# Patient Record
Sex: MALE | Race: WHITE | NOT HISPANIC OR LATINO | Employment: UNEMPLOYED | URBAN - NONMETROPOLITAN AREA
[De-identification: names, ages, dates, MRNs, and addresses within clinical notes are randomized per-mention and may not be internally consistent; named-entity substitution may affect disease eponyms.]

---

## 2019-06-27 ENCOUNTER — APPOINTMENT (OUTPATIENT)
Dept: ULTRASOUND IMAGING | Facility: HOSPITAL | Age: 29
End: 2019-06-27

## 2019-06-27 ENCOUNTER — HOSPITAL ENCOUNTER (INPATIENT)
Facility: HOSPITAL | Age: 29
LOS: 3 days | Discharge: LEFT BEFORE DISCHARGE | End: 2019-06-30
Attending: FAMILY MEDICINE | Admitting: HOSPITALIST

## 2019-06-27 ENCOUNTER — APPOINTMENT (OUTPATIENT)
Dept: CT IMAGING | Facility: HOSPITAL | Age: 29
End: 2019-06-27

## 2019-06-27 ENCOUNTER — APPOINTMENT (OUTPATIENT)
Dept: GENERAL RADIOLOGY | Facility: HOSPITAL | Age: 29
End: 2019-06-27

## 2019-06-27 DIAGNOSIS — K85.20 ALCOHOL-INDUCED ACUTE PANCREATITIS, UNSPECIFIED COMPLICATION STATUS: Primary | ICD-10-CM

## 2019-06-27 LAB
6-ACETYL MORPHINE: NEGATIVE
ALBUMIN SERPL-MCNC: 3.94 G/DL (ref 3.5–5.2)
ALBUMIN/GLOB SERPL: 1 G/DL
ALP SERPL-CCNC: 312 U/L (ref 39–117)
ALT SERPL W P-5'-P-CCNC: 114 U/L (ref 1–41)
AMPHET+METHAMPHET UR QL: POSITIVE
AMYLASE SERPL-CCNC: 259 U/L (ref 28–100)
ANION GAP SERPL CALCULATED.3IONS-SCNC: 25.9 MMOL/L (ref 5–15)
AST SERPL-CCNC: 272 U/L (ref 1–40)
BARBITURATES UR QL SCN: NEGATIVE
BASOPHILS # BLD AUTO: 0.01 10*3/MM3 (ref 0–0.2)
BASOPHILS NFR BLD AUTO: 0.1 % (ref 0–1.5)
BENZODIAZ UR QL SCN: NEGATIVE
BILIRUB SERPL-MCNC: 2.1 MG/DL (ref 0.2–1.2)
BILIRUB UR QL STRIP: NEGATIVE
BUN BLD-MCNC: 7 MG/DL (ref 6–20)
BUN/CREAT SERPL: 10.1 (ref 7–25)
BUPRENORPHINE SERPL-MCNC: POSITIVE NG/ML
CALCIUM SPEC-SCNC: 9.3 MG/DL (ref 8.6–10.5)
CANNABINOIDS SERPL QL: NEGATIVE
CHLORIDE SERPL-SCNC: 99 MMOL/L (ref 98–107)
CK SERPL-CCNC: 42 U/L (ref 20–200)
CLARITY UR: CLEAR
CO2 SERPL-SCNC: 15.1 MMOL/L (ref 22–29)
COCAINE UR QL: NEGATIVE
COLOR UR: ABNORMAL
CREAT BLD-MCNC: 0.69 MG/DL (ref 0.76–1.27)
CRP SERPL-MCNC: 0.08 MG/DL (ref 0–0.5)
D-LACTATE SERPL-SCNC: 2 MMOL/L (ref 0.5–2)
D-LACTATE SERPL-SCNC: 6.6 MMOL/L (ref 0.5–2)
DEPRECATED RDW RBC AUTO: 43.6 FL (ref 37–54)
EOSINOPHIL # BLD AUTO: 0.06 10*3/MM3 (ref 0–0.4)
EOSINOPHIL NFR BLD AUTO: 0.5 % (ref 0.3–6.2)
ERYTHROCYTE [DISTWIDTH] IN BLOOD BY AUTOMATED COUNT: 12.6 % (ref 12.3–15.4)
ETHANOL BLD-MCNC: 53 MG/DL (ref 0–10)
ETHANOL UR QL: 0.05 %
GFR SERPL CREATININE-BSD FRML MDRD: 137 ML/MIN/1.73
GLOBULIN UR ELPH-MCNC: 3.8 GM/DL
GLUCOSE BLD-MCNC: 142 MG/DL (ref 65–99)
GLUCOSE UR STRIP-MCNC: NEGATIVE MG/DL
HAV IGM SERPL QL IA: ABNORMAL
HBV CORE IGM SERPL QL IA: ABNORMAL
HBV SURFACE AG SERPL QL IA: ABNORMAL
HCT VFR BLD AUTO: 46.6 % (ref 37.5–51)
HCV AB SER DONR QL: REACTIVE
HGB BLD-MCNC: 16.2 G/DL (ref 13–17.7)
HGB UR QL STRIP.AUTO: NEGATIVE
HOLD SPECIMEN: NORMAL
IMM GRANULOCYTES # BLD AUTO: 0.03 10*3/MM3 (ref 0–0.05)
IMM GRANULOCYTES NFR BLD AUTO: 0.3 % (ref 0–0.5)
INR PPP: 0.99 (ref 0.9–1.1)
KETONES UR QL STRIP: ABNORMAL
LEUKOCYTE ESTERASE UR QL STRIP.AUTO: NEGATIVE
LIPASE SERPL-CCNC: 988 U/L (ref 13–60)
LYMPHOCYTES # BLD AUTO: 1.39 10*3/MM3 (ref 0.7–3.1)
LYMPHOCYTES NFR BLD AUTO: 11.8 % (ref 19.6–45.3)
MAGNESIUM SERPL-MCNC: 1.3 MG/DL (ref 1.6–2.6)
MCH RBC QN AUTO: 33.7 PG (ref 26.6–33)
MCHC RBC AUTO-ENTMCNC: 34.8 G/DL (ref 31.5–35.7)
MCV RBC AUTO: 96.9 FL (ref 79–97)
METHADONE UR QL SCN: NEGATIVE
MONOCYTES # BLD AUTO: 1 10*3/MM3 (ref 0.1–0.9)
MONOCYTES NFR BLD AUTO: 8.5 % (ref 5–12)
MYOGLOBIN SERPL-MCNC: <21 NG/ML (ref 28–72)
NEUTROPHILS # BLD AUTO: 9.29 10*3/MM3 (ref 1.7–7)
NEUTROPHILS NFR BLD AUTO: 78.8 % (ref 42.7–76)
NITRITE UR QL STRIP: NEGATIVE
OPIATES UR QL: POSITIVE
OXYCODONE UR QL SCN: NEGATIVE
PCP UR QL SCN: NEGATIVE
PH UR STRIP.AUTO: 6 [PH] (ref 5–8)
PLATELET # BLD AUTO: 206 10*3/MM3 (ref 140–450)
PMV BLD AUTO: 10.4 FL (ref 6–12)
POTASSIUM BLD-SCNC: 2.7 MMOL/L (ref 3.5–5.2)
PROT SERPL-MCNC: 7.7 G/DL (ref 6–8.5)
PROT UR QL STRIP: ABNORMAL
PROTHROMBIN TIME: 13.6 SECONDS (ref 11–15.4)
RBC # BLD AUTO: 4.81 10*6/MM3 (ref 4.14–5.8)
SODIUM BLD-SCNC: 140 MMOL/L (ref 136–145)
SP GR UR STRIP: >1.03 (ref 1–1.03)
TROPONIN T SERPL-MCNC: <0.01 NG/ML (ref 0–0.03)
UROBILINOGEN UR QL STRIP: ABNORMAL
WBC NRBC COR # BLD: 11.78 10*3/MM3 (ref 3.4–10.8)

## 2019-06-27 PROCEDURE — 80307 DRUG TEST PRSMV CHEM ANLYZR: CPT | Performed by: FAMILY MEDICINE

## 2019-06-27 PROCEDURE — 71045 X-RAY EXAM CHEST 1 VIEW: CPT | Performed by: RADIOLOGY

## 2019-06-27 PROCEDURE — 71045 X-RAY EXAM CHEST 1 VIEW: CPT

## 2019-06-27 PROCEDURE — 93010 ELECTROCARDIOGRAM REPORT: CPT | Performed by: INTERNAL MEDICINE

## 2019-06-27 PROCEDURE — 93005 ELECTROCARDIOGRAM TRACING: CPT | Performed by: FAMILY MEDICINE

## 2019-06-27 PROCEDURE — 25010000002 PROMETHAZINE PER 50 MG: Performed by: FAMILY MEDICINE

## 2019-06-27 PROCEDURE — 81003 URINALYSIS AUTO W/O SCOPE: CPT | Performed by: FAMILY MEDICINE

## 2019-06-27 PROCEDURE — 25010000002 HYDROMORPHONE PER 4 MG: Performed by: FAMILY MEDICINE

## 2019-06-27 PROCEDURE — 85610 PROTHROMBIN TIME: CPT | Performed by: FAMILY MEDICINE

## 2019-06-27 PROCEDURE — 80074 ACUTE HEPATITIS PANEL: CPT | Performed by: FAMILY MEDICINE

## 2019-06-27 PROCEDURE — 99223 1ST HOSP IP/OBS HIGH 75: CPT | Performed by: HOSPITALIST

## 2019-06-27 PROCEDURE — 76705 ECHO EXAM OF ABDOMEN: CPT

## 2019-06-27 PROCEDURE — 0 IOVERSOL 68 % SOLUTION: Performed by: FAMILY MEDICINE

## 2019-06-27 PROCEDURE — 82150 ASSAY OF AMYLASE: CPT | Performed by: FAMILY MEDICINE

## 2019-06-27 PROCEDURE — 80053 COMPREHEN METABOLIC PANEL: CPT | Performed by: FAMILY MEDICINE

## 2019-06-27 PROCEDURE — 83690 ASSAY OF LIPASE: CPT | Performed by: FAMILY MEDICINE

## 2019-06-27 PROCEDURE — 85025 COMPLETE CBC W/AUTO DIFF WBC: CPT | Performed by: FAMILY MEDICINE

## 2019-06-27 PROCEDURE — 99285 EMERGENCY DEPT VISIT HI MDM: CPT

## 2019-06-27 PROCEDURE — 99406 BEHAV CHNG SMOKING 3-10 MIN: CPT | Performed by: HOSPITALIST

## 2019-06-27 PROCEDURE — 74177 CT ABD & PELVIS W/CONTRAST: CPT | Performed by: RADIOLOGY

## 2019-06-27 PROCEDURE — 83874 ASSAY OF MYOGLOBIN: CPT | Performed by: FAMILY MEDICINE

## 2019-06-27 PROCEDURE — 83735 ASSAY OF MAGNESIUM: CPT | Performed by: FAMILY MEDICINE

## 2019-06-27 PROCEDURE — 86140 C-REACTIVE PROTEIN: CPT | Performed by: FAMILY MEDICINE

## 2019-06-27 PROCEDURE — 25010000002 MAGNESIUM SULFATE 2 GM/50ML SOLUTION: Performed by: FAMILY MEDICINE

## 2019-06-27 PROCEDURE — 74177 CT ABD & PELVIS W/CONTRAST: CPT

## 2019-06-27 PROCEDURE — 76705 ECHO EXAM OF ABDOMEN: CPT | Performed by: RADIOLOGY

## 2019-06-27 PROCEDURE — 84484 ASSAY OF TROPONIN QUANT: CPT | Performed by: FAMILY MEDICINE

## 2019-06-27 PROCEDURE — 25010000002 HYDROMORPHONE 1 MG/ML SOLUTION: Performed by: FAMILY MEDICINE

## 2019-06-27 PROCEDURE — 25010000002 ONDANSETRON PER 1 MG: Performed by: FAMILY MEDICINE

## 2019-06-27 PROCEDURE — 87040 BLOOD CULTURE FOR BACTERIA: CPT | Performed by: FAMILY MEDICINE

## 2019-06-27 PROCEDURE — 83605 ASSAY OF LACTIC ACID: CPT | Performed by: FAMILY MEDICINE

## 2019-06-27 PROCEDURE — 25010000002 MORPHINE PER 10 MG: Performed by: FAMILY MEDICINE

## 2019-06-27 PROCEDURE — 82550 ASSAY OF CK (CPK): CPT | Performed by: FAMILY MEDICINE

## 2019-06-27 PROCEDURE — 25010000002 HYDROMORPHONE 1 MG/ML SOLUTION: Performed by: HOSPITALIST

## 2019-06-27 PROCEDURE — 25010000002 HYDRALAZINE PER 20 MG: Performed by: HOSPITALIST

## 2019-06-27 RX ORDER — ONDANSETRON 2 MG/ML
4 INJECTION INTRAMUSCULAR; INTRAVENOUS ONCE
Status: COMPLETED | OUTPATIENT
Start: 2019-06-27 | End: 2019-06-27

## 2019-06-27 RX ORDER — MAGNESIUM SULFATE HEPTAHYDRATE 40 MG/ML
4 INJECTION, SOLUTION INTRAVENOUS AS NEEDED
Status: DISCONTINUED | OUTPATIENT
Start: 2019-06-27 | End: 2019-06-30 | Stop reason: HOSPADM

## 2019-06-27 RX ORDER — HYDRALAZINE HYDROCHLORIDE 20 MG/ML
10 INJECTION INTRAMUSCULAR; INTRAVENOUS ONCE
Status: COMPLETED | OUTPATIENT
Start: 2019-06-27 | End: 2019-06-27

## 2019-06-27 RX ORDER — MAGNESIUM SULFATE HEPTAHYDRATE 40 MG/ML
2 INJECTION, SOLUTION INTRAVENOUS ONCE
Status: COMPLETED | OUTPATIENT
Start: 2019-06-27 | End: 2019-06-27

## 2019-06-27 RX ORDER — POTASSIUM CHLORIDE 750 MG/1
40 CAPSULE, EXTENDED RELEASE ORAL AS NEEDED
Status: DISCONTINUED | OUTPATIENT
Start: 2019-06-27 | End: 2019-06-30 | Stop reason: HOSPADM

## 2019-06-27 RX ORDER — LORAZEPAM 2 MG/ML
2 INJECTION INTRAMUSCULAR
Status: DISCONTINUED | OUTPATIENT
Start: 2019-06-27 | End: 2019-06-30 | Stop reason: HOSPADM

## 2019-06-27 RX ORDER — LORAZEPAM 1 MG/1
1 TABLET ORAL
Status: DISCONTINUED | OUTPATIENT
Start: 2019-06-27 | End: 2019-06-30 | Stop reason: HOSPADM

## 2019-06-27 RX ORDER — SODIUM CHLORIDE 0.9 % (FLUSH) 0.9 %
3 SYRINGE (ML) INJECTION EVERY 12 HOURS SCHEDULED
Status: DISCONTINUED | OUTPATIENT
Start: 2019-06-27 | End: 2019-06-30 | Stop reason: HOSPADM

## 2019-06-27 RX ORDER — HYDROMORPHONE HYDROCHLORIDE 1 MG/ML
0.5 INJECTION, SOLUTION INTRAMUSCULAR; INTRAVENOUS; SUBCUTANEOUS ONCE
Status: COMPLETED | OUTPATIENT
Start: 2019-06-27 | End: 2019-06-27

## 2019-06-27 RX ORDER — METOPROLOL TARTRATE 5 MG/5ML
5 INJECTION INTRAVENOUS EVERY 6 HOURS PRN
Status: DISCONTINUED | OUTPATIENT
Start: 2019-06-27 | End: 2019-06-30 | Stop reason: HOSPADM

## 2019-06-27 RX ORDER — NALOXONE HCL 0.4 MG/ML
0.4 VIAL (ML) INJECTION
Status: DISCONTINUED | OUTPATIENT
Start: 2019-06-27 | End: 2019-06-28

## 2019-06-27 RX ORDER — SODIUM CHLORIDE 9 MG/ML
50 INJECTION, SOLUTION INTRAVENOUS CONTINUOUS
Status: DISCONTINUED | OUTPATIENT
Start: 2019-06-27 | End: 2019-06-30 | Stop reason: HOSPADM

## 2019-06-27 RX ORDER — LORAZEPAM 2 MG/1
2 TABLET ORAL
Status: DISCONTINUED | OUTPATIENT
Start: 2019-06-27 | End: 2019-06-30 | Stop reason: HOSPADM

## 2019-06-27 RX ORDER — POTASSIUM CHLORIDE 1.5 G/1.77G
40 POWDER, FOR SOLUTION ORAL AS NEEDED
Status: DISCONTINUED | OUTPATIENT
Start: 2019-06-27 | End: 2019-06-30 | Stop reason: HOSPADM

## 2019-06-27 RX ORDER — POTASSIUM CHLORIDE 7.45 MG/ML
10 INJECTION INTRAVENOUS
Status: DISPENSED | OUTPATIENT
Start: 2019-06-28 | End: 2019-06-28

## 2019-06-27 RX ORDER — SODIUM CHLORIDE 0.9 % (FLUSH) 0.9 %
10 SYRINGE (ML) INJECTION AS NEEDED
Status: DISCONTINUED | OUTPATIENT
Start: 2019-06-27 | End: 2019-06-30 | Stop reason: HOSPADM

## 2019-06-27 RX ORDER — NALOXONE HCL 0.4 MG/ML
0.4 VIAL (ML) INJECTION
Status: DISCONTINUED | OUTPATIENT
Start: 2019-06-27 | End: 2019-06-27

## 2019-06-27 RX ORDER — POTASSIUM CHLORIDE 7.45 MG/ML
10 INJECTION INTRAVENOUS
Status: DISCONTINUED | OUTPATIENT
Start: 2019-06-27 | End: 2019-06-30 | Stop reason: HOSPADM

## 2019-06-27 RX ORDER — CLONIDINE 0.1 MG/24H
1 PATCH, EXTENDED RELEASE TRANSDERMAL WEEKLY
Status: DISCONTINUED | OUTPATIENT
Start: 2019-06-28 | End: 2019-06-29

## 2019-06-27 RX ORDER — MORPHINE SULFATE 2 MG/ML
4 INJECTION, SOLUTION INTRAMUSCULAR; INTRAVENOUS ONCE
Status: COMPLETED | OUTPATIENT
Start: 2019-06-27 | End: 2019-06-27

## 2019-06-27 RX ORDER — MAGNESIUM SULFATE HEPTAHYDRATE 40 MG/ML
2 INJECTION, SOLUTION INTRAVENOUS AS NEEDED
Status: DISCONTINUED | OUTPATIENT
Start: 2019-06-27 | End: 2019-06-30 | Stop reason: HOSPADM

## 2019-06-27 RX ORDER — HYDRALAZINE HYDROCHLORIDE 20 MG/ML
20 INJECTION INTRAMUSCULAR; INTRAVENOUS EVERY 6 HOURS PRN
Status: DISCONTINUED | OUTPATIENT
Start: 2019-06-27 | End: 2019-06-30 | Stop reason: HOSPADM

## 2019-06-27 RX ORDER — SODIUM CHLORIDE 0.9 % (FLUSH) 0.9 %
3-10 SYRINGE (ML) INJECTION AS NEEDED
Status: DISCONTINUED | OUTPATIENT
Start: 2019-06-27 | End: 2019-06-30 | Stop reason: HOSPADM

## 2019-06-27 RX ORDER — NITROGLYCERIN 0.4 MG/1
0.4 TABLET SUBLINGUAL
Status: DISCONTINUED | OUTPATIENT
Start: 2019-06-27 | End: 2019-06-30 | Stop reason: HOSPADM

## 2019-06-27 RX ORDER — LORAZEPAM 2 MG/ML
1 INJECTION INTRAMUSCULAR
Status: DISCONTINUED | OUTPATIENT
Start: 2019-06-27 | End: 2019-06-30 | Stop reason: HOSPADM

## 2019-06-27 RX ADMIN — SODIUM CHLORIDE 1000 ML: 9 INJECTION, SOLUTION INTRAVENOUS at 14:44

## 2019-06-27 RX ADMIN — IOVERSOL 100 ML: 678 INJECTION INTRA-ARTERIAL; INTRAVENOUS at 16:03

## 2019-06-27 RX ADMIN — HYDROMORPHONE HYDROCHLORIDE 1 MG: 1 INJECTION, SOLUTION INTRAMUSCULAR; INTRAVENOUS; SUBCUTANEOUS at 18:58

## 2019-06-27 RX ADMIN — HYDRALAZINE HYDROCHLORIDE 10 MG: 20 INJECTION INTRAMUSCULAR; INTRAVENOUS at 21:33

## 2019-06-27 RX ADMIN — HYDROMORPHONE HYDROCHLORIDE 1 MG: 1 INJECTION, SOLUTION INTRAMUSCULAR; INTRAVENOUS; SUBCUTANEOUS at 20:55

## 2019-06-27 RX ADMIN — HYDROMORPHONE HYDROCHLORIDE 1 MG: 1 INJECTION, SOLUTION INTRAMUSCULAR; INTRAVENOUS; SUBCUTANEOUS at 19:56

## 2019-06-27 RX ADMIN — HYDROMORPHONE HYDROCHLORIDE 1 MG: 1 INJECTION, SOLUTION INTRAMUSCULAR; INTRAVENOUS; SUBCUTANEOUS at 22:59

## 2019-06-27 RX ADMIN — HYDROMORPHONE HYDROCHLORIDE 0.5 MG: 1 INJECTION, SOLUTION INTRAMUSCULAR; INTRAVENOUS; SUBCUTANEOUS at 15:34

## 2019-06-27 RX ADMIN — HYDROMORPHONE HYDROCHLORIDE 1 MG: 1 INJECTION, SOLUTION INTRAMUSCULAR; INTRAVENOUS; SUBCUTANEOUS at 17:23

## 2019-06-27 RX ADMIN — HYDROMORPHONE HYDROCHLORIDE 1 MG: 1 INJECTION, SOLUTION INTRAMUSCULAR; INTRAVENOUS; SUBCUTANEOUS at 16:35

## 2019-06-27 RX ADMIN — SODIUM CHLORIDE 2790 ML: 9 INJECTION, SOLUTION INTRAVENOUS at 18:59

## 2019-06-27 RX ADMIN — ONDANSETRON 4 MG: 2 INJECTION, SOLUTION INTRAMUSCULAR; INTRAVENOUS at 14:43

## 2019-06-27 RX ADMIN — HYDROMORPHONE HYDROCHLORIDE 1 MG: 1 INJECTION, SOLUTION INTRAMUSCULAR; INTRAVENOUS; SUBCUTANEOUS at 21:54

## 2019-06-27 RX ADMIN — SODIUM CHLORIDE 1000 ML: 9 INJECTION, SOLUTION INTRAVENOUS at 17:26

## 2019-06-27 RX ADMIN — HYDROMORPHONE HYDROCHLORIDE 1 MG: 1 INJECTION, SOLUTION INTRAMUSCULAR; INTRAVENOUS; SUBCUTANEOUS at 15:05

## 2019-06-27 RX ADMIN — MORPHINE SULFATE 4 MG: 2 INJECTION, SOLUTION INTRAMUSCULAR; INTRAVENOUS at 14:44

## 2019-06-27 RX ADMIN — METOPROLOL TARTRATE 5 MG: 5 INJECTION, SOLUTION INTRAVENOUS at 23:22

## 2019-06-27 RX ADMIN — MAGNESIUM SULFATE IN WATER 2 G: 40 INJECTION, SOLUTION INTRAVENOUS at 21:12

## 2019-06-27 RX ADMIN — PROMETHAZINE HYDROCHLORIDE 12.5 MG: 25 INJECTION INTRAMUSCULAR; INTRAVENOUS at 17:24

## 2019-06-28 LAB
ALBUMIN SERPL-MCNC: 3.41 G/DL (ref 3.5–5.2)
ALBUMIN/GLOB SERPL: 1 G/DL
ALP SERPL-CCNC: 263 U/L (ref 39–117)
ALT SERPL W P-5'-P-CCNC: 79 U/L (ref 1–41)
AMYLASE SERPL-CCNC: 310 U/L (ref 28–100)
ANION GAP SERPL CALCULATED.3IONS-SCNC: 18.1 MMOL/L (ref 5–15)
AST SERPL-CCNC: 132 U/L (ref 1–40)
BASOPHILS # BLD AUTO: 0.02 10*3/MM3 (ref 0–0.2)
BASOPHILS NFR BLD AUTO: 0.1 % (ref 0–1.5)
BILIRUB SERPL-MCNC: 3.2 MG/DL (ref 0.2–1.2)
BUN BLD-MCNC: 7 MG/DL (ref 6–20)
BUN/CREAT SERPL: 12.5 (ref 7–25)
CALCIUM SPEC-SCNC: 8.8 MG/DL (ref 8.6–10.5)
CHLORIDE SERPL-SCNC: 102 MMOL/L (ref 98–107)
CHOLEST SERPL-MCNC: 147 MG/DL (ref 0–200)
CO2 SERPL-SCNC: 17.9 MMOL/L (ref 22–29)
CREAT BLD-MCNC: 0.56 MG/DL (ref 0.76–1.27)
DEPRECATED RDW RBC AUTO: 47.8 FL (ref 37–54)
EOSINOPHIL # BLD AUTO: 0.06 10*3/MM3 (ref 0–0.4)
EOSINOPHIL NFR BLD AUTO: 0.4 % (ref 0.3–6.2)
ERYTHROCYTE [DISTWIDTH] IN BLOOD BY AUTOMATED COUNT: 13 % (ref 12.3–15.4)
ETHANOL BLD-MCNC: <10 MG/DL (ref 0–10)
ETHANOL UR QL: <0.01 %
GFR SERPL CREATININE-BSD FRML MDRD: >150 ML/MIN/1.73
GLOBULIN UR ELPH-MCNC: 3.4 GM/DL
GLUCOSE BLD-MCNC: 142 MG/DL (ref 65–99)
HCT VFR BLD AUTO: 50.9 % (ref 37.5–51)
HDLC SERPL-MCNC: 52 MG/DL (ref 40–60)
HGB BLD-MCNC: 17.1 G/DL (ref 13–17.7)
IMM GRANULOCYTES # BLD AUTO: 0.07 10*3/MM3 (ref 0–0.05)
IMM GRANULOCYTES NFR BLD AUTO: 0.4 % (ref 0–0.5)
LDLC SERPL CALC-MCNC: 76 MG/DL (ref 0–100)
LDLC/HDLC SERPL: 1.46 {RATIO}
LIPASE SERPL-CCNC: 876 U/L (ref 13–60)
LYMPHOCYTES # BLD AUTO: 0.93 10*3/MM3 (ref 0.7–3.1)
LYMPHOCYTES NFR BLD AUTO: 5.6 % (ref 19.6–45.3)
MCH RBC QN AUTO: 34.1 PG (ref 26.6–33)
MCHC RBC AUTO-ENTMCNC: 33.6 G/DL (ref 31.5–35.7)
MCV RBC AUTO: 101.6 FL (ref 79–97)
MONOCYTES # BLD AUTO: 1.4 10*3/MM3 (ref 0.1–0.9)
MONOCYTES NFR BLD AUTO: 8.5 % (ref 5–12)
NEUTROPHILS # BLD AUTO: 13.99 10*3/MM3 (ref 1.7–7)
NEUTROPHILS NFR BLD AUTO: 85 % (ref 42.7–76)
PLATELET # BLD AUTO: 173 10*3/MM3 (ref 140–450)
PMV BLD AUTO: 11.2 FL (ref 6–12)
POTASSIUM BLD-SCNC: 4.5 MMOL/L (ref 3.5–5.2)
PROT SERPL-MCNC: 6.8 G/DL (ref 6–8.5)
RBC # BLD AUTO: 5.01 10*6/MM3 (ref 4.14–5.8)
SODIUM BLD-SCNC: 138 MMOL/L (ref 136–145)
TRIGL SERPL-MCNC: 96 MG/DL (ref 0–150)
VLDLC SERPL-MCNC: 19.2 MG/DL
WBC NRBC COR # BLD: 16.47 10*3/MM3 (ref 3.4–10.8)

## 2019-06-28 PROCEDURE — 80061 LIPID PANEL: CPT | Performed by: HOSPITALIST

## 2019-06-28 PROCEDURE — 25010000002 HYDRALAZINE PER 20 MG: Performed by: HOSPITALIST

## 2019-06-28 PROCEDURE — 25010000003 POTASSIUM CHLORIDE 10 MEQ/100ML SOLUTION: Performed by: HOSPITALIST

## 2019-06-28 PROCEDURE — 94799 UNLISTED PULMONARY SVC/PX: CPT

## 2019-06-28 PROCEDURE — 80053 COMPREHEN METABOLIC PANEL: CPT | Performed by: HOSPITALIST

## 2019-06-28 PROCEDURE — 99233 SBSQ HOSP IP/OBS HIGH 50: CPT | Performed by: INTERNAL MEDICINE

## 2019-06-28 PROCEDURE — 80307 DRUG TEST PRSMV CHEM ANLYZR: CPT | Performed by: NURSE PRACTITIONER

## 2019-06-28 PROCEDURE — 83690 ASSAY OF LIPASE: CPT | Performed by: HOSPITALIST

## 2019-06-28 PROCEDURE — 25010000002 LORAZEPAM PER 2 MG: Performed by: NURSE PRACTITIONER

## 2019-06-28 PROCEDURE — 82150 ASSAY OF AMYLASE: CPT | Performed by: HOSPITALIST

## 2019-06-28 PROCEDURE — 25010000002 HYDROMORPHONE 1 MG/ML SOLUTION: Performed by: HOSPITALIST

## 2019-06-28 PROCEDURE — 25010000002 PROMETHAZINE PER 50 MG: Performed by: HOSPITALIST

## 2019-06-28 PROCEDURE — 25010000002 THIAMINE PER 100 MG: Performed by: NURSE PRACTITIONER

## 2019-06-28 PROCEDURE — 25010000002 HYDROMORPHONE 1 MG/ML SOLUTION: Performed by: INTERNAL MEDICINE

## 2019-06-28 PROCEDURE — 85025 COMPLETE CBC W/AUTO DIFF WBC: CPT | Performed by: HOSPITALIST

## 2019-06-28 RX ORDER — CHLORDIAZEPOXIDE HYDROCHLORIDE 25 MG/1
25 CAPSULE, GELATIN COATED ORAL EVERY 8 HOURS SCHEDULED
Status: DISCONTINUED | OUTPATIENT
Start: 2019-06-28 | End: 2019-06-30 | Stop reason: HOSPADM

## 2019-06-28 RX ORDER — THIAMINE MONONITRATE (VIT B1) 100 MG
100 TABLET ORAL DAILY
Status: DISCONTINUED | OUTPATIENT
Start: 2019-06-28 | End: 2019-06-30 | Stop reason: HOSPADM

## 2019-06-28 RX ORDER — FOLIC ACID 1 MG/1
1 TABLET ORAL DAILY
Status: DISCONTINUED | OUTPATIENT
Start: 2019-06-28 | End: 2019-06-30 | Stop reason: HOSPADM

## 2019-06-28 RX ORDER — NALOXONE HCL 0.4 MG/ML
0.4 VIAL (ML) INJECTION
Status: DISCONTINUED | OUTPATIENT
Start: 2019-06-28 | End: 2019-06-29

## 2019-06-28 RX ORDER — ISOSORBIDE MONONITRATE 60 MG/1
120 TABLET, EXTENDED RELEASE ORAL
Status: DISCONTINUED | OUTPATIENT
Start: 2019-06-28 | End: 2019-06-28

## 2019-06-28 RX ORDER — ISOSORBIDE MONONITRATE 60 MG/1
60 TABLET, EXTENDED RELEASE ORAL
Status: DISCONTINUED | OUTPATIENT
Start: 2019-06-28 | End: 2019-06-28

## 2019-06-28 RX ORDER — UREA 10 %
1 LOTION (ML) TOPICAL DAILY
Status: DISCONTINUED | OUTPATIENT
Start: 2019-06-28 | End: 2019-06-30 | Stop reason: HOSPADM

## 2019-06-28 RX ORDER — ALUMINA, MAGNESIA, AND SIMETHICONE 2400; 2400; 240 MG/30ML; MG/30ML; MG/30ML
15 SUSPENSION ORAL EVERY 6 HOURS PRN
Status: DISCONTINUED | OUTPATIENT
Start: 2019-06-28 | End: 2019-06-30 | Stop reason: HOSPADM

## 2019-06-28 RX ADMIN — HYDROMORPHONE HYDROCHLORIDE 1 MG: 1 INJECTION, SOLUTION INTRAMUSCULAR; INTRAVENOUS; SUBCUTANEOUS at 13:20

## 2019-06-28 RX ADMIN — CHLORDIAZEPOXIDE HYDROCHLORIDE 25 MG: 25 CAPSULE ORAL at 22:06

## 2019-06-28 RX ADMIN — CLONIDINE 1 PATCH: 0.1 PATCH TRANSDERMAL at 00:13

## 2019-06-28 RX ADMIN — HYDROMORPHONE HYDROCHLORIDE 1 MG: 1 INJECTION, SOLUTION INTRAMUSCULAR; INTRAVENOUS; SUBCUTANEOUS at 03:08

## 2019-06-28 RX ADMIN — HYDROMORPHONE HYDROCHLORIDE 1 MG: 1 INJECTION, SOLUTION INTRAMUSCULAR; INTRAVENOUS; SUBCUTANEOUS at 10:04

## 2019-06-28 RX ADMIN — POTASSIUM CHLORIDE 10 MEQ: 10 INJECTION, SOLUTION INTRAVENOUS at 01:59

## 2019-06-28 RX ADMIN — HYDRALAZINE HYDROCHLORIDE 20 MG: 20 INJECTION INTRAMUSCULAR; INTRAVENOUS at 14:21

## 2019-06-28 RX ADMIN — POTASSIUM CHLORIDE 10 MEQ: 10 INJECTION, SOLUTION INTRAVENOUS at 13:07

## 2019-06-28 RX ADMIN — Medication 1 TABLET: at 12:09

## 2019-06-28 RX ADMIN — NICOTINE 1 PATCH: 7 PATCH TRANSDERMAL at 00:13

## 2019-06-28 RX ADMIN — POTASSIUM CHLORIDE 10 MEQ: 10 INJECTION, SOLUTION INTRAVENOUS at 03:08

## 2019-06-28 RX ADMIN — POTASSIUM CHLORIDE 10 MEQ: 10 INJECTION, SOLUTION INTRAVENOUS at 12:14

## 2019-06-28 RX ADMIN — HYDROMORPHONE HYDROCHLORIDE 1 MG: 1 INJECTION, SOLUTION INTRAMUSCULAR; INTRAVENOUS; SUBCUTANEOUS at 21:01

## 2019-06-28 RX ADMIN — HYDROMORPHONE HYDROCHLORIDE 1 MG: 1 INJECTION, SOLUTION INTRAMUSCULAR; INTRAVENOUS; SUBCUTANEOUS at 07:26

## 2019-06-28 RX ADMIN — HYDROMORPHONE HYDROCHLORIDE 1 MG: 1 INJECTION, SOLUTION INTRAMUSCULAR; INTRAVENOUS; SUBCUTANEOUS at 01:11

## 2019-06-28 RX ADMIN — HYDRALAZINE HYDROCHLORIDE 20 MG: 20 INJECTION INTRAMUSCULAR; INTRAVENOUS at 03:08

## 2019-06-28 RX ADMIN — HYDROMORPHONE HYDROCHLORIDE 1 MG: 1 INJECTION, SOLUTION INTRAMUSCULAR; INTRAVENOUS; SUBCUTANEOUS at 16:57

## 2019-06-28 RX ADMIN — FOLIC ACID 1 MG: 1 TABLET ORAL at 12:09

## 2019-06-28 RX ADMIN — CHLORDIAZEPOXIDE HYDROCHLORIDE 25 MG: 25 CAPSULE ORAL at 12:09

## 2019-06-28 RX ADMIN — POTASSIUM CHLORIDE 10 MEQ: 10 INJECTION, SOLUTION INTRAVENOUS at 00:23

## 2019-06-28 RX ADMIN — THIAMINE HYDROCHLORIDE 100 ML/HR: 100 INJECTION, SOLUTION INTRAMUSCULAR; INTRAVENOUS at 05:10

## 2019-06-28 RX ADMIN — SODIUM CHLORIDE 150 ML/HR: 9 INJECTION, SOLUTION INTRAVENOUS at 21:01

## 2019-06-28 RX ADMIN — METOPROLOL TARTRATE 5 MG: 5 INJECTION, SOLUTION INTRAVENOUS at 15:46

## 2019-06-28 RX ADMIN — POTASSIUM CHLORIDE 10 MEQ: 10 INJECTION, SOLUTION INTRAVENOUS at 05:10

## 2019-06-28 RX ADMIN — Medication 3 ML: at 22:13

## 2019-06-28 RX ADMIN — LORAZEPAM 1 MG: 2 INJECTION INTRAMUSCULAR; INTRAVENOUS at 19:25

## 2019-06-28 RX ADMIN — SODIUM CHLORIDE 150 ML/HR: 9 INJECTION, SOLUTION INTRAVENOUS at 13:51

## 2019-06-28 RX ADMIN — HYDROMORPHONE HYDROCHLORIDE 1 MG: 1 INJECTION, SOLUTION INTRAMUSCULAR; INTRAVENOUS; SUBCUTANEOUS at 05:23

## 2019-06-28 RX ADMIN — METOPROLOL TARTRATE 5 MG: 5 INJECTION, SOLUTION INTRAVENOUS at 06:06

## 2019-06-28 RX ADMIN — SODIUM CHLORIDE 150 ML/HR: 9 INJECTION, SOLUTION INTRAVENOUS at 00:19

## 2019-06-28 RX ADMIN — Medication 100 MG: at 12:10

## 2019-06-28 RX ADMIN — PROMETHAZINE HYDROCHLORIDE 6.25 MG: 25 INJECTION INTRAMUSCULAR; INTRAVENOUS at 05:23

## 2019-06-29 LAB
ALBUMIN SERPL-MCNC: 2.92 G/DL (ref 3.5–5.2)
ALBUMIN/GLOB SERPL: 0.8 G/DL
ALP SERPL-CCNC: 201 U/L (ref 39–117)
ALT SERPL W P-5'-P-CCNC: 47 U/L (ref 1–41)
ANION GAP SERPL CALCULATED.3IONS-SCNC: 11.9 MMOL/L (ref 5–15)
AST SERPL-CCNC: 96 U/L (ref 1–40)
BILIRUB SERPL-MCNC: 5.3 MG/DL (ref 0.2–1.2)
BUN BLD-MCNC: 7 MG/DL (ref 6–20)
BUN/CREAT SERPL: 13 (ref 7–25)
CALCIUM SPEC-SCNC: 8.7 MG/DL (ref 8.6–10.5)
CHLORIDE SERPL-SCNC: 99 MMOL/L (ref 98–107)
CO2 SERPL-SCNC: 19.1 MMOL/L (ref 22–29)
CREAT BLD-MCNC: 0.54 MG/DL (ref 0.76–1.27)
DEPRECATED RDW RBC AUTO: 49.4 FL (ref 37–54)
ERYTHROCYTE [DISTWIDTH] IN BLOOD BY AUTOMATED COUNT: 13.3 % (ref 12.3–15.4)
GFR SERPL CREATININE-BSD FRML MDRD: >150 ML/MIN/1.73
GLOBULIN UR ELPH-MCNC: 3.5 GM/DL
GLUCOSE BLD-MCNC: 137 MG/DL (ref 65–99)
GLUCOSE BLDC GLUCOMTR-MCNC: 104 MG/DL (ref 70–130)
HCT VFR BLD AUTO: 48.5 % (ref 37.5–51)
HGB BLD-MCNC: 17.2 G/DL (ref 13–17.7)
MCH RBC QN AUTO: 35.9 PG (ref 26.6–33)
MCHC RBC AUTO-ENTMCNC: 35.5 G/DL (ref 31.5–35.7)
MCV RBC AUTO: 101.3 FL (ref 79–97)
PLATELET # BLD AUTO: 127 10*3/MM3 (ref 140–450)
PMV BLD AUTO: 11.6 FL (ref 6–12)
POTASSIUM BLD-SCNC: 3.7 MMOL/L (ref 3.5–5.2)
PROT SERPL-MCNC: 6.4 G/DL (ref 6–8.5)
RBC # BLD AUTO: 4.79 10*6/MM3 (ref 4.14–5.8)
SODIUM BLD-SCNC: 130 MMOL/L (ref 136–145)
WBC NRBC COR # BLD: 15.84 10*3/MM3 (ref 3.4–10.8)

## 2019-06-29 PROCEDURE — 82962 GLUCOSE BLOOD TEST: CPT

## 2019-06-29 PROCEDURE — 80053 COMPREHEN METABOLIC PANEL: CPT | Performed by: INTERNAL MEDICINE

## 2019-06-29 PROCEDURE — 25010000002 HYDROMORPHONE 1 MG/ML SOLUTION: Performed by: INTERNAL MEDICINE

## 2019-06-29 PROCEDURE — 99233 SBSQ HOSP IP/OBS HIGH 50: CPT | Performed by: INTERNAL MEDICINE

## 2019-06-29 PROCEDURE — 85027 COMPLETE CBC AUTOMATED: CPT | Performed by: INTERNAL MEDICINE

## 2019-06-29 PROCEDURE — 25010000002 LORAZEPAM PER 2 MG: Performed by: NURSE PRACTITIONER

## 2019-06-29 RX ORDER — ISOSORBIDE MONONITRATE 60 MG/1
60 TABLET, EXTENDED RELEASE ORAL
Status: DISCONTINUED | OUTPATIENT
Start: 2019-06-29 | End: 2019-06-30 | Stop reason: HOSPADM

## 2019-06-29 RX ORDER — SENNA AND DOCUSATE SODIUM 50; 8.6 MG/1; MG/1
2 TABLET, FILM COATED ORAL NIGHTLY
Status: DISCONTINUED | OUTPATIENT
Start: 2019-06-29 | End: 2019-06-30 | Stop reason: HOSPADM

## 2019-06-29 RX ORDER — OXYCODONE HYDROCHLORIDE 5 MG/1
10 TABLET ORAL EVERY 4 HOURS PRN
Status: DISCONTINUED | OUTPATIENT
Start: 2019-06-29 | End: 2019-06-30 | Stop reason: HOSPADM

## 2019-06-29 RX ORDER — CLONIDINE 0.2 MG/24H
1 PATCH, EXTENDED RELEASE TRANSDERMAL WEEKLY
Status: DISCONTINUED | OUTPATIENT
Start: 2019-06-29 | End: 2019-06-30 | Stop reason: HOSPADM

## 2019-06-29 RX ORDER — ACETAMINOPHEN 325 MG/1
650 TABLET ORAL ONCE
Status: COMPLETED | OUTPATIENT
Start: 2019-06-29 | End: 2019-06-29

## 2019-06-29 RX ADMIN — HYDROMORPHONE HYDROCHLORIDE 1 MG: 1 INJECTION, SOLUTION INTRAMUSCULAR; INTRAVENOUS; SUBCUTANEOUS at 10:15

## 2019-06-29 RX ADMIN — Medication 100 MG: at 08:24

## 2019-06-29 RX ADMIN — FOLIC ACID 1 MG: 1 TABLET ORAL at 08:24

## 2019-06-29 RX ADMIN — ISOSORBIDE MONONITRATE 60 MG: 60 TABLET, EXTENDED RELEASE ORAL at 09:21

## 2019-06-29 RX ADMIN — LORAZEPAM 2 MG: 2 TABLET ORAL at 09:21

## 2019-06-29 RX ADMIN — OXYCODONE HYDROCHLORIDE 10 MG: 5 TABLET ORAL at 15:32

## 2019-06-29 RX ADMIN — LORAZEPAM 1 MG: 2 INJECTION INTRAMUSCULAR; INTRAVENOUS at 00:08

## 2019-06-29 RX ADMIN — OXYCODONE HYDROCHLORIDE 10 MG: 5 TABLET ORAL at 19:27

## 2019-06-29 RX ADMIN — CHLORDIAZEPOXIDE HYDROCHLORIDE 25 MG: 25 CAPSULE ORAL at 13:27

## 2019-06-29 RX ADMIN — Medication 1 TABLET: at 08:24

## 2019-06-29 RX ADMIN — ACETAMINOPHEN 650 MG: 325 TABLET ORAL at 19:27

## 2019-06-29 RX ADMIN — HYDROMORPHONE HYDROCHLORIDE 1 MG: 1 INJECTION, SOLUTION INTRAMUSCULAR; INTRAVENOUS; SUBCUTANEOUS at 06:11

## 2019-06-29 RX ADMIN — SENNOSIDES, DOCUSATE SODIUM 2 TABLET: 50; 8.6 TABLET, FILM COATED ORAL at 20:22

## 2019-06-29 RX ADMIN — SODIUM CHLORIDE 150 ML/HR: 9 INJECTION, SOLUTION INTRAVENOUS at 04:03

## 2019-06-29 RX ADMIN — NICOTINE 1 PATCH: 7 PATCH TRANSDERMAL at 08:24

## 2019-06-29 RX ADMIN — LORAZEPAM 1 MG: 2 INJECTION INTRAMUSCULAR; INTRAVENOUS at 04:03

## 2019-06-29 RX ADMIN — LORAZEPAM 1 MG: 1 TABLET ORAL at 17:23

## 2019-06-29 RX ADMIN — CHLORDIAZEPOXIDE HYDROCHLORIDE 25 MG: 25 CAPSULE ORAL at 20:22

## 2019-06-29 RX ADMIN — CHLORDIAZEPOXIDE HYDROCHLORIDE 25 MG: 25 CAPSULE ORAL at 05:08

## 2019-06-29 RX ADMIN — HYDROMORPHONE HYDROCHLORIDE 1 MG: 1 INJECTION, SOLUTION INTRAMUSCULAR; INTRAVENOUS; SUBCUTANEOUS at 01:51

## 2019-06-29 RX ADMIN — CLONIDINE 1 PATCH: 0.2 PATCH TRANSDERMAL at 09:22

## 2019-06-29 RX ADMIN — SODIUM CHLORIDE 150 ML/HR: 9 INJECTION, SOLUTION INTRAVENOUS at 11:06

## 2019-06-30 VITALS
OXYGEN SATURATION: 95 % | HEIGHT: 78 IN | SYSTOLIC BLOOD PRESSURE: 119 MMHG | TEMPERATURE: 98.7 F | BODY MASS INDEX: 24.3 KG/M2 | HEART RATE: 99 BPM | WEIGHT: 210 LBS | RESPIRATION RATE: 20 BRPM | DIASTOLIC BLOOD PRESSURE: 55 MMHG

## 2019-06-30 LAB
ALBUMIN SERPL-MCNC: 2.34 G/DL (ref 3.5–5.2)
ALBUMIN/GLOB SERPL: 0.8 G/DL
ALP SERPL-CCNC: 150 U/L (ref 39–117)
ALT SERPL W P-5'-P-CCNC: 35 U/L (ref 1–41)
ANION GAP SERPL CALCULATED.3IONS-SCNC: 10 MMOL/L (ref 5–15)
AST SERPL-CCNC: 83 U/L (ref 1–40)
BILIRUB SERPL-MCNC: 2.6 MG/DL (ref 0.2–1.2)
BUN BLD-MCNC: 6 MG/DL (ref 6–20)
BUN/CREAT SERPL: 10.9 (ref 7–25)
CALCIUM SPEC-SCNC: 7.9 MG/DL (ref 8.6–10.5)
CHLORIDE SERPL-SCNC: 100 MMOL/L (ref 98–107)
CO2 SERPL-SCNC: 22 MMOL/L (ref 22–29)
CREAT BLD-MCNC: 0.55 MG/DL (ref 0.76–1.27)
GFR SERPL CREATININE-BSD FRML MDRD: >150 ML/MIN/1.73
GLOBULIN UR ELPH-MCNC: 3.1 GM/DL
GLUCOSE BLD-MCNC: 110 MG/DL (ref 65–99)
LIPASE SERPL-CCNC: 427 U/L (ref 13–60)
POTASSIUM BLD-SCNC: 3.9 MMOL/L (ref 3.5–5.2)
PROT SERPL-MCNC: 5.4 G/DL (ref 6–8.5)
SODIUM BLD-SCNC: 132 MMOL/L (ref 136–145)

## 2019-06-30 PROCEDURE — 83690 ASSAY OF LIPASE: CPT | Performed by: HOSPITALIST

## 2019-06-30 PROCEDURE — 80053 COMPREHEN METABOLIC PANEL: CPT | Performed by: INTERNAL MEDICINE

## 2019-06-30 PROCEDURE — 99239 HOSP IP/OBS DSCHRG MGMT >30: CPT | Performed by: HOSPITALIST

## 2019-06-30 RX ADMIN — OXYCODONE HYDROCHLORIDE 10 MG: 5 TABLET ORAL at 12:22

## 2019-06-30 RX ADMIN — FOLIC ACID 1 MG: 1 TABLET ORAL at 08:21

## 2019-06-30 RX ADMIN — OXYCODONE HYDROCHLORIDE 10 MG: 5 TABLET ORAL at 08:21

## 2019-06-30 RX ADMIN — OXYCODONE HYDROCHLORIDE 10 MG: 5 TABLET ORAL at 01:13

## 2019-06-30 RX ADMIN — ISOSORBIDE MONONITRATE 60 MG: 60 TABLET, EXTENDED RELEASE ORAL at 08:21

## 2019-06-30 RX ADMIN — NICOTINE 1 PATCH: 7 PATCH TRANSDERMAL at 08:22

## 2019-06-30 RX ADMIN — CHLORDIAZEPOXIDE HYDROCHLORIDE 25 MG: 25 CAPSULE ORAL at 05:01

## 2019-06-30 RX ADMIN — Medication 100 MG: at 08:21

## 2019-06-30 RX ADMIN — Medication 1 TABLET: at 08:21

## 2019-07-01 NOTE — PAYOR COMM NOTE
"[facility]   GREGORIO ARIAS  PHONE  [phone]  FAX  [phone]  NPI:  [id number]    PATIENT D/C 6/30/19      Renato Thomas (29 y.o. Male)     Date of Birth Social Security Number Address Home Phone MRN    1990  1970 [address] [facility] [id number] [phone] [id number]    Amish Marital Status          [facility] Single       Admission Date Admission Type Admitting Provider Attending Provider Department, Room/Bed    6/27/19 Emergency Cesar Kwon MD  [facility] [address] [place], 3346/2S    Discharge Date Discharge Disposition Discharge Destination        6/30/2019 Left Against Medical Advice              Attending Provider:  (none)   Allergies:  No Known Allergies    Isolation:  None   Infection:  None   Code Status:  Prior    Ht:  198.1 cm (78\")   Wt:  95.3 kg (210 lb)    Admission Cmt:  None   Principal Problem:  None                Active Insurance as of 6/27/2019     Primary Coverage     Payor Plan Insurance Group Employer/Plan Group    [employer]HEM MEDICAID ANTHEM MEDICAID [id number]     Payor Plan Address Payor Plan Phone Number Payor Plan Fax Number Effective Dates    [address] [address] [phone]  10/1/2014 - None Entered    [facility] [address]       Subscriber Name Subscriber Birth Date Member ID       RENATO THOMAS 1990 [id number]                 Emergency Contacts      (Rel.) Home Phone Work Phone Mobile Phone    SHAKA THOMAS (Mother) [phone] -- [phone]    Silvia Gallardo (Other) [phone] -- --              "

## 2019-07-02 LAB
BACTERIA SPEC AEROBE CULT: NORMAL
BACTERIA SPEC AEROBE CULT: NORMAL

## 2020-02-03 ENCOUNTER — OFFICE VISIT (OUTPATIENT)
Dept: RETAIL CLINIC | Facility: CLINIC | Age: 30
End: 2020-02-03

## 2020-02-03 VITALS
RESPIRATION RATE: 20 BRPM | WEIGHT: 206 LBS | TEMPERATURE: 97.4 F | SYSTOLIC BLOOD PRESSURE: 150 MMHG | HEART RATE: 71 BPM | BODY MASS INDEX: 23.81 KG/M2 | DIASTOLIC BLOOD PRESSURE: 109 MMHG | OXYGEN SATURATION: 97 %

## 2020-02-03 DIAGNOSIS — R11.2 NAUSEA AND VOMITING, INTRACTABILITY OF VOMITING NOT SPECIFIED, UNSPECIFIED VOMITING TYPE: Primary | ICD-10-CM

## 2020-02-03 PROCEDURE — 99203 OFFICE O/P NEW LOW 30 MIN: CPT | Performed by: NURSE PRACTITIONER

## 2020-02-03 RX ORDER — PANTOPRAZOLE SODIUM 40 MG/1
40 TABLET, DELAYED RELEASE ORAL DAILY
Status: ON HOLD | COMMUNITY
Start: 2020-01-17 | End: 2021-06-17

## 2020-02-03 RX ORDER — ONDANSETRON 4 MG/1
4 TABLET, ORALLY DISINTEGRATING ORAL EVERY 8 HOURS PRN
Qty: 15 TABLET | Refills: 0 | Status: ON HOLD | OUTPATIENT
Start: 2020-02-03 | End: 2021-06-17

## 2020-02-03 NOTE — PROGRESS NOTES
Subjective   Franck Toure is a 29 y.o. male.   Chief Complaint   Patient presents with   • Vomiting      Vomiting    This is a new problem. The current episode started today. The problem occurs 2 to 4 times per day. The problem has been unchanged. The emesis has an appearance of stomach contents. There has been no fever. Associated symptoms include abdominal pain (cramping). Pertinent negatives include no diarrhea, dizziness, fever, headaches, myalgias or URI. Associated symptoms comments: Abdominal cramping and nausea. He has tried nothing for the symptoms. The treatment provided no relief.        The following portions of the patient's history were reviewed and updated as appropriate: allergies, current medications, past family history, past medical history, past social history, past surgical history and problem list.    Review of Systems   Constitutional: Negative.  Negative for fever.   HENT: Negative.    Eyes: Negative.    Respiratory: Negative.    Gastrointestinal: Positive for abdominal pain (cramping), nausea and vomiting. Negative for constipation and diarrhea.   Genitourinary: Negative.    Musculoskeletal: Negative for myalgias.   Skin: Negative.    Allergic/Immunologic: Negative.    Neurological: Negative for dizziness and headaches.   Psychiatric/Behavioral: Negative.        Objective   No Known Allergies    Physical Exam   Constitutional: He is oriented to person, place, and time. He appears well-developed and well-nourished. He appears ill.   HENT:   Nose: Nose normal.   Mouth/Throat: Oropharynx is clear and moist.   Eyes: Pupils are equal, round, and reactive to light.   Neck: Neck supple.   Cardiovascular: Normal rate and regular rhythm.   Pulmonary/Chest: Effort normal and breath sounds normal.   Abdominal: Soft. Bowel sounds are increased. There is no hepatosplenomegaly. There is no tenderness. There is no rigidity, no rebound, no guarding and no CVA tenderness.   Lymphadenopathy:     He has no  cervical adenopathy.   Neurological: He is alert and oriented to person, place, and time.   Skin: Skin is warm and dry. No rash noted.   Psychiatric: He has a normal mood and affect.   Vitals reviewed.      Assessment/Plan   Franck was seen today for vomiting.    Diagnoses and all orders for this visit:    Nausea and vomiting, intractability of vomiting not specified, unspecified vomiting type    Other orders  -     ondansetron ODT (ZOFRAN-ODT) 4 MG disintegrating tablet; Take 1 tablet by mouth Every 8 (Eight) Hours As Needed for Nausea or Vomiting.          Encouraged patient to keep check on blood pressure. If consistently above 130/90, follow up with PCP.  Voices understanding.       This document has been electronically signed by RENAN Zamudio February 3, 2020 8:45 AM

## 2020-02-03 NOTE — PATIENT INSTRUCTIONS
Nausea and Vomiting, Adult  Nausea is feeling sick to your stomach or feeling that you are about to throw up (vomit). Vomiting is when food in your stomach is thrown up and out of the mouth. Throwing up can make you feel weak. It can also make you lose too much water in your body (get dehydrated). If you lose too much water in your body, you may:  · Feel tired.  · Feel thirsty.  · Have a dry mouth.  · Have cracked lips.  · Go pee (urinate) less often.  Older adults and people with other diseases or a weak body defense system (immune system) are at higher risk for losing too much water in the body. If you feel sick to your stomach and you throw up, it is important to follow instructions from your doctor about how to take care of yourself.  Follow these instructions at home:  Watch your symptoms for any changes. Tell your doctor about them. Follow these instructions to care for yourself at home.  Eating and drinking         · Take an ORS (oral rehydration solution). This is a drink that is sold at pharmacies and stores.  · Drink clear fluids in small amounts as you are able, such as:  ? Water.  ? Ice chips.  ? Fruit juice that has water added (diluted fruit juice).  ? Low-calorie sports drinks.  · Eat bland, easy-to-digest foods in small amounts as you are able, such as:  ? Bananas.  ? Applesauce.  ? Rice.  ? Low-fat (lean) meats.  ? Toast.  ? Crackers.  · Avoid drinking fluids that have a lot of sugar or caffeine in them. This includes energy drinks, sports drinks, and soda.  · Avoid alcohol.  · Avoid spicy or fatty foods.  General instructions  · Take over-the-counter and prescription medicines only as told by your doctor.  · Drink enough fluid to keep your pee (urine) pale yellow.  · Wash your hands often with soap and water. If you cannot use soap and water, use hand .  · Make sure that all people in your home wash their hands well and often.  · Rest at home while you get better.  · Watch your condition  for any changes.  · Take slow and deep breaths when you feel sick to your stomach.  · Keep all follow-up visits as told by your doctor. This is important.  Contact a doctor if:  · Your symptoms get worse.  · You have new symptoms.  · You have a fever.  · You cannot drink fluids without throwing up.  · You feel sick to your stomach for more than 2 days.  · You feel light-headed or dizzy.  · You have a headache.  · You have muscle cramps.  · You have a rash.  · You have pain while peeing.  Get help right away if:  · You have pain in your chest, neck, arm, or jaw.  · You feel very weak or you pass out (faint).  · You throw up again and again.  · You have throw up that is bright red or looks like black coffee grounds.  · You have bloody or black poop (stools) or poop that looks like tar.  · You have a very bad headache, a stiff neck, or both.  · You have very bad pain, cramping, or bloating in your belly (abdomen).  · You have trouble breathing.  · You are breathing very quickly.  · Your heart is beating very quickly.  · Your skin feels cold and clammy.  · You feel confused.  · You have signs of losing too much water in your body, such as:  ? Dark pee, very little pee, or no pee.  ? Cracked lips.  ? Dry mouth.  ? Sunken eyes.  ? Sleepiness.  ? Weakness.  These symptoms may be an emergency. Do not wait to see if the symptoms will go away. Get medical help right away. Call your local emergency services (911 in the [place] Do not drive yourself to the hospital.  Summary  · Nausea is feeling sick to your stomach or feeling that you are about to throw up (vomit). Vomiting is when food in your stomach is thrown up and out of the mouth.  · Follow instructions from your doctor about eating and drinking to keep from losing too much water in your body.  · Take over-the-counter and prescription medicines only as told by your doctor.  · Contact your doctor if your symptoms get worse or you have new symptoms.  · Keep all follow-up  visits as told by your doctor. This is important.  This information is not intended to replace advice given to you by your health care provider. Make sure you discuss any questions you have with your health care provider.  Document Released: 06/05/2009 Document Revised: 05/28/2019 Document Reviewed: 05/28/2019  [employer] Interactive Patient Education © 2019 [employer]

## 2020-02-04 ENCOUNTER — APPOINTMENT (OUTPATIENT)
Dept: CT IMAGING | Facility: HOSPITAL | Age: 30
End: 2020-02-04

## 2020-02-04 ENCOUNTER — APPOINTMENT (OUTPATIENT)
Dept: ULTRASOUND IMAGING | Facility: HOSPITAL | Age: 30
End: 2020-02-04

## 2020-02-04 ENCOUNTER — HOSPITAL ENCOUNTER (EMERGENCY)
Facility: HOSPITAL | Age: 30
Discharge: ANOTHER HOSPITAL | End: 2020-02-04
Attending: EMERGENCY MEDICINE | Admitting: EMERGENCY MEDICINE

## 2020-02-04 VITALS
DIASTOLIC BLOOD PRESSURE: 102 MMHG | TEMPERATURE: 98 F | OXYGEN SATURATION: 99 % | HEART RATE: 39 BPM | BODY MASS INDEX: 24.79 KG/M2 | HEIGHT: 77 IN | RESPIRATION RATE: 20 BRPM | SYSTOLIC BLOOD PRESSURE: 149 MMHG | WEIGHT: 210 LBS

## 2020-02-04 DIAGNOSIS — K85.20 ALCOHOL-INDUCED ACUTE PANCREATITIS, UNSPECIFIED COMPLICATION STATUS: Primary | ICD-10-CM

## 2020-02-04 LAB
6-ACETYL MORPHINE: NEGATIVE
ALBUMIN SERPL-MCNC: 3.34 G/DL (ref 3.5–5.2)
ALBUMIN/GLOB SERPL: 0.8 G/DL
ALP SERPL-CCNC: 229 U/L (ref 39–117)
ALT SERPL W P-5'-P-CCNC: 138 U/L (ref 1–41)
AMPHET+METHAMPHET UR QL: NEGATIVE
ANION GAP SERPL CALCULATED.3IONS-SCNC: 16.1 MMOL/L (ref 5–15)
AST SERPL-CCNC: 149 U/L (ref 1–40)
BACTERIA UR QL AUTO: NORMAL /HPF
BARBITURATES UR QL SCN: NEGATIVE
BASOPHILS # BLD AUTO: 0.04 10*3/MM3 (ref 0–0.2)
BASOPHILS NFR BLD AUTO: 0.3 % (ref 0–1.5)
BENZODIAZ UR QL SCN: NEGATIVE
BILIRUB SERPL-MCNC: 1.9 MG/DL (ref 0.2–1.2)
BILIRUB UR QL STRIP: ABNORMAL
BUN BLD-MCNC: 6 MG/DL (ref 6–20)
BUN/CREAT SERPL: 9.5 (ref 7–25)
BUPRENORPHINE SERPL-MCNC: POSITIVE NG/ML
CALCIUM SPEC-SCNC: 9.2 MG/DL (ref 8.6–10.5)
CANNABINOIDS SERPL QL: NEGATIVE
CHLORIDE SERPL-SCNC: 97 MMOL/L (ref 98–107)
CLARITY UR: CLEAR
CO2 SERPL-SCNC: 21.9 MMOL/L (ref 22–29)
COCAINE UR QL: NEGATIVE
COLOR UR: ABNORMAL
CREAT BLD-MCNC: 0.63 MG/DL (ref 0.76–1.27)
DEPRECATED RDW RBC AUTO: 45.5 FL (ref 37–54)
EOSINOPHIL # BLD AUTO: 0.5 10*3/MM3 (ref 0–0.4)
EOSINOPHIL NFR BLD AUTO: 4.3 % (ref 0.3–6.2)
ERYTHROCYTE [DISTWIDTH] IN BLOOD BY AUTOMATED COUNT: 12.9 % (ref 12.3–15.4)
ETHANOL BLD-MCNC: <10 MG/DL (ref 0–10)
ETHANOL UR QL: <0.01 %
GFR SERPL CREATININE-BSD FRML MDRD: >150 ML/MIN/1.73
GLOBULIN UR ELPH-MCNC: 4 GM/DL
GLUCOSE BLD-MCNC: 117 MG/DL (ref 65–99)
GLUCOSE UR STRIP-MCNC: NEGATIVE MG/DL
HAV IGM SERPL QL IA: ABNORMAL
HBV CORE IGM SERPL QL IA: ABNORMAL
HBV SURFACE AG SERPL QL IA: ABNORMAL
HCT VFR BLD AUTO: 53.5 % (ref 37.5–51)
HCV AB SER DONR QL: REACTIVE
HGB BLD-MCNC: 17.9 G/DL (ref 13–17.7)
HGB UR QL STRIP.AUTO: NEGATIVE
HOLD SPECIMEN: NORMAL
HYALINE CASTS UR QL AUTO: NORMAL /LPF
IMM GRANULOCYTES # BLD AUTO: 0.06 10*3/MM3 (ref 0–0.05)
IMM GRANULOCYTES NFR BLD AUTO: 0.5 % (ref 0–0.5)
KETONES UR QL STRIP: ABNORMAL
LEUKOCYTE ESTERASE UR QL STRIP.AUTO: ABNORMAL
LIPASE SERPL-CCNC: 436 U/L (ref 13–60)
LYMPHOCYTES # BLD AUTO: 1.07 10*3/MM3 (ref 0.7–3.1)
LYMPHOCYTES NFR BLD AUTO: 9.2 % (ref 19.6–45.3)
MCH RBC QN AUTO: 32.2 PG (ref 26.6–33)
MCHC RBC AUTO-ENTMCNC: 33.5 G/DL (ref 31.5–35.7)
MCV RBC AUTO: 96.2 FL (ref 79–97)
METHADONE UR QL SCN: NEGATIVE
MONOCYTES # BLD AUTO: 1.01 10*3/MM3 (ref 0.1–0.9)
MONOCYTES NFR BLD AUTO: 8.7 % (ref 5–12)
NEUTROPHILS # BLD AUTO: 8.92 10*3/MM3 (ref 1.7–7)
NEUTROPHILS NFR BLD AUTO: 77 % (ref 42.7–76)
NITRITE UR QL STRIP: POSITIVE
NRBC BLD AUTO-RTO: 0 /100 WBC (ref 0–0.2)
OPIATES UR QL: NEGATIVE
OXYCODONE UR QL SCN: NEGATIVE
PCP UR QL SCN: NEGATIVE
PH UR STRIP.AUTO: 5.5 [PH] (ref 5–8)
PLATELET # BLD AUTO: 158 10*3/MM3 (ref 140–450)
PMV BLD AUTO: 10.1 FL (ref 6–12)
POTASSIUM BLD-SCNC: 3.6 MMOL/L (ref 3.5–5.2)
PROT SERPL-MCNC: 7.3 G/DL (ref 6–8.5)
PROT UR QL STRIP: ABNORMAL
RBC # BLD AUTO: 5.56 10*6/MM3 (ref 4.14–5.8)
RBC # UR: NORMAL /HPF
REF LAB TEST METHOD: NORMAL
SODIUM BLD-SCNC: 135 MMOL/L (ref 136–145)
SP GR UR STRIP: >1.03 (ref 1–1.03)
SQUAMOUS #/AREA URNS HPF: NORMAL /HPF
UROBILINOGEN UR QL STRIP: ABNORMAL
WBC NRBC COR # BLD: 11.6 10*3/MM3 (ref 3.4–10.8)
WBC UR QL AUTO: NORMAL /HPF

## 2020-02-04 PROCEDURE — 74176 CT ABD & PELVIS W/O CONTRAST: CPT

## 2020-02-04 PROCEDURE — 80307 DRUG TEST PRSMV CHEM ANLYZR: CPT | Performed by: NURSE PRACTITIONER

## 2020-02-04 PROCEDURE — 99284 EMERGENCY DEPT VISIT MOD MDM: CPT

## 2020-02-04 PROCEDURE — 93010 ELECTROCARDIOGRAM REPORT: CPT | Performed by: INTERNAL MEDICINE

## 2020-02-04 PROCEDURE — 80053 COMPREHEN METABOLIC PANEL: CPT | Performed by: NURSE PRACTITIONER

## 2020-02-04 PROCEDURE — 25010000002 BUTORPHANOL PER 1 MG: Performed by: EMERGENCY MEDICINE

## 2020-02-04 PROCEDURE — 25010000002 MORPHINE PER 10 MG: Performed by: EMERGENCY MEDICINE

## 2020-02-04 PROCEDURE — 83690 ASSAY OF LIPASE: CPT | Performed by: NURSE PRACTITIONER

## 2020-02-04 PROCEDURE — 81001 URINALYSIS AUTO W/SCOPE: CPT | Performed by: NURSE PRACTITIONER

## 2020-02-04 PROCEDURE — 85025 COMPLETE CBC W/AUTO DIFF WBC: CPT | Performed by: NURSE PRACTITIONER

## 2020-02-04 PROCEDURE — 76705 ECHO EXAM OF ABDOMEN: CPT

## 2020-02-04 PROCEDURE — 80074 ACUTE HEPATITIS PANEL: CPT | Performed by: NURSE PRACTITIONER

## 2020-02-04 PROCEDURE — 93005 ELECTROCARDIOGRAM TRACING: CPT | Performed by: NURSE PRACTITIONER

## 2020-02-04 PROCEDURE — 96372 THER/PROPH/DIAG INJ SC/IM: CPT

## 2020-02-04 RX ORDER — FAMOTIDINE 10 MG/ML
20 INJECTION, SOLUTION INTRAVENOUS ONCE
Status: DISCONTINUED | OUTPATIENT
Start: 2020-02-04 | End: 2020-02-04

## 2020-02-04 RX ORDER — SODIUM CHLORIDE 0.9 % (FLUSH) 0.9 %
10 SYRINGE (ML) INJECTION AS NEEDED
Status: DISCONTINUED | OUTPATIENT
Start: 2020-02-04 | End: 2020-02-04 | Stop reason: HOSPADM

## 2020-02-04 RX ORDER — DICYCLOMINE HYDROCHLORIDE 10 MG/1
20 CAPSULE ORAL ONCE
Status: COMPLETED | OUTPATIENT
Start: 2020-02-04 | End: 2020-02-04

## 2020-02-04 RX ORDER — LORAZEPAM 1 MG/1
1 TABLET ORAL ONCE
Status: COMPLETED | OUTPATIENT
Start: 2020-02-04 | End: 2020-02-04

## 2020-02-04 RX ORDER — BUTORPHANOL TARTRATE 1 MG/ML
1 INJECTION, SOLUTION INTRAMUSCULAR; INTRAVENOUS ONCE
Status: COMPLETED | OUTPATIENT
Start: 2020-02-04 | End: 2020-02-04

## 2020-02-04 RX ORDER — FAMOTIDINE 20 MG/1
20 TABLET, FILM COATED ORAL ONCE
Status: COMPLETED | OUTPATIENT
Start: 2020-02-04 | End: 2020-02-04

## 2020-02-04 RX ORDER — PROMETHAZINE HYDROCHLORIDE 25 MG/ML
12.5 INJECTION, SOLUTION INTRAMUSCULAR; INTRAVENOUS ONCE
Status: DISCONTINUED | OUTPATIENT
Start: 2020-02-04 | End: 2020-02-04

## 2020-02-04 RX ORDER — ONDANSETRON 4 MG/1
4 TABLET, ORALLY DISINTEGRATING ORAL ONCE
Status: COMPLETED | OUTPATIENT
Start: 2020-02-04 | End: 2020-02-04

## 2020-02-04 RX ADMIN — FAMOTIDINE 20 MG: 20 TABLET, FILM COATED ORAL at 06:00

## 2020-02-04 RX ADMIN — DICYCLOMINE HYDROCHLORIDE 20 MG: 10 CAPSULE ORAL at 06:01

## 2020-02-04 RX ADMIN — BUTORPHANOL TARTRATE 1 MG: 1 INJECTION, SOLUTION INTRAMUSCULAR; INTRAVENOUS at 07:09

## 2020-02-04 RX ADMIN — ONDANSETRON 4 MG: 4 TABLET, ORALLY DISINTEGRATING ORAL at 06:00

## 2020-02-04 RX ADMIN — LORAZEPAM 1 MG: 1 TABLET ORAL at 08:30

## 2020-02-04 RX ADMIN — MORPHINE SULFATE 4 MG: 4 INJECTION, SOLUTION INTRAMUSCULAR; INTRAVENOUS at 08:30

## 2020-02-04 NOTE — ED NOTES
Pt and pt's mother instructed to go straight to [facility] for admission.      Tammy Rincon RN  02/04/20 0956

## 2020-02-04 NOTE — ED NOTES
Patient given a urinal at this time, pt verbalized understanding of needed urinal sample.        Kimi Conroy, RN  02/04/20 0595

## 2020-02-04 NOTE — ED NOTES
Multiple staff nurses attempted IV at this time, No success at this time. Provider made aware. Kimi Hdz, RN  02/04/20 0619

## 2020-02-04 NOTE — ED PROVIDER NOTES
Subjective     History provided by:  Patient   used: No    Abdominal Pain   Pain location:  Epigastric  Pain quality: sharp    Pain radiates to:  Does not radiate  Pain severity:  Moderate  Onset quality:  Gradual  Duration:  4 days  Timing:  Constant  Progression:  Worsening  Chronicity:  New  Context: alcohol use    Context: not awakening from sleep, not diet changes, not medication withdrawal, not previous surgeries, not retching and not sick contacts    Relieved by:  Nothing  Worsened by:  Nothing  Ineffective treatments:  None tried  Associated symptoms: nausea and vomiting    Risk factors: alcohol abuse        Review of Systems   Constitutional: Negative.    HENT: Negative.    Eyes: Negative.    Respiratory: Negative.    Gastrointestinal: Positive for abdominal pain, nausea and vomiting.   Endocrine: Negative.    Genitourinary: Negative.    Musculoskeletal: Negative.    Skin: Negative.    Allergic/Immunologic: Negative.    Neurological: Negative.    Hematological: Negative.    Psychiatric/Behavioral: Negative.    All other systems reviewed and are negative.      No past medical history on file.    No Known Allergies    No past surgical history on file.    Family History   Problem Relation Age of Onset   • Cancer Maternal Grandmother        Social History     Socioeconomic History   • Marital status: Single     Spouse name: Not on file   • Number of children: Not on file   • Years of education: Not on file   • Highest education level: Not on file   Tobacco Use   • Smoking status: Current Every Day Smoker     Packs/day: 0.50     Types: Cigarettes   • Smokeless tobacco: Current User   Substance and Sexual Activity   • Alcohol use: Yes     Alcohol/week: 8.0 standard drinks     Types: 8 Shots of liquor per week     Frequency: 4 or more times a week     Drinks per session: 7 to 9     Binge frequency: Daily or almost daily   • Drug use: Yes     Types: Amphetamines     Comment: suboxone as well   •  Sexual activity: Never           Objective   Physical Exam   Constitutional: He is oriented to person, place, and time. He appears well-developed and well-nourished.   HENT:   Head: Normocephalic.   Mouth/Throat: Oropharynx is clear and moist.   Eyes: Pupils are equal, round, and reactive to light. Scleral icterus is present.   Cardiovascular: Normal rate, regular rhythm and normal heart sounds.   Pulmonary/Chest: Effort normal and breath sounds normal.   Abdominal: Soft. Normal appearance and bowel sounds are normal. There is tenderness in the epigastric area.   Neurological: He is alert and oriented to person, place, and time.   Skin: Skin is warm. Capillary refill takes less than 2 seconds.   Psychiatric: He has a normal mood and affect. His behavior is normal.   Nursing note and vitals reviewed.      Procedures           ED Course  ED Course as of Feb 04 0808   Tue Feb 04, 2020   0750 No beds at this facility, discussed with patient and family who are willing to be transferred to [facility]     No GI at [facility], not willing to admit patient to that facility at this time    [KK]   0804 Discussed with Dr. Valdes at [facility] who accepts patient to their facility. Will call back with a bed.     [KK]      ED Course User Index  [KK] Keyana Poole, RENAN                                               MDM  Number of Diagnoses or Management Options  Alcohol-induced acute pancreatitis, unspecified complication status: new and requires workup     Amount and/or Complexity of Data Reviewed  Clinical lab tests: ordered and reviewed  Tests in the radiology section of CPT®: reviewed and ordered  Tests in the medicine section of CPT®: reviewed and ordered  Decide to obtain previous medical records or to obtain history from someone other than the patient: yes    Risk of Complications, Morbidity, and/or Mortality  Presenting problems: moderate  Diagnostic procedures:  moderate  Management options: moderate    Patient Progress  Patient progress: improved      Final diagnoses:   Alcohol-induced acute pancreatitis, unspecified complication status            Keyana Poole, APRN  02/04/20 0808

## 2020-02-04 NOTE — ED NOTES
Called report to bryanna BATRES [facility] federico Rincon, Tammy Chapa, RN  02/04/20 0860

## 2021-06-17 ENCOUNTER — HOSPITAL ENCOUNTER (INPATIENT)
Facility: HOSPITAL | Age: 31
LOS: 2 days | Discharge: HOME | End: 2021-06-19
Attending: FAMILY MEDICINE | Admitting: STUDENT IN AN ORGANIZED HEALTH CARE EDUCATION/TRAINING PROGRAM

## 2021-06-17 ENCOUNTER — APPOINTMENT (OUTPATIENT)
Dept: CT IMAGING | Facility: HOSPITAL | Age: 31
End: 2021-06-17

## 2021-06-17 DIAGNOSIS — K85.90 ACUTE PANCREATITIS, UNSPECIFIED COMPLICATION STATUS, UNSPECIFIED PANCREATITIS TYPE: Primary | ICD-10-CM

## 2021-06-17 LAB
6-ACETYL MORPHINE: NEGATIVE
ALBUMIN SERPL-MCNC: 3.96 G/DL (ref 3.5–5.2)
ALBUMIN/GLOB SERPL: 1.1 G/DL
ALP SERPL-CCNC: 276 U/L (ref 39–117)
ALT SERPL W P-5'-P-CCNC: 99 U/L (ref 1–41)
AMPHET+METHAMPHET UR QL: NEGATIVE
AMYLASE SERPL-CCNC: 205 U/L (ref 28–100)
ANION GAP SERPL CALCULATED.3IONS-SCNC: 11.1 MMOL/L (ref 5–15)
AST SERPL-CCNC: 156 U/L (ref 1–40)
BARBITURATES UR QL SCN: NEGATIVE
BASOPHILS # BLD AUTO: 0.03 10*3/MM3 (ref 0–0.2)
BASOPHILS NFR BLD AUTO: 0.3 % (ref 0–1.5)
BENZODIAZ UR QL SCN: NEGATIVE
BILIRUB SERPL-MCNC: 1.1 MG/DL (ref 0–1.2)
BILIRUB UR QL STRIP: NEGATIVE
BUN SERPL-MCNC: 5 MG/DL (ref 6–20)
BUN/CREAT SERPL: 8.8 (ref 7–25)
BUPRENORPHINE SERPL-MCNC: POSITIVE NG/ML
CALCIUM SPEC-SCNC: 9.6 MG/DL (ref 8.6–10.5)
CANNABINOIDS SERPL QL: NEGATIVE
CHLORIDE SERPL-SCNC: 106 MMOL/L (ref 98–107)
CLARITY UR: CLEAR
CO2 SERPL-SCNC: 22.9 MMOL/L (ref 22–29)
COCAINE UR QL: NEGATIVE
COLOR UR: YELLOW
CREAT SERPL-MCNC: 0.57 MG/DL (ref 0.76–1.27)
DEPRECATED RDW RBC AUTO: 42.6 FL (ref 37–54)
EOSINOPHIL # BLD AUTO: 0.17 10*3/MM3 (ref 0–0.4)
EOSINOPHIL NFR BLD AUTO: 1.6 % (ref 0.3–6.2)
ERYTHROCYTE [DISTWIDTH] IN BLOOD BY AUTOMATED COUNT: 12.3 % (ref 12.3–15.4)
ETHANOL BLD-MCNC: <10 MG/DL (ref 0–10)
ETHANOL UR QL: <0.01 %
FLUAV RNA RESP QL NAA+PROBE: NOT DETECTED
FLUBV RNA RESP QL NAA+PROBE: NOT DETECTED
GFR SERPL CREATININE-BSD FRML MDRD: >150 ML/MIN/1.73
GLOBULIN UR ELPH-MCNC: 3.6 GM/DL
GLUCOSE SERPL-MCNC: 141 MG/DL (ref 65–99)
GLUCOSE UR STRIP-MCNC: NEGATIVE MG/DL
HBA1C MFR BLD: 5.5 % (ref 4.8–5.6)
HCT VFR BLD AUTO: 47.7 % (ref 37.5–51)
HGB BLD-MCNC: 16.3 G/DL (ref 13–17.7)
HGB UR QL STRIP.AUTO: NEGATIVE
IMM GRANULOCYTES # BLD AUTO: 0.05 10*3/MM3 (ref 0–0.05)
IMM GRANULOCYTES NFR BLD AUTO: 0.5 % (ref 0–0.5)
KETONES UR QL STRIP: NEGATIVE
LEUKOCYTE ESTERASE UR QL STRIP.AUTO: NEGATIVE
LIPASE SERPL-CCNC: 463 U/L (ref 13–60)
LYMPHOCYTES # BLD AUTO: 1.03 10*3/MM3 (ref 0.7–3.1)
LYMPHOCYTES NFR BLD AUTO: 9.6 % (ref 19.6–45.3)
MCH RBC QN AUTO: 32.1 PG (ref 26.6–33)
MCHC RBC AUTO-ENTMCNC: 34.2 G/DL (ref 31.5–35.7)
MCV RBC AUTO: 94.1 FL (ref 79–97)
METHADONE UR QL SCN: NEGATIVE
MONOCYTES # BLD AUTO: 0.69 10*3/MM3 (ref 0.1–0.9)
MONOCYTES NFR BLD AUTO: 6.4 % (ref 5–12)
NEUTROPHILS NFR BLD AUTO: 8.76 10*3/MM3 (ref 1.7–7)
NEUTROPHILS NFR BLD AUTO: 81.6 % (ref 42.7–76)
NITRITE UR QL STRIP: NEGATIVE
NRBC BLD AUTO-RTO: 0 /100 WBC (ref 0–0.2)
OPIATES UR QL: NEGATIVE
OXYCODONE UR QL SCN: NEGATIVE
PCP UR QL SCN: NEGATIVE
PH UR STRIP.AUTO: 5.5 [PH] (ref 5–8)
PLATELET # BLD AUTO: 168 10*3/MM3 (ref 140–450)
PMV BLD AUTO: 10.1 FL (ref 6–12)
POTASSIUM SERPL-SCNC: 3.9 MMOL/L (ref 3.5–5.2)
PROT SERPL-MCNC: 7.6 G/DL (ref 6–8.5)
PROT UR QL STRIP: NEGATIVE
RBC # BLD AUTO: 5.07 10*6/MM3 (ref 4.14–5.8)
SARS-COV-2 RNA RESP QL NAA+PROBE: NOT DETECTED
SODIUM SERPL-SCNC: 140 MMOL/L (ref 136–145)
SP GR UR STRIP: >1.03 (ref 1–1.03)
TSH SERPL DL<=0.05 MIU/L-ACNC: 2.36 UIU/ML (ref 0.27–4.2)
UROBILINOGEN UR QL STRIP: ABNORMAL
WBC # BLD AUTO: 10.73 10*3/MM3 (ref 3.4–10.8)

## 2021-06-17 PROCEDURE — 80053 COMPREHEN METABOLIC PANEL: CPT | Performed by: PHYSICIAN ASSISTANT

## 2021-06-17 PROCEDURE — 25010000002 KETOROLAC TROMETHAMINE PER 15 MG: Performed by: PHYSICIAN ASSISTANT

## 2021-06-17 PROCEDURE — 80307 DRUG TEST PRSMV CHEM ANLYZR: CPT | Performed by: PHYSICIAN ASSISTANT

## 2021-06-17 PROCEDURE — 74177 CT ABD & PELVIS W/CONTRAST: CPT

## 2021-06-17 PROCEDURE — 93005 ELECTROCARDIOGRAM TRACING: CPT | Performed by: INTERNAL MEDICINE

## 2021-06-17 PROCEDURE — 99223 1ST HOSP IP/OBS HIGH 75: CPT | Performed by: INTERNAL MEDICINE

## 2021-06-17 PROCEDURE — 25010000002 ONDANSETRON PER 1 MG: Performed by: PHYSICIAN ASSISTANT

## 2021-06-17 PROCEDURE — 36415 COLL VENOUS BLD VENIPUNCTURE: CPT

## 2021-06-17 PROCEDURE — 99284 EMERGENCY DEPT VISIT MOD MDM: CPT

## 2021-06-17 PROCEDURE — 25010000002 MORPHINE PER 10 MG: Performed by: EMERGENCY MEDICINE

## 2021-06-17 PROCEDURE — 25010000002 HYDRALAZINE PER 20 MG: Performed by: INTERNAL MEDICINE

## 2021-06-17 PROCEDURE — 81003 URINALYSIS AUTO W/O SCOPE: CPT | Performed by: PHYSICIAN ASSISTANT

## 2021-06-17 PROCEDURE — 25010000002 IOPAMIDOL 61 % SOLUTION: Performed by: FAMILY MEDICINE

## 2021-06-17 PROCEDURE — 25010000002 MORPHINE PER 10 MG: Performed by: PHYSICIAN ASSISTANT

## 2021-06-17 PROCEDURE — 85025 COMPLETE CBC W/AUTO DIFF WBC: CPT | Performed by: PHYSICIAN ASSISTANT

## 2021-06-17 PROCEDURE — 87636 SARSCOV2 & INF A&B AMP PRB: CPT | Performed by: FAMILY MEDICINE

## 2021-06-17 PROCEDURE — 83690 ASSAY OF LIPASE: CPT | Performed by: PHYSICIAN ASSISTANT

## 2021-06-17 PROCEDURE — 84443 ASSAY THYROID STIM HORMONE: CPT | Performed by: INTERNAL MEDICINE

## 2021-06-17 PROCEDURE — 83036 HEMOGLOBIN GLYCOSYLATED A1C: CPT | Performed by: INTERNAL MEDICINE

## 2021-06-17 PROCEDURE — 25010000002 MORPHINE PER 10 MG: Performed by: STUDENT IN AN ORGANIZED HEALTH CARE EDUCATION/TRAINING PROGRAM

## 2021-06-17 PROCEDURE — 74177 CT ABD & PELVIS W/CONTRAST: CPT | Performed by: RADIOLOGY

## 2021-06-17 PROCEDURE — 82077 ASSAY SPEC XCP UR&BREATH IA: CPT | Performed by: PHYSICIAN ASSISTANT

## 2021-06-17 PROCEDURE — 82150 ASSAY OF AMYLASE: CPT | Performed by: PHYSICIAN ASSISTANT

## 2021-06-17 RX ORDER — CLONIDINE HYDROCHLORIDE 0.1 MG/1
0.1 TABLET ORAL ONCE
Status: COMPLETED | OUTPATIENT
Start: 2021-06-17 | End: 2021-06-17

## 2021-06-17 RX ORDER — ONDANSETRON 2 MG/ML
4 INJECTION INTRAMUSCULAR; INTRAVENOUS ONCE
Status: COMPLETED | OUTPATIENT
Start: 2021-06-17 | End: 2021-06-17

## 2021-06-17 RX ORDER — SODIUM CHLORIDE, SODIUM LACTATE, POTASSIUM CHLORIDE, CALCIUM CHLORIDE 600; 310; 30; 20 MG/100ML; MG/100ML; MG/100ML; MG/100ML
150 INJECTION, SOLUTION INTRAVENOUS CONTINUOUS
Status: DISCONTINUED | OUTPATIENT
Start: 2021-06-17 | End: 2021-06-19 | Stop reason: HOSPADM

## 2021-06-17 RX ORDER — NICOTINE 21 MG/24HR
1 PATCH, TRANSDERMAL 24 HOURS TRANSDERMAL
Status: DISCONTINUED | OUTPATIENT
Start: 2021-06-18 | End: 2021-06-19 | Stop reason: HOSPADM

## 2021-06-17 RX ORDER — SODIUM CHLORIDE 0.9 % (FLUSH) 0.9 %
10 SYRINGE (ML) INJECTION EVERY 12 HOURS SCHEDULED
Status: DISCONTINUED | OUTPATIENT
Start: 2021-06-17 | End: 2021-06-19 | Stop reason: HOSPADM

## 2021-06-17 RX ORDER — KETOROLAC TROMETHAMINE 30 MG/ML
30 INJECTION, SOLUTION INTRAMUSCULAR; INTRAVENOUS ONCE
Status: COMPLETED | OUTPATIENT
Start: 2021-06-17 | End: 2021-06-17

## 2021-06-17 RX ORDER — SODIUM CHLORIDE 0.9 % (FLUSH) 0.9 %
10 SYRINGE (ML) INJECTION AS NEEDED
Status: DISCONTINUED | OUTPATIENT
Start: 2021-06-17 | End: 2021-06-19 | Stop reason: HOSPADM

## 2021-06-17 RX ORDER — HYDRALAZINE HYDROCHLORIDE 20 MG/ML
20 INJECTION INTRAMUSCULAR; INTRAVENOUS ONCE
Status: COMPLETED | OUTPATIENT
Start: 2021-06-17 | End: 2021-06-17

## 2021-06-17 RX ADMIN — IOPAMIDOL 100 ML: 612 INJECTION, SOLUTION INTRAVENOUS at 17:12

## 2021-06-17 RX ADMIN — SODIUM CHLORIDE, PRESERVATIVE FREE 10 ML: 5 INJECTION INTRAVENOUS at 21:39

## 2021-06-17 RX ADMIN — HYDRALAZINE HYDROCHLORIDE 20 MG: 20 INJECTION INTRAMUSCULAR; INTRAVENOUS at 21:39

## 2021-06-17 RX ADMIN — ONDANSETRON 4 MG: 2 INJECTION INTRAMUSCULAR; INTRAVENOUS at 19:35

## 2021-06-17 RX ADMIN — SODIUM CHLORIDE 1000 ML: 9 INJECTION, SOLUTION INTRAVENOUS at 16:35

## 2021-06-17 RX ADMIN — MORPHINE SULFATE 4 MG: 4 INJECTION, SOLUTION INTRAMUSCULAR; INTRAVENOUS at 19:38

## 2021-06-17 RX ADMIN — MORPHINE SULFATE 4 MG: 4 INJECTION, SOLUTION INTRAMUSCULAR; INTRAVENOUS at 23:18

## 2021-06-17 RX ADMIN — KETOROLAC TROMETHAMINE 30 MG: 30 INJECTION, SOLUTION INTRAMUSCULAR; INTRAVENOUS at 16:52

## 2021-06-17 RX ADMIN — CLONIDINE HYDROCHLORIDE 0.1 MG: 0.1 TABLET ORAL at 18:29

## 2021-06-17 RX ADMIN — CLONIDINE HYDROCHLORIDE 0.1 MG: 0.1 TABLET ORAL at 20:11

## 2021-06-17 RX ADMIN — MORPHINE SULFATE 4 MG: 4 INJECTION, SOLUTION INTRAMUSCULAR; INTRAVENOUS at 21:19

## 2021-06-17 NOTE — ED NOTES
Pt returned from ct at this time, states pt tolerated it well     Stacey Sandoval, RN  06/17/21 5678

## 2021-06-17 NOTE — ED NOTES
Iv attempted x2 without success, could get blood back but would not thread or hold iv, provider aware, also of pts hr and bp, pt states he has been told before he has high bp but has not been treating it, pt appears uncomfortable with upper epigastric pain, provider aware     Stacey Sandoval, RN  06/17/21 4404

## 2021-06-17 NOTE — ED PROVIDER NOTES
Subjective   History of pancreatitis      History provided by:  Patient   used: No    Abdominal Pain  Pain location:  LUQ  Pain quality: aching    Pain radiates to:  Does not radiate  Pain severity:  Mild  Onset quality:  Sudden  Duration:  2 days  Timing:  Constant  Progression:  Worsening  Chronicity:  New  Relieved by:  Nothing  Worsened by:  Nothing  Ineffective treatments:  None tried  Associated symptoms: nausea    Associated symptoms: no chest pain, no chills, no cough, no diarrhea, no dysuria, no fever, no shortness of breath, no sore throat and no vomiting        Review of Systems   Constitutional: Negative for chills and fever.   HENT: Negative for congestion, ear pain and sore throat.    Respiratory: Negative for cough, shortness of breath and wheezing.    Cardiovascular: Negative for chest pain.   Gastrointestinal: Positive for abdominal pain and nausea. Negative for diarrhea and vomiting.   Genitourinary: Negative for dysuria and flank pain.   Skin: Negative for rash.   Neurological: Negative for headaches.   Psychiatric/Behavioral: The patient is not nervous/anxious.    All other systems reviewed and are negative.      History reviewed. No pertinent past medical history.    No Known Allergies    History reviewed. No pertinent surgical history.    Family History   Problem Relation Age of Onset   • Cancer Maternal Grandmother        Social History     Socioeconomic History   • Marital status: Single     Spouse name: Not on file   • Number of children: Not on file   • Years of education: Not on file   • Highest education level: Not on file   Tobacco Use   • Smoking status: Current Every Day Smoker     Packs/day: 0.50     Types: Cigarettes   • Smokeless tobacco: Current User   Substance and Sexual Activity   • Alcohol use: Yes     Alcohol/week: 8.0 standard drinks     Types: 8 Shots of liquor per week     Comment: states occasional use, last use was yesterday, drank couple mixed drink    • Drug use: Yes     Types: Amphetamines     Comment: suboxone as well   • Sexual activity: Defer           Objective   Physical Exam  Vitals and nursing note reviewed.   Constitutional:       Appearance: He is well-developed.   HENT:      Head: Normocephalic.   Cardiovascular:      Rate and Rhythm: Normal rate and regular rhythm.   Pulmonary:      Effort: Pulmonary effort is normal.      Breath sounds: Normal breath sounds.   Abdominal:      General: Bowel sounds are normal.      Palpations: Abdomen is soft.      Tenderness: There is abdominal tenderness in the left upper quadrant.   Musculoskeletal:         General: Normal range of motion.      Cervical back: Neck supple.   Skin:     General: Skin is warm and dry.   Neurological:      Mental Status: He is alert and oriented to person, place, and time.   Psychiatric:         Behavior: Behavior normal.         Thought Content: Thought content normal.         Judgment: Judgment normal.         Procedures           ED Course  ED Course as of Jun 17 1956   Thu Jun 17, 2021   1751 Paged hospitalist     [LC]   1820 Pt was discussed with Concha lopez admit    [LC]      ED Course User Index  [LC] Janelle Schuster PA                                           [facility]    Final diagnoses:   Acute pancreatitis, unspecified complication status, unspecified pancreatitis type       ED Disposition  ED Disposition     ED Disposition Condition Comment    Decision to Admit  Level of Care: Med/Surg [1]   Diagnosis: Pancreatitis [202663]   Certification: I Certify That Inpatient Hospital Services Are Medically Necessary For Greater Than 2 Midnights            No follow-up provider specified.       Medication List      No changes were made to your prescriptions during this visit.          Janelle Schuster PA  06/17/21 1954       Janelle Schuster PA  06/17/21 1956

## 2021-06-17 NOTE — ED NOTES
Pt currently rates upper epigastric pain as a 6 on 1-10 pain scale prior to morphine     Stacey Sandoval RN  06/17/21 1939

## 2021-06-17 NOTE — ED NOTES
"I was told I had phone call, it was pts mother, very upset questioning pts care and why he had not had pain medicine, why bp had not been treated, etc, she states that it was because of pt's \"history, I know why you all aren't treating him\" I tried to reassure her I did not know what \"history\" she was speaking of that it had nothing to do with pt's care, I instr. Her I could not discuss details with her without speaking to pt first due to HIPPA, unable to reason with her, she instructed me she was notifying \"higher up\" and that I would be hearing from them, I instr. Her pt had been updated during entire visit and he had a family member she could speak with, unable to reason with her and she hung up, provider was made aware and pt/family member whom states they would update her and speak to her and also apologized for her, lead nurse notified as well     Stacey Sandoval, RN  06/17/21 5929    "

## 2021-06-17 NOTE — ED NOTES
Pt states pain is feeling much better, rates it as a 2 or 3 currently, pt has been updated on visit     Stacey Sandoval RN  06/17/21 4155

## 2021-06-17 NOTE — ED NOTES
Pt ask if he could have something else for pain, pt states it is starting to increase again, provider notified, pt aware he may be a possible admission and that he will be updated once we know, pt verb understanding, pt very pleasant, voices on complaints     Stacey Sandoval, RN  06/17/21 8292

## 2021-06-18 LAB
ALBUMIN SERPL-MCNC: 3.46 G/DL (ref 3.5–5.2)
ALBUMIN/GLOB SERPL: 1.1 G/DL
ALP SERPL-CCNC: 237 U/L (ref 39–117)
ALT SERPL W P-5'-P-CCNC: 77 U/L (ref 1–41)
ANION GAP SERPL CALCULATED.3IONS-SCNC: 12.9 MMOL/L (ref 5–15)
APTT PPP: 28 SECONDS (ref 25.5–35.4)
AST SERPL-CCNC: 100 U/L (ref 1–40)
BILIRUB SERPL-MCNC: 1.5 MG/DL (ref 0–1.2)
BUN SERPL-MCNC: 5 MG/DL (ref 6–20)
BUN/CREAT SERPL: 9.1 (ref 7–25)
CALCIUM SPEC-SCNC: 9.1 MG/DL (ref 8.6–10.5)
CHLORIDE SERPL-SCNC: 103 MMOL/L (ref 98–107)
CHOLEST SERPL-MCNC: 183 MG/DL (ref 0–200)
CO2 SERPL-SCNC: 22.1 MMOL/L (ref 22–29)
CREAT SERPL-MCNC: 0.55 MG/DL (ref 0.76–1.27)
DEPRECATED RDW RBC AUTO: 42.6 FL (ref 37–54)
ERYTHROCYTE [DISTWIDTH] IN BLOOD BY AUTOMATED COUNT: 12.2 % (ref 12.3–15.4)
FOLATE SERPL-MCNC: 2.31 NG/ML (ref 4.78–24.2)
GFR SERPL CREATININE-BSD FRML MDRD: >150 ML/MIN/1.73
GLOBULIN UR ELPH-MCNC: 3.2 GM/DL
GLUCOSE SERPL-MCNC: 121 MG/DL (ref 65–99)
HCT VFR BLD AUTO: 48.1 % (ref 37.5–51)
HDLC SERPL-MCNC: 61 MG/DL (ref 40–60)
HGB BLD-MCNC: 16.2 G/DL (ref 13–17.7)
INR PPP: 0.98 (ref 0.9–1.1)
LDLC SERPL CALC-MCNC: 100 MG/DL (ref 0–100)
LDLC/HDLC SERPL: 1.58 {RATIO}
MAGNESIUM SERPL-MCNC: 1.5 MG/DL (ref 1.6–2.6)
MCH RBC QN AUTO: 32.2 PG (ref 26.6–33)
MCHC RBC AUTO-ENTMCNC: 33.7 G/DL (ref 31.5–35.7)
MCV RBC AUTO: 95.6 FL (ref 79–97)
PHOSPHATE SERPL-MCNC: 2.8 MG/DL (ref 2.5–4.5)
PLATELET # BLD AUTO: 141 10*3/MM3 (ref 140–450)
PMV BLD AUTO: 10.5 FL (ref 6–12)
POTASSIUM SERPL-SCNC: 3.5 MMOL/L (ref 3.5–5.2)
PROT SERPL-MCNC: 6.7 G/DL (ref 6–8.5)
PROTHROMBIN TIME: 13.4 SECONDS (ref 12.8–14.5)
RBC # BLD AUTO: 5.03 10*6/MM3 (ref 4.14–5.8)
SODIUM SERPL-SCNC: 138 MMOL/L (ref 136–145)
TRIGL SERPL-MCNC: 128 MG/DL (ref 0–150)
VIT B12 BLD-MCNC: 352 PG/ML (ref 211–946)
VLDLC SERPL-MCNC: 22 MG/DL (ref 5–40)
WBC # BLD AUTO: 11.2 10*3/MM3 (ref 3.4–10.8)

## 2021-06-18 PROCEDURE — 82607 VITAMIN B-12: CPT | Performed by: INTERNAL MEDICINE

## 2021-06-18 PROCEDURE — 82746 ASSAY OF FOLIC ACID SERUM: CPT | Performed by: INTERNAL MEDICINE

## 2021-06-18 PROCEDURE — 83735 ASSAY OF MAGNESIUM: CPT | Performed by: INTERNAL MEDICINE

## 2021-06-18 PROCEDURE — 25010000002 MAGNESIUM SULFATE 2 GM/50ML SOLUTION: Performed by: STUDENT IN AN ORGANIZED HEALTH CARE EDUCATION/TRAINING PROGRAM

## 2021-06-18 PROCEDURE — 87522 HEPATITIS C REVRS TRNSCRPJ: CPT | Performed by: INTERNAL MEDICINE

## 2021-06-18 PROCEDURE — 87902 NFCT AGT GNTYP ALYS HEP C: CPT | Performed by: INTERNAL MEDICINE

## 2021-06-18 PROCEDURE — 25010000002 MORPHINE PER 10 MG: Performed by: PHYSICIAN ASSISTANT

## 2021-06-18 PROCEDURE — 85610 PROTHROMBIN TIME: CPT | Performed by: INTERNAL MEDICINE

## 2021-06-18 PROCEDURE — 93010 ELECTROCARDIOGRAM REPORT: CPT | Performed by: SPECIALIST

## 2021-06-18 PROCEDURE — 85730 THROMBOPLASTIN TIME PARTIAL: CPT | Performed by: INTERNAL MEDICINE

## 2021-06-18 PROCEDURE — 25010000002 MORPHINE PER 10 MG: Performed by: INTERNAL MEDICINE

## 2021-06-18 PROCEDURE — 84425 ASSAY OF VITAMIN B-1: CPT | Performed by: INTERNAL MEDICINE

## 2021-06-18 PROCEDURE — 25010000002 HYDRALAZINE PER 20 MG: Performed by: STUDENT IN AN ORGANIZED HEALTH CARE EDUCATION/TRAINING PROGRAM

## 2021-06-18 PROCEDURE — 25010000002 ENOXAPARIN PER 10 MG: Performed by: STUDENT IN AN ORGANIZED HEALTH CARE EDUCATION/TRAINING PROGRAM

## 2021-06-18 PROCEDURE — 25010000002 MORPHINE PER 10 MG: Performed by: STUDENT IN AN ORGANIZED HEALTH CARE EDUCATION/TRAINING PROGRAM

## 2021-06-18 PROCEDURE — 84100 ASSAY OF PHOSPHORUS: CPT | Performed by: INTERNAL MEDICINE

## 2021-06-18 PROCEDURE — 80053 COMPREHEN METABOLIC PANEL: CPT | Performed by: INTERNAL MEDICINE

## 2021-06-18 PROCEDURE — 85027 COMPLETE CBC AUTOMATED: CPT | Performed by: INTERNAL MEDICINE

## 2021-06-18 PROCEDURE — 99233 SBSQ HOSP IP/OBS HIGH 50: CPT | Performed by: STUDENT IN AN ORGANIZED HEALTH CARE EDUCATION/TRAINING PROGRAM

## 2021-06-18 PROCEDURE — 80061 LIPID PANEL: CPT | Performed by: INTERNAL MEDICINE

## 2021-06-18 PROCEDURE — 25010000002 THIAMINE PER 100 MG: Performed by: INTERNAL MEDICINE

## 2021-06-18 RX ORDER — HYDRALAZINE HYDROCHLORIDE 20 MG/ML
10 INJECTION INTRAMUSCULAR; INTRAVENOUS EVERY 6 HOURS PRN
Status: DISCONTINUED | OUTPATIENT
Start: 2021-06-18 | End: 2021-06-19 | Stop reason: HOSPADM

## 2021-06-18 RX ORDER — MAGNESIUM SULFATE HEPTAHYDRATE 40 MG/ML
2 INJECTION, SOLUTION INTRAVENOUS AS NEEDED
Status: DISCONTINUED | OUTPATIENT
Start: 2021-06-18 | End: 2021-06-19 | Stop reason: HOSPADM

## 2021-06-18 RX ORDER — NALOXONE HCL 0.4 MG/ML
0.1 VIAL (ML) INJECTION
Status: DISCONTINUED | OUTPATIENT
Start: 2021-06-18 | End: 2021-06-19 | Stop reason: HOSPADM

## 2021-06-18 RX ORDER — MAGNESIUM SULFATE HEPTAHYDRATE 40 MG/ML
2 INJECTION, SOLUTION INTRAVENOUS
Status: COMPLETED | OUTPATIENT
Start: 2021-06-18 | End: 2021-06-18

## 2021-06-18 RX ORDER — MORPHINE SULFATE/0.9% NACL/PF 1 MG/ML
SYRINGE (ML) INJECTION CONTINUOUS
Status: DISCONTINUED | OUTPATIENT
Start: 2021-06-18 | End: 2021-06-18

## 2021-06-18 RX ORDER — MAGNESIUM SULFATE HEPTAHYDRATE 40 MG/ML
4 INJECTION, SOLUTION INTRAVENOUS AS NEEDED
Status: DISCONTINUED | OUTPATIENT
Start: 2021-06-18 | End: 2021-06-19 | Stop reason: HOSPADM

## 2021-06-18 RX ADMIN — SODIUM CHLORIDE, POTASSIUM CHLORIDE, SODIUM LACTATE AND CALCIUM CHLORIDE 150 ML/HR: 600; 310; 30; 20 INJECTION, SOLUTION INTRAVENOUS at 12:49

## 2021-06-18 RX ADMIN — MORPHINE SULFATE 4 MG: 4 INJECTION, SOLUTION INTRAMUSCULAR; INTRAVENOUS at 01:24

## 2021-06-18 RX ADMIN — MORPHINE SULFATE 4 MG: 4 INJECTION, SOLUTION INTRAMUSCULAR; INTRAVENOUS at 22:36

## 2021-06-18 RX ADMIN — NICOTINE TRANSDERMAL SYSTEM 1 PATCH: 21 PATCH, EXTENDED RELEASE TRANSDERMAL at 09:24

## 2021-06-18 RX ADMIN — ENOXAPARIN SODIUM 40 MG: 40 INJECTION SUBCUTANEOUS at 09:23

## 2021-06-18 RX ADMIN — Medication: at 03:49

## 2021-06-18 RX ADMIN — SODIUM CHLORIDE, POTASSIUM CHLORIDE, SODIUM LACTATE AND CALCIUM CHLORIDE 150 ML/HR: 600; 310; 30; 20 INJECTION, SOLUTION INTRAVENOUS at 05:38

## 2021-06-18 RX ADMIN — SODIUM CHLORIDE, PRESERVATIVE FREE 10 ML: 5 INJECTION INTRAVENOUS at 20:18

## 2021-06-18 RX ADMIN — MAGNESIUM SULFATE HEPTAHYDRATE 2 G: 40 INJECTION, SOLUTION INTRAVENOUS at 06:36

## 2021-06-18 RX ADMIN — HYDRALAZINE HYDROCHLORIDE 10 MG: 20 INJECTION INTRAMUSCULAR; INTRAVENOUS at 09:32

## 2021-06-18 RX ADMIN — MAGNESIUM SULFATE HEPTAHYDRATE 2 G: 40 INJECTION, SOLUTION INTRAVENOUS at 09:25

## 2021-06-18 RX ADMIN — THIAMINE HYDROCHLORIDE 100 MG: 100 INJECTION, SOLUTION INTRAMUSCULAR; INTRAVENOUS at 09:23

## 2021-06-18 RX ADMIN — SODIUM CHLORIDE, PRESERVATIVE FREE 10 ML: 5 INJECTION INTRAVENOUS at 09:23

## 2021-06-18 RX ADMIN — MAGNESIUM SULFATE HEPTAHYDRATE 2 G: 40 INJECTION, SOLUTION INTRAVENOUS at 05:38

## 2021-06-18 RX ADMIN — Medication: at 19:09

## 2021-06-18 NOTE — PROGRESS NOTES
[facility] HOSPITALIST PROGRESS NOTE     Patient Identification:  Name:  Franck Toure  Age:  30 y.o.  Sex:  male  :  1990  MRN:  [id number]  Visit Number:  [id number]  ROOM: [address] [place] [address]     Primary Care Provider:  Irma López APRN    Length of stay in inpatient status:  1    Subjective     Chief Compliant:    Chief Complaint   Patient presents with   • Abdominal Pain       History of Presenting Illness: Patient seen and evaluated in follow-up for acute alcoholic induced pancreatitis with subsequent elevated blood pressures and elevated liver enzymes consistent with his alcohol intake.  Patient today feeling much better and using less pain medication and wishes to advance diet.  Patient otherwise without any acute complaints.    Objective     Current Hospital Meds:enoxaparin, 40 mg, Subcutaneous, Daily  nicotine, 1 patch, Transdermal, Q24H  sodium chloride, 10 mL, Intravenous, Q12H  thiamine (VITAMIN B1) IVPB, 100 mg, Intravenous, Daily    lactated ringers, 150 mL/hr, Last Rate: 150 mL/hr (21 1249)  Morphine,         Current Antimicrobial Therapy:  Anti-Infectives (From admission, onward)    None        Current Diuretic Therapy:  Diuretics (From admission, onward)    None        ----------------------------------------------------------------------------------------------------------------------  Vital Signs:  Temp:  [97.5 °F (36.4 °C)-98.9 °F (37.2 °C)] 98.1 °F (36.7 °C)  Heart Rate:  [53-76] 60  Resp:  [18-22] 18  BP: (145-200)/() 146/98  SpO2:  [96 %-99 %] 99 %  on   ;      Body mass index is 23.32 kg/m².    Wt Readings from Last 3 Encounters:   21 91.5 kg (201 lb 12.8 oz)   20 95.3 kg (210 lb)   20 93.4 kg (206 lb)     Intake & Output (last 3 days)       701 -  07 -  07 -  07    I.V. (mL/kg)   2958.9 (32.3)    IV Piggyback  1000 100    Total Intake(mL/kg)  1000 (10.9) 3058.9 (33.4)    Urine (mL/kg/hr)    1750 (1.5)    Total Output   1750    Net  +1000 +1308.9           Urine Unmeasured Occurrence  2 x         Diet Full Liquid  ----------------------------------------------------------------------------------------------------------------------  Physical exam:  Constitutional:  Well-developed and well-nourished.  No acute distress.      HENT:  Head:  Normocephalic and atraumatic.  Mouth:  Moist mucous membranes.    Eyes:  Conjunctivae and EOM are normal. No scleral icterus.    Neck:  Neck supple.  No JVD present.    Cardiovascular:  Normal rate, regular rhythm and normal heart sounds with no murmur.  Pulmonary/Chest:  No respiratory distress, no wheezes, no crackles, with normal breath sounds and good air movement.  Abdominal:  Soft.  Bowel sounds are normal.  No distension and mild tenderness to palpation in the epigastric region..   Musculoskeletal:  No edema, no tenderness, and no deformity.  No red or swollen joints anywhere.  Functional ROM intact.   Neurological:  Alert and oriented to person, place, and time.  No cranial nerve deficit.  No tongue deviation.  No facial droop.  No slurred speech. Intact Sensation throughout  Skin:  Skin is warm and dry. No rash or lesion noted. No pallor.   Peripheral vascular:  Pulses in all 4 extremities with no clubbing, no cyanosis, no edema.  Psychiatric: Appropriate mood and affect, pleasant.   ----------------------------------------------------------------------------------------------------------------------  Tele:    ----------------------------------------------------------------------------------------------------------------------  Results from last 7 days   Lab Units 06/18/21  0108 06/17/21  1502   WBC 10*3/mm3 11.20* 10.73   HEMOGLOBIN g/dL 16.2 16.3   HEMATOCRIT % 48.1 47.7   MCV fL 95.6 94.1   MCHC g/dL 33.7 34.2   PLATELETS 10*3/mm3 141 168   INR  0.98  --          Results from last 7 days   Lab Units 06/18/21  0108 06/17/21  1502   SODIUM mmol/L 138 140    POTASSIUM mmol/L 3.5 3.9   MAGNESIUM mg/dL 1.5*  --    CHLORIDE mmol/L 103 106   CO2 mmol/L 22.1 22.9   BUN mg/dL 5* 5*   CREATININE mg/dL 0.55* 0.57*   EGFR IF NONAFRICN AM mL/min/1.73 >150 >150   CALCIUM mg/dL 9.1 9.6   PHOSPHORUS mg/dL 2.8  --    GLUCOSE mg/dL 121* 141*   ALBUMIN g/dL 3.46* 3.96   BILIRUBIN mg/dL 1.5* 1.1   ALK PHOS U/L 237* 276*   AST (SGOT) U/L 100* 156*   ALT (SGPT) U/L 77* 99*   Estimated Creatinine Clearance: 254.2 mL/min (A) (by C-G formula based on SCr of 0.55 mg/dL (L)).  No results found for: AMMONIA          Results from last 7 days   Lab Units 06/18/21  0459   CHOLESTEROL mg/dL 183   TRIGLYCERIDES mg/dL 128   HDL CHOL mg/dL 61*   LDL CHOL mg/dL 100     Hemoglobin A1C   Date/Time Value Ref Range Status   06/17/2021 1502 5.50 4.80 - 5.60 % Final     Lab Results   Component Value Date    TSH 2.360 06/17/2021     No results found for: PREGTESTUR, PREGSERUM, HCG, HCGQUANT  Pain Management Panel     Pain Management Panel Latest Ref Rng & Units 6/17/2021 2/4/2020    AMPHETAMINES SCREEN, URINE Negative Negative Negative    BARBITURATES SCREEN Negative Negative Negative    BENZODIAZEPINE SCREEN, URINE Negative Negative Negative    BUPRENORPHINEUR Negative Positive(A) Positive(A)    COCAINE SCREEN, URINE Negative Negative Negative    METHADONE SCREEN, URINE Negative Negative Negative        Brief Urine Lab Results  (Last result in the past 365 days)      Color   Clarity   Blood   Leuk Est   Nitrite   Protein   CREAT   Urine HCG        06/17/21 1823 Yellow Clear Negative Negative Negative Negative             No results found for: BLOODCX  No results found for: URINECX  No results found for: WOUNDCX  No results found for: STOOLCX  No results found for: RESPCX  No results found for: AFBCX        I have personally looked at the labs and they are summarized above.  ----------------------------------------------------------------------------------------------------------------------  Detailed  radiology reports for the last 24 hours:    Imaging Results (Last 24 Hours)     ** No results found for the last 24 hours. **        Assessment & Plan      Acute alcoholic induced pancreatitis  Hypertension secondary to pain  Tobacco abuse  Acute mild hepatitis  Chronic hepatitis C  Polysubstance abuse  History of medical noncompliance    Patient doing well on IV fluids and PCA.  Patient reports that he has been using the PCA very little this afternoon and request to be trialed on a diet as he would like to be able to be cleared for discharge hopefully home tomorrow.  Patient with minimal pain with palpation of the epigastric region on exam.  Will advance patient to full liquid diet and if tolerates well overnight will advance to bland in the morning.  In the meantime we will discontinue patient's PCA approved and transition patient to morphine 4 mg every 3 as needed as needed for pain.  Continue DVT prophylaxis with Lovenox.  Continue LR at 150 an hour with plan to DC in the morning.  Hydralazine as needed for hypertension.      VTE Prophylaxis:   Mechanical Order History:     None      Pharmalogical Order History:      Ordered     Dose Route Frequency Stop    06/17/21 2112  enoxaparin (LOVENOX) syringe 40 mg      40 mg SC Daily --    06/17/21 2106  Pharmacy to Dose enoxaparin (LOVENOX)  Status:  Discontinued     Question:  Indication of use  Answer:  Prophylaxis    -- XX Continuous PRN 06/18/21 1627                Disposition likely home tomorrow afternoon with tolerance of diet.    Lincoln Kern DO  [facility] Hospitalist  06/18/21  19:49 EDT

## 2021-06-18 NOTE — ED NOTES
Pt continues to refuse transport at this time. Provider at bedside.     Liane Aiken, RN  06/17/21 2044

## 2021-06-18 NOTE — PLAN OF CARE
Goal Outcome Evaluation:  Plan of Care Reviewed With: patient           Outcome Summary: Patient is resting for now. Wakes up about every 2 hours in pain in upper abdomen. No acute distress noted. B/P is remaining high. MD aware. Pain medication increased freq from Q4 to Q2 per MD order. Will continue to monitor and follow plan of care.

## 2021-06-18 NOTE — ED NOTES
Report to Cassandra RN and Liane, RN at bedside, pt alert, oriented, stable, iv heplock remains in place with no abnormality noted to site, have spoken to provider about vitals whom is aware, pt updated aware awaiting on bed assignment for admissioStacey Mata, RN  06/17/21 2010

## 2021-06-18 NOTE — PLAN OF CARE
Goal Outcome Evaluation:            Patient has had no complaints this shift. He has rested well and tolerated a full liquid diet. Will follow plan of care.

## 2021-06-18 NOTE — H&P
[facility] HOSPITALIST HISTORY AND PHYSICAL    Patient Identification:  Name:  Franck Toure  Age:  30 y.o.  Sex:  male  :  1990  MRN:  [id number]   Visit Number:  [id number]  Admit Date: 2021   Room number:  3310/2S  Primary Care Physician:  Irma López APRN    Date of Admission: 2021     Subjective     Chief complaint:    Chief Complaint   Patient presents with   • Abdominal Pain     History of presenting illness:  30 y.o. male who was admitted on 2021 from the ED with epigastric abdominal pain.  The patient has a history of alcoholic induced pancreatitis; he was hospitalized at [facility] 2019 through 2019, at which time he left AGAINST MEDICAL ADVICE.  During that hospitalization, the patient admitted to taking Suboxone and amphetamines; it was also discovered that he had a positive hepatitis C antibody test.      The patient noticed intermittent epigastric pain and thus came to the emergency department for evaluation.  The patient states that he went on vacation recently; 2 days prior to admission he drank 8 shots of fireball and had a couple of beers.  1 day prior to admission, the patient states that he drank about 3-4 bottles of strawberry wine coolers.  By the day of admission, the patient had the sharp epigastric pain that radiated to his back in a straight line.  He had one episode of nonbloody nonbilious emesis without any diarrhea.  He denies any fevers, cough, chest pain, muscle aches, arthralgias, dysuria, hematuria, headaches, jaundice, or lightheadedness.  In the emergency department, the patient was found to have a very elevated amylase and lipase level.  He was diagnosed with acute alcoholic pancreatitis.  The patient was thus admitted to the medical surgical floor for further evaluation and treatment.    Also, the patient denies any right upper quadrant pain with  eating.    ---------------------------------------------------------------------------------------------------------------------   Review of Systems   Constitutional: Negative for chills, diaphoresis, fatigue and fever.   HENT: Negative for congestion and rhinorrhea.    Eyes: Negative for discharge and redness.   Respiratory: Negative for cough and shortness of breath.    Cardiovascular: Negative for chest pain and leg swelling.   Gastrointestinal: Positive for abdominal pain (Epigastric), nausea and vomiting (x1, nonbloody). Negative for diarrhea.   Genitourinary: Negative for dysuria and hematuria.   Musculoskeletal: Negative for arthralgias and myalgias.   Skin: Negative for rash and wound.   Neurological: Negative for dizziness, syncope, speech difficulty and headaches.   Psychiatric/Behavioral: Negative for agitation, behavioral problems and confusion.     ---------------------------------------------------------------------------------------------------------------------   Past Medical History:   Diagnosis Date   • Alcohol-induced acute pancreatitis 6/27/2019   • Drug use     Amphetamines and Suboxone   • Fatty liver    • Hepatitis C antibody test positive 2019   • Medical non-compliance    • Smoker      Past Surgical History:   Procedure Laterality Date   • NO PAST SURGERIES       Family History   Problem Relation Age of Onset   • Cancer Maternal Grandmother      Social History     Socioeconomic History   • Marital status: Single   Tobacco Use   • Smoking status: Current Every Day Smoker     Packs/day: 0.50     Types: Cigarettes   • Smokeless tobacco: Current User   Substance and Sexual Activity   • Alcohol use: Yes     Alcohol/week: 8.0 standard drinks     Types: 8 Shots of liquor per week     Comment: states occasional use, last use was yesterday, drank couple mixed drink   • Drug use: Yes     Types: Amphetamines     Comment: suboxone as well   • Sexual activity: Defer      ---------------------------------------------------------------------------------------------------------------------   Allergies:  Patient has no known allergies.  ---------------------------------------------------------------------------------------------------------------------   Medications below are reported home medications pulling from within the system; at this time, these medications have not been reconciled unless otherwise specified and are in the verification process for further verifcation as current home medications.      Prior to Admission Medications     None        Objective     Vital Signs:  Temp:  [98 °F (36.7 °C)-98.9 °F (37.2 °C)] 98.3 °F (36.8 °C)  Heart Rate:  [54-62] 55  Resp:  [16-22] 20  BP: (145-200)/() 145/93    Mean Arterial Pressure (Non-Invasive) for the past 24 hrs (Last 3 readings):   Noninvasive MAP (mmHg)   06/18/21 0300 110   06/17/21 2254 128   06/17/21 2058 158     SpO2:  [95 %-100 %] 99 %  on room air     Body mass index is 23.32 kg/m².    Wt Readings from Last 3 Encounters:   06/17/21 91.5 kg (201 lb 12.8 oz)   02/04/20 95.3 kg (210 lb)   02/03/20 93.4 kg (206 lb)      ----------------------------------------------------------------------------------------------------------------------  Physical Exam  Vitals reviewed.   Constitutional:       Appearance: Normal appearance. He is well-developed. He is not toxic-appearing or diaphoretic.      Comments: He appears to be in pain.   HENT:      Head: Normocephalic and atraumatic.      Right Ear: External ear normal.      Left Ear: External ear normal.      Nose: Nose normal.   Eyes:      General: No scleral icterus.        Right eye: No discharge.         Left eye: No discharge.      Pupils: Pupils are equal, round, and reactive to light.   Cardiovascular:      Rate and Rhythm: Normal rate and regular rhythm.      Pulses: Normal pulses.      Heart sounds: No murmur heard.     Pulmonary:      Effort: Pulmonary effort is  normal. No respiratory distress.      Breath sounds: No wheezing or rales.   Abdominal:      General: Abdomen is flat. Bowel sounds are normal. There is no distension.      Tenderness: There is abdominal tenderness in the epigastric area.   Musculoskeletal:         General: No swelling, deformity or signs of injury.   Skin:     Capillary Refill: Capillary refill takes less than 2 seconds.      Coloration: Skin is not jaundiced or pale.      Findings: No rash.   Neurological:      Mental Status: He is alert and oriented to person, place, and time. Mental status is at baseline.      Cranial Nerves: No cranial nerve deficit.   Psychiatric:         Mood and Affect: Mood normal.         Behavior: Behavior normal. Behavior is cooperative.         Thought Content: Thought content normal.         Judgment: Judgment normal.       ---------------------------------------------------------------------------------------------------------------------  EKG: Sinus bradycardia with a heart rate of 55 and a QTC of 436 ms.  There is no ischemia.  When compared to an EKG dated 6/27/2019, there are no changes seen.  Please note that I have personally looked at the EKG from this admission, the comparison EKGs in the medical records, and the above is my interpretation of this admission's EKG; I await the final cardiology read.    Telemetry:  Normal sinus rhythm heart rates 50s to 60s.  Please note that I personally looked at the telemetry strips.  --------------------------------------------------------------------------------------------------------------------  LABS:    CBC and coagulation:  Results from last 7 days   Lab Units 06/18/21  0108 06/17/21  1502   WBC 10*3/mm3 11.20* 10.73   HEMOGLOBIN g/dL 16.2 16.3   HEMATOCRIT % 48.1 47.7   MCV fL 95.6 94.1   MCHC g/dL 33.7 34.2   PLATELETS 10*3/mm3 141 168   INR  0.98  --      Renal and electrolytes:  Results from last 7 days   Lab Units 06/18/21  0108 06/17/21  1502   SODIUM mmol/L 138  140   POTASSIUM mmol/L 3.5 3.9   MAGNESIUM mg/dL 1.5*  --    CHLORIDE mmol/L 103 106   CO2 mmol/L 22.1 22.9   BUN mg/dL 5* 5*   CREATININE mg/dL 0.55* 0.57*   EGFR IF NONAFRICN AM mL/min/1.73 >150 >150   CALCIUM mg/dL 9.1 9.6   PHOSPHORUS mg/dL 2.8  --    GLUCOSE mg/dL 121* 141*     Estimated Creatinine Clearance: 254.2 mL/min (A) (by C-G formula based on SCr of 0.55 mg/dL (L)).    Liver and pancreatic function:  Results from last 7 days   Lab Units 06/18/21  0108 06/17/21  1502   ALBUMIN g/dL 3.46* 3.96   BILIRUBIN mg/dL 1.5* 1.1   ALK PHOS U/L 237* 276*   AST (SGOT) U/L 100* 156*   ALT (SGPT) U/L 77* 99*   AMYLASE U/L  --  205*   LIPASE U/L  --  463*       Endocrine function:  Lab Results   Component Value Date    HGBA1C 5.50 06/17/2021       Lab Results   Component Value Date    TSH 2.360 06/17/2021     Cultures:  Lab Results   Component Value Date    COLORU Yellow 06/17/2021    CLARITYU Clear 06/17/2021    PHUR 5.5 06/17/2021    GLUCOSEU Negative 06/17/2021    KETONESU Negative 06/17/2021    BLOODU Negative 06/17/2021    NITRITEU Negative 06/17/2021    LEUKOCYTESUR Negative 06/17/2021    BILIRUBINUR Negative 06/17/2021    UROBILINOGEN 1.0 E.U./dL 06/17/2021    RBCUA 0-2 02/04/2020    WBCUA 0-2 02/04/2020    BACTERIA None Seen 02/04/2020     Microbiology Results (last 10 days)     Procedure Component Value - Date/Time    COVID-19 and FLU A/B PCR - Swab, Nasopharynx [[id number]]  (Normal) Collected: 06/17/21 1914    Lab Status: Final result Specimen: Swab from Nasopharynx Updated: 06/17/21 1938     COVID19 Not Detected     Influenza A PCR Not Detected     Influenza B PCR Not Detected    Narrative:      Fact sheet for providers: https://www.fda.gov/media/833045/download    Fact sheet for patients: https://www.fda.gov/media/805911/download    Test performed by PCR.              I have personally looked at the labs and they are summarized  above.  ----------------------------------------------------------------------------------------------------------------------  Detailed radiology reports for the last 24 hours:    Imaging Results (Last 24 Hours)     Procedure Component Value Units Date/Time    CT Abdomen Pelvis With Contrast [[id number]] Collected: 06/17/21 1707     Updated: 06/17/21 1713    Narrative:      EXAM: CT ABDOMEN PELVIS W CONTRAST-         TECHNIQUE: Multiple axial CT images were obtained from lung bases  through pubic symphysis WITH administration of IV contrast. Reformatted  images in the coronal and/or sagittal plane(s) were generated from the  axial data set to facilitate diagnostic accuracy and/or surgical  planning.  Oral Contrast:NONE.     Radiation dose reduction techniques were utilized per ALARA protocol.  Automated exposure control was initiated through either or Blend or  DoseRight software packages by  protocol.    DOSE:     Clinical information abd pain      Comparison to 04/20/2020     FINDINGS:     Lower thorax: Clear. No effusions.     Abdomen:     Liver: The liver is homogeneous, but decreased in attenuation compatible  with fatty infiltration of the liver.     Gallbladder: There is evidence of cholelithiasis.     Pancreas: Peripancreatic stranding. No walled off pseudocyst is seen.     Spleen: Homogeneous. No splenomegaly.     Adrenals: No mass.     Kidneys/ureters: No mass. No obstructive uropathy.  No evidence of  urolithiasis.     GI tract: Non-dilated. No definite wall thickening.. There is no  evidence of appendicitis     MESENTERY: As stated above extensive peripancreatic stranding      Vasculature: No evidence of aneurysm.     Abdominal wall: No focal hernia or mass.        Bladder: No focal mass or significant wall thickening     Reproductive: Unremarkable as visualized     Bones: No acute bony abnormality.       Impression:         1. Extensive peripancreatic stranding and fluid compatible  with  pancreatitis. No mass or pseudocyst is identified.     2.Cholelithiasis     3. Fatty infiltration of the liver              This report was finalized on 6/17/2021 5:09 PM by Dr. Scott Alfred MD.           I have personally looked at the radiology images and I have read the available final reports.    Assessment & Plan       -Acute alcoholic induced pancreatitis  -Acute hypomagnesemia  -Elevated blood pressures without a known history of essential hypertension  -Elevated liver enzymes, consistent with acute alcoholic hepatitis  -Tobacco smoking addiction  -Drug use with amphetamines and Suboxone  -Positive hepatitis C antibody noted in June 2019  -History of medical noncompliance  -History of fatty liver and cholelithiasis    He was admitted to the medical surgical floor (no medical surgical bed was available and thus he is overflow on the telemetry unit).  We will make him n.p.o. and give him IV fluids.  I will give him a one-time dose of hydralazine IV 20 mg for the blood pressure of 200/140; some of the blood pressure elevation may be due to pain; thus, we will give him IV morphine as needed.  We will continue to monitor his blood pressures closely.  Also, the patient could have elevated blood pressures due to alcohol withdrawal or withdrawal from pain medication.  I have placed him on the CIWA protocol; if his scores are consistently above 8 then we will order an IV Ativan as needed protocol.  I will give him IV thiamine 100 mg IV daily; I have ordered a thiamine level, vitamin B12 level, and folic acid level as the patient is at risk for deficiencies in these vitamins due to his alcohol use.  I will trend his liver enzymes.  The patient was noted to have a positive hepatitis C antibody in June 2019.  This test was repeated in February 2020 and the hepatitis C antibody remained positive.  Therefore, I will send for hepatitis C genotype and viral load.  The patient will need to follow-up in a GI clinic to  be treated for the hepatitis C.  We will give him a nicotine patch.  I will order a lipid panel in the morning.  He had a gallbladder ultrasound last year and a CT scan in the emergency department for this admission and both do not indicate any gallbladder disease.  I have written for the IV magnesium replacement protocol and we will continue to monitor his electrolytes closely, especially since he is at risk of deficiencies due to his alcohol use.    VTE Prophylaxis:   Mechanical Order History:     None      Pharmalogical Order History:      Ordered     Dose Route Frequency Stop    06/17/21 2112  enoxaparin (LOVENOX) syringe 40 mg      40 mg SC Daily --    06/17/21 2106  Pharmacy to Dose enoxaparin (LOVENOX)     Question:  Indication of use  Answer:  Prophylaxis    -- XX Continuous PRN --              The patient is high risk due to the following diagnoses/reasons: Alcoholic acute pancreatitis    Disposition: Home    Helder Neville MD  [facility] Hospitalist  06/18/21  04:10 EDT

## 2021-06-18 NOTE — PAYOR COMM NOTE
"[facility]  NPI:[id number]    Utilization Review  Contact: Catrina Hein RN  Phone: [phone]  Fax:[phone]    INITIATE INPATIENT AUTHORIZATION        Renato Thomas (30 y.o. Male)     Date of Birth Social Security Number Address Home Phone MRN    1990  [address] [facility] [address] [phone] [id number]    Zoroastrian Marital Status          Jew Single       Admission Date Admission Type Admitting Provider Attending Provider Department, Room/Bed    21 Emergency Lincoln Kern DO Cagle, William, DO [facility] 3 SOUTH, 3310/2S    Discharge Date Discharge Disposition Discharge Destination                       Attending Provider: Lincoln Kern DO    Allergies: No Known Allergies    Isolation: None   Infection: COVID (rule out) (21)   Code Status: CPR    Ht: 198.1 cm (78\")   Wt: 91.5 kg (201 lb 12.8 oz)    Admission Cmt: None   Principal Problem: Pancreatitis [K85.90]                 Active Insurance as of 2021     Primary Coverage     Payor Plan Insurance Group Employer/Plan Group    Critical access hospital BLUE CROSS [employer] EM[employer]OYEE [id number]     Payor Plan Address Payor Plan Phone Number Payor Plan Fax Number Effective Dates    [address] [phone]  2019 - None Entered    [place] [address]       Subscriber Name Subscriber Birth Date Member ID       RENATO THOMAS 1990 [id number]                 Emergency Contacts      (Rel.) Home Phone Work Phone Mobile Phone    SHAKA THOMAS (Mother) [phone] -- [phone]    SEGUNDO MURPHY (Significant Other) [phone] -- --               History & Physical      Helder Neville MD at 21 [facility]              [facility] HOSPITALIST HISTORY AND PHYSICAL    Patient Identification:  Name:  Renato Thomas  Age:  30 y.o.  Sex:  male  :  1990  MRN:  [id number]   Visit Number:  [id number]  Admit Date: 2021   Room number:  " 3310/2S  Primary Care Physician:  Irma López, APRN    Date of Admission: 6/17/2021     Subjective     Chief complaint:    Chief Complaint   Patient presents with   • Abdominal Pain     History of presenting illness:  30 y.o. male who was admitted on 6/17/2021 from the ED with epigastric abdominal pain.  The patient has a history of alcoholic induced pancreatitis; he was hospitalized at [facility] 6/27/2019 through 6/30/2019, at which time he left AGAINST MEDICAL ADVICE.  During that hospitalization, the patient admitted to taking Suboxone and amphetamines; it was also discovered that he had a positive hepatitis C antibody test.      The patient noticed intermittent epigastric pain and thus came to the emergency department for evaluation.  The patient states that he went on vacation recently; 2 days prior to admission he drank 8 shots of fireball and had a couple of beers.  1 day prior to admission, the patient states that he drank about 3-4 bottles of strawberry wine coolers.  By the day of admission, the patient had the sharp epigastric pain that radiated to his back in a straight line.  He had one episode of nonbloody nonbilious emesis without any diarrhea.  He denies any fevers, cough, chest pain, muscle aches, arthralgias, dysuria, hematuria, headaches, jaundice, or lightheadedness.  In the emergency department, the patient was found to have a very elevated amylase and lipase level.  He was diagnosed with acute alcoholic pancreatitis.  The patient was thus admitted to the medical surgical floor for further evaluation and treatment.    Also, the patient denies any right upper quadrant pain with eating.    ---------------------------------------------------------------------------------------------------------------------   Review of Systems   Constitutional: Negative for chills, diaphoresis, fatigue and fever.   HENT: Negative for congestion and rhinorrhea.    Eyes: Negative for discharge and  redness.   Respiratory: Negative for cough and shortness of breath.    Cardiovascular: Negative for chest pain and leg swelling.   Gastrointestinal: Positive for abdominal pain (Epigastric), nausea and vomiting (x1, nonbloody). Negative for diarrhea.   Genitourinary: Negative for dysuria and hematuria.   Musculoskeletal: Negative for arthralgias and myalgias.   Skin: Negative for rash and wound.   Neurological: Negative for dizziness, syncope, speech difficulty and headaches.   Psychiatric/Behavioral: Negative for agitation, behavioral problems and confusion.     ---------------------------------------------------------------------------------------------------------------------   Past Medical History:   Diagnosis Date   • Alcohol-induced acute pancreatitis 6/27/2019   • Drug use     Amphetamines and Suboxone   • Fatty liver    • Hepatitis C antibody test positive 2019   • Medical non-compliance    • Smoker      Past Surgical History:   Procedure Laterality Date   • NO PAST SURGERIES       Family History   Problem Relation Age of Onset   • Cancer Maternal Grandmother      Social History     Socioeconomic History   • Marital status: Single   Tobacco Use   • Smoking status: Current Every Day Smoker     Packs/day: 0.50     Types: Cigarettes   • Smokeless tobacco: Current User   Substance and Sexual Activity   • Alcohol use: Yes     Alcohol/week: 8.0 standard drinks     Types: 8 Shots of liquor per week     Comment: states occasional use, last use was yesterday, drank couple mixed drink   • Drug use: Yes     Types: Amphetamines     Comment: suboxone as well   • Sexual activity: Defer     ---------------------------------------------------------------------------------------------------------------------   Allergies:  Patient has no known allergies.  ---------------------------------------------------------------------------------------------------------------------   Medications below are reported home medications  pulling from within the system; at this time, these medications have not been reconciled unless otherwise specified and are in the verification process for further verifcation as current home medications.      Prior to Admission Medications     None        Objective     Vital Signs:  Temp:  [98 °F (36.7 °C)-98.9 °F (37.2 °C)] 98.3 °F (36.8 °C)  Heart Rate:  [54-62] 55  Resp:  [16-22] 20  BP: (145-200)/() 145/93    Mean Arterial Pressure (Non-Invasive) for the past 24 hrs (Last 3 readings):   Noninvasive MAP (mmHg)   06/18/21 0300 110   06/17/21 2254 128   06/17/21 2058 158     SpO2:  [95 %-100 %] 99 %  on room air     Body mass index is 23.32 kg/m².    Wt Readings from Last 3 Encounters:   06/17/21 91.5 kg (201 lb 12.8 oz)   02/04/20 95.3 kg (210 lb)   02/03/20 93.4 kg (206 lb)      ----------------------------------------------------------------------------------------------------------------------  Physical Exam  Vitals reviewed.   Constitutional:       Appearance: Normal appearance. He is well-developed. He is not toxic-appearing or diaphoretic.      Comments: He appears to be in pain.   HENT:      Head: Normocephalic and atraumatic.      Right Ear: External ear normal.      Left Ear: External ear normal.      Nose: Nose normal.   Eyes:      General: No scleral icterus.        Right eye: No discharge.         Left eye: No discharge.      Pupils: Pupils are equal, round, and reactive to light.   Cardiovascular:      Rate and Rhythm: Normal rate and regular rhythm.      Pulses: Normal pulses.      Heart sounds: No murmur heard.     Pulmonary:      Effort: Pulmonary effort is normal. No respiratory distress.      Breath sounds: No wheezing or rales.   Abdominal:      General: Abdomen is flat. Bowel sounds are normal. There is no distension.      Tenderness: There is abdominal tenderness in the epigastric area.   Musculoskeletal:         General: No swelling, deformity or signs of injury.   Skin:     Capillary  Refill: Capillary refill takes less than 2 seconds.      Coloration: Skin is not jaundiced or pale.      Findings: No rash.   Neurological:      Mental Status: He is alert and oriented to person, place, and time. Mental status is at baseline.      Cranial Nerves: No cranial nerve deficit.   Psychiatric:         Mood and Affect: Mood normal.         Behavior: Behavior normal. Behavior is cooperative.         Thought Content: Thought content normal.         Judgment: Judgment normal.       ---------------------------------------------------------------------------------------------------------------------  EKG: Sinus bradycardia with a heart rate of 55 and a QTC of 436 ms.  There is no ischemia.  When compared to an EKG dated 6/27/2019, there are no changes seen.  Please note that I have personally looked at the EKG from this admission, the comparison EKGs in the medical records, and the above is my interpretation of this admission's EKG; I await the final cardiology read.    Telemetry:  Normal sinus rhythm heart rates 50s to 60s.  Please note that I personally looked at the telemetry strips.  --------------------------------------------------------------------------------------------------------------------  LABS:    CBC and coagulation:  Results from last 7 days   Lab Units 06/18/21  0108 06/17/21  1502   WBC 10*3/mm3 11.20* 10.73   HEMOGLOBIN g/dL 16.2 16.3   HEMATOCRIT % 48.1 47.7   MCV fL 95.6 94.1   MCHC g/dL 33.7 34.2   PLATELETS 10*3/mm3 141 168   INR  0.98  --      Renal and electrolytes:  Results from last 7 days   Lab Units 06/18/21  0108 06/17/21  1502   SODIUM mmol/L 138 140   POTASSIUM mmol/L 3.5 3.9   MAGNESIUM mg/dL 1.5*  --    CHLORIDE mmol/L 103 106   CO2 mmol/L 22.1 22.9   BUN mg/dL 5* 5*   CREATININE mg/dL 0.55* 0.57*   EGFR IF NONAFRICN AM mL/min/1.73 >150 >150   CALCIUM mg/dL 9.1 9.6   PHOSPHORUS mg/dL 2.8  --    GLUCOSE mg/dL 121* 141*     Estimated Creatinine Clearance: 254.2 mL/min (A) (by CMooseG  formula based on SCr of 0.55 mg/dL (L)).    Liver and pancreatic function:  Results from last 7 days   Lab Units 06/18/21  0108 06/17/21  1502   ALBUMIN g/dL 3.46* 3.96   BILIRUBIN mg/dL 1.5* 1.1   ALK PHOS U/L 237* 276*   AST (SGOT) U/L 100* 156*   ALT (SGPT) U/L 77* 99*   AMYLASE U/L  --  205*   LIPASE U/L  --  463*       Endocrine function:  Lab Results   Component Value Date    HGBA1C 5.50 06/17/2021       Lab Results   Component Value Date    TSH 2.360 06/17/2021     Cultures:  Lab Results   Component Value Date    COLORU Yellow 06/17/2021    CLARITYU Clear 06/17/2021    PHUR 5.5 06/17/2021    GLUCOSEU Negative 06/17/2021    KETONESU Negative 06/17/2021    BLOODU Negative 06/17/2021    NITRITEU Negative 06/17/2021    LEUKOCYTESUR Negative 06/17/2021    BILIRUBINUR Negative 06/17/2021    UROBILINOGEN 1.0 E.U./dL 06/17/2021    RBCUA 0-2 02/04/2020    WBCUA 0-2 02/04/2020    BACTERIA None Seen 02/04/2020     Microbiology Results (last 10 days)     Procedure Component Value - Date/Time    COVID-19 and FLU A/B PCR - Swab, Nasopharynx [[id number]]  (Normal) Collected: 06/17/21 1914    Lab Status: Final result Specimen: Swab from Nasopharynx Updated: 06/17/21 1938     COVID19 Not Detected     Influenza A PCR Not Detected     Influenza B PCR Not Detected    Narrative:      Fact sheet for providers: https://www.fda.gov/media/545442/download    Fact sheet for patients: https://www.fda.gov/media/713527/download    Test performed by PCR.              I have personally looked at the labs and they are summarized above.  ----------------------------------------------------------------------------------------------------------------------  Detailed radiology reports for the last 24 hours:    Imaging Results (Last 24 Hours)     Procedure Component Value Units Date/Time    CT Abdomen Pelvis With Contrast [[id number]] Collected: 06/17/21 1707     Updated: 06/17/21 1713    Narrative:      EXAM: CT ABDOMEN PELVIS W CONTRAST-          TECHNIQUE: Multiple axial CT images were obtained from lung bases  through pubic symphysis WITH administration of IV contrast. Reformatted  images in the coronal and/or sagittal plane(s) were generated from the  axial data set to facilitate diagnostic accuracy and/or surgical  planning.  Oral Contrast:NONE.     Radiation dose reduction techniques were utilized per ALARA protocol.  Automated exposure control was initiated through either or Paperlinks or  DoseRight software packages by  protocol.    DOSE:     Clinical information abd pain      Comparison to 04/20/2020     FINDINGS:     Lower thorax: Clear. No effusions.     Abdomen:     Liver: The liver is homogeneous, but decreased in attenuation compatible  with fatty infiltration of the liver.     Gallbladder: There is evidence of cholelithiasis.     Pancreas: Peripancreatic stranding. No walled off pseudocyst is seen.     Spleen: Homogeneous. No splenomegaly.     Adrenals: No mass.     Kidneys/ureters: No mass. No obstructive uropathy.  No evidence of  urolithiasis.     GI tract: Non-dilated. No definite wall thickening.. There is no  evidence of appendicitis     MESENTERY: As stated above extensive peripancreatic stranding      Vasculature: No evidence of aneurysm.     Abdominal wall: No focal hernia or mass.        Bladder: No focal mass or significant wall thickening     Reproductive: Unremarkable as visualized     Bones: No acute bony abnormality.       Impression:         1. Extensive peripancreatic stranding and fluid compatible with  pancreatitis. No mass or pseudocyst is identified.     2.Cholelithiasis     3. Fatty infiltration of the liver              This report was finalized on 6/17/2021 5:09 PM by Dr. Scott Alfred MD.           I have personally looked at the radiology images and I have read the available final reports.    Assessment & Plan       -Acute alcoholic induced pancreatitis  -Acute hypomagnesemia  -Elevated blood pressures  without a known history of essential hypertension  -Elevated liver enzymes, consistent with acute alcoholic hepatitis  -Tobacco smoking addiction  -Drug use with amphetamines and Suboxone  -Positive hepatitis C antibody noted in June 2019  -History of medical noncompliance  -History of fatty liver and cholelithiasis    He was admitted to the medical surgical floor (no medical surgical bed was available and thus he is overflow on the telemetry unit).  We will make him n.p.o. and give him IV fluids.  I will give him a one-time dose of hydralazine IV 20 mg for the blood pressure of 200/140; some of the blood pressure elevation may be due to pain; thus, we will give him IV morphine as needed.  We will continue to monitor his blood pressures closely.  Also, the patient could have elevated blood pressures due to alcohol withdrawal or withdrawal from pain medication.  I have placed him on the CIWA protocol; if his scores are consistently above 8 then we will order an IV Ativan as needed protocol.  I will give him IV thiamine 100 mg IV daily; I have ordered a thiamine level, vitamin B12 level, and folic acid level as the patient is at risk for deficiencies in these vitamins due to his alcohol use.  I will trend his liver enzymes.  The patient was noted to have a positive hepatitis C antibody in June 2019.  This test was repeated in February 2020 and the hepatitis C antibody remained positive.  Therefore, I will send for hepatitis C genotype and viral load.  The patient will need to follow-up in a GI clinic to be treated for the hepatitis C.  We will give him a nicotine patch.  I will order a lipid panel in the morning.  He had a gallbladder ultrasound last year and a CT scan in the emergency department for this admission and both do not indicate any gallbladder disease.  I have written for the IV magnesium replacement protocol and we will continue to monitor his electrolytes closely, especially since he is at risk of  deficiencies due to his alcohol use.    VTE Prophylaxis:   Mechanical Order History:     None      Pharmalogical Order History:      Ordered     Dose Route Frequency Stop    06/17/21 2112  enoxaparin (LOVENOX) syringe 40 mg      40 mg SC Daily --    06/17/21 2106  Pharmacy to Dose enoxaparin (LOVENOX)     Question:  Indication of use  Answer:  Prophylaxis    -- XX Continuous PRN --              The patient is high risk due to the following diagnoses/reasons: Alcoholic acute pancreatitis    Disposition: Home    Helder Neville MD  [facility]  06/18/21  04:10 EDT        Electronically signed by Helder Neville MD at 06/18/21 0411          Emergency Department Notes      Barrett Voss at 06/17/21 1405        Other ed2 tech is going to try to get blood for labs.     Barrett Voss  06/17/21 1415      Electronically signed by Barrett Voss at 06/17/21 1415     Stacey Sandoval, RN at 06/17/21 1435        Report obtained from MEDARDO Haddad at this time     Stacey Sandoval, RN  06/17/21 1449      Electronically signed by Stacey Sandoval RN at 06/17/21 1449     Scott Ng at 06/17/21 1440        Dorys with lab x2 attempt negative success, resp contacted for stick      Scott Ng  06/17/21 1441      Electronically signed by Scott Ng at 06/17/21 1441     Janelle Schuster PA at 06/17/21 1550     Attestation signed by Jason Dos Santos MD at 06/18/21 0546          For this patient encounter, I reviewed the NP or PA documentation, treatment plan, and medical decision making. Jason Dos Santos MD 6/18/2021 05:46 EDT                  Subjective   History of pancreatitis      History provided by:  Patient   used: No    Abdominal Pain  Pain location:  LUQ  Pain quality: aching    Pain radiates to:  Does not radiate  Pain severity:  Mild  Onset quality:  Sudden  Duration:  2 days  Timing:  Constant  Progression:  Worsening  Chronicity:  New  Relieved by:   Nothing  Worsened by:  Nothing  Ineffective treatments:  None tried  Associated symptoms: nausea    Associated symptoms: no chest pain, no chills, no cough, no diarrhea, no dysuria, no fever, no shortness of breath, no sore throat and no vomiting        Review of Systems   Constitutional: Negative for chills and fever.   HENT: Negative for congestion, ear pain and sore throat.    Respiratory: Negative for cough, shortness of breath and wheezing.    Cardiovascular: Negative for chest pain.   Gastrointestinal: Positive for abdominal pain and nausea. Negative for diarrhea and vomiting.   Genitourinary: Negative for dysuria and flank pain.   Skin: Negative for rash.   Neurological: Negative for headaches.   Psychiatric/Behavioral: The patient is not nervous/anxious.    All other systems reviewed and are negative.      History reviewed. No pertinent past medical history.    No Known Allergies    History reviewed. No pertinent surgical history.    Family History   Problem Relation Age of Onset   • Cancer Maternal Grandmother        Social History     Socioeconomic History   • Marital status: Single     Spouse name: Not on file   • Number of children: Not on file   • Years of education: Not on file   • Highest education level: Not on file   Tobacco Use   • Smoking status: Current Every Day Smoker     Packs/day: 0.50     Types: Cigarettes   • Smokeless tobacco: Current User   Substance and Sexual Activity   • Alcohol use: Yes     Alcohol/week: 8.0 standard drinks     Types: 8 Shots of liquor per week     Comment: states occasional use, last use was yesterday, drank couple mixed drink   • Drug use: Yes     Types: Amphetamines     Comment: suboxone as well   • Sexual activity: Defer           Objective   Physical Exam  Vitals and nursing note reviewed.   Constitutional:       Appearance: He is well-developed.   HENT:      Head: Normocephalic.   Cardiovascular:      Rate and Rhythm: Normal rate and regular rhythm.    Pulmonary:      Effort: Pulmonary effort is normal.      Breath sounds: Normal breath sounds.   Abdominal:      General: Bowel sounds are normal.      Palpations: Abdomen is soft.      Tenderness: There is abdominal tenderness in the left upper quadrant.   Musculoskeletal:         General: Normal range of motion.      Cervical back: Neck supple.   Skin:     General: Skin is warm and dry.   Neurological:      Mental Status: He is alert and oriented to person, place, and time.   Psychiatric:         Behavior: Behavior normal.         Thought Content: Thought content normal.         Judgment: Judgment normal.         Procedures          ED Course  ED Course as of Jun 17 1956   u Jun 17, 2021   1751 Paged hospitalist     []   1820 Pt was discussed with Concha will admit    []      ED Course User Index  [] Janelle Schuster PA                                           [facility]    Final diagnoses:   Acute pancreatitis, unspecified complication status, unspecified pancreatitis type       ED Disposition  ED Disposition     ED Disposition Condition Comment    Decision to Admit  Level of Care: Med/Surg [1]   Diagnosis: Pancreatitis [202663]   Certification: I Certify That Inpatient Hospital Services Are Medically Necessary For Greater Than 2 Midnights            No follow-up provider specified.       Medication List      No changes were made to your prescriptions during this visit.          Janelle Schuster PA  06/17/21 1954       Janelle Schuster PA  06/17/21 1956      Electronically signed by Jason Dos Santos MD at 06/18/21 6291     Stacey Sandoval, RN at 06/17/21 8735        Iv attempted x2 without success, could get blood back but would not thread or hold iv, provider aware, also of pts hr and bp, pt states he has been told before he has high bp but has not been treating it, pt appears uncomfortable with upper epigastric pain, provider aware     Stacey Sandoval, RN  06/17/21 4168      Electronically signed by  "Stacey Sandoval RN at 06/17/21 1556     Stacey Sandoval, RN at 06/17/21 1557        Provider contacted lead nurse for iv     Stacey Sandoval RN  06/17/21 1557      Electronically signed by Stacey Sandoval RN at 06/17/21 1557     Stacey Sandoval RN at 06/17/21 1557        [place] given as requested     Stacey Sandoval RN  06/17/21 1557      Electronically signed by Stacey Sandoval RN at 06/17/21 1557     Stacey Sandoval, RN at 06/17/21 1640        Pt asking for pain medicine, provider notified     Stacey Sandoval RN  06/17/21 1640      Electronically signed by Stacey Sandoval RN at 06/17/21 1640     Stacey Sandoval RN at 06/17/21 1641        I was told I had phone call, it was pts mother, very upset questioning pts care and why he had not had pain medicine, why bp had not been treated, etc, she states that it was because of pt's \"history, I know why you all aren't treating him\" I tried to reassure her I did not know what \"history\" she was speaking of that it had nothing to do with pt's care, I instr. Her I could not discuss details with her without speaking to pt first due to HIPPA, unable to reason with her, she instructed me she was notifying \"higher up\" and that I would be hearing from them, I instr. Her pt had been updated during entire visit and he had a family member she could speak with, unable to reason with her and she hung up, provider was made aware and pt/family member whom states they would update her and speak to her and also apologized for her, lead nurse notified as well     Stacey Sandoval RN  06/17/21 1707      Electronically signed by Stacey Sandoval RN at 06/17/21 1707     Stacey Sandoval, RN at 06/17/21 1653        Pt to ct via wc at this time     Stacey Sandoval RN  06/17/21 1703      Electronically signed by Stacey Sandoval RN at 06/17/21 1703     Stacey Sandoval, RN at 06/17/21 1711        Pt returned from ct at " this time, states pt tolerated it well     Stacey Sandoval RN  06/17/21 1711      Electronically signed by Stacey Sandoval RN at 06/17/21 1711     Stacey Sandoval, RN at 06/17/21 1724        Pt states pain is feeling much better, rates it as a 2 or 3 currently, pt has been updated on visit     Stacey Sandoval RN  06/17/21 1724      Electronically signed by Stacey Sandoval RN at 06/17/21 1724     Stacey Sandoval, RN at 06/17/21 1833        Pt ask if he could have something else for pain, pt states it is starting to increase again, provider notified, pt aware he may be a possible admission and that he will be updated once we know, pt verb understanding, pt very pleasant, voices on complaints     Stacey Sandoval RN  06/17/21 1834      Electronically signed by Stacey Sandoval RN at 06/17/21 1834     Stacey Sandoval RN at 06/17/21 1908        Report to [facility] [place] and Liane RN at bedside, pt alert, oriented, stable, iv heplock remains in place with no abnormality noted to site, have spoken to provider about vitals whom is aware, pt updated aware awaiting on bed assignment for admissioin     Stacey Sandoavl RN  06/17/21 2010      Electronically signed by Stacey Sandoval RN at 06/17/21 2010     Stacey Sandoval, RN at 06/17/21 1938        Pt currently rates upper epigastric pain as a 6 on 1-10 pain scale prior to morphine     Stacey Sandoval RN  06/17/21 1939      Electronically signed by Stacey Sandoval RN at 06/17/21 1939     Liane Aiken, RN at 06/17/21 2035        Pt refusing transport to . Pt states he has a 3mo old at home that is driving it's mother anel. Provider made aware.      Liane Aiken, MEDARDO  06/17/21 2045      Electronically signed by Liane Aiken, RN at 06/17/21 2045     Liane Aiken, RN at 06/17/21 2045        Pt continues to refuse transport at this time. Provider at bedside.     Liane Aiken, MEDARDO  06/17/21  2046      Electronically signed by Liane Aiken, RN at 06/17/21 2046     Liane Aiken, RN at 06/17/21 2048        Pt agreeing to admission at this time.      Liane Aiken, MEDARDO  06/17/21 2049      Electronically signed by Liane Aiken, RN at 06/17/21 2049         Facility-Administered Medications as of 6/18/2021   Medication Dose Route Frequency Provider Last Rate Last Admin   • [COMPLETED] cloNIDine (CATAPRES) tablet 0.1 mg  0.1 mg Oral Once Janelle Schuster PA   0.1 mg at 06/17/21 1829   • [COMPLETED] cloNIDine (CATAPRES) tablet 0.1 mg  0.1 mg Oral Once Janelle Schuster PA   0.1 mg at 06/17/21 2011   • enoxaparin (LOVENOX) syringe 40 mg  40 mg Subcutaneous Daily Lincoln Kern DO   40 mg at 06/18/21 0923   • hydrALAZINE (APRESOLINE) injection 10 mg  10 mg Intravenous Q6H PRN Lincoln Kern DO   10 mg at 06/18/21 0932   • [COMPLETED] hydrALAZINE (APRESOLINE) injection 20 mg  20 mg Intravenous Once Helder Neville MD   20 mg at 06/17/21 2139   • [COMPLETED] iopamidol (ISOVUE-300) 61 % injection 100 mL  100 mL Intravenous Once in imaging Jason Dos Santos MD   100 mL at 06/17/21 1712   • [COMPLETED] ketorolac (TORADOL) injection 30 mg  30 mg Intravenous Once Janelle Schuster PA   30 mg at 06/17/21 1652   • lactated ringers infusion  150 mL/hr Intravenous Continuous Lincoln Kern  mL/hr at 06/18/21 0538 150 mL/hr at 06/18/21 0538   • Magnesium Sulfate 2 gram Bolus, followed by 8 gram infusion (total Mg dose 10 grams)- Mg less than or equal to 1mg/dL  2 g Intravenous PRN Helder Neville MD        Or   • Magnesium Sulfate 2 gram / 50mL Infusion (GIVE X 3 BAGS TO EQUAL 6GM TOTAL DOSE) - Mg 1.1 - 1.5 mg/dl  2 g Intravenous PRN Helder Neville MD        Or   • Magnesium Sulfate 4 gram infusion- Mg 1.6-1.9 mg/dL  4 g Intravenous PRN Helder Neville MD       • Magnesium Sulfate 2 gram / 50mL Infusion (GIVE X 3 BAGS TO EQUAL 6GM TOTAL DOSE) - Mg 1.1 - 1.5 mg/dl  2 g Intravenous Q2H Lincoln Kern,  DO 25 mL/hr at 06/18/21 0925 2 g at 06/18/21 0925   • [COMPLETED] morphine injection 4 mg  4 mg Intravenous Once Rustam Bradshaw MD   4 mg at 06/17/21 1938   • Morphine sulfate PCA 1 mg/mL   Intravenous Continuous Helder Neville MD   New Syringe/Cartridge at 06/18/21 0349   • naloxone (NARCAN) injection 0.1 mg  0.1 mg Intravenous Q5 Min PRN Helder Neville MD       • nicotine (NICODERM CQ) 21 MG/24HR patch 1 patch  1 patch Transdermal Q24H Helder Neville MD   1 patch at 06/18/21 0924   • [COMPLETED] ondansetron (ZOFRAN) injection 4 mg  4 mg Intravenous Once Janelle Schuster PA   4 mg at 06/17/21 1935   • Pharmacy to Dose enoxaparin (LOVENOX)   Does not apply Continuous PRN Lincoln Kern DO       • [COMPLETED] sodium chloride 0.9 % bolus 1,000 mL  1,000 mL Intravenous Once Janelle Schuster PA   Stopped at 06/17/21 1825   • sodium chloride 0.9 % flush 10 mL  10 mL Intravenous Q12H Lincoln Kern DO   10 mL at 06/18/21 0923   • sodium chloride 0.9 % flush 10 mL  10 mL Intravenous PRN Lincoln Kern DO       • thiamine (B-1) 100 mg in sodium chloride 0.9 % 100 mL IVPB  100 mg Intravenous Daily Helder Neville  mL/hr at 06/18/21 0923 100 mg at 06/18/21 0923

## 2021-06-18 NOTE — ED NOTES
Pt refusing transport to [facility]. Pt states he has a 3mo old at home that is driving it's mother crazy. Provider made aware.      Liane Aiken RN  06/17/21 2045

## 2021-06-18 NOTE — CASE MANAGEMENT/SOCIAL WORK
Discharge Planning Assessment   [place]     Patient Name: Franck Toure  MRN: [id number]  Today's Date: 6/18/2021    Admit Date: 6/17/2021    Discharge Needs Assessment     Row Name 06/18/21 1319       Living Environment    Lives With  significant other    Name(s) of Who Lives With Patient  Lives with significant other, Karen    Current Living Arrangements  home/apartment/condo    Primary Care Provided by  self    Quality of Family Relationships  helpful;involved;supportive    Able to Return to Prior Arrangements  yes       Transition Planning    Patient/Family Anticipates Transition to  home    Patient/Family Anticipated Services at Transition  none    Transportation Anticipated  family or friend will provide       Discharge Needs Assessment    Equipment Currently Used at Home  none    Concerns to be Addressed  no discharge needs identified    Equipment Needed After Discharge  none    Current Discharge Risk  substance use/abuse        Discharge Plan     Row Name 06/18/21 1320       Plan    Plan Pt lives with significant otherKaren. Pt does not utilize HH or DME. PCP is Irma López. Pt does not have a POA or living will. Pt's family will provide transportation home at d/c. Pt declined substance abuse resources. SS to follow and assist.    Patient/Family in Agreement with Plan  yes        Demographic Summary     Row Name 06/18/21 8055       General Information    Referral Source  nursing    Reason for Consult  -- discharge planning        SHAMEKA Harrison

## 2021-06-19 VITALS
HEART RATE: 72 BPM | DIASTOLIC BLOOD PRESSURE: 107 MMHG | HEIGHT: 78 IN | RESPIRATION RATE: 18 BRPM | SYSTOLIC BLOOD PRESSURE: 147 MMHG | TEMPERATURE: 98 F | OXYGEN SATURATION: 98 % | BODY MASS INDEX: 23.46 KG/M2 | WEIGHT: 202.8 LBS

## 2021-06-19 LAB
ALBUMIN SERPL-MCNC: 3.16 G/DL (ref 3.5–5.2)
ALBUMIN/GLOB SERPL: 0.9 G/DL
ALP SERPL-CCNC: 233 U/L (ref 39–117)
ALT SERPL W P-5'-P-CCNC: 49 U/L (ref 1–41)
ANION GAP SERPL CALCULATED.3IONS-SCNC: 12 MMOL/L (ref 5–15)
AST SERPL-CCNC: 68 U/L (ref 1–40)
BILIRUB SERPL-MCNC: 1.2 MG/DL (ref 0–1.2)
BUN SERPL-MCNC: 4 MG/DL (ref 6–20)
BUN/CREAT SERPL: 7.7 (ref 7–25)
CALCIUM SPEC-SCNC: 8.9 MG/DL (ref 8.6–10.5)
CHLORIDE SERPL-SCNC: 98 MMOL/L (ref 98–107)
CO2 SERPL-SCNC: 22 MMOL/L (ref 22–29)
CREAT SERPL-MCNC: 0.52 MG/DL (ref 0.76–1.27)
GFR SERPL CREATININE-BSD FRML MDRD: >150 ML/MIN/1.73
GLOBULIN UR ELPH-MCNC: 3.6 GM/DL
GLUCOSE SERPL-MCNC: 102 MG/DL (ref 65–99)
MAGNESIUM SERPL-MCNC: 1.9 MG/DL (ref 1.6–2.6)
POTASSIUM SERPL-SCNC: 3.9 MMOL/L (ref 3.5–5.2)
PROT SERPL-MCNC: 6.8 G/DL (ref 6–8.5)
QT INTERVAL: 456 MS
QTC INTERVAL: 436 MS
SODIUM SERPL-SCNC: 132 MMOL/L (ref 136–145)

## 2021-06-19 PROCEDURE — 80053 COMPREHEN METABOLIC PANEL: CPT | Performed by: STUDENT IN AN ORGANIZED HEALTH CARE EDUCATION/TRAINING PROGRAM

## 2021-06-19 PROCEDURE — 25010000002 MORPHINE PER 10 MG: Performed by: STUDENT IN AN ORGANIZED HEALTH CARE EDUCATION/TRAINING PROGRAM

## 2021-06-19 PROCEDURE — 83735 ASSAY OF MAGNESIUM: CPT | Performed by: STUDENT IN AN ORGANIZED HEALTH CARE EDUCATION/TRAINING PROGRAM

## 2021-06-19 PROCEDURE — 99239 HOSP IP/OBS DSCHRG MGMT >30: CPT | Performed by: STUDENT IN AN ORGANIZED HEALTH CARE EDUCATION/TRAINING PROGRAM

## 2021-06-19 PROCEDURE — 25010000002 THIAMINE PER 100 MG: Performed by: INTERNAL MEDICINE

## 2021-06-19 PROCEDURE — 25010000003 MAGNESIUM SULFATE 4 GM/100ML SOLUTION: Performed by: STUDENT IN AN ORGANIZED HEALTH CARE EDUCATION/TRAINING PROGRAM

## 2021-06-19 PROCEDURE — 25010000002 ENOXAPARIN PER 10 MG: Performed by: STUDENT IN AN ORGANIZED HEALTH CARE EDUCATION/TRAINING PROGRAM

## 2021-06-19 RX ORDER — ONDANSETRON 4 MG/1
4 TABLET, ORALLY DISINTEGRATING ORAL EVERY 8 HOURS PRN
Qty: 12 TABLET | Refills: 0 | Status: SHIPPED | OUTPATIENT
Start: 2021-06-19 | End: 2021-06-23

## 2021-06-19 RX ORDER — MAGNESIUM SULFATE HEPTAHYDRATE 40 MG/ML
2 INJECTION, SOLUTION INTRAVENOUS
Status: DISPENSED | OUTPATIENT
Start: 2021-06-19 | End: 2021-06-19

## 2021-06-19 RX ORDER — MAGNESIUM SULFATE HEPTAHYDRATE 40 MG/ML
4 INJECTION, SOLUTION INTRAVENOUS ONCE
Status: COMPLETED | OUTPATIENT
Start: 2021-06-19 | End: 2021-06-19

## 2021-06-19 RX ADMIN — MORPHINE SULFATE 4 MG: 4 INJECTION, SOLUTION INTRAMUSCULAR; INTRAVENOUS at 04:54

## 2021-06-19 RX ADMIN — MORPHINE SULFATE 4 MG: 4 INJECTION, SOLUTION INTRAMUSCULAR; INTRAVENOUS at 01:52

## 2021-06-19 RX ADMIN — THIAMINE HYDROCHLORIDE 100 MG: 100 INJECTION, SOLUTION INTRAMUSCULAR; INTRAVENOUS at 08:14

## 2021-06-19 RX ADMIN — ENOXAPARIN SODIUM 40 MG: 40 INJECTION SUBCUTANEOUS at 08:13

## 2021-06-19 RX ADMIN — SODIUM CHLORIDE, PRESERVATIVE FREE 10 ML: 5 INJECTION INTRAVENOUS at 08:14

## 2021-06-19 RX ADMIN — MAGNESIUM SULFATE 4 G: 4 INJECTION INTRAVENOUS at 08:14

## 2021-06-19 RX ADMIN — MORPHINE SULFATE 4 MG: 4 INJECTION, SOLUTION INTRAMUSCULAR; INTRAVENOUS at 13:10

## 2021-06-19 RX ADMIN — MORPHINE SULFATE 4 MG: 4 INJECTION, SOLUTION INTRAMUSCULAR; INTRAVENOUS at 09:06

## 2021-06-19 RX ADMIN — NICOTINE TRANSDERMAL SYSTEM 1 PATCH: 21 PATCH, EXTENDED RELEASE TRANSDERMAL at 08:14

## 2021-06-19 NOTE — PLAN OF CARE
Problem: Adult Inpatient Plan of Care  Goal: Plan of Care Review  Outcome: Ongoing, Progressing  Goal: Patient-Specific Goal (Individualized)  Outcome: Ongoing, Progressing  Goal: Absence of Hospital-Acquired Illness or Injury  Outcome: Ongoing, Progressing  Intervention: Identify and Manage Fall Risk  Recent Flowsheet Documentation  Taken 6/19/2021 0813 by Bryce Rios RN  Safety Promotion/Fall Prevention:   safety round/check completed   fall prevention program maintained  Intervention: Prevent Skin Injury  Recent Flowsheet Documentation  Taken 6/19/2021 0813 by Bryce Rios RN  Body Position: position changed independently  Goal: Optimal Comfort and Wellbeing  Outcome: Ongoing, Progressing  Intervention: Provide Person-Centered Care  Recent Flowsheet Documentation  Taken 6/19/2021 0813 by Bryce Rios RN  Trust Relationship/Rapport:   care explained   thoughts/feelings acknowledged  Goal: Readiness for Transition of Care  Outcome: Ongoing, Progressing     Problem: Fluid Imbalance (Pancreatitis)  Goal: Fluid Balance  Outcome: Ongoing, Progressing  Intervention: Monitor and Manage Fluid Balance  Recent Flowsheet Documentation  Taken 6/19/2021 0813 by Bryce Rios RN  Fluid/Electrolyte Management: fluids provided     Problem: Infection (Pancreatitis)  Goal: Infection Symptom Resolution  Outcome: Ongoing, Progressing     Problem: Nutrition Impaired (Pancreatitis)  Goal: Optimal Nutrition Intake  Outcome: Ongoing, Progressing     Problem: Pain (Pancreatitis)  Goal: Acceptable Pain Control  Outcome: Ongoing, Progressing  Intervention: Monitor and Manage Pain  Recent Flowsheet Documentation  Taken 6/19/2021 0813 by Bryce Rios RN  Diversional Activities: television     Problem: Respiratory Compromise (Pancreatitis)  Goal: Effective Oxygenation and Ventilation  Outcome: Ongoing, Progressing  Intervention: Promote Airway Secretion Clearance  Recent Flowsheet Documentation  Taken 6/19/2021 0813 by  Bryce Rios, RN  Activity Management: up ad harleen  Intervention: Optimize Oxygenation and Ventilation  Recent Flowsheet Documentation  Taken 6/19/2021 0813 by Bryce Rios, RN  Activity Management: up ad harleen     Problem: Alcohol Withdrawal  Goal: Alcohol Withdrawal Symptom Control  Outcome: Ongoing, Progressing   Goal Outcome Evaluation:

## 2021-06-19 NOTE — PLAN OF CARE
Goal Outcome Evaluation:  Pt has been resting in bed. PCA pump was discontinued. Pt has requested morphine q3 as scheduled. No complaints at this time. Will continue to follow plan of care.

## 2021-06-19 NOTE — DISCHARGE SUMMARY
[facility] DISCHARGE SUMMARY    Patient Identification:  Name:  Franck Toure  Age:  30 y.o.  Sex:  male  :  1990  MRN:  [id number]  Visit Number:  [id number]    Date of Admission: 2021  Date of Discharge:  2021     PCP: Irma López, RENAN    DISCHARGE DIAGNOSIS    Acute alcoholic induced pancreatitis  Hypertension secondary to pain  Tobacco abuse  Acute mild hepatitis  Chronic hepatitis C  Polysubstance abuse  History of medical noncompliance  CONSULTS     None  PROCEDURES PERFORMED    None  HOSPITAL COURSE  Patient is a 30 y.o. male presented to [facility] complaining of abdominal pain.  Please see the admitting history and physical for further details.      Patient admitted to the hospital service for acute alcoholic induced pancreatitis.  Patient with recent alcohol usage and remote history of alcohol induced pancreatitis in the past.  Patient admitted to the telemetry floor due to elevated blood pressures and bed availability as well as for possible need of CIWA protocol and Ativan usage.  However patient did not have any signs or symptoms of alcohol withdrawal during his hospitalization.  Patient initially on PCA pump for analgesia but in less than 24 hours quickly titrated down to as needed IV usage.  Patient tolerated clear liquid diet well the day after his admission with transition to bland diet the following day.  He continued to tolerate oral intake without any further issues and his blood pressure improved.  As patient was tolerating diet and not having any abdominal pain he was felt medically stable for discharge home.  Of note patient with history of positive hepatitis C in 2019 and admits that he has never seen anyone for treatment.  Therefore we will also obtain GI referral for follow-up and consideration of initiation of treatment.    VITAL SIGNS:  Temp:  [97.5 °F (36.4 °C)-98.3 °F (36.8 °C)] 98 °F (36.7 °C)  Heart Rate:  [55-80]  72  Resp:  [18] 18  BP: (131-147)/() 147/107  SpO2:  [98 %-99 %] 98 %  on   ;        Body mass index is 23.44 kg/m².  Wt Readings from Last 3 Encounters:   06/19/21 92 kg (202 lb 12.8 oz)   02/04/20 95.3 kg (210 lb)   02/03/20 93.4 kg (206 lb)       PHYSICAL EXAM:  Constitutional:  Well-developed and well-nourished.  No acute distress.      HENT:  Head:  Normocephalic and atraumatic.  Mouth:  Moist mucous membranes.    Eyes:  Conjunctivae and EOM are normal. No scleral icterus.    Neck:  Neck supple.  No JVD present.    Cardiovascular:  Normal rate, regular rhythm and normal heart sounds with no murmur.  Pulmonary/Chest:  No respiratory distress, no wheezes, no crackles, with normal breath sounds and good air movement.  Abdominal:  Soft.  Bowel sounds are normal.  No distension and m minimal tenderness to palpation in the epigastric region..   Musculoskeletal:  No edema, no tenderness, and no deformity.  No red or swollen joints anywhere.  Functional ROM intact.   Neurological:  Alert and oriented to person, place, and time.  No cranial nerve deficit.  No tongue deviation.  No facial droop.  No slurred speech. Intact Sensation throughout  Skin:  Skin is warm and dry. No rash or lesion noted. No pallor.   Peripheral vascular:  Pulses in all 4 extremities with no clubbing, no cyanosis, no edema.  Psychiatric: Appropriate mood and affect, pleasant.     DISCHARGE DISPOSITION   Stable    DISCHARGE MEDICATIONS:     Discharge Medications      New Medications      Instructions Start Date   ondansetron ODT 4 MG disintegrating tablet  Commonly known as: Zofran ODT   4 mg, Translingual, Every 8 Hours PRN               Additional Instructions for the Follow-ups that You Need to Schedule     Discharge Follow-up with PCP   As directed       Currently Documented PCP:    Irma López APRN    PCP Phone Number:    [phone]     Follow Up Details: 1 week post hospital follow up           Follow-up Information     [facility],  Irma Goode, APRN .    Specialty: Family Medicine  Why: 1 week post hospital follow up  Contact information:  [address]  [facility] [id number] [phone]                    TEST  RESULTS PENDING AT DISCHARGE  Pending Labs     Order Current Status    Hepatitis C Genotype In process    Hepatitis C RNA, Quantitative, PCR (graph) In process    Vitamin B1, Whole Blood In process           CODE STATUS  Code Status and Medical Interventions:   Ordered at: 06/17/21 1828     Code Status:    CPR     Medical Interventions (Level of Support Prior to Arrest):    Full       Lincoln Kern DO  [facility] Hospitalist  06/19/21  13:04 EDT    Please note that this discharge summary required more than 30 minutes to complete.

## 2021-06-20 LAB
HCV RNA SERPL NAA+PROBE-ACNC: NORMAL IU/ML
TEST INFORMATION: NORMAL

## 2021-06-21 ENCOUNTER — TELEPHONE (OUTPATIENT)
Dept: TELEMETRY | Facility: HOSPITAL | Age: 31
End: 2021-06-21

## 2021-06-21 NOTE — CASE MANAGEMENT/SOCIAL WORK
Discharge Planning Assessment   [place]     Patient Name: Franck Toure  MRN: [id number]  Today's Date: 6/21/2021    Admit Date: 6/17/2021        Discharge Plan     Row Name 06/21/21 0927       Plan    Final Discharge Disposition Code  01 - home or self-care    Final Note  Pt discharged home over weekend.        MOISE De La RosaW

## 2021-06-23 LAB
HCV GENTYP SERPL NAA+PROBE: NORMAL
LABORATORY COMMENT REPORT: NORMAL
VIT B1 BLD-SCNC: 144.6 NMOL/L (ref 66.5–200)

## 2021-10-12 ENCOUNTER — OFFICE VISIT (OUTPATIENT)
Dept: PSYCHIATRY | Facility: CLINIC | Age: 31
End: 2021-10-12

## 2021-10-12 VITALS
HEIGHT: 78 IN | TEMPERATURE: 98 F | HEART RATE: 89 BPM | DIASTOLIC BLOOD PRESSURE: 90 MMHG | BODY MASS INDEX: 23.14 KG/M2 | OXYGEN SATURATION: 98 % | SYSTOLIC BLOOD PRESSURE: 140 MMHG | WEIGHT: 200 LBS

## 2021-10-12 DIAGNOSIS — G47.00 INSOMNIA, UNSPECIFIED TYPE: ICD-10-CM

## 2021-10-12 DIAGNOSIS — Z79.899 MEDICATION MANAGEMENT: ICD-10-CM

## 2021-10-12 DIAGNOSIS — F32.A DEPRESSION, UNSPECIFIED DEPRESSION TYPE: ICD-10-CM

## 2021-10-12 DIAGNOSIS — F11.20 OPIOID TYPE DEPENDENCE, CONTINUOUS (HCC): Primary | ICD-10-CM

## 2021-10-12 DIAGNOSIS — F41.1 GENERALIZED ANXIETY DISORDER: ICD-10-CM

## 2021-10-12 LAB
AMPHETAMINE CUT-OFF: ABNORMAL
BENZODIAZIPINE CUT-OFF: ABNORMAL
BUPRENORPHINE CUT-OFF: ABNORMAL
COCAINE CUT-OFF: ABNORMAL
EXTERNAL AMPHETAMINE SCREEN URINE: NEGATIVE
EXTERNAL BENZODIAZEPINE SCREEN URINE: NEGATIVE
EXTERNAL BUPRENORPHINE SCREEN URINE: POSITIVE
EXTERNAL COCAINE SCREEN URINE: NEGATIVE
EXTERNAL MDMA: NEGATIVE
EXTERNAL METHADONE SCREEN URINE: NEGATIVE
EXTERNAL METHAMPHETAMINE SCREEN URINE: NEGATIVE
EXTERNAL OPIATES SCREEN URINE: NEGATIVE
EXTERNAL OXYCODONE SCREEN URINE: NEGATIVE
EXTERNAL THC SCREEN URINE: NEGATIVE
MDMA CUT-OFF: ABNORMAL
METHADONE CUT-OFF: ABNORMAL
METHAMPHETAMINE CUT-OFF: ABNORMAL
OPIATES CUT-OFF: ABNORMAL
OXYCODONE CUT-OFF: ABNORMAL
THC CUT-OFF: ABNORMAL

## 2021-10-12 PROCEDURE — 90792 PSYCH DIAG EVAL W/MED SRVCS: CPT | Performed by: NURSE PRACTITIONER

## 2021-10-12 RX ORDER — NALOXONE HYDROCHLORIDE 4 MG/.1ML
1 SPRAY NASAL AS NEEDED
Qty: 2 EACH | Refills: 2 | Status: SHIPPED | OUTPATIENT
Start: 2021-10-12

## 2021-10-12 RX ORDER — CLONIDINE HYDROCHLORIDE 0.1 MG/1
0.1 TABLET ORAL 2 TIMES DAILY
Qty: 60 TABLET | Refills: 2 | Status: SHIPPED | OUTPATIENT
Start: 2021-10-12 | End: 2022-01-13 | Stop reason: SDUPTHER

## 2021-10-12 RX ORDER — HYDROXYZINE PAMOATE 25 MG/1
25 CAPSULE ORAL 4 TIMES DAILY PRN
Qty: 60 CAPSULE | Refills: 1 | Status: SHIPPED | OUTPATIENT
Start: 2021-10-12 | End: 2022-01-13 | Stop reason: SDUPTHER

## 2021-10-12 RX ORDER — BUPRENORPHINE HYDROCHLORIDE AND NALOXONE HYDROCHLORIDE DIHYDRATE 8; 2 MG/1; MG/1
2 TABLET SUBLINGUAL DAILY
Qty: 14 TABLET | Refills: 0 | Status: SHIPPED | OUTPATIENT
Start: 2021-10-12 | End: 2021-10-20 | Stop reason: SDUPTHER

## 2021-10-12 RX ORDER — SERTRALINE HYDROCHLORIDE 25 MG/1
25 TABLET, FILM COATED ORAL DAILY
Qty: 30 TABLET | Refills: 0 | Status: SHIPPED | OUTPATIENT
Start: 2021-10-12 | End: 2021-11-03 | Stop reason: SDUPTHER

## 2021-10-12 NOTE — PROGRESS NOTES
Initial Evaluation: Chief Complaint/History of Present Illness: Patient presents for initial evaluation requesting buprenorphine/naloxone for opiate use relapse prevention.  Substance use history onset mid 20s and involved primarily methamphetamine and heroin with last use of both a year and a half ago.  Over the past year and a half patient has been acquiring buprenorphine/naloxone nonprescribed taking 16-4 mg daily.  Patient reports he would like to no longer have to buy Suboxone nonprescribed.  Patient reports his opiate cravings are controlled and is no longer having withdrawals.  As patient is currently dosing with buprenorphine/naloxone as evidenced by urine dip today and a prescription for such last October 2020 Will defer induction and continue Roman verify dosage.  Patient further reports his mother is a registered nurse and is concerned patient may be suffering from manic depression.  Patient has difficulty articulating his subjective experience of his mood as it pertains to such; patient states depressive symptoms including low energy motivation and drive.  Patient also reports he feels very anxious and often has trouble sleeping.  Patient has never been medicated for such and feels the symptoms are a chronic deterrence to overall quality of life.  We will start Zoloft 25 mg daily as well as Vistaril and clonidine for anxiety.  Patient reports history of suicidal thoughts however none current.      Urine Drug Screen (today's visit) discussed: Positive buprenorphine, otherwise negative for substances tested    ROMAN (PDMP) Reviewed for Current/Active Medications: Buprenorphine/naloxone (from last year), no current/active prescriptions    Past Surgical History:  Past Surgical History:   Procedure Laterality Date   • NO PAST SURGERIES         Problem List:  Patient Active Problem List   Diagnosis   • Alcohol-induced acute pancreatitis   • Pancreatitis       Allergy:   No Known Allergies     Current  Medications:   Current Outpatient Medications   Medication Sig Dispense Refill   • buprenorphine-naloxone (SUBOXONE) 8-2 MG per SL tablet Place 2 tablets under the tongue Daily. 14 tablet 0   • cloNIDine (Catapres) 0.1 MG tablet Take 1 tablet by mouth 2 (Two) Times a Day. Take as needed for anxiety.  Insomnia.  Chills or sweats 60 tablet 2   • hydrOXYzine pamoate (Vistaril) 25 MG capsule Take 1 capsule by mouth 4 (Four) Times a Day As Needed for Anxiety (and/or insomia). 60 capsule 1   • naloxone (NARCAN) 4 MG/0.1ML nasal spray 1 spray into the nostril(s) as directed by provider As Needed (Opiate oversedation). Spray in 1 nostril every 2 to 3 minutes call 911 2 each 2   • sertraline (Zoloft) 25 MG tablet Take 1 tablet by mouth Daily for 30 days. 30 tablet 0     No current facility-administered medications for this visit.       Past Medical History:  Past Medical History:   Diagnosis Date   • Alcohol-induced acute pancreatitis 6/27/2019   • Drug use     Amphetamines and Suboxone   • Fatty liver    • Hepatitis C antibody test positive 2019   • Medical non-compliance    • Smoker            Social History     Socioeconomic History   • Marital status: Single   Tobacco Use   • Smoking status: Current Every Day Smoker     Packs/day: 0.50     Types: Cigarettes   • Smokeless tobacco: Current User   Vaping Use   • Vaping Use: Never used   Substance and Sexual Activity   • Alcohol use: Yes     Alcohol/week: 8.0 standard drinks     Types: 8 Shots of liquor per week     Comment: states occasional use, last use was yesterday, drank couple mixed drink   • Drug use: Yes     Types: Amphetamines     Comment: suboxone as well   • Sexual activity: Defer       Family History:   Family History of Substance/Alcohol use:      Family History   Problem Relation Age of Onset   • Cancer Maternal Grandmother          Mental Status Exam:   Hygiene:   good  Cooperation:  Cooperative  Eye Contact:  Fair  Psychomotor Behavior:  Appropriate  Affect:   Appropriate  Mood: depressed and anxious  Hopelessness: Denies  Speech:  Monotone  Thought Process:  Goal directed  Thought Content:  Normal  Suicidal:  None  Homicidal:  None  Hallucinations:  None  Delusion:  None  Memory:  Intact  Orientation:  Person, Place, Time and Situation  Reliability:  good  Insight:  Fair  Judgement:  Fair  Impulse Control:  Good  Physical/Medical Issues:  No      Clinical Opiate Withdrawal Scale (COWS)    = Reflects Patient's Score     Heart Rate  80 or <  0   +1  101-120 +2  > 120  +4   =1    Sweating  No chills or sweating +0  Subjective Report of chills/flushing +1  Observable flushed/moist face +2  Beads of sweat on brow/face +3  Sweat streaming off face +4  =0    Restlessness  Able to sit still +0  Reports difficulty sitting still but is able to do so +1  Frequent shifting/extraneous movements of arms legs +3  Unable to sit still +5  =0    Pupil Size  Pupils pinned or normal +0  Larger than normal +1  Moderately dilated +2  Only rim of iris visible +5  =0    Bone/joint aches  Not presents +0  Mild diffuse discomfort +1  Severe diffuse aching +2  Pt rubbing joints/muscles and unable to sit still +4  =0    Runny nose/tearing   Not present +0  Nasal stuffiness/moist eyes +1  Nose running or tearing +2  Nose constantly running or tears streaming down cheeks +4  =0    GI Upset  No GI symptoms +0  Stomach Cramps +1  Nausea or loose stool +2  Vomiting or diarrhea +3  Multiple episodes of vomiting or diarrhea +5  =0    Tremors  No tremors +0  Tremor felt (not observed) +1  Slight observable tremor +2  Gross tremor or muscle twitching +4  =0    Yawning (Observed)  No yawning +0  Yawning once or twice +1  Yawning 3 or more times +2  Yawning several times/minute +4  =0    Anxiety  None +0  Reports anxiety/irritability +1  Pt obviously irritable/anxious +2  Difficult assessment due to anxiety/irritability   =2    Gooseflesh Skin  Skin smooth +0  Piloerection can be felt +3  Prominent  piloerection +5  =0    COWS Score  <5 No withdrawal   5-12 Mild Withdrawal  13-24 Moderate Withdrawal  25-36 Moderate/Severe Withdrawal  >36 Severe Withdrawal     Patient Score   =3  Score of 3 not unexpected as patient is currently dosing with buprenorphine    Diagnostic Criteria for Substance Use Disorder (JENNIFER)    Impaired Control over Substance Use  -Use of substance in increasing amounts and/or increasing periods of time  -Desire to cut down or quite but unable  -Significant amount of time procuring/using/recovering from use of substance  -Cravings, particularly around triggers    Social Impairment  -Obligations at home/school/work failed/neglected  -Social/occupational/recreational activities abandoned  -Continues use despite adverse interpersonal/social consequences    Risky Use of Substance  -Use of substance in situations that are dangerous (ex IV use, driving intoxicated)  -Continued use despite knowledge of a psychological and/or physical problem which will develop or worsen with use    Pharmacological Criteria  -Tolerance  -Withdrawal (dependence)     Level of JENNIFER  Mild JENNIFER: 2-3 Criteria  Moderate JENNIFER: 4-5 Criteria  Severe JENNIFER: 6 or more Criteria            Review of Systems:  Review of Systems   Constitutional: Positive for fatigue. Negative for activity change, chills and diaphoresis.   Respiratory: Negative for apnea, cough and shortness of breath.    Cardiovascular: Negative for chest pain, palpitations and leg swelling.   Gastrointestinal: Negative for abdominal pain, constipation, diarrhea, nausea and vomiting.   Genitourinary: Negative for difficulty urinating.   Musculoskeletal: Negative for arthralgias.   Skin: Negative for rash.   Neurological: Negative for dizziness, weakness and headaches.   Psychiatric/Behavioral: Positive for decreased concentration and sleep disturbance. Negative for agitation, self-injury and suicidal ideas. The patient is nervous/anxious.          Physical Exam:  Physical  "Exam  Vitals reviewed.   Constitutional:       General: He is not in acute distress.     Appearance: Normal appearance. He is not ill-appearing or toxic-appearing.   Pulmonary:      Effort: Pulmonary effort is normal.   Musculoskeletal:         General: Normal range of motion.   Neurological:      General: No focal deficit present.      Mental Status: He is alert and oriented to person, place, and time.   Psychiatric:         Attention and Perception: Attention and perception normal.         Mood and Affect: Mood is anxious and depressed.         Speech: Speech normal.         Behavior: Behavior normal. Behavior is cooperative.         Thought Content: Thought content normal.         Cognition and Memory: Cognition and memory normal.         Judgment: Judgment normal.         Vital Signs:   /90   Pulse 89   Temp 98 °F (36.7 °C)   Ht 198.1 cm (77.99\")   Wt 90.7 kg (200 lb)   SpO2 98%   BMI 23.12 kg/m²      Lab Results:   Office Visit on 10/12/2021   Component Date Value Ref Range Status   • External Amphetamine Screen Urine 10/12/2021 Negative   Final   • Amphetamine Cut-Off 10/12/2021 1000NG/ML   Final   • External Benzodiazepine Screen Uri* 10/12/2021 Negative   Final   • Benzodiazipine Cut-Off 10/12/2021 300NG/ML   Final   • External Cocaine Screen Urine 10/12/2021 Negative   Final   • Cocaine Cut-Off 10/12/2021 300NG/ML   Final   • External THC Screen Urine 10/12/2021 Negative   Final   • THC Cut-Off 10/12/2021 50NG/ML   Final   • External Methadone Screen Urine 10/12/2021 Negative   Final   • Methadone Cut-Off 10/12/2021 300NG/ML   Final   • External Methamphetamine Screen Ur* 10/12/2021 Negative   Final   • Methamphetamine Cut-Off 10/12/2021 1000NG/ML   Final   • External Oxycodone Screen Urine 10/12/2021 Negative   Final   • Oxycodone Cut-Off 10/12/2021 100NG/ML   Final   • External Buprenorphine Screen Urine 10/12/2021 Positive   Final   • Buprenorphine Cut-Off 10/12/2021 10NG/ML   Final   • " External MDMA 10/12/2021 Negative   Final   • MDMA Cut-Off 10/12/2021 500NG/ML   Final   • External Opiates Screen Urine 10/12/2021 Negative   Final   • Opiates Cut-Off 10/12/2021 300NG/ML   Final   Admission on 06/17/2021, Discharged on 06/19/2021   Component Date Value Ref Range Status   • Glucose 06/17/2021 141* 65 - 99 mg/dL Final   • BUN 06/17/2021 5* 6 - 20 mg/dL Final   • Creatinine 06/17/2021 0.57* 0.76 - 1.27 mg/dL Final   • Sodium 06/17/2021 140  136 - 145 mmol/L Final   • Potassium 06/17/2021 3.9  3.5 - 5.2 mmol/L Final    Slight hemolysis detected by analyzer. Results may be affected.   • Chloride 06/17/2021 106  98 - 107 mmol/L Final   • CO2 06/17/2021 22.9  22.0 - 29.0 mmol/L Final   • Calcium 06/17/2021 9.6  8.6 - 10.5 mg/dL Final   • Total Protein 06/17/2021 7.6  6.0 - 8.5 g/dL Final   • Albumin 06/17/2021 3.96  3.50 - 5.20 g/dL Final   • ALT (SGPT) 06/17/2021 99* 1 - 41 U/L Final   • AST (SGOT) 06/17/2021 156* 1 - 40 U/L Final   • Alkaline Phosphatase 06/17/2021 276* 39 - 117 U/L Final   • Total Bilirubin 06/17/2021 1.1  0.0 - 1.2 mg/dL Final   • eGFR Non African Amer 06/17/2021 >150  >60 mL/min/1.73 Final   • Globulin 06/17/2021 3.6  gm/dL Final   • A/G Ratio 06/17/2021 1.1  g/dL Final   • BUN/Creatinine Ratio 06/17/2021 8.8  7.0 - 25.0 Final   • Anion Gap 06/17/2021 11.1  5.0 - 15.0 mmol/L Final   • Lipase 06/17/2021 463* 13 - 60 U/L Final   • Amylase 06/17/2021 205* 28 - 100 U/L Final   • WBC 06/17/2021 10.73  3.40 - 10.80 10*3/mm3 Final   • RBC 06/17/2021 5.07  4.14 - 5.80 10*6/mm3 Final   • Hemoglobin 06/17/2021 16.3  13.0 - 17.7 g/dL Final   • Hematocrit 06/17/2021 47.7  37.5 - 51.0 % Final   • MCV 06/17/2021 94.1  79.0 - 97.0 fL Final   • MCH 06/17/2021 32.1  26.6 - 33.0 pg Final   • MCHC 06/17/2021 34.2  31.5 - 35.7 g/dL Final   • RDW 06/17/2021 12.3  12.3 - 15.4 % Final   • RDW-SD 06/17/2021 42.6  37.0 - 54.0 fl Final   • MPV 06/17/2021 10.1  6.0 - 12.0 fL Final   • Platelets 06/17/2021 168   140 - 450 10*3/mm3 Final   • Neutrophil % 06/17/2021 81.6* 42.7 - 76.0 % Final   • Lymphocyte % 06/17/2021 9.6* 19.6 - 45.3 % Final   • Monocyte % 06/17/2021 6.4  5.0 - 12.0 % Final   • Eosinophil % 06/17/2021 1.6  0.3 - 6.2 % Final   • Basophil % 06/17/2021 0.3  0.0 - 1.5 % Final   • Immature Grans % 06/17/2021 0.5  0.0 - 0.5 % Final   • Neutrophils, Absolute 06/17/2021 8.76* 1.70 - 7.00 10*3/mm3 Final   • Lymphocytes, Absolute 06/17/2021 1.03  0.70 - 3.10 10*3/mm3 Final   • Monocytes, Absolute 06/17/2021 0.69  0.10 - 0.90 10*3/mm3 Final   • Eosinophils, Absolute 06/17/2021 0.17  0.00 - 0.40 10*3/mm3 Final   • Basophils, Absolute 06/17/2021 0.03  0.00 - 0.20 10*3/mm3 Final   • Immature Grans, Absolute 06/17/2021 0.05  0.00 - 0.05 10*3/mm3 Final   • nRBC 06/17/2021 0.0  0.0 - 0.2 /100 WBC Final   • Ethanol 06/17/2021 <10  0 - 10 mg/dL Final   • Ethanol % 06/17/2021 <0.010  % Final   • Color, UA 06/17/2021 Yellow  Yellow, Straw Final   • Appearance, UA 06/17/2021 Clear  Clear Final   • pH, UA 06/17/2021 5.5  5.0 - 8.0 Final   • Specific Gravity, UA 06/17/2021 >1.030* 1.005 - 1.030 Final   • Glucose, UA 06/17/2021 Negative  Negative Final   • Ketones, UA 06/17/2021 Negative  Negative Final   • Bilirubin, UA 06/17/2021 Negative  Negative Final   • Blood, UA 06/17/2021 Negative  Negative Final   • Protein, UA 06/17/2021 Negative  Negative Final   • Leuk Esterase, UA 06/17/2021 Negative  Negative Final   • Nitrite, UA 06/17/2021 Negative  Negative Final   • Urobilinogen, UA 06/17/2021 1.0 E.U./dL  0.2 - 1.0 E.U./dL Final   • Amphetamine Screen, Urine 06/17/2021 Negative  Negative Final   • Barbiturates Screen, Urine 06/17/2021 Negative  Negative Final   • Benzodiazepine Screen, Urine 06/17/2021 Negative  Negative Final   • Cocaine Screen, Urine 06/17/2021 Negative  Negative Final   • Methadone Screen, Urine 06/17/2021 Negative  Negative Final   • Opiate Screen 06/17/2021 Negative  Negative Final   • Phencyclidine (PCP),  Urine 06/17/2021 Negative  Negative Final   • THC, Screen, Urine 06/17/2021 Negative  Negative Final   • 6-ACETYL MORPHINE 06/17/2021 Negative  Negative Final   • Buprenorphine, Screen, Urine 06/17/2021 Positive* Negative Final   • Oxycodone Screen, Urine 06/17/2021 Negative  Negative Final   • COVID19 06/17/2021 Not Detected  Not Detected - Ref. Range Final   • Influenza A PCR 06/17/2021 Not Detected  Not Detected Final   • Influenza B PCR 06/17/2021 Not Detected  Not Detected Final   • Hemoglobin A1C 06/17/2021 5.50  4.80 - 5.60 % Final   • TSH 06/17/2021 2.360  0.270 - 4.200 uIU/mL Final   • QT Interval 06/18/2021 456  ms Final   • QTC Interval 06/18/2021 436  ms Final   • Hepatitis C Quantitation 06/18/2021 HCV Not Detected  IU/mL Final   • Test Information 06/18/2021 Comment   Final    The quantitative range of this assay is 15 IU/mL to 100 million IU/mL.   • Please note 06/18/2021 Comment   Final    This test was developed and its performance characteristics determined  by [facility].  It has not been cleared or approved by the U.[place] Food and  Drug Administration.  The FDA has determined that such clearance or approval is not  necessary. This test is used for clinical purposes.  It should not be  regarded as investigational or for research.   • Hepatitis C Genotype 06/18/2021 Comment   Final    Specimen has insufficient hepatitis C virus RNA to obtain genotyping  results. This genotyping assay should only be used for known HCV  positive patients with HCV RNA levels above 1000 IU/mL.   • Glucose 06/18/2021 121* 65 - 99 mg/dL Final   • BUN 06/18/2021 5* 6 - 20 mg/dL Final   • Creatinine 06/18/2021 0.55* 0.76 - 1.27 mg/dL Final   • Sodium 06/18/2021 138  136 - 145 mmol/L Final   • Potassium 06/18/2021 3.5  3.5 - 5.2 mmol/L Final    Slight hemolysis detected by analyzer. Results may be affected.   • Chloride 06/18/2021 103  98 - 107 mmol/L Final   • CO2 06/18/2021 22.1  22.0 - 29.0 mmol/L Final   • Calcium 06/18/2021  9.1  8.6 - 10.5 mg/dL Final   • Total Protein 06/18/2021 6.7  6.0 - 8.5 g/dL Final   • Albumin 06/18/2021 3.46* 3.50 - 5.20 g/dL Final   • ALT (SGPT) 06/18/2021 77* 1 - 41 U/L Final   • AST (SGOT) 06/18/2021 100* 1 - 40 U/L Final   • Alkaline Phosphatase 06/18/2021 237* 39 - 117 U/L Final   • Total Bilirubin 06/18/2021 1.5* 0.0 - 1.2 mg/dL Final   • eGFR Non African Amer 06/18/2021 >150  >60 mL/min/1.73 Final   • Globulin 06/18/2021 3.2  gm/dL Final   • A/G Ratio 06/18/2021 1.1  g/dL Final   • BUN/Creatinine Ratio 06/18/2021 9.1  7.0 - 25.0 Final   • Anion Gap 06/18/2021 12.9  5.0 - 15.0 mmol/L Final   • Protime 06/18/2021 13.4  12.8 - 14.5 Seconds Final    Note new Reference Range   • INR 06/18/2021 0.98  0.90 - 1.10 Final   • PTT 06/18/2021 28.0  25.5 - 35.4 seconds Final    Note new Reference Range   • Phosphorus 06/18/2021 2.8  2.5 - 4.5 mg/dL Final   • Magnesium 06/18/2021 1.5* 1.6 - 2.6 mg/dL Final   • WBC 06/18/2021 11.20* 3.40 - 10.80 10*3/mm3 Final   • RBC 06/18/2021 5.03  4.14 - 5.80 10*6/mm3 Final   • Hemoglobin 06/18/2021 16.2  13.0 - 17.7 g/dL Final   • Hematocrit 06/18/2021 48.1  37.5 - 51.0 % Final   • MCV 06/18/2021 95.6  79.0 - 97.0 fL Final   • MCH 06/18/2021 32.2  26.6 - 33.0 pg Final   • MCHC 06/18/2021 33.7  31.5 - 35.7 g/dL Final   • RDW 06/18/2021 12.2* 12.3 - 15.4 % Final   • RDW-SD 06/18/2021 42.6  37.0 - 54.0 fl Final   • MPV 06/18/2021 10.5  6.0 - 12.0 fL Final   • Platelets 06/18/2021 141  140 - 450 10*3/mm3 Final   • Vitamin B1, Whole Blood 06/18/2021 144.6  66.5 - 200.0 nmol/L Final   • Vitamin B-12 06/18/2021 352  211 - 946 pg/mL Final   • Folate 06/18/2021 2.31* 4.78 - 24.20 ng/mL Final   • Total Cholesterol 06/18/2021 183  0 - 200 mg/dL Final   • Triglycerides 06/18/2021 128  0 - 150 mg/dL Final   • HDL Cholesterol 06/18/2021 61* 40 - 60 mg/dL Final   • LDL Cholesterol  06/18/2021 100  0 - 100 mg/dL Final   • VLDL Cholesterol 06/18/2021 22  5 - 40 mg/dL Final   • LDL/HDL Ratio  06/18/2021 1.58   Final   • Magnesium 06/19/2021 1.9  1.6 - 2.6 mg/dL Final   • Glucose 06/19/2021 102* 65 - 99 mg/dL Final   • BUN 06/19/2021 4* 6 - 20 mg/dL Final   • Creatinine 06/19/2021 0.52* 0.76 - 1.27 mg/dL Final   • Sodium 06/19/2021 132* 136 - 145 mmol/L Final   • Potassium 06/19/2021 3.9  3.5 - 5.2 mmol/L Final    Slight hemolysis detected by analyzer. Results may be affected.   • Chloride 06/19/2021 98  98 - 107 mmol/L Final   • CO2 06/19/2021 22.0  22.0 - 29.0 mmol/L Final   • Calcium 06/19/2021 8.9  8.6 - 10.5 mg/dL Final   • Total Protein 06/19/2021 6.8  6.0 - 8.5 g/dL Final   • Albumin 06/19/2021 3.16* 3.50 - 5.20 g/dL Final   • ALT (SGPT) 06/19/2021 49* 1 - 41 U/L Final   • AST (SGOT) 06/19/2021 68* 1 - 40 U/L Final   • Alkaline Phosphatase 06/19/2021 233* 39 - 117 U/L Final   • Total Bilirubin 06/19/2021 1.2  0.0 - 1.2 mg/dL Final   • eGFR Non African Amer 06/19/2021 >150  >60 mL/min/1.73 Final   • Globulin 06/19/2021 3.6  gm/dL Final   • A/G Ratio 06/19/2021 0.9  g/dL Final   • BUN/Creatinine Ratio 06/19/2021 7.7  7.0 - 25.0 Final   • Anion Gap 06/19/2021 12.0  5.0 - 15.0 mmol/L Final           Assessment/Plan   Diagnoses and all orders for this visit:    1. Opioid type dependence, continuous (HCC) (Primary)  -     buprenorphine-naloxone (SUBOXONE) 8-2 MG per SL tablet; Place 2 tablets under the tongue Daily.  Dispense: 14 tablet; Refill: 0  -     naloxone (NARCAN) 4 MG/0.1ML nasal spray; 1 spray into the nostril(s) as directed by provider As Needed (Opiate oversedation). Spray in 1 nostril every 2 to 3 minutes call 911  Dispense: 2 each; Refill: 2    2. Medication management  -     [facility] Drug Screen  -     CBC & Differential; Future  -     Comprehensive Metabolic Panel; Future  -     HIV-1 / O / 2 Ag / Antibody 4th Generation  -     Hepatitis B surface antigen  -     Hepatitis B surface antibody  -     Hepatitis B core antibody, total  -     Hepatitis A antibody, total  -     Hepatitis C  antibody    3. Depression, unspecified depression type  -     sertraline (Zoloft) 25 MG tablet; Take 1 tablet by mouth Daily for 30 days.  Dispense: 30 tablet; Refill: 0    4. Generalized anxiety disorder  -     sertraline (Zoloft) 25 MG tablet; Take 1 tablet by mouth Daily for 30 days.  Dispense: 30 tablet; Refill: 0  -     hydrOXYzine pamoate (Vistaril) 25 MG capsule; Take 1 capsule by mouth 4 (Four) Times a Day As Needed for Anxiety (and/or insomia).  Dispense: 60 capsule; Refill: 1  -     cloNIDine (Catapres) 0.1 MG tablet; Take 1 tablet by mouth 2 (Two) Times a Day. Take as needed for anxiety.  Insomnia.  Chills or sweats  Dispense: 60 tablet; Refill: 2    5. Insomnia, unspecified type  -     hydrOXYzine pamoate (Vistaril) 25 MG capsule; Take 1 capsule by mouth 4 (Four) Times a Day As Needed for Anxiety (and/or insomia).  Dispense: 60 capsule; Refill: 1  -     cloNIDine (Catapres) 0.1 MG tablet; Take 1 tablet by mouth 2 (Two) Times a Day. Take as needed for anxiety.  Insomnia.  Chills or sweats  Dispense: 60 tablet; Refill: 2        Visit Diagnoses:    ICD-10-CM ICD-9-CM   1. Opioid type dependence, continuous ([facility])  F11.20 304.01   2. Medication management  Z79.899 V58.69   3. Depression, unspecified depression type  F32.A 311   4. Generalized anxiety disorder  F41.1 300.02   5. Insomnia, unspecified type  G47.00 780.52       PLAN:  Review Expectations for care in Medicated Assisted Treatment     Treatment Plan:   Medication management is only one component of treatment. To maximize the potential for treatment success it is necessary to take part in 1:1 counselling and or 12 Step Facilitation in which you maintain an active relationship with a sponsor or . Verification/Proof of active involvement with 1:1 counselling or 12 Step Facilitation may be required as a component of the treatment plan.    Clinic expectations:     Visit Frequency: New client Progression- Weekly x 2 months (8  weeks/visits), Biweekly x 2 months (4 visits), monthly thereafter.  Visit frequency is dependent upon drug screen analysis and treatment plan compliance; Required interval between clinic visits may be shortened or increased.    Drug Screen: You will be required to provide a drug screen at each clinic evaluation; if unable/unwilling you may be asked to reschedule and will not see the provider. Supervised urine collections are required.     Appointments: You will be required to set an appointment for your clinic evaluations. These evaluation appointments must be maintained as scheduled; If you are more than 15 minutes late for a scheduled appointment you may be asked to reschedule and will not see a provider that day.     Random Evaluations: Random evaluations are essential for accountability in Medicated Assisted Treatment as well as a [place] law requirement. Thus, a working phone number must be maintained. If called for a Random evaluation, you have 24 hours to report to the clinic (during normal operating business hours) with your medication and pill bottle for assessment as well as a drug screen.  Clinic staff must have means to contact you.     Medication Management:    Medication will be prescribed to last until the next clinic follow up evaluation; no controlled substance refills (ex. Suboxone/buprenorphine) will be prescribed without completing a clinic evaluation. If you need to reschedule an appointment every effort will be made to reschedule as soon as possible for evaluation and further medication management. Contact the clinic if an extenuating circumstance presents.      Lost/Stolen Medication: Controlled substances (ex. Suboxone/buprenorphine) will not automatically be refilled in the event of lost/stolen medication. If a prescribed controlled substance is stolen, you must notify law enforcement and proof of said notification may be required. Notify the clinic for further guidance.     Involuntary  Discharge:  You will be involuntary discharged if you exhibit violent/threatening/abusive behavior or if compelling evidence shows diversion of medication.       TREATMENT PLAN/GOALS: Continue supportive psychotherapy efforts and medications as indicated. Treatment and medication options discussed during today's visit. Patient acknowledged and verbally consented to continue with current treatment plan and was educated on the importance of compliance with treatment and follow-up appointments.    MEDICATION ISSUES:  ROMAN reviewed  Discussed medication options and treatment plan of prescribed medication as well as the risks, benefits, and side effects including potential falls, possible impaired driving and metabolic adversities among others. Patient is agreeable to call the office with any worsening of symptoms or onset of side effects. Patient is agreeable to call 911 or go to the nearest ER should he/she begin having SI/HI. No medication side effects or related complaints today.     MEDS ORDERED DURING VISIT:  New Medications Ordered This Visit   Medications   • buprenorphine-naloxone (SUBOXONE) 8-2 MG per SL tablet     Sig: Place 2 tablets under the tongue Daily.     Dispense:  14 tablet     Refill:  0   • naloxone (NARCAN) 4 MG/0.1ML nasal spray     Si spray into the nostril(s) as directed by provider As Needed (Opiate oversedation). Spray in 1 nostril every 2 to 3 minutes call 911     Dispense:  2 each     Refill:  2   • sertraline (Zoloft) 25 MG tablet     Sig: Take 1 tablet by mouth Daily for 30 days.     Dispense:  30 tablet     Refill:  0   • hydrOXYzine pamoate (Vistaril) 25 MG capsule     Sig: Take 1 capsule by mouth 4 (Four) Times a Day As Needed for Anxiety (and/or insomia).     Dispense:  60 capsule     Refill:  1   • cloNIDine (Catapres) 0.1 MG tablet     Sig: Take 1 tablet by mouth 2 (Two) Times a Day. Take as needed for anxiety.  Insomnia.  Chills or sweats     Dispense:  60 tablet     Refill:   2       No follow-ups on file.           This document has been electronically signed by RENAN De Dios  October 12, 2021 13:49 EDT      Part of this note may be an electronic transcription/translation of spoken language to printed text using the Dragon Dictation System.

## 2021-10-20 ENCOUNTER — OFFICE VISIT (OUTPATIENT)
Dept: PSYCHIATRY | Facility: CLINIC | Age: 31
End: 2021-10-20

## 2021-10-20 VITALS
DIASTOLIC BLOOD PRESSURE: 94 MMHG | HEIGHT: 78 IN | WEIGHT: 202.8 LBS | SYSTOLIC BLOOD PRESSURE: 150 MMHG | BODY MASS INDEX: 23.46 KG/M2 | HEART RATE: 76 BPM

## 2021-10-20 DIAGNOSIS — F11.20 OPIOID TYPE DEPENDENCE, CONTINUOUS (HCC): Primary | ICD-10-CM

## 2021-10-20 DIAGNOSIS — Z79.899 MEDICATION MANAGEMENT: ICD-10-CM

## 2021-10-20 PROCEDURE — 99213 OFFICE O/P EST LOW 20 MIN: CPT | Performed by: NURSE PRACTITIONER

## 2021-10-20 RX ORDER — BUPRENORPHINE HYDROCHLORIDE AND NALOXONE HYDROCHLORIDE DIHYDRATE 8; 2 MG/1; MG/1
2 TABLET SUBLINGUAL DAILY
Qty: 14 TABLET | Refills: 0 | Status: SHIPPED | OUTPATIENT
Start: 2021-10-20 | End: 2021-10-27 | Stop reason: SDUPTHER

## 2021-10-20 NOTE — PROGRESS NOTES
This provider is located at [facility]. The Patient is seen remotely using Video. Patient is being seen via telehealth and confirm that they are in a secure environment for this session. The patient's condition being diagnosed/treated is appropriate for telemedicine. Provider identified as Daniel Olivas as well as credentials APRN MSN FNP-JUSTIN PINEDA.   The client/patient gave consent to be seen remotely, and when consent is given they understand that the consent allows for patient identifiable information to be sent to a third party as needed.   They may refuse to be seen remotely at any time. The electronic data is encrypted and password protected, and the patient has been advised of the potential risks to privacy not withstanding such measures.    Chief Complaint/History of Present Illness: Follow Up buprenorphine/naloxone Medicated Assisted Treatment for Opiate Use Disorder     Patient/Client Concerns/Updates: Follow-up from initiation of Zoloft, Vistaril and clonidine for anxiety last evaluation; patient denies concerning side effects of these medications however reports increased grogginess-advised to decrease Vistaril dosage and frequency; increased anxiety and depression this week due to wife leaving the marriage    Triggers (Persons/Places/Things/Events/Thought/Emotions): Depression and anxiety and recent separation from wife    Cravings: Denies opiate cravings    Relapse Prevention: Counseling and peer support    Urine Drug Screen (today's visit) discussed: Positive buprenorphine, otherwise negative for substances tested    UDS Confirmation: Pending    ROMAN (PDMP) Reviewed for Current/Active Medications: buprenorphine/naloxone as reviewed today    Past Surgical History:  Past Surgical History:   Procedure Laterality Date   • NO PAST SURGERIES         Problem List:  Patient Active Problem List   Diagnosis   • Alcohol-induced acute pancreatitis   • Pancreatitis       Allergy:   No Known Allergies      Current Medications:   Current Outpatient Medications   Medication Sig Dispense Refill   • buprenorphine-naloxone (SUBOXONE) 8-2 MG per SL tablet Place 2 tablets under the tongue Daily. 14 tablet 0   • cloNIDine (Catapres) 0.1 MG tablet Take 1 tablet by mouth 2 (Two) Times a Day. Take as needed for anxiety.  Insomnia.  Chills or sweats 60 tablet 2   • hydrOXYzine pamoate (Vistaril) 25 MG capsule Take 1 capsule by mouth 4 (Four) Times a Day As Needed for Anxiety (and/or insomia). 60 capsule 1   • naloxone (NARCAN) 4 MG/0.1ML nasal spray 1 spray into the nostril(s) as directed by provider As Needed (Opiate oversedation). Spray in 1 nostril every 2 to 3 minutes call 911 2 each 2   • sertraline (Zoloft) 25 MG tablet Take 1 tablet by mouth Daily for 30 days. 30 tablet 0     No current facility-administered medications for this visit.       Past Medical History:  Past Medical History:   Diagnosis Date   • Alcohol-induced acute pancreatitis 6/27/2019   • Drug use     Amphetamines and Suboxone   • Fatty liver    • Hepatitis C antibody test positive 2019   • Medical non-compliance    • Smoker          Social History     Socioeconomic History   • Marital status: Single   Tobacco Use   • Smoking status: Current Every Day Smoker     Packs/day: 0.50     Types: Cigarettes   • Smokeless tobacco: Current User   Vaping Use   • Vaping Use: Never used   Substance and Sexual Activity   • Alcohol use: Yes     Alcohol/week: 8.0 standard drinks     Types: 8 Shots of liquor per week     Comment: states occasional use, last use was yesterday, drank couple mixed drink   • Drug use: Yes     Types: Amphetamines     Comment: suboxone as well   • Sexual activity: Defer         Family History   Problem Relation Age of Onset   • Cancer Maternal Grandmother          Mental Status Exam:   Hygiene:   good  Cooperation:  Cooperative  Eye Contact:  Downcast  Psychomotor Behavior:  Slow  Affect:  Appropriate  Mood: depressed  Speech:   "Monotone  Thought Process:  Goal directed  Thought Content:  Normal  Suicidal:  None  Homicidal:  None  Hallucinations:  None  Delusion:  None  Memory:  Intact  Orientation:  Person, Place, Time and Situation  Reliability:  good  Insight:  Good  Judgement:  Good  Impulse Control:  Good         Review of Systems:  Review of Systems   Constitutional: Positive for fatigue. Negative for activity change, chills and diaphoresis.   Respiratory: Negative for apnea, cough and shortness of breath.    Cardiovascular: Negative for chest pain, palpitations and leg swelling.   Gastrointestinal: Negative for abdominal pain, constipation, diarrhea, nausea and vomiting.   Genitourinary: Negative for difficulty urinating.   Musculoskeletal: Negative for arthralgias.   Skin: Negative for rash.   Neurological: Negative for dizziness, weakness and headaches.   Psychiatric/Behavioral: Negative for agitation, self-injury, sleep disturbance and suicidal ideas. The patient is nervous/anxious.          Physical Exam:  Physical Exam  Vitals reviewed.   Constitutional:       General: He is not in acute distress.     Appearance: Normal appearance. He is not ill-appearing or toxic-appearing.   Pulmonary:      Effort: Pulmonary effort is normal.   Musculoskeletal:         General: Normal range of motion.   Neurological:      General: No focal deficit present.      Mental Status: He is alert and oriented to person, place, and time.   Psychiatric:         Attention and Perception: Attention and perception normal.         Mood and Affect: Mood is depressed. Mood is not anxious.         Speech: Speech normal.         Behavior: Behavior normal. Behavior is cooperative.         Thought Content: Thought content normal.         Cognition and Memory: Cognition and memory normal.         Judgment: Judgment normal.         Vital Signs:   /94   Pulse 76   Ht 198.1 cm (77.99\")   Wt 92 kg (202 lb 12.8 oz)   BMI 23.44 kg/m²      Lab Results:   Office " Visit on 10/12/2021   Component Date Value Ref Range Status   • External Amphetamine Screen Urine 10/12/2021 Negative   Final   • Amphetamine Cut-Off 10/12/2021 1000NG/ML   Final   • External Benzodiazepine Screen Uri* 10/12/2021 Negative   Final   • Benzodiazipine Cut-Off 10/12/2021 300NG/ML   Final   • External Cocaine Screen Urine 10/12/2021 Negative   Final   • Cocaine Cut-Off 10/12/2021 300NG/ML   Final   • External THC Screen Urine 10/12/2021 Negative   Final   • THC Cut-Off 10/12/2021 50NG/ML   Final   • External Methadone Screen Urine 10/12/2021 Negative   Final   • Methadone Cut-Off 10/12/2021 300NG/ML   Final   • External Methamphetamine Screen Ur* 10/12/2021 Negative   Final   • Methamphetamine Cut-Off 10/12/2021 1000NG/ML   Final   • External Oxycodone Screen Urine 10/12/2021 Negative   Final   • Oxycodone Cut-Off 10/12/2021 100NG/ML   Final   • External Buprenorphine Screen Urine 10/12/2021 Positive   Final   • Buprenorphine Cut-Off 10/12/2021 10NG/ML   Final   • External MDMA 10/12/2021 Negative   Final   • MDMA Cut-Off 10/12/2021 500NG/ML   Final   • External Opiates Screen Urine 10/12/2021 Negative   Final   • Opiates Cut-Off 10/12/2021 300NG/ML   Final   Admission on 06/17/2021, Discharged on 06/19/2021   Component Date Value Ref Range Status   • Glucose 06/17/2021 141* 65 - 99 mg/dL Final   • BUN 06/17/2021 5* 6 - 20 mg/dL Final   • Creatinine 06/17/2021 0.57* 0.76 - 1.27 mg/dL Final   • Sodium 06/17/2021 140  136 - 145 mmol/L Final   • Potassium 06/17/2021 3.9  3.5 - 5.2 mmol/L Final    Slight hemolysis detected by analyzer. Results may be affected.   • Chloride 06/17/2021 106  98 - 107 mmol/L Final   • CO2 06/17/2021 22.9  22.0 - 29.0 mmol/L Final   • Calcium 06/17/2021 9.6  8.6 - 10.5 mg/dL Final   • Total Protein 06/17/2021 7.6  6.0 - 8.5 g/dL Final   • Albumin 06/17/2021 3.96  3.50 - 5.20 g/dL Final   • ALT (SGPT) 06/17/2021 99* 1 - 41 U/L Final   • AST (SGOT) 06/17/2021 156* 1 - 40 U/L Final    • Alkaline Phosphatase 06/17/2021 276* 39 - 117 U/L Final   • Total Bilirubin 06/17/2021 1.1  0.0 - 1.2 mg/dL Final   • eGFR Non African Amer 06/17/2021 >150  >60 mL/min/1.73 Final   • Globulin 06/17/2021 3.6  gm/dL Final   • A/G Ratio 06/17/2021 1.1  g/dL Final   • BUN/Creatinine Ratio 06/17/2021 8.8  7.0 - 25.0 Final   • Anion Gap 06/17/2021 11.1  5.0 - 15.0 mmol/L Final   • Lipase 06/17/2021 463* 13 - 60 U/L Final   • Amylase 06/17/2021 205* 28 - 100 U/L Final   • WBC 06/17/2021 10.73  3.40 - 10.80 10*3/mm3 Final   • RBC 06/17/2021 5.07  4.14 - 5.80 10*6/mm3 Final   • Hemoglobin 06/17/2021 16.3  13.0 - 17.7 g/dL Final   • Hematocrit 06/17/2021 47.7  37.5 - 51.0 % Final   • MCV 06/17/2021 94.1  79.0 - 97.0 fL Final   • MCH 06/17/2021 32.1  26.6 - 33.0 pg Final   • MCHC 06/17/2021 34.2  31.5 - 35.7 g/dL Final   • RDW 06/17/2021 12.3  12.3 - 15.4 % Final   • RDW-SD 06/17/2021 42.6  37.0 - 54.0 fl Final   • MPV 06/17/2021 10.1  6.0 - 12.0 fL Final   • Platelets 06/17/2021 168  140 - 450 10*3/mm3 Final   • Neutrophil % 06/17/2021 81.6* 42.7 - 76.0 % Final   • Lymphocyte % 06/17/2021 9.6* 19.6 - 45.3 % Final   • Monocyte % 06/17/2021 6.4  5.0 - 12.0 % Final   • Eosinophil % 06/17/2021 1.6  0.3 - 6.2 % Final   • Basophil % 06/17/2021 0.3  0.0 - 1.5 % Final   • Immature Grans % 06/17/2021 0.5  0.0 - 0.5 % Final   • Neutrophils, Absolute 06/17/2021 8.76* 1.70 - 7.00 10*3/mm3 Final   • Lymphocytes, Absolute 06/17/2021 1.03  0.70 - 3.10 10*3/mm3 Final   • Monocytes, Absolute 06/17/2021 0.69  0.10 - 0.90 10*3/mm3 Final   • Eosinophils, Absolute 06/17/2021 0.17  0.00 - 0.40 10*3/mm3 Final   • Basophils, Absolute 06/17/2021 0.03  0.00 - 0.20 10*3/mm3 Final   • Immature Grans, Absolute 06/17/2021 0.05  0.00 - 0.05 10*3/mm3 Final   • nRBC 06/17/2021 0.0  0.0 - 0.2 /100 WBC Final   • Ethanol 06/17/2021 <10  0 - 10 mg/dL Final   • Ethanol % 06/17/2021 <0.010  % Final   • Color, UA 06/17/2021 Yellow  Yellow, Straw Final   •  Appearance, UA 06/17/2021 Clear  Clear Final   • pH, UA 06/17/2021 5.5  5.0 - 8.0 Final   • Specific Gravity, UA 06/17/2021 >1.030* 1.005 - 1.030 Final   • Glucose, UA 06/17/2021 Negative  Negative Final   • Ketones, UA 06/17/2021 Negative  Negative Final   • Bilirubin, UA 06/17/2021 Negative  Negative Final   • Blood, UA 06/17/2021 Negative  Negative Final   • Protein, UA 06/17/2021 Negative  Negative Final   • Leuk Esterase, UA 06/17/2021 Negative  Negative Final   • Nitrite, UA 06/17/2021 Negative  Negative Final   • Urobilinogen, UA 06/17/2021 1.0 E.U./dL  0.2 - 1.0 E.U./dL Final   • Amphetamine Screen, Urine 06/17/2021 Negative  Negative Final   • Barbiturates Screen, Urine 06/17/2021 Negative  Negative Final   • Benzodiazepine Screen, Urine 06/17/2021 Negative  Negative Final   • Cocaine Screen, Urine 06/17/2021 Negative  Negative Final   • Methadone Screen, Urine 06/17/2021 Negative  Negative Final   • Opiate Screen 06/17/2021 Negative  Negative Final   • Phencyclidine (PCP), Urine 06/17/2021 Negative  Negative Final   • THC, Screen, Urine 06/17/2021 Negative  Negative Final   • 6-ACETYL MORPHINE 06/17/2021 Negative  Negative Final   • Buprenorphine, Screen, Urine 06/17/2021 Positive* Negative Final   • Oxycodone Screen, Urine 06/17/2021 Negative  Negative Final   • COVID19 06/17/2021 Not Detected  Not Detected - Ref. Range Final   • Influenza A PCR 06/17/2021 Not Detected  Not Detected Final   • Influenza B PCR 06/17/2021 Not Detected  Not Detected Final   • Hemoglobin A1C 06/17/2021 5.50  4.80 - 5.60 % Final   • TSH 06/17/2021 2.360  0.270 - 4.200 uIU/mL Final   • QT Interval 06/18/2021 456  ms Final   • QTC Interval 06/18/2021 436  ms Final   • Hepatitis C Quantitation 06/18/2021 HCV Not Detected  IU/mL Final   • Test Information 06/18/2021 Comment   Final    The quantitative range of this assay is 15 IU/mL to 100 million IU/mL.   • Please note 06/18/2021 Comment   Final    This test was developed and its  performance characteristics determined  by [employer].  It has not been cleared or approved by the [place] Food and  Drug Administration.  The FDA has determined that such clearance or approval is not  necessary. This test is used for clinical purposes.  It should not be  regarded as investigational or for research.   • Hepatitis C Genotype 06/18/2021 Comment   Final    Specimen has insufficient hepatitis C virus RNA to obtain genotyping  results. This genotyping assay should only be used for known HCV  positive patients with HCV RNA levels above 1000 IU/mL.   • Glucose 06/18/2021 121* 65 - 99 mg/dL Final   • BUN 06/18/2021 5* 6 - 20 mg/dL Final   • Creatinine 06/18/2021 0.55* 0.76 - 1.27 mg/dL Final   • Sodium 06/18/2021 138  136 - 145 mmol/L Final   • Potassium 06/18/2021 3.5  3.5 - 5.2 mmol/L Final    Slight hemolysis detected by analyzer. Results may be affected.   • Chloride 06/18/2021 103  98 - 107 mmol/L Final   • CO2 06/18/2021 22.1  22.0 - 29.0 mmol/L Final   • Calcium 06/18/2021 9.1  8.6 - 10.5 mg/dL Final   • Total Protein 06/18/2021 6.7  6.0 - 8.5 g/dL Final   • Albumin 06/18/2021 3.46* 3.50 - 5.20 g/dL Final   • ALT (SGPT) 06/18/2021 77* 1 - 41 U/L Final   • AST (SGOT) 06/18/2021 100* 1 - 40 U/L Final   • Alkaline Phosphatase 06/18/2021 237* 39 - 117 U/L Final   • Total Bilirubin 06/18/2021 1.5* 0.0 - 1.2 mg/dL Final   • eGFR Non African Amer 06/18/2021 >150  >60 mL/min/1.73 Final   • Globulin 06/18/2021 3.2  gm/dL Final   • A/G Ratio 06/18/2021 1.1  g/dL Final   • BUN/Creatinine Ratio 06/18/2021 9.1  7.0 - 25.0 Final   • Anion Gap 06/18/2021 12.9  5.0 - 15.0 mmol/L Final   • Protime 06/18/2021 13.4  12.8 - 14.5 Seconds Final    Note new Reference Range   • INR 06/18/2021 0.98  0.90 - 1.10 Final   • PTT 06/18/2021 28.0  25.5 - 35.4 seconds Final    Note new Reference Range   • Phosphorus 06/18/2021 2.8  2.5 - 4.5 mg/dL Final   • Magnesium 06/18/2021 1.5* 1.6 - 2.6 mg/dL Final   • WBC 06/18/2021 11.20* 3.40 -  10.80 10*3/mm3 Final   • RBC 06/18/2021 5.03  4.14 - 5.80 10*6/mm3 Final   • Hemoglobin 06/18/2021 16.2  13.0 - 17.7 g/dL Final   • Hematocrit 06/18/2021 48.1  37.5 - 51.0 % Final   • MCV 06/18/2021 95.6  79.0 - 97.0 fL Final   • MCH 06/18/2021 32.2  26.6 - 33.0 pg Final   • MCHC 06/18/2021 33.7  31.5 - 35.7 g/dL Final   • RDW 06/18/2021 12.2* 12.3 - 15.4 % Final   • RDW-SD 06/18/2021 42.6  37.0 - 54.0 fl Final   • MPV 06/18/2021 10.5  6.0 - 12.0 fL Final   • Platelets 06/18/2021 141  140 - 450 10*3/mm3 Final   • Vitamin B1, Whole Blood 06/18/2021 144.6  66.5 - 200.0 nmol/L Final   • Vitamin B-12 06/18/2021 352  211 - 946 pg/mL Final   • Folate 06/18/2021 2.31* 4.78 - 24.20 ng/mL Final   • Total Cholesterol 06/18/2021 183  0 - 200 mg/dL Final   • Triglycerides 06/18/2021 128  0 - 150 mg/dL Final   • HDL Cholesterol 06/18/2021 61* 40 - 60 mg/dL Final   • LDL Cholesterol  06/18/2021 100  0 - 100 mg/dL Final   • VLDL Cholesterol 06/18/2021 22  5 - 40 mg/dL Final   • LDL/HDL Ratio 06/18/2021 1.58   Final   • Magnesium 06/19/2021 1.9  1.6 - 2.6 mg/dL Final   • Glucose 06/19/2021 102* 65 - 99 mg/dL Final   • BUN 06/19/2021 4* 6 - 20 mg/dL Final   • Creatinine 06/19/2021 0.52* 0.76 - 1.27 mg/dL Final   • Sodium 06/19/2021 132* 136 - 145 mmol/L Final   • Potassium 06/19/2021 3.9  3.5 - 5.2 mmol/L Final    Slight hemolysis detected by analyzer. Results may be affected.   • Chloride 06/19/2021 98  98 - 107 mmol/L Final   • CO2 06/19/2021 22.0  22.0 - 29.0 mmol/L Final   • Calcium 06/19/2021 8.9  8.6 - 10.5 mg/dL Final   • Total Protein 06/19/2021 6.8  6.0 - 8.5 g/dL Final   • Albumin 06/19/2021 3.16* 3.50 - 5.20 g/dL Final   • ALT (SGPT) 06/19/2021 49* 1 - 41 U/L Final   • AST (SGOT) 06/19/2021 68* 1 - 40 U/L Final   • Alkaline Phosphatase 06/19/2021 233* 39 - 117 U/L Final   • Total Bilirubin 06/19/2021 1.2  0.0 - 1.2 mg/dL Final   • eGFR Non African Amer 06/19/2021 >150  >60 mL/min/1.73 Final   • Globulin 06/19/2021 3.6   gm/dL Final   • A/G Ratio 06/19/2021 0.9  g/dL Final   • BUN/Creatinine Ratio 06/19/2021 7.7  7.0 - 25.0 Final   • Anion Gap 06/19/2021 12.0  5.0 - 15.0 mmol/L Final         Assessment/Plan   Diagnoses and all orders for this visit:    1. Opioid type dependence, continuous (HCC) (Primary)  -     buprenorphine-naloxone (SUBOXONE) 8-2 MG per SL tablet; Place 2 tablets under the tongue Daily.  Dispense: 14 tablet; Refill: 0    2. Medication management  -     [facility] Drug Screen        Visit Diagnoses:    ICD-10-CM ICD-9-CM   1. Medication management  Z79.899 V58.69       PLAN:  1. Safety: No acute safety concerns  2. Risk Assessment: Risk of self-harm acutely is low. Risk of self-harm chronically is also low, but could be further elevated in the event of treatment noncompliance and/or AODA.    TREATMENT PLAN/GOALS: Continue supportive psychotherapy efforts and medications as indicated. Treatment and medication options discussed during today's visit. Patient acknowledged and verbally consented to continue with current treatment plan and was educated on the importance of compliance with treatment and follow-up appointments.    MEDICATION ISSUES:  ROMAN reviewed as expected.  Discussed medication options and treatment plan of prescribed medication as well as the risks, benefits, and side effects including potential falls, possible impaired driving and metabolic adversities among others. Patient is agreeable to call the office with any worsening of symptoms or onset of side effects. Patient is agreeable to call 911 or go to the nearest ER should he/she begin having SI/HI. No medication side effects or related complaints today.     MEDS ORDERED DURING VISIT:  No orders of the defined types were placed in this encounter.      No follow-ups on file.           This document has been electronically signed by RENAN De Dios  October 20, 2021 13:43 EDT      Part of this note may be an electronic transcription/translation of  spoken language to printed text using the Dragon Dictation System.

## 2021-10-27 ENCOUNTER — OFFICE VISIT (OUTPATIENT)
Dept: PSYCHIATRY | Facility: CLINIC | Age: 31
End: 2021-10-27

## 2021-10-27 VITALS
HEART RATE: 79 BPM | WEIGHT: 202.9 LBS | DIASTOLIC BLOOD PRESSURE: 103 MMHG | HEIGHT: 78 IN | SYSTOLIC BLOOD PRESSURE: 152 MMHG | BODY MASS INDEX: 23.47 KG/M2

## 2021-10-27 DIAGNOSIS — F11.20 OPIOID TYPE DEPENDENCE, CONTINUOUS (HCC): Primary | ICD-10-CM

## 2021-10-27 PROCEDURE — 99213 OFFICE O/P EST LOW 20 MIN: CPT | Performed by: NURSE PRACTITIONER

## 2021-10-27 RX ORDER — BUPRENORPHINE HYDROCHLORIDE AND NALOXONE HYDROCHLORIDE DIHYDRATE 8; 2 MG/1; MG/1
2 TABLET SUBLINGUAL DAILY
Qty: 14 TABLET | Refills: 0 | Status: SHIPPED | OUTPATIENT
Start: 2021-10-27 | End: 2021-11-03 | Stop reason: SDUPTHER

## 2021-10-27 NOTE — PROGRESS NOTES
This provider is located at [facility]. The Patient is seen remotely using Video. Patient is being seen via telehealth and confirm that they are in a secure environment for this session. The patient's condition being diagnosed/treated is appropriate for telemedicine. Provider identified as Daniel Olivas as well as credentials APRN ADRIENNE FNP-JUSTIN PINEDA.   The client/patient gave consent to be seen remotely, and when consent is given they understand that the consent allows for patient identifiable information to be sent to a third party as needed.   They may refuse to be seen remotely at any time. The electronic data is encrypted and password protected, and the patient has been advised of the potential risks to privacy not withstanding such measures.    Chief Complaint/History of Present Illness: Follow Up buprenorphine/naloxone Medicated Assisted Treatment for Opiate Use Disorder     Patient/Client Concerns/Updates: Patient states decreasing frequency of Vistaril has helped decrease excessive grogginess; no concerns with Zoloft at current dose of 25 mg; patient now residing with his mother having recently  from his wife which remains a stressor; otherwise no acute concerns this week    Triggers (Persons/Places/Things/Events/Thought/Emotions): Recent separation from wife    Cravings: Denies cravings for illicit substances    Relapse Prevention: Counseling    Urine Drug Screen (today's visit) discussed: Urine dip not assessed due to lack of supplies for said test; will assess urine confirmation    UDS Confirmation: Positive buprenorphine/nor-buprenorphine, no concerns for urine tampering positive ethyl glucuronide    ROMAN (PDMP) Reviewed for Current/Active Medications: buprenorphine/naloxone as reviewed today    Past Surgical History:  Past Surgical History:   Procedure Laterality Date   • NO PAST SURGERIES         Problem List:  Patient Active Problem List   Diagnosis   • Alcohol-induced acute pancreatitis    • Pancreatitis       Allergy:   No Known Allergies     Current Medications:   Current Outpatient Medications   Medication Sig Dispense Refill   • buprenorphine-naloxone (SUBOXONE) 8-2 MG per SL tablet Place 2 tablets under the tongue Daily. 14 tablet 0   • cloNIDine (Catapres) 0.1 MG tablet Take 1 tablet by mouth 2 (Two) Times a Day. Take as needed for anxiety.  Insomnia.  Chills or sweats 60 tablet 2   • hydrOXYzine pamoate (Vistaril) 25 MG capsule Take 1 capsule by mouth 4 (Four) Times a Day As Needed for Anxiety (and/or insomia). 60 capsule 1   • naloxone (NARCAN) 4 MG/0.1ML nasal spray 1 spray into the nostril(s) as directed by provider As Needed (Opiate oversedation). Spray in 1 nostril every 2 to 3 minutes call 911 2 each 2   • sertraline (Zoloft) 25 MG tablet Take 1 tablet by mouth Daily for 30 days. 30 tablet 0     No current facility-administered medications for this visit.       Past Medical History:  Past Medical History:   Diagnosis Date   • Alcohol-induced acute pancreatitis 6/27/2019   • Drug use     Amphetamines and Suboxone   • Fatty liver    • Hepatitis C antibody test positive 2019   • Medical non-compliance    • Smoker          Social History     Socioeconomic History   • Marital status: Single   Tobacco Use   • Smoking status: Current Every Day Smoker     Packs/day: 0.50     Types: Cigarettes   • Smokeless tobacco: Current User   Vaping Use   • Vaping Use: Never used   Substance and Sexual Activity   • Alcohol use: Yes     Alcohol/week: 8.0 standard drinks     Types: 8 Shots of liquor per week     Comment: states occasional use, last use was yesterday, drank couple mixed drink   • Drug use: Yes     Types: Amphetamines     Comment: suboxone as well   • Sexual activity: Defer         Family History   Problem Relation Age of Onset   • Cancer Maternal Grandmother          Mental Status Exam:   Hygiene:   good  Cooperation:  Cooperative  Eye Contact:  Good  Psychomotor Behavior:   "Appropriate  Affect:  Appropriate  Mood: depressed  Speech:  Normal  Thought Process:  Goal directed  Thought Content:  Normal  Suicidal:  None  Homicidal:  None  Hallucinations:  None  Delusion:  None  Memory:  Intact  Orientation:  Person, Place, Time and Situation  Reliability:  good  Insight:  Good  Judgement:  Good  Impulse Control:  Good         Review of Systems:  Review of Systems   Constitutional: Negative for activity change, chills, diaphoresis and fatigue.   Respiratory: Negative for apnea, cough and shortness of breath.    Cardiovascular: Negative for chest pain, palpitations and leg swelling.   Gastrointestinal: Negative for abdominal pain, constipation, diarrhea, nausea and vomiting.   Genitourinary: Negative for difficulty urinating.   Musculoskeletal: Negative for arthralgias.   Skin: Negative for rash.   Neurological: Negative for dizziness, weakness and headaches.   Psychiatric/Behavioral: Negative for agitation, self-injury, sleep disturbance and suicidal ideas. The patient is nervous/anxious.          Physical Exam:  Physical Exam  Vitals reviewed.   Constitutional:       General: He is not in acute distress.     Appearance: Normal appearance. He is not ill-appearing or toxic-appearing.   Pulmonary:      Effort: Pulmonary effort is normal.   Musculoskeletal:         General: Normal range of motion.   Neurological:      General: No focal deficit present.      Mental Status: He is alert and oriented to person, place, and time.   Psychiatric:         Attention and Perception: Attention and perception normal.         Mood and Affect: Mood is depressed. Mood is not anxious.         Speech: Speech normal.         Behavior: Behavior normal. Behavior is cooperative.         Thought Content: Thought content normal.         Cognition and Memory: Cognition and memory normal.         Judgment: Judgment normal.         Vital Signs:   BP (!) 152/103   Pulse 79   Ht 198.1 cm (77.99\")   Wt 92 kg (202 lb 14.4 " oz)   BMI 23.45 kg/m²      Lab Results:   Office Visit on 10/20/2021   Component Date Value Ref Range Status   • External Amphetamine Screen Urine 10/20/2021 Negative   Final   • Amphetamine Cut-Off 10/20/2021 1000ng/ml   Final   • External Benzodiazepine Screen Uri* 10/20/2021 Negative   Final   • Benzodiazipine Cut-Off 10/20/2021 300ng/ml   Final   • External Cocaine Screen Urine 10/20/2021 Negative   Final   • Cocaine Cut-Off 10/20/2021 300ng/ml   Final   • External THC Screen Urine 10/20/2021 Negative   Final   • THC Cut-Off 10/20/2021 50ng/ml   Final   • External Methadone Screen Urine 10/20/2021 Negative   Final   • Methadone Cut-Off 10/20/2021 300ng/ml   Final   • External Methamphetamine Screen Ur* 10/20/2021 Negative   Final   • Methamphetamine Cut-Off 10/20/2021 1000ng/ml   Final   • External Oxycodone Screen Urine 10/20/2021 Negative   Final   • Oxycodone Cut-Off 10/20/2021 100ng/ml   Final   • External Buprenorphine Screen Urine 10/20/2021 Positive   Final   • Buprenorphine Cut-Off 10/20/2021 10ng/ml   Final   • External MDMA 10/20/2021 Negative   Final   • MDMA Cut-Off 10/20/2021 500ng/ml   Final   • External Opiates Screen Urine 10/20/2021 Negative   Final   • Opiates Cut-Off 10/20/2021 300ng/ml   Final   Office Visit on 10/12/2021   Component Date Value Ref Range Status   • External Amphetamine Screen Urine 10/12/2021 Negative   Final   • Amphetamine Cut-Off 10/12/2021 1000NG/ML   Final   • External Benzodiazepine Screen Uri* 10/12/2021 Negative   Final   • Benzodiazipine Cut-Off 10/12/2021 300NG/ML   Final   • External Cocaine Screen Urine 10/12/2021 Negative   Final   • Cocaine Cut-Off 10/12/2021 300NG/ML   Final   • External THC Screen Urine 10/12/2021 Negative   Final   • THC Cut-Off 10/12/2021 50NG/ML   Final   • External Methadone Screen Urine 10/12/2021 Negative   Final   • Methadone Cut-Off 10/12/2021 300NG/ML   Final   • External Methamphetamine Screen Ur* 10/12/2021 Negative   Final   •  Methamphetamine Cut-Off 10/12/2021 1000NG/ML   Final   • External Oxycodone Screen Urine 10/12/2021 Negative   Final   • Oxycodone Cut-Off 10/12/2021 100NG/ML   Final   • External Buprenorphine Screen Urine 10/12/2021 Positive   Final   • Buprenorphine Cut-Off 10/12/2021 10NG/ML   Final   • External MDMA 10/12/2021 Negative   Final   • MDMA Cut-Off 10/12/2021 500NG/ML   Final   • External Opiates Screen Urine 10/12/2021 Negative   Final   • Opiates Cut-Off 10/12/2021 300NG/ML   Final   Admission on 06/17/2021, Discharged on 06/19/2021   Component Date Value Ref Range Status   • Glucose 06/17/2021 141* 65 - 99 mg/dL Final   • BUN 06/17/2021 5* 6 - 20 mg/dL Final   • Creatinine 06/17/2021 0.57* 0.76 - 1.27 mg/dL Final   • Sodium 06/17/2021 140  136 - 145 mmol/L Final   • Potassium 06/17/2021 3.9  3.5 - 5.2 mmol/L Final    Slight hemolysis detected by analyzer. Results may be affected.   • Chloride 06/17/2021 106  98 - 107 mmol/L Final   • CO2 06/17/2021 22.9  22.0 - 29.0 mmol/L Final   • Calcium 06/17/2021 9.6  8.6 - 10.5 mg/dL Final   • Total Protein 06/17/2021 7.6  6.0 - 8.5 g/dL Final   • Albumin 06/17/2021 3.96  3.50 - 5.20 g/dL Final   • ALT (SGPT) 06/17/2021 99* 1 - 41 U/L Final   • AST (SGOT) 06/17/2021 156* 1 - 40 U/L Final   • Alkaline Phosphatase 06/17/2021 276* 39 - 117 U/L Final   • Total Bilirubin 06/17/2021 1.1  0.0 - 1.2 mg/dL Final   • eGFR Non African Amer 06/17/2021 >150  >60 mL/min/1.73 Final   • Globulin 06/17/2021 3.6  gm/dL Final   • A/G Ratio 06/17/2021 1.1  g/dL Final   • BUN/Creatinine Ratio 06/17/2021 8.8  7.0 - 25.0 Final   • Anion Gap 06/17/2021 11.1  5.0 - 15.0 mmol/L Final   • Lipase 06/17/2021 463* 13 - 60 U/L Final   • Amylase 06/17/2021 205* 28 - 100 U/L Final   • WBC 06/17/2021 10.73  3.40 - 10.80 10*3/mm3 Final   • RBC 06/17/2021 5.07  4.14 - 5.80 10*6/mm3 Final   • Hemoglobin 06/17/2021 16.3  13.0 - 17.7 g/dL Final   • Hematocrit 06/17/2021 47.7  37.5 - 51.0 % Final   • MCV  06/17/2021 94.1  79.0 - 97.0 fL Final   • MCH 06/17/2021 32.1  26.6 - 33.0 pg Final   • MCHC 06/17/2021 34.2  31.5 - 35.7 g/dL Final   • RDW 06/17/2021 12.3  12.3 - 15.4 % Final   • RDW-SD 06/17/2021 42.6  37.0 - 54.0 fl Final   • MPV 06/17/2021 10.1  6.0 - 12.0 fL Final   • Platelets 06/17/2021 168  140 - 450 10*3/mm3 Final   • Neutrophil % 06/17/2021 81.6* 42.7 - 76.0 % Final   • Lymphocyte % 06/17/2021 9.6* 19.6 - 45.3 % Final   • Monocyte % 06/17/2021 6.4  5.0 - 12.0 % Final   • Eosinophil % 06/17/2021 1.6  0.3 - 6.2 % Final   • Basophil % 06/17/2021 0.3  0.0 - 1.5 % Final   • Immature Grans % 06/17/2021 0.5  0.0 - 0.5 % Final   • Neutrophils, Absolute 06/17/2021 8.76* 1.70 - 7.00 10*3/mm3 Final   • Lymphocytes, Absolute 06/17/2021 1.03  0.70 - 3.10 10*3/mm3 Final   • Monocytes, Absolute 06/17/2021 0.69  0.10 - 0.90 10*3/mm3 Final   • Eosinophils, Absolute 06/17/2021 0.17  0.00 - 0.40 10*3/mm3 Final   • Basophils, Absolute 06/17/2021 0.03  0.00 - 0.20 10*3/mm3 Final   • Immature Grans, Absolute 06/17/2021 0.05  0.00 - 0.05 10*3/mm3 Final   • nRBC 06/17/2021 0.0  0.0 - 0.2 /100 WBC Final   • Ethanol 06/17/2021 <10  0 - 10 mg/dL Final   • Ethanol % 06/17/2021 <0.010  % Final   • Color, UA 06/17/2021 Yellow  Yellow, Straw Final   • Appearance, UA 06/17/2021 Clear  Clear Final   • pH, UA 06/17/2021 5.5  5.0 - 8.0 Final   • Specific Gravity, UA 06/17/2021 >1.030* 1.005 - 1.030 Final   • Glucose, UA 06/17/2021 Negative  Negative Final   • Ketones, UA 06/17/2021 Negative  Negative Final   • Bilirubin, UA 06/17/2021 Negative  Negative Final   • Blood, UA 06/17/2021 Negative  Negative Final   • Protein, UA 06/17/2021 Negative  Negative Final   • Leuk Esterase, UA 06/17/2021 Negative  Negative Final   • Nitrite, UA 06/17/2021 Negative  Negative Final   • Urobilinogen, UA 06/17/2021 1.0 E.U./dL  0.2 - 1.0 E.U./dL Final   • Amphetamine Screen, Urine 06/17/2021 Negative  Negative Final   • Barbiturates Screen, Urine  06/17/2021 Negative  Negative Final   • Benzodiazepine Screen, Urine 06/17/2021 Negative  Negative Final   • Cocaine Screen, Urine 06/17/2021 Negative  Negative Final   • Methadone Screen, Urine 06/17/2021 Negative  Negative Final   • Opiate Screen 06/17/2021 Negative  Negative Final   • Phencyclidine (PCP), Urine 06/17/2021 Negative  Negative Final   • THC, Screen, Urine 06/17/2021 Negative  Negative Final   • 6-ACETYL MORPHINE 06/17/2021 Negative  Negative Final   • Buprenorphine, Screen, Urine 06/17/2021 Positive* Negative Final   • Oxycodone Screen, Urine 06/17/2021 Negative  Negative Final   • COVID19 06/17/2021 Not Detected  Not Detected - Ref. Range Final   • Influenza A PCR 06/17/2021 Not Detected  Not Detected Final   • Influenza B PCR 06/17/2021 Not Detected  Not Detected Final   • Hemoglobin A1C 06/17/2021 5.50  4.80 - 5.60 % Final   • TSH 06/17/2021 2.360  0.270 - 4.200 uIU/mL Final   • QT Interval 06/18/2021 456  ms Final   • QTC Interval 06/18/2021 436  ms Final   • Hepatitis C Quantitation 06/18/2021 HCV Not Detected  IU/mL Final   • Test Information 06/18/2021 Comment   Final    The quantitative range of this assay is 15 IU/mL to 100 million IU/mL.   • Please note 06/18/2021 Comment   Final    This test was developed and its performance characteristics determined  by [facility].  It has not been cleared or approved by the [place] Food and  Drug Administration.  The FDA has determined that such clearance or approval is not  necessary. This test is used for clinical purposes.  It should not be  regarded as investigational or for research.   • Hepatitis C Genotype 06/18/2021 Comment   Final    Specimen has insufficient hepatitis C virus RNA to obtain genotyping  results. This genotyping assay should only be used for known HCV  positive patients with HCV RNA levels above 1000 IU/mL.   • Glucose 06/18/2021 121* 65 - 99 mg/dL Final   • BUN 06/18/2021 5* 6 - 20 mg/dL Final   • Creatinine 06/18/2021 0.55* 0.76 -  1.27 mg/dL Final   • Sodium 06/18/2021 138  136 - 145 mmol/L Final   • Potassium 06/18/2021 3.5  3.5 - 5.2 mmol/L Final    Slight hemolysis detected by analyzer. Results may be affected.   • Chloride 06/18/2021 103  98 - 107 mmol/L Final   • CO2 06/18/2021 22.1  22.0 - 29.0 mmol/L Final   • Calcium 06/18/2021 9.1  8.6 - 10.5 mg/dL Final   • Total Protein 06/18/2021 6.7  6.0 - 8.5 g/dL Final   • Albumin 06/18/2021 3.46* 3.50 - 5.20 g/dL Final   • ALT (SGPT) 06/18/2021 77* 1 - 41 U/L Final   • AST (SGOT) 06/18/2021 100* 1 - 40 U/L Final   • Alkaline Phosphatase 06/18/2021 237* 39 - 117 U/L Final   • Total Bilirubin 06/18/2021 1.5* 0.0 - 1.2 mg/dL Final   • eGFR Non African Amer 06/18/2021 >150  >60 mL/min/1.73 Final   • Globulin 06/18/2021 3.2  gm/dL Final   • A/G Ratio 06/18/2021 1.1  g/dL Final   • BUN/Creatinine Ratio 06/18/2021 9.1  7.0 - 25.0 Final   • Anion Gap 06/18/2021 12.9  5.0 - 15.0 mmol/L Final   • Protime 06/18/2021 13.4  12.8 - 14.5 Seconds Final    Note new Reference Range   • INR 06/18/2021 0.98  0.90 - 1.10 Final   • PTT 06/18/2021 28.0  25.5 - 35.4 seconds Final    Note new Reference Range   • Phosphorus 06/18/2021 2.8  2.5 - 4.5 mg/dL Final   • Magnesium 06/18/2021 1.5* 1.6 - 2.6 mg/dL Final   • WBC 06/18/2021 11.20* 3.40 - 10.80 10*3/mm3 Final   • RBC 06/18/2021 5.03  4.14 - 5.80 10*6/mm3 Final   • Hemoglobin 06/18/2021 16.2  13.0 - 17.7 g/dL Final   • Hematocrit 06/18/2021 48.1  37.5 - 51.0 % Final   • MCV 06/18/2021 95.6  79.0 - 97.0 fL Final   • MCH 06/18/2021 32.2  26.6 - 33.0 pg Final   • MCHC 06/18/2021 33.7  31.5 - 35.7 g/dL Final   • RDW 06/18/2021 12.2* 12.3 - 15.4 % Final   • RDW-SD 06/18/2021 42.6  37.0 - 54.0 fl Final   • MPV 06/18/2021 10.5  6.0 - 12.0 fL Final   • Platelets 06/18/2021 141  140 - 450 10*3/mm3 Final   • Vitamin B1, Whole Blood 06/18/2021 144.6  66.5 - 200.0 nmol/L Final   • Vitamin B-12 06/18/2021 352  211 - 946 pg/mL Final   • Folate 06/18/2021 2.31* 4.78 - 24.20  ng/mL Final   • Total Cholesterol 06/18/2021 183  0 - 200 mg/dL Final   • Triglycerides 06/18/2021 128  0 - 150 mg/dL Final   • HDL Cholesterol 06/18/2021 61* 40 - 60 mg/dL Final   • LDL Cholesterol  06/18/2021 100  0 - 100 mg/dL Final   • VLDL Cholesterol 06/18/2021 22  5 - 40 mg/dL Final   • LDL/HDL Ratio 06/18/2021 1.58   Final   • Magnesium 06/19/2021 1.9  1.6 - 2.6 mg/dL Final   • Glucose 06/19/2021 102* 65 - 99 mg/dL Final   • BUN 06/19/2021 4* 6 - 20 mg/dL Final   • Creatinine 06/19/2021 0.52* 0.76 - 1.27 mg/dL Final   • Sodium 06/19/2021 132* 136 - 145 mmol/L Final   • Potassium 06/19/2021 3.9  3.5 - 5.2 mmol/L Final    Slight hemolysis detected by analyzer. Results may be affected.   • Chloride 06/19/2021 98  98 - 107 mmol/L Final   • CO2 06/19/2021 22.0  22.0 - 29.0 mmol/L Final   • Calcium 06/19/2021 8.9  8.6 - 10.5 mg/dL Final   • Total Protein 06/19/2021 6.8  6.0 - 8.5 g/dL Final   • Albumin 06/19/2021 3.16* 3.50 - 5.20 g/dL Final   • ALT (SGPT) 06/19/2021 49* 1 - 41 U/L Final   • AST (SGOT) 06/19/2021 68* 1 - 40 U/L Final   • Alkaline Phosphatase 06/19/2021 233* 39 - 117 U/L Final   • Total Bilirubin 06/19/2021 1.2  0.0 - 1.2 mg/dL Final   • eGFR Non African Amer 06/19/2021 >150  >60 mL/min/1.73 Final   • Globulin 06/19/2021 3.6  gm/dL Final   • A/G Ratio 06/19/2021 0.9  g/dL Final   • BUN/Creatinine Ratio 06/19/2021 7.7  7.0 - 25.0 Final   • Anion Gap 06/19/2021 12.0  5.0 - 15.0 mmol/L Final         Assessment/Plan   Diagnoses and all orders for this visit:    1. Opioid type dependence, continuous (HCC) (Primary)  -     buprenorphine-naloxone (SUBOXONE) 8-2 MG per SL tablet; Place 2 tablets under the tongue Daily.  Dispense: 14 tablet; Refill: 0        Visit Diagnoses:  No diagnosis found.    PLAN:  1. Safety: No acute safety concerns  2. Risk Assessment: Risk of self-harm acutely is low. Risk of self-harm chronically is also low, but could be further elevated in the event of treatment noncompliance  and/or AODA.    TREATMENT PLAN/GOALS: Continue supportive psychotherapy efforts and medications as indicated. Treatment and medication options discussed during today's visit. Patient acknowledged and verbally consented to continue with current treatment plan and was educated on the importance of compliance with treatment and follow-up appointments.    MEDICATION ISSUES:  ROMAN reviewed as expected.  Discussed medication options and treatment plan of prescribed medication as well as the risks, benefits, and side effects including potential falls, possible impaired driving and metabolic adversities among others. Patient is agreeable to call the office with any worsening of symptoms or onset of side effects. Patient is agreeable to call 911 or go to the nearest ER should he/she begin having SI/HI. No medication side effects or related complaints today.     MEDS ORDERED DURING VISIT:  No orders of the defined types were placed in this encounter.      No follow-ups on file.           This document has been electronically signed by RENAN De Dios  October 27, 2021 13:08 EDT      Part of this note may be an electronic transcription/translation of spoken language to printed text using the Dragon Dictation System.

## 2021-11-03 ENCOUNTER — OFFICE VISIT (OUTPATIENT)
Dept: PSYCHIATRY | Facility: CLINIC | Age: 31
End: 2021-11-03

## 2021-11-03 VITALS
DIASTOLIC BLOOD PRESSURE: 98 MMHG | HEART RATE: 82 BPM | OXYGEN SATURATION: 97 % | BODY MASS INDEX: 23.26 KG/M2 | WEIGHT: 201 LBS | SYSTOLIC BLOOD PRESSURE: 142 MMHG | TEMPERATURE: 97.8 F | HEIGHT: 78 IN

## 2021-11-03 DIAGNOSIS — F11.20 OPIOID TYPE DEPENDENCE, CONTINUOUS (HCC): Primary | ICD-10-CM

## 2021-11-03 DIAGNOSIS — F32.A DEPRESSION, UNSPECIFIED DEPRESSION TYPE: ICD-10-CM

## 2021-11-03 DIAGNOSIS — F41.1 GENERALIZED ANXIETY DISORDER: ICD-10-CM

## 2021-11-03 DIAGNOSIS — Z79.899 MEDICATION MANAGEMENT: ICD-10-CM

## 2021-11-03 PROCEDURE — 99214 OFFICE O/P EST MOD 30 MIN: CPT | Performed by: NURSE PRACTITIONER

## 2021-11-03 RX ORDER — SERTRALINE HYDROCHLORIDE 25 MG/1
25 TABLET, FILM COATED ORAL DAILY
Qty: 30 TABLET | Refills: 3 | Status: SHIPPED | OUTPATIENT
Start: 2021-11-03 | End: 2021-11-10 | Stop reason: SDUPTHER

## 2021-11-03 RX ORDER — BUPRENORPHINE HYDROCHLORIDE AND NALOXONE HYDROCHLORIDE DIHYDRATE 8; 2 MG/1; MG/1
2 TABLET SUBLINGUAL DAILY
Qty: 14 TABLET | Refills: 0 | Status: SHIPPED | OUTPATIENT
Start: 2021-11-03 | End: 2021-11-10 | Stop reason: SDUPTHER

## 2021-11-03 NOTE — PROGRESS NOTES
This provider is located at [facility]. The Patient is seen remotely using Video. Patient is being seen via telehealth and confirm that they are in a secure environment for this session. The patient's condition being diagnosed/treated is appropriate for telemedicine. Provider identified as Daniel Olivas as well as credentials APRN ADRIENNE ANDERSONP-JUSTIN PINEDA.   The client/patient gave consent to be seen remotely, and when consent is given they understand that the consent allows for patient identifiable information to be sent to a third party as needed.   They may refuse to be seen remotely at any time. The electronic data is encrypted and password protected, and the patient has been advised of the potential risks to privacy not withstanding such measures.    Chief Complaint/History of Present Illness: Follow Up buprenorphine/naloxone Medicated Assisted Treatment for Opiate Use Disorder     Patient/Client Concerns/Updates: Patient states he and his wife are striving to resume their relationship which patient states is very positive and desirable; overall no concerns with prescribed medications, will continue Zoloft 25 mg for depression and anxiety, no concern for exacerbation of mood and no SI.  Patient states he consumes alcohol every other day; I advised patient to minimize and cease this behavior due to risk of CNS oversedation and overdose and will not accommodate extended scripts if alcohol or nonprescribed gabapentin remain positive    Triggers (Persons/Places/Things/Events/Thought/Emotions): Stress anxiety and depression    Cravings: Denies cravings for opiates    Relapse Prevention: Counseling    Urine Drug Screen (today's visit) discussed: Positive buprenorphine, otherwise negative for substances tested    UDS Confirmation: Positive buprenorphine/nor-buprenorphine, no concerns for urine tampering, positive gabapentin and ethyl glucuronide    ROMAN (PDMP) Reviewed for Current/Active Medications:  buprenorphine/naloxone as reviewed today    Past Surgical History:  Past Surgical History:   Procedure Laterality Date   • NO PAST SURGERIES         Problem List:  Patient Active Problem List   Diagnosis   • Alcohol-induced acute pancreatitis   • Pancreatitis       Allergy:   No Known Allergies     Current Medications:   Current Outpatient Medications   Medication Sig Dispense Refill   • buprenorphine-naloxone (SUBOXONE) 8-2 MG per SL tablet Place 2 tablets under the tongue Daily. 14 tablet 0   • cloNIDine (Catapres) 0.1 MG tablet Take 1 tablet by mouth 2 (Two) Times a Day. Take as needed for anxiety.  Insomnia.  Chills or sweats 60 tablet 2   • hydrOXYzine pamoate (Vistaril) 25 MG capsule Take 1 capsule by mouth 4 (Four) Times a Day As Needed for Anxiety (and/or insomia). 60 capsule 1   • naloxone (NARCAN) 4 MG/0.1ML nasal spray 1 spray into the nostril(s) as directed by provider As Needed (Opiate oversedation). Spray in 1 nostril every 2 to 3 minutes call 911 2 each 2   • sertraline (Zoloft) 25 MG tablet Take 1 tablet by mouth Daily for 30 days. 30 tablet 0     No current facility-administered medications for this visit.       Past Medical History:  Past Medical History:   Diagnosis Date   • Alcohol-induced acute pancreatitis 6/27/2019   • Drug use     Amphetamines and Suboxone   • Fatty liver    • Hepatitis C antibody test positive 2019   • Medical non-compliance    • Smoker          Social History     Socioeconomic History   • Marital status: Single   Tobacco Use   • Smoking status: Current Every Day Smoker     Packs/day: 0.50     Types: Cigarettes   • Smokeless tobacco: Current User   Vaping Use   • Vaping Use: Never used   Substance and Sexual Activity   • Alcohol use: Yes     Alcohol/week: 8.0 standard drinks     Types: 8 Shots of liquor per week     Comment: states occasional use, last use was yesterday, drank couple mixed drink   • Drug use: Yes     Types: Amphetamines     Comment: suboxone as well   •  Sexual activity: Defer         Family History   Problem Relation Age of Onset   • Cancer Maternal Grandmother          Mental Status Exam:   Hygiene:   good  Cooperation:  Cooperative  Eye Contact:  Fair  Psychomotor Behavior:  Appropriate  Affect:  Appropriate  Mood: depressed  Speech:  Monotone  Thought Process:  Goal directed  Thought Content:  Normal  Suicidal:  None  Homicidal:  None  Hallucinations:  None  Delusion:  None  Memory:  Intact  Orientation:  Person, Place, Time and Situation  Reliability:  fair  Insight:  Fair  Judgement:  Fair  Impulse Control:  Fair         Review of Systems:  Review of Systems   Constitutional: Negative for activity change, chills, diaphoresis and fatigue.   Respiratory: Negative for apnea, cough and shortness of breath.    Cardiovascular: Negative for chest pain, palpitations and leg swelling.   Gastrointestinal: Negative for abdominal pain, constipation, diarrhea, nausea and vomiting.   Genitourinary: Negative for difficulty urinating.   Musculoskeletal: Negative for arthralgias.   Skin: Negative for rash.   Neurological: Negative for dizziness, weakness and headaches.   Psychiatric/Behavioral: Negative for agitation, self-injury, sleep disturbance and suicidal ideas. The patient is nervous/anxious.          Physical Exam:  Physical Exam  Vitals reviewed.   Constitutional:       General: He is not in acute distress.     Appearance: Normal appearance. He is not ill-appearing or toxic-appearing.   Pulmonary:      Effort: Pulmonary effort is normal.   Musculoskeletal:         General: Normal range of motion.   Neurological:      General: No focal deficit present.      Mental Status: He is alert and oriented to person, place, and time.   Psychiatric:         Attention and Perception: Attention and perception normal.         Mood and Affect: Mood is anxious. Mood is not depressed.         Speech: Speech normal.         Behavior: Behavior normal. Behavior is cooperative.          "Thought Content: Thought content normal.         Cognition and Memory: Cognition and memory normal.         Judgment: Judgment normal.         Vital Signs:   /98   Pulse 82   Temp 97.8 °F (36.6 °C)   Ht 198.1 cm (77.99\")   Wt 91.2 kg (201 lb)   SpO2 97%   BMI 23.23 kg/m²      Lab Results:   Office Visit on 11/03/2021   Component Date Value Ref Range Status   • External Amphetamine Screen Urine 11/03/2021 Negative   Final   • Amphetamine Cut-Off 11/03/2021 1000NG/ML   Final   • External Benzodiazepine Screen Uri* 11/03/2021 Negative   Final   • Benzodiazipine Cut-Off 11/03/2021 300NG/ML   Final   • External Cocaine Screen Urine 11/03/2021 Negative   Final   • Cocaine Cut-Off 11/03/2021 300NG/ML   Final   • External THC Screen Urine 11/03/2021 Negative   Final   • THC Cut-Off 11/03/2021 50NG/ML   Final   • External Methadone Screen Urine 11/03/2021 Negative   Final   • Methadone Cut-Off 11/03/2021 300NG/ML   Final   • External Methamphetamine Screen Ur* 11/03/2021 Negative   Final   • Methamphetamine Cut-Off 11/03/2021 1000NG/ML   Final   • External Oxycodone Screen Urine 11/03/2021 Negative   Final   • Oxycodone Cut-Off 11/03/2021 100NG/ML   Final   • External Buprenorphine Screen Urine 11/03/2021 Positive   Final   • Buprenorphine Cut-Off 11/03/2021 10NG/ML   Final   • External MDMA 11/03/2021 Negative   Final   • MDMA Cut-Off 11/03/2021 500NG/ML   Final   • External Opiates Screen Urine 11/03/2021 Negative   Final   • Opiates Cut-Off 11/03/2021 300NG/ML   Final   Office Visit on 10/20/2021   Component Date Value Ref Range Status   • External Amphetamine Screen Urine 10/20/2021 Negative   Final   • Amphetamine Cut-Off 10/20/2021 1000ng/ml   Final   • External Benzodiazepine Screen Uri* 10/20/2021 Negative   Final   • Benzodiazipine Cut-Off 10/20/2021 300ng/ml   Final   • External Cocaine Screen Urine 10/20/2021 Negative   Final   • Cocaine Cut-Off 10/20/2021 300ng/ml   Final   • External THC Screen Urine " 10/20/2021 Negative   Final   • THC Cut-Off 10/20/2021 50ng/ml   Final   • External Methadone Screen Urine 10/20/2021 Negative   Final   • Methadone Cut-Off 10/20/2021 300ng/ml   Final   • External Methamphetamine Screen Ur* 10/20/2021 Negative   Final   • Methamphetamine Cut-Off 10/20/2021 1000ng/ml   Final   • External Oxycodone Screen Urine 10/20/2021 Negative   Final   • Oxycodone Cut-Off 10/20/2021 100ng/ml   Final   • External Buprenorphine Screen Urine 10/20/2021 Positive   Final   • Buprenorphine Cut-Off 10/20/2021 10ng/ml   Final   • External MDMA 10/20/2021 Negative   Final   • MDMA Cut-Off 10/20/2021 500ng/ml   Final   • External Opiates Screen Urine 10/20/2021 Negative   Final   • Opiates Cut-Off 10/20/2021 300ng/ml   Final   Office Visit on 10/12/2021   Component Date Value Ref Range Status   • External Amphetamine Screen Urine 10/12/2021 Negative   Final   • Amphetamine Cut-Off 10/12/2021 1000NG/ML   Final   • External Benzodiazepine Screen Uri* 10/12/2021 Negative   Final   • Benzodiazipine Cut-Off 10/12/2021 300NG/ML   Final   • External Cocaine Screen Urine 10/12/2021 Negative   Final   • Cocaine Cut-Off 10/12/2021 300NG/ML   Final   • External THC Screen Urine 10/12/2021 Negative   Final   • THC Cut-Off 10/12/2021 50NG/ML   Final   • External Methadone Screen Urine 10/12/2021 Negative   Final   • Methadone Cut-Off 10/12/2021 300NG/ML   Final   • External Methamphetamine Screen Ur* 10/12/2021 Negative   Final   • Methamphetamine Cut-Off 10/12/2021 1000NG/ML   Final   • External Oxycodone Screen Urine 10/12/2021 Negative   Final   • Oxycodone Cut-Off 10/12/2021 100NG/ML   Final   • External Buprenorphine Screen Urine 10/12/2021 Positive   Final   • Buprenorphine Cut-Off 10/12/2021 10NG/ML   Final   • External MDMA 10/12/2021 Negative   Final   • MDMA Cut-Off 10/12/2021 500NG/ML   Final   • External Opiates Screen Urine 10/12/2021 Negative   Final   • Opiates Cut-Off 10/12/2021 300NG/ML   Final    Admission on 06/17/2021, Discharged on 06/19/2021   Component Date Value Ref Range Status   • Glucose 06/17/2021 141* 65 - 99 mg/dL Final   • BUN 06/17/2021 5* 6 - 20 mg/dL Final   • Creatinine 06/17/2021 0.57* 0.76 - 1.27 mg/dL Final   • Sodium 06/17/2021 140  136 - 145 mmol/L Final   • Potassium 06/17/2021 3.9  3.5 - 5.2 mmol/L Final    Slight hemolysis detected by analyzer. Results may be affected.   • Chloride 06/17/2021 106  98 - 107 mmol/L Final   • CO2 06/17/2021 22.9  22.0 - 29.0 mmol/L Final   • Calcium 06/17/2021 9.6  8.6 - 10.5 mg/dL Final   • Total Protein 06/17/2021 7.6  6.0 - 8.5 g/dL Final   • Albumin 06/17/2021 3.96  3.50 - 5.20 g/dL Final   • ALT (SGPT) 06/17/2021 99* 1 - 41 U/L Final   • AST (SGOT) 06/17/2021 156* 1 - 40 U/L Final   • Alkaline Phosphatase 06/17/2021 276* 39 - 117 U/L Final   • Total Bilirubin 06/17/2021 1.1  0.0 - 1.2 mg/dL Final   • eGFR Non African Amer 06/17/2021 >150  >60 mL/min/1.73 Final   • Globulin 06/17/2021 3.6  gm/dL Final   • A/G Ratio 06/17/2021 1.1  g/dL Final   • BUN/Creatinine Ratio 06/17/2021 8.8  7.0 - 25.0 Final   • Anion Gap 06/17/2021 11.1  5.0 - 15.0 mmol/L Final   • Lipase 06/17/2021 463* 13 - 60 U/L Final   • Amylase 06/17/2021 205* 28 - 100 U/L Final   • WBC 06/17/2021 10.73  3.40 - 10.80 10*3/mm3 Final   • RBC 06/17/2021 5.07  4.14 - 5.80 10*6/mm3 Final   • Hemoglobin 06/17/2021 16.3  13.0 - 17.7 g/dL Final   • Hematocrit 06/17/2021 47.7  37.5 - 51.0 % Final   • MCV 06/17/2021 94.1  79.0 - 97.0 fL Final   • MCH 06/17/2021 32.1  26.6 - 33.0 pg Final   • MCHC 06/17/2021 34.2  31.5 - 35.7 g/dL Final   • RDW 06/17/2021 12.3  12.3 - 15.4 % Final   • RDW-SD 06/17/2021 42.6  37.0 - 54.0 fl Final   • MPV 06/17/2021 10.1  6.0 - 12.0 fL Final   • Platelets 06/17/2021 168  140 - 450 10*3/mm3 Final   • Neutrophil % 06/17/2021 81.6* 42.7 - 76.0 % Final   • Lymphocyte % 06/17/2021 9.6* 19.6 - 45.3 % Final   • Monocyte % 06/17/2021 6.4  5.0 - 12.0 % Final   •  Eosinophil % 06/17/2021 1.6  0.3 - 6.2 % Final   • Basophil % 06/17/2021 0.3  0.0 - 1.5 % Final   • Immature Grans % 06/17/2021 0.5  0.0 - 0.5 % Final   • Neutrophils, Absolute 06/17/2021 8.76* 1.70 - 7.00 10*3/mm3 Final   • Lymphocytes, Absolute 06/17/2021 1.03  0.70 - 3.10 10*3/mm3 Final   • Monocytes, Absolute 06/17/2021 0.69  0.10 - 0.90 10*3/mm3 Final   • Eosinophils, Absolute 06/17/2021 0.17  0.00 - 0.40 10*3/mm3 Final   • Basophils, Absolute 06/17/2021 0.03  0.00 - 0.20 10*3/mm3 Final   • Immature Grans, Absolute 06/17/2021 0.05  0.00 - 0.05 10*3/mm3 Final   • nRBC 06/17/2021 0.0  0.0 - 0.2 /100 WBC Final   • Ethanol 06/17/2021 <10  0 - 10 mg/dL Final   • Ethanol % 06/17/2021 <0.010  % Final   • Color, UA 06/17/2021 Yellow  Yellow, Straw Final   • Appearance, UA 06/17/2021 Clear  Clear Final   • pH, UA 06/17/2021 5.5  5.0 - 8.0 Final   • Specific Gravity, UA 06/17/2021 >1.030* 1.005 - 1.030 Final   • Glucose, UA 06/17/2021 Negative  Negative Final   • Ketones, UA 06/17/2021 Negative  Negative Final   • Bilirubin, UA 06/17/2021 Negative  Negative Final   • Blood, UA 06/17/2021 Negative  Negative Final   • Protein, UA 06/17/2021 Negative  Negative Final   • Leuk Esterase, UA 06/17/2021 Negative  Negative Final   • Nitrite, UA 06/17/2021 Negative  Negative Final   • Urobilinogen, UA 06/17/2021 1.0 E.U./dL  0.2 - 1.0 E.U./dL Final   • Amphetamine Screen, Urine 06/17/2021 Negative  Negative Final   • Barbiturates Screen, Urine 06/17/2021 Negative  Negative Final   • Benzodiazepine Screen, Urine 06/17/2021 Negative  Negative Final   • Cocaine Screen, Urine 06/17/2021 Negative  Negative Final   • Methadone Screen, Urine 06/17/2021 Negative  Negative Final   • Opiate Screen 06/17/2021 Negative  Negative Final   • Phencyclidine (PCP), Urine 06/17/2021 Negative  Negative Final   • THC, Screen, Urine 06/17/2021 Negative  Negative Final   • 6-ACETYL MORPHINE 06/17/2021 Negative  Negative Final   • Buprenorphine, Screen,  Urine 06/17/2021 Positive* Negative Final   • Oxycodone Screen, Urine 06/17/2021 Negative  Negative Final   • COVID19 06/17/2021 Not Detected  Not Detected - Ref. Range Final   • Influenza A PCR 06/17/2021 Not Detected  Not Detected Final   • Influenza B PCR 06/17/2021 Not Detected  Not Detected Final   • Hemoglobin A1C 06/17/2021 5.50  4.80 - 5.60 % Final   • TSH 06/17/2021 2.360  0.270 - 4.200 uIU/mL Final   • QT Interval 06/18/2021 456  ms Final   • QTC Interval 06/18/2021 436  ms Final   • Hepatitis C Quantitation 06/18/2021 HCV Not Detected  IU/mL Final   • Test Information 06/18/2021 Comment   Final    The quantitative range of this assay is 15 IU/mL to 100 million IU/mL.   • Please note 06/18/2021 Comment   Final    This test was developed and its performance characteristics determined  by [employer].  It has not been cleared or approved by the U.[place] Food and  Drug Administration.  The FDA has determined that such clearance or approval is not  necessary. This test is used for clinical purposes.  It should not be  regarded as investigational or for research.   • Hepatitis C Genotype 06/18/2021 Comment   Final    Specimen has insufficient hepatitis C virus RNA to obtain genotyping  results. This genotyping assay should only be used for known HCV  positive patients with HCV RNA levels above 1000 IU/mL.   • Glucose 06/18/2021 121* 65 - 99 mg/dL Final   • BUN 06/18/2021 5* 6 - 20 mg/dL Final   • Creatinine 06/18/2021 0.55* 0.76 - 1.27 mg/dL Final   • Sodium 06/18/2021 138  136 - 145 mmol/L Final   • Potassium 06/18/2021 3.5  3.5 - 5.2 mmol/L Final    Slight hemolysis detected by analyzer. Results may be affected.   • Chloride 06/18/2021 103  98 - 107 mmol/L Final   • CO2 06/18/2021 22.1  22.0 - 29.0 mmol/L Final   • Calcium 06/18/2021 9.1  8.6 - 10.5 mg/dL Final   • Total Protein 06/18/2021 6.7  6.0 - 8.5 g/dL Final   • Albumin 06/18/2021 3.46* 3.50 - 5.20 g/dL Final   • ALT (SGPT) 06/18/2021 77* 1 - 41 U/L Final   •  AST (SGOT) 06/18/2021 100* 1 - 40 U/L Final   • Alkaline Phosphatase 06/18/2021 237* 39 - 117 U/L Final   • Total Bilirubin 06/18/2021 1.5* 0.0 - 1.2 mg/dL Final   • eGFR Non African Amer 06/18/2021 >150  >60 mL/min/1.73 Final   • Globulin 06/18/2021 3.2  gm/dL Final   • A/G Ratio 06/18/2021 1.1  g/dL Final   • BUN/Creatinine Ratio 06/18/2021 9.1  7.0 - 25.0 Final   • Anion Gap 06/18/2021 12.9  5.0 - 15.0 mmol/L Final   • Protime 06/18/2021 13.4  12.8 - 14.5 Seconds Final    Note new Reference Range   • INR 06/18/2021 0.98  0.90 - 1.10 Final   • PTT 06/18/2021 28.0  25.5 - 35.4 seconds Final    Note new Reference Range   • Phosphorus 06/18/2021 2.8  2.5 - 4.5 mg/dL Final   • Magnesium 06/18/2021 1.5* 1.6 - 2.6 mg/dL Final   • WBC 06/18/2021 11.20* 3.40 - 10.80 10*3/mm3 Final   • RBC 06/18/2021 5.03  4.14 - 5.80 10*6/mm3 Final   • Hemoglobin 06/18/2021 16.2  13.0 - 17.7 g/dL Final   • Hematocrit 06/18/2021 48.1  37.5 - 51.0 % Final   • MCV 06/18/2021 95.6  79.0 - 97.0 fL Final   • MCH 06/18/2021 32.2  26.6 - 33.0 pg Final   • MCHC 06/18/2021 33.7  31.5 - 35.7 g/dL Final   • RDW 06/18/2021 12.2* 12.3 - 15.4 % Final   • RDW-SD 06/18/2021 42.6  37.0 - 54.0 fl Final   • MPV 06/18/2021 10.5  6.0 - 12.0 fL Final   • Platelets 06/18/2021 141  140 - 450 10*3/mm3 Final   • Vitamin B1, Whole Blood 06/18/2021 144.6  66.5 - 200.0 nmol/L Final   • Vitamin B-12 06/18/2021 352  211 - 946 pg/mL Final   • Folate 06/18/2021 2.31* 4.78 - 24.20 ng/mL Final   • Total Cholesterol 06/18/2021 183  0 - 200 mg/dL Final   • Triglycerides 06/18/2021 128  0 - 150 mg/dL Final   • HDL Cholesterol 06/18/2021 61* 40 - 60 mg/dL Final   • LDL Cholesterol  06/18/2021 100  0 - 100 mg/dL Final   • VLDL Cholesterol 06/18/2021 22  5 - 40 mg/dL Final   • LDL/HDL Ratio 06/18/2021 1.58   Final   • Magnesium 06/19/2021 1.9  1.6 - 2.6 mg/dL Final   • Glucose 06/19/2021 102* 65 - 99 mg/dL Final   • BUN 06/19/2021 4* 6 - 20 mg/dL Final   • Creatinine 06/19/2021  0.52* 0.76 - 1.27 mg/dL Final   • Sodium 06/19/2021 132* 136 - 145 mmol/L Final   • Potassium 06/19/2021 3.9  3.5 - 5.2 mmol/L Final    Slight hemolysis detected by analyzer. Results may be affected.   • Chloride 06/19/2021 98  98 - 107 mmol/L Final   • CO2 06/19/2021 22.0  22.0 - 29.0 mmol/L Final   • Calcium 06/19/2021 8.9  8.6 - 10.5 mg/dL Final   • Total Protein 06/19/2021 6.8  6.0 - 8.5 g/dL Final   • Albumin 06/19/2021 3.16* 3.50 - 5.20 g/dL Final   • ALT (SGPT) 06/19/2021 49* 1 - 41 U/L Final   • AST (SGOT) 06/19/2021 68* 1 - 40 U/L Final   • Alkaline Phosphatase 06/19/2021 233* 39 - 117 U/L Final   • Total Bilirubin 06/19/2021 1.2  0.0 - 1.2 mg/dL Final   • eGFR Non African Amer 06/19/2021 >150  >60 mL/min/1.73 Final   • Globulin 06/19/2021 3.6  gm/dL Final   • A/G Ratio 06/19/2021 0.9  g/dL Final   • BUN/Creatinine Ratio 06/19/2021 7.7  7.0 - 25.0 Final   • Anion Gap 06/19/2021 12.0  5.0 - 15.0 mmol/L Final         Assessment/Plan   Diagnoses and all orders for this visit:    1. Opioid type dependence, continuous (HCC) (Primary)  -     buprenorphine-naloxone (SUBOXONE) 8-2 MG per SL tablet; Place 2 tablets under the tongue Daily.  Dispense: 14 tablet; Refill: 0    2. Medication management  -     [facility] Drug Screen    3. Depression, unspecified depression type  -     sertraline (Zoloft) 25 MG tablet; Take 1 tablet by mouth Daily for 30 days.  Dispense: 30 tablet; Refill: 3    4. Generalized anxiety disorder  -     sertraline (Zoloft) 25 MG tablet; Take 1 tablet by mouth Daily for 30 days.  Dispense: 30 tablet; Refill: 3    Other orders  -     SCANNED - LABS  -     SCANNED - LABS        Visit Diagnoses:    ICD-10-CM ICD-9-CM   1. Medication management  Z79.899 V58.69       PLAN:  1. Safety: No acute safety concerns  2. Risk Assessment: Risk of self-harm acutely is low. Risk of self-harm chronically is also low, but could be further elevated in the event of treatment noncompliance and/or AODA.    TREATMENT  PLAN/GOALS: Continue supportive psychotherapy efforts and medications as indicated. Treatment and medication options discussed during today's visit. Patient acknowledged and verbally consented to continue with current treatment plan and was educated on the importance of compliance with treatment and follow-up appointments.    MEDICATION ISSUES:  ROMAN reviewed as expected.  Discussed medication options and treatment plan of prescribed medication as well as the risks, benefits, and side effects including potential falls, possible impaired driving and metabolic adversities among others. Patient is agreeable to call the office with any worsening of symptoms or onset of side effects. Patient is agreeable to call 911 or go to the nearest ER should he/she begin having SI/HI. No medication side effects or related complaints today.     MEDS ORDERED DURING VISIT:  No orders of the defined types were placed in this encounter.      No follow-ups on file.           This document has been electronically signed by RENAN De Dios  November 3, 2021 14:01 EDT      Part of this note may be an electronic transcription/translation of spoken language to printed text using the Dragon Dictation System.

## 2021-11-10 ENCOUNTER — APPOINTMENT (OUTPATIENT)
Dept: CT IMAGING | Facility: HOSPITAL | Age: 31
End: 2021-11-10

## 2021-11-10 ENCOUNTER — OFFICE VISIT (OUTPATIENT)
Dept: PSYCHIATRY | Facility: CLINIC | Age: 31
End: 2021-11-10

## 2021-11-10 ENCOUNTER — HOSPITAL ENCOUNTER (EMERGENCY)
Facility: HOSPITAL | Age: 31
Discharge: HOME | End: 2021-11-11
Admitting: STUDENT IN AN ORGANIZED HEALTH CARE EDUCATION/TRAINING PROGRAM

## 2021-11-10 ENCOUNTER — APPOINTMENT (OUTPATIENT)
Dept: GENERAL RADIOLOGY | Facility: HOSPITAL | Age: 31
End: 2021-11-10

## 2021-11-10 VITALS
BODY MASS INDEX: 23.49 KG/M2 | HEART RATE: 102 BPM | TEMPERATURE: 97.6 F | SYSTOLIC BLOOD PRESSURE: 162 MMHG | OXYGEN SATURATION: 99 % | WEIGHT: 203 LBS | HEIGHT: 78 IN | DIASTOLIC BLOOD PRESSURE: 92 MMHG

## 2021-11-10 DIAGNOSIS — F11.20 OPIOID TYPE DEPENDENCE, CONTINUOUS (HCC): Primary | ICD-10-CM

## 2021-11-10 DIAGNOSIS — S01.311A COMPLEX LACERATION OF RIGHT EAR, INITIAL ENCOUNTER: ICD-10-CM

## 2021-11-10 DIAGNOSIS — S09.90XA INJURY OF HEAD, INITIAL ENCOUNTER: ICD-10-CM

## 2021-11-10 DIAGNOSIS — M89.8X1 PAIN OF RIGHT CLAVICLE: ICD-10-CM

## 2021-11-10 DIAGNOSIS — F32.A DEPRESSION, UNSPECIFIED DEPRESSION TYPE: ICD-10-CM

## 2021-11-10 DIAGNOSIS — F41.1 GENERALIZED ANXIETY DISORDER: ICD-10-CM

## 2021-11-10 DIAGNOSIS — M25.519 SHOULDER PAIN, UNSPECIFIED CHRONICITY, UNSPECIFIED LATERALITY: ICD-10-CM
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
## 2021-11-10 DIAGNOSIS — Z79.899 MEDICATION MANAGEMENT: ICD-10-CM

## 2021-11-10 DIAGNOSIS — T07.XXXA MULTIPLE ABRASIONS: ICD-10-CM

## 2021-11-10 LAB
AMPHETAMINE CUT-OFF: ABNORMAL
BENZODIAZIPINE CUT-OFF: ABNORMAL
BUPRENORPHINE CUT-OFF: ABNORMAL
COCAINE CUT-OFF: ABNORMAL
ETHANOL BLD-MCNC: 234 MG/DL (ref 0–10)
ETHANOL UR QL: 0.23 %
EXTERNAL AMPHETAMINE SCREEN URINE: NEGATIVE
EXTERNAL BENZODIAZEPINE SCREEN URINE: NEGATIVE
EXTERNAL BUPRENORPHINE SCREEN URINE: POSITIVE
EXTERNAL COCAINE SCREEN URINE: NEGATIVE
EXTERNAL MDMA: NEGATIVE
EXTERNAL METHADONE SCREEN URINE: NEGATIVE
EXTERNAL METHAMPHETAMINE SCREEN URINE: NEGATIVE
EXTERNAL OPIATES SCREEN URINE: NEGATIVE
EXTERNAL OXYCODONE SCREEN URINE: NEGATIVE
EXTERNAL THC SCREEN URINE: NEGATIVE
MDMA CUT-OFF: ABNORMAL
METHADONE CUT-OFF: ABNORMAL
METHAMPHETAMINE CUT-OFF: ABNORMAL
OPIATES CUT-OFF: ABNORMAL
OXYCODONE CUT-OFF: ABNORMAL
THC CUT-OFF: ABNORMAL

## 2021-11-10 PROCEDURE — 70450 CT HEAD/BRAIN W/O DYE: CPT

## 2021-11-10 PROCEDURE — 82077 ASSAY SPEC XCP UR&BREATH IA: CPT | Performed by: PHYSICIAN ASSISTANT

## 2021-11-10 PROCEDURE — 25010000002 MORPHINE PER 10 MG: Performed by: STUDENT IN AN ORGANIZED HEALTH CARE EDUCATION/TRAINING PROGRAM

## 2021-11-10 PROCEDURE — 25010000002 CEFTRIAXONE PER 250 MG: Performed by: PHYSICIAN ASSISTANT

## 2021-11-10 PROCEDURE — 72128 CT CHEST SPINE W/O DYE: CPT

## 2021-11-10 PROCEDURE — 96372 THER/PROPH/DIAG INJ SC/IM: CPT

## 2021-11-10 PROCEDURE — 72125 CT NECK SPINE W/O DYE: CPT

## 2021-11-10 PROCEDURE — 25010000002 TETANUS-DIPHTH-ACELL PERTUSSIS 5-2.5-18.5 LF-MCG/0.5 SUSPENSION PREFILLED SYRINGE: Performed by: PHYSICIAN ASSISTANT

## 2021-11-10 PROCEDURE — 90471 IMMUNIZATION ADMIN: CPT | Performed by: PHYSICIAN ASSISTANT

## 2021-11-10 PROCEDURE — 36415 COLL VENOUS BLD VENIPUNCTURE: CPT

## 2021-11-10 PROCEDURE — 73000 X-RAY EXAM OF COLLAR BONE: CPT

## 2021-11-10 PROCEDURE — 90715 TDAP VACCINE 7 YRS/> IM: CPT | Performed by: PHYSICIAN ASSISTANT

## 2021-11-10 PROCEDURE — 99283 EMERGENCY DEPT VISIT LOW MDM: CPT

## 2021-11-10 PROCEDURE — 99214 OFFICE O/P EST MOD 30 MIN: CPT | Performed by: NURSE PRACTITIONER

## 2021-11-10 PROCEDURE — 72131 CT LUMBAR SPINE W/O DYE: CPT

## 2021-11-10 RX ORDER — SERTRALINE HYDROCHLORIDE 25 MG/1
25 TABLET, FILM COATED ORAL DAILY
Qty: 30 TABLET | Refills: 3 | Status: SHIPPED | OUTPATIENT
Start: 2021-11-10 | End: 2022-02-24

## 2021-11-10 RX ORDER — BUPRENORPHINE HYDROCHLORIDE AND NALOXONE HYDROCHLORIDE DIHYDRATE 8; 2 MG/1; MG/1
2 TABLET SUBLINGUAL DAILY
Qty: 14 TABLET | Refills: 0 | Status: SHIPPED | OUTPATIENT
Start: 2021-11-10 | End: 2021-11-17 | Stop reason: SDUPTHER

## 2021-11-10 RX ORDER — CEPHALEXIN 500 MG/1
500 CAPSULE ORAL 3 TIMES DAILY
Qty: 30 CAPSULE | Refills: 0 | Status: SHIPPED | OUTPATIENT
Start: 2021-11-10 | End: 2023-02-13

## 2021-11-10 RX ORDER — IBUPROFEN 600 MG/1
600 TABLET ORAL EVERY 6 HOURS PRN
Qty: 30 TABLET | Refills: 2 | Status: SHIPPED | OUTPATIENT
Start: 2021-11-10 | End: 2021-11-10 | Stop reason: SDUPTHER

## 2021-11-10 RX ORDER — LIDOCAINE HYDROCHLORIDE 10 MG/ML
10 INJECTION, SOLUTION EPIDURAL; INFILTRATION; INTRACAUDAL; PERINEURAL ONCE
Status: COMPLETED | OUTPATIENT
Start: 2021-11-10 | End: 2021-11-10

## 2021-11-10 RX ORDER — ORPHENADRINE CITRATE 100 MG/1
100 TABLET, EXTENDED RELEASE ORAL 2 TIMES DAILY PRN
Qty: 14 TABLET | Refills: 0 | Status: SHIPPED | OUTPATIENT
Start: 2021-11-10 | End: 2023-02-13

## 2021-11-10 RX ORDER — IBUPROFEN 800 MG/1
800 TABLET ORAL EVERY 8 HOURS PRN
Qty: 30 TABLET | Refills: 0 | Status: SHIPPED | OUTPATIENT
Start: 2021-11-10 | End: 2021-11-17

## 2021-11-10 RX ORDER — LIDOCAINE HYDROCHLORIDE AND EPINEPHRINE BITARTRATE 20; .01 MG/ML; MG/ML
10 INJECTION, SOLUTION SUBCUTANEOUS ONCE
Status: COMPLETED | OUTPATIENT
Start: 2021-11-10 | End: 2021-11-10

## 2021-11-10 RX ADMIN — MORPHINE SULFATE 4 MG: 4 INJECTION INTRAVENOUS at 23:37

## 2021-11-10 RX ADMIN — LIDOCAINE HYDROCHLORIDE 10 ML: 10 INJECTION, SOLUTION EPIDURAL; INFILTRATION; INTRACAUDAL; PERINEURAL at 22:03

## 2021-11-10 RX ADMIN — LIDOCAINE HYDROCHLORIDE,EPINEPHRINE BITARTRATE 10 ML: 20; .01 INJECTION, SOLUTION INFILTRATION; PERINEURAL at 22:45

## 2021-11-10 RX ADMIN — TETANUS TOXOID, REDUCED DIPHTHERIA TOXOID AND ACELLULAR PERTUSSIS VACCINE, ADSORBED 0.5 ML: 5; 2.5; 8; 8; 2.5 SUSPENSION INTRAMUSCULAR at 21:38

## 2021-11-10 RX ADMIN — LIDOCAINE HYDROCHLORIDE 1 G: 10 INJECTION, SOLUTION EPIDURAL; INFILTRATION; INTRACAUDAL; PERINEURAL at 23:54

## 2021-11-10 NOTE — PROGRESS NOTES
This provider is located at [facility]. The Patient is seen remotely using Video. Patient is being seen via telehealth and confirm that they are in a secure environment for this session. The patient's condition being diagnosed/treated is appropriate for telemedicine. Provider identified as Daniel Olivas as well as credentials APRN ADRIENNE ANDERSONP-JUSTIN PINEDA.   The client/patient gave consent to be seen remotely, and when consent is given they understand that the consent allows for patient identifiable information to be sent to a third party as needed.   They may refuse to be seen remotely at any time. The electronic data is encrypted and password protected, and the patient has been advised of the potential risks to privacy not withstanding such measures.    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  Patient reports continued alcohol use having a couple beers last night; reiterated need to cease this behavior due to dangerous risk of CNS oversedation.  Patient doing well on Zoloft for anxiety and depression, no SI-we will continue    Triggers (Persons/Places/Things/Events/Thought/Emotions): Anxiety and depression    Cravings: Denies cravings for opiates    Relapse Prevention: Counseling    Urine Drug Screen (today's visit) discussed: Positive buprenorphine, otherwise negative for substances tested    UDS Confirmation: Positive buprenorphine/nor-buprenorphine, no concerns for urine tampering, ethyl glucuronide and gabapentin    ROMAN (PDMP) Reviewed for Current/Active Medications: buprenorphine/naloxone as reviewed today    Past Surgical History:  Past Surgical History:   Procedure  Laterality Date   • NO PAST SURGERIES         Problem List:  Patient Active Problem List   Diagnosis   • Alcohol-induced acute pancreatitis   • Pancreatitis       Allergy:   No Known Allergies     Current Medications:   Current Outpatient Medications   Medication Sig Dispense Refill   • buprenorphine-naloxone (SUBOXONE) 8-2 MG per SL tablet Place 2 tablets under the tongue Daily. 14 tablet 0   • cloNIDine (Catapres) 0.1 MG tablet Take 1 tablet by mouth 2 (Two) Times a Day. Take as needed for anxiety.  Insomnia.  Chills or sweats 60 tablet 2   • hydrOXYzine pamoate (Vistaril) 25 MG capsule Take 1 capsule by mouth 4 (Four) Times a Day As Needed for Anxiety (and/or insomia). 60 capsule 1   • naloxone (NARCAN) 4 MG/0.1ML nasal spray 1 spray into the nostril(s) as directed by provider As Needed (Opiate oversedation). Spray in 1 nostril every 2 to 3 minutes call 911 2 each 2   • sertraline (Zoloft) 25 MG tablet Take 1 tablet by mouth Daily for 30 days. 30 tablet 3     No current facility-administered medications for this visit.       Past Medical History:  Past Medical History:   Diagnosis Date   • Alcohol-induced acute pancreatitis 6/27/2019   • Drug use     Amphetamines and Suboxone   • Fatty liver    • Hepatitis C antibody test positive 2019   • Medical non-compliance    • Smoker          Social History     Socioeconomic History   • Marital status: Single   Tobacco Use   • Smoking status: Current Every Day Smoker     Packs/day: 0.50     Types: Cigarettes   • Smokeless tobacco: Current User   Vaping Use   • Vaping Use: Never used   Substance and Sexual Activity   • Alcohol use: Yes     Alcohol/week: 8.0 standard drinks     Types: 8 Shots of liquor per week     Comment: states occasional use, last use was yesterday, drank couple mixed drink   • Drug use: Yes     Types: Amphetamines     Comment: suboxone as well   • Sexual activity: Defer         Family History   Problem Relation Age of Onset   • Cancer Maternal  Grandmother          Mental Status Exam:   Hygiene:   good  Cooperation:  Cooperative  Eye Contact:  Fair  Psychomotor Behavior:  Appropriate  Affect:  Appropriate  Mood: normal  Speech:  Normal  Thought Process:  Goal directed  Thought Content:  Normal  Suicidal:  None  Homicidal:  None  Hallucinations:  None  Delusion:  None  Memory:  Intact  Orientation:  Grossly intact  Reliability:  good  Insight:  Fair  Judgement:  Fair  Impulse Control:  Fair         Review of Systems:  Review of Systems   Constitutional: Negative for activity change, chills, diaphoresis and fatigue.   Respiratory: Negative for apnea, cough and shortness of breath.    Cardiovascular: Negative for chest pain, palpitations and leg swelling.   Gastrointestinal: Negative for abdominal pain, constipation, diarrhea, nausea and vomiting.   Genitourinary: Negative for difficulty urinating.   Musculoskeletal: Positive for arthralgias.   Skin: Negative for rash.   Neurological: Negative for dizziness, weakness and headaches.   Psychiatric/Behavioral: Negative for agitation, self-injury, sleep disturbance and suicidal ideas. The patient is nervous/anxious.          Physical Exam:  Physical Exam  Vitals reviewed.   Constitutional:       General: He is not in acute distress.     Appearance: Normal appearance. He is not ill-appearing or toxic-appearing.   Pulmonary:      Effort: Pulmonary effort is normal.   Musculoskeletal:         General: Normal range of motion.   Neurological:      General: No focal deficit present.      Mental Status: He is alert and oriented to person, place, and time.   Psychiatric:         Attention and Perception: Attention and perception normal.         Mood and Affect: Mood normal. Mood is not anxious or depressed.         Speech: Speech normal.         Behavior: Behavior normal. Behavior is cooperative.         Thought Content: Thought content normal.         Cognition and Memory: Cognition and memory normal.         Judgment:  "Judgment normal.         Vital Signs:   /92   Pulse 102   Temp 97.6 °F (36.4 °C)   Ht 198.1 cm (77.99\")   Wt 92.1 kg (203 lb)   SpO2 99%   BMI 23.46 kg/m²      Lab Results:   Office Visit on 11/03/2021   Component Date Value Ref Range Status   • External Amphetamine Screen Urine 11/03/2021 Negative   Final   • Amphetamine Cut-Off 11/03/2021 1000NG/ML   Final   • External Benzodiazepine Screen Uri* 11/03/2021 Negative   Final   • Benzodiazipine Cut-Off 11/03/2021 300NG/ML   Final   • External Cocaine Screen Urine 11/03/2021 Negative   Final   • Cocaine Cut-Off 11/03/2021 300NG/ML   Final   • External THC Screen Urine 11/03/2021 Negative   Final   • THC Cut-Off 11/03/2021 50NG/ML   Final   • External Methadone Screen Urine 11/03/2021 Negative   Final   • Methadone Cut-Off 11/03/2021 300NG/ML   Final   • External Methamphetamine Screen Ur* 11/03/2021 Negative   Final   • Methamphetamine Cut-Off 11/03/2021 1000NG/ML   Final   • External Oxycodone Screen Urine 11/03/2021 Negative   Final   • Oxycodone Cut-Off 11/03/2021 100NG/ML   Final   • External Buprenorphine Screen Urine 11/03/2021 Positive   Final   • Buprenorphine Cut-Off 11/03/2021 10NG/ML   Final   • External MDMA 11/03/2021 Negative   Final   • MDMA Cut-Off 11/03/2021 500NG/ML   Final   • External Opiates Screen Urine 11/03/2021 Negative   Final   • Opiates Cut-Off 11/03/2021 300NG/ML   Final   Office Visit on 10/20/2021   Component Date Value Ref Range Status   • External Amphetamine Screen Urine 10/20/2021 Negative   Final   • Amphetamine Cut-Off 10/20/2021 1000ng/ml   Final   • External Benzodiazepine Screen Uri* 10/20/2021 Negative   Final   • Benzodiazipine Cut-Off 10/20/2021 300ng/ml   Final   • External Cocaine Screen Urine 10/20/2021 Negative   Final   • Cocaine Cut-Off 10/20/2021 300ng/ml   Final   • External THC Screen Urine 10/20/2021 Negative   Final   • THC Cut-Off 10/20/2021 50ng/ml   Final   • External Methadone Screen Urine " 10/20/2021 Negative   Final   • Methadone Cut-Off 10/20/2021 300ng/ml   Final   • External Methamphetamine Screen Ur* 10/20/2021 Negative   Final   • Methamphetamine Cut-Off 10/20/2021 1000ng/ml   Final   • External Oxycodone Screen Urine 10/20/2021 Negative   Final   • Oxycodone Cut-Off 10/20/2021 100ng/ml   Final   • External Buprenorphine Screen Urine 10/20/2021 Positive   Final   • Buprenorphine Cut-Off 10/20/2021 10ng/ml   Final   • External MDMA 10/20/2021 Negative   Final   • MDMA Cut-Off 10/20/2021 500ng/ml   Final   • External Opiates Screen Urine 10/20/2021 Negative   Final   • Opiates Cut-Off 10/20/2021 300ng/ml   Final   Office Visit on 10/12/2021   Component Date Value Ref Range Status   • External Amphetamine Screen Urine 10/12/2021 Negative   Final   • Amphetamine Cut-Off 10/12/2021 1000NG/ML   Final   • External Benzodiazepine Screen Uri* 10/12/2021 Negative   Final   • Benzodiazipine Cut-Off 10/12/2021 300NG/ML   Final   • External Cocaine Screen Urine 10/12/2021 Negative   Final   • Cocaine Cut-Off 10/12/2021 300NG/ML   Final   • External THC Screen Urine 10/12/2021 Negative   Final   • THC Cut-Off 10/12/2021 50NG/ML   Final   • External Methadone Screen Urine 10/12/2021 Negative   Final   • Methadone Cut-Off 10/12/2021 300NG/ML   Final   • External Methamphetamine Screen Ur* 10/12/2021 Negative   Final   • Methamphetamine Cut-Off 10/12/2021 1000NG/ML   Final   • External Oxycodone Screen Urine 10/12/2021 Negative   Final   • Oxycodone Cut-Off 10/12/2021 100NG/ML   Final   • External Buprenorphine Screen Urine 10/12/2021 Positive   Final   • Buprenorphine Cut-Off 10/12/2021 10NG/ML   Final   • External MDMA 10/12/2021 Negative   Final   • MDMA Cut-Off 10/12/2021 500NG/ML   Final   • External Opiates Screen Urine 10/12/2021 Negative   Final   • Opiates Cut-Off 10/12/2021 300NG/ML   Final   Admission on 06/17/2021, Discharged on 06/19/2021   Component Date Value Ref Range Status   • Glucose  06/17/2021 141* 65 - 99 mg/dL Final   • BUN 06/17/2021 5* 6 - 20 mg/dL Final   • Creatinine 06/17/2021 0.57* 0.76 - 1.27 mg/dL Final   • Sodium 06/17/2021 140  136 - 145 mmol/L Final   • Potassium 06/17/2021 3.9  3.5 - 5.2 mmol/L Final    Slight hemolysis detected by analyzer. Results may be affected.   • Chloride 06/17/2021 106  98 - 107 mmol/L Final   • CO2 06/17/2021 22.9  22.0 - 29.0 mmol/L Final   • Calcium 06/17/2021 9.6  8.6 - 10.5 mg/dL Final   • Total Protein 06/17/2021 7.6  6.0 - 8.5 g/dL Final   • Albumin 06/17/2021 3.96  3.50 - 5.20 g/dL Final   • ALT (SGPT) 06/17/2021 99* 1 - 41 U/L Final   • AST (SGOT) 06/17/2021 156* 1 - 40 U/L Final   • Alkaline Phosphatase 06/17/2021 276* 39 - 117 U/L Final   • Total Bilirubin 06/17/2021 1.1  0.0 - 1.2 mg/dL Final   • eGFR Non African Amer 06/17/2021 >150  >60 mL/min/1.73 Final   • Globulin 06/17/2021 3.6  gm/dL Final   • A/G Ratio 06/17/2021 1.1  g/dL Final   • BUN/Creatinine Ratio 06/17/2021 8.8  7.0 - 25.0 Final   • Anion Gap 06/17/2021 11.1  5.0 - 15.0 mmol/L Final   • Lipase 06/17/2021 463* 13 - 60 U/L Final   • Amylase 06/17/2021 205* 28 - 100 U/L Final   • WBC 06/17/2021 10.73  3.40 - 10.80 10*3/mm3 Final   • RBC 06/17/2021 5.07  4.14 - 5.80 10*6/mm3 Final   • Hemoglobin 06/17/2021 16.3  13.0 - 17.7 g/dL Final   • Hematocrit 06/17/2021 47.7  37.5 - 51.0 % Final   • MCV 06/17/2021 94.1  79.0 - 97.0 fL Final   • MCH 06/17/2021 32.1  26.6 - 33.0 pg Final   • MCHC 06/17/2021 34.2  31.5 - 35.7 g/dL Final   • RDW 06/17/2021 12.3  12.3 - 15.4 % Final   • RDW-SD 06/17/2021 42.6  37.0 - 54.0 fl Final   • MPV 06/17/2021 10.1  6.0 - 12.0 fL Final   • Platelets 06/17/2021 168  140 - 450 10*3/mm3 Final   • Neutrophil % 06/17/2021 81.6* 42.7 - 76.0 % Final   • Lymphocyte % 06/17/2021 9.6* 19.6 - 45.3 % Final   • Monocyte % 06/17/2021 6.4  5.0 - 12.0 % Final   • Eosinophil % 06/17/2021 1.6  0.3 - 6.2 % Final   • Basophil % 06/17/2021 0.3  0.0 - 1.5 % Final   • Immature  Grans % 06/17/2021 0.5  0.0 - 0.5 % Final   • Neutrophils, Absolute 06/17/2021 8.76* 1.70 - 7.00 10*3/mm3 Final   • Lymphocytes, Absolute 06/17/2021 1.03  0.70 - 3.10 10*3/mm3 Final   • Monocytes, Absolute 06/17/2021 0.69  0.10 - 0.90 10*3/mm3 Final   • Eosinophils, Absolute 06/17/2021 0.17  0.00 - 0.40 10*3/mm3 Final   • Basophils, Absolute 06/17/2021 0.03  0.00 - 0.20 10*3/mm3 Final   • Immature Grans, Absolute 06/17/2021 0.05  0.00 - 0.05 10*3/mm3 Final   • nRBC 06/17/2021 0.0  0.0 - 0.2 /100 WBC Final   • Ethanol 06/17/2021 <10  0 - 10 mg/dL Final   • Ethanol % 06/17/2021 <0.010  % Final   • Color, UA 06/17/2021 Yellow  Yellow, Straw Final   • Appearance, UA 06/17/2021 Clear  Clear Final   • pH, UA 06/17/2021 5.5  5.0 - 8.0 Final   • Specific Gravity, UA 06/17/2021 >1.030* 1.005 - 1.030 Final   • Glucose, UA 06/17/2021 Negative  Negative Final   • Ketones, UA 06/17/2021 Negative  Negative Final   • Bilirubin, UA 06/17/2021 Negative  Negative Final   • Blood, UA 06/17/2021 Negative  Negative Final   • Protein, UA 06/17/2021 Negative  Negative Final   • Leuk Esterase, UA 06/17/2021 Negative  Negative Final   • Nitrite, UA 06/17/2021 Negative  Negative Final   • Urobilinogen, UA 06/17/2021 1.0 E.U./dL  0.2 - 1.0 E.U./dL Final   • Amphetamine Screen, Urine 06/17/2021 Negative  Negative Final   • Barbiturates Screen, Urine 06/17/2021 Negative  Negative Final   • Benzodiazepine Screen, Urine 06/17/2021 Negative  Negative Final   • Cocaine Screen, Urine 06/17/2021 Negative  Negative Final   • Methadone Screen, Urine 06/17/2021 Negative  Negative Final   • Opiate Screen 06/17/2021 Negative  Negative Final   • Phencyclidine (PCP), Urine 06/17/2021 Negative  Negative Final   • THC, Screen, Urine 06/17/2021 Negative  Negative Final   • 6-ACETYL MORPHINE 06/17/2021 Negative  Negative Final   • Buprenorphine, Screen, Urine 06/17/2021 Positive* Negative Final   • Oxycodone Screen, Urine 06/17/2021 Negative  Negative Final   •  COVID19 06/17/2021 Not Detected  Not Detected - Ref. Range Final   • Influenza A PCR 06/17/2021 Not Detected  Not Detected Final   • Influenza B PCR 06/17/2021 Not Detected  Not Detected Final   • Hemoglobin A1C 06/17/2021 5.50  4.80 - 5.60 % Final   • TSH 06/17/2021 2.360  0.270 - 4.200 uIU/mL Final   • QT Interval 06/18/2021 456  ms Final   • QTC Interval 06/18/2021 436  ms Final   • Hepatitis C Quantitation 06/18/2021 HCV Not Detected  IU/mL Final   • Test Information 06/18/2021 Comment   Final    The quantitative range of this assay is 15 IU/mL to 100 million IU/mL.   • Please note 06/18/2021 Comment   Final    This test was developed and its performance characteristics determined  by [employer].  It has not been cleared or approved by the U.[place]. Food and  Drug Administration.  The FDA has determined that such clearance or approval is not  necessary. This test is used for clinical purposes.  It should not be  regarded as investigational or for research.   • Hepatitis C Genotype 06/18/2021 Comment   Final    Specimen has insufficient hepatitis C virus RNA to obtain genotyping  results. This genotyping assay should only be used for known HCV  positive patients with HCV RNA levels above 1000 IU/mL.   • Glucose 06/18/2021 121* 65 - 99 mg/dL Final   • BUN 06/18/2021 5* 6 - 20 mg/dL Final   • Creatinine 06/18/2021 0.55* 0.76 - 1.27 mg/dL Final   • Sodium 06/18/2021 138  136 - 145 mmol/L Final   • Potassium 06/18/2021 3.5  3.5 - 5.2 mmol/L Final    Slight hemolysis detected by analyzer. Results may be affected.   • Chloride 06/18/2021 103  98 - 107 mmol/L Final   • CO2 06/18/2021 22.1  22.0 - 29.0 mmol/L Final   • Calcium 06/18/2021 9.1  8.6 - 10.5 mg/dL Final   • Total Protein 06/18/2021 6.7  6.0 - 8.5 g/dL Final   • Albumin 06/18/2021 3.46* 3.50 - 5.20 g/dL Final   • ALT (SGPT) 06/18/2021 77* 1 - 41 U/L Final   • AST (SGOT) 06/18/2021 100* 1 - 40 U/L Final   • Alkaline Phosphatase 06/18/2021 237* 39 - 117 U/L Final   •  Total Bilirubin 06/18/2021 1.5* 0.0 - 1.2 mg/dL Final   • eGFR Non African Amer 06/18/2021 >150  >60 mL/min/1.73 Final   • Globulin 06/18/2021 3.2  gm/dL Final   • A/G Ratio 06/18/2021 1.1  g/dL Final   • BUN/Creatinine Ratio 06/18/2021 9.1  7.0 - 25.0 Final   • Anion Gap 06/18/2021 12.9  5.0 - 15.0 mmol/L Final   • Protime 06/18/2021 13.4  12.8 - 14.5 Seconds Final    Note new Reference Range   • INR 06/18/2021 0.98  0.90 - 1.10 Final   • PTT 06/18/2021 28.0  25.5 - 35.4 seconds Final    Note new Reference Range   • Phosphorus 06/18/2021 2.8  2.5 - 4.5 mg/dL Final   • Magnesium 06/18/2021 1.5* 1.6 - 2.6 mg/dL Final   • WBC 06/18/2021 11.20* 3.40 - 10.80 10*3/mm3 Final   • RBC 06/18/2021 5.03  4.14 - 5.80 10*6/mm3 Final   • Hemoglobin 06/18/2021 16.2  13.0 - 17.7 g/dL Final   • Hematocrit 06/18/2021 48.1  37.5 - 51.0 % Final   • MCV 06/18/2021 95.6  79.0 - 97.0 fL Final   • MCH 06/18/2021 32.2  26.6 - 33.0 pg Final   • MCHC 06/18/2021 33.7  31.5 - 35.7 g/dL Final   • RDW 06/18/2021 12.2* 12.3 - 15.4 % Final   • RDW-SD 06/18/2021 42.6  37.0 - 54.0 fl Final   • MPV 06/18/2021 10.5  6.0 - 12.0 fL Final   • Platelets 06/18/2021 141  140 - 450 10*3/mm3 Final   • Vitamin B1, Whole Blood 06/18/2021 144.6  66.5 - 200.0 nmol/L Final   • Vitamin B-12 06/18/2021 352  211 - 946 pg/mL Final   • Folate 06/18/2021 2.31* 4.78 - 24.20 ng/mL Final   • Total Cholesterol 06/18/2021 183  0 - 200 mg/dL Final   • Triglycerides 06/18/2021 128  0 - 150 mg/dL Final   • HDL Cholesterol 06/18/2021 61* 40 - 60 mg/dL Final   • LDL Cholesterol  06/18/2021 100  0 - 100 mg/dL Final   • VLDL Cholesterol 06/18/2021 22  5 - 40 mg/dL Final   • LDL/HDL Ratio 06/18/2021 1.58   Final   • Magnesium 06/19/2021 1.9  1.6 - 2.6 mg/dL Final   • Glucose 06/19/2021 102* 65 - 99 mg/dL Final   • BUN 06/19/2021 4* 6 - 20 mg/dL Final   • Creatinine 06/19/2021 0.52* 0.76 - 1.27 mg/dL Final   • Sodium 06/19/2021 132* 136 - 145 mmol/L Final   • Potassium 06/19/2021 3.9   3.5 - 5.2 mmol/L Final    Slight hemolysis detected by analyzer. Results may be affected.   • Chloride 06/19/2021 98  98 - 107 mmol/L Final   • CO2 06/19/2021 22.0  22.0 - 29.0 mmol/L Final   • Calcium 06/19/2021 8.9  8.6 - 10.5 mg/dL Final   • Total Protein 06/19/2021 6.8  6.0 - 8.5 g/dL Final   • Albumin 06/19/2021 3.16* 3.50 - 5.20 g/dL Final   • ALT (SGPT) 06/19/2021 49* 1 - 41 U/L Final   • AST (SGOT) 06/19/2021 68* 1 - 40 U/L Final   • Alkaline Phosphatase 06/19/2021 233* 39 - 117 U/L Final   • Total Bilirubin 06/19/2021 1.2  0.0 - 1.2 mg/dL Final   • eGFR Non African Amer 06/19/2021 >150  >60 mL/min/1.73 Final   • Globulin 06/19/2021 3.6  gm/dL Final   • A/G Ratio 06/19/2021 0.9  g/dL Final   • BUN/Creatinine Ratio 06/19/2021 7.7  7.0 - 25.0 Final   • Anion Gap 06/19/2021 12.0  5.0 - 15.0 mmol/L Final         Assessment/Plan   Diagnoses and all orders for this visit:    1. Opioid type dependence, continuous (HCC) (Primary)  -     buprenorphine-naloxone (SUBOXONE) 8-2 MG per SL tablet; Place 2 tablets under the tongue Daily.  Dispense: 14 tablet; Refill: 0    2. Medication management  -     [facility] Drug Screen    3. Depression, unspecified depression type  -     sertraline (Zoloft) 25 MG tablet; Take 1 tablet by mouth Daily for 30 days.  Dispense: 30 tablet; Refill: 3    4. Generalized anxiety disorder  -     sertraline (Zoloft) 25 MG tablet; Take 1 tablet by mouth Daily for 30 days.  Dispense: 30 tablet; Refill: 3    5. Shoulder pain, unspecified chronicity, unspecified laterality  -     ibuprofen (ADVIL,MOTRIN) 600 MG tablet; Take 1 tablet by mouth Every 6 (Six) Hours As Needed for Moderate Pain .  Dispense: 30 tablet; Refill: 2        Visit Diagnoses:    ICD-10-CM ICD-9-CM   1. Medication management  Z79.899 V58.69       PLAN:  1. Safety: No acute safety concerns  2. Risk Assessment: Risk of self-harm acutely is low. Risk of self-harm chronically is also low, but could be further elevated in the event of  treatment noncompliance and/or AODA.    TREATMENT PLAN/GOALS: Continue supportive psychotherapy efforts and medications as indicated. Treatment and medication options discussed during today's visit. Patient acknowledged and verbally consented to continue with current treatment plan and was educated on the importance of compliance with treatment and follow-up appointments.    MEDICATION ISSUES:  ROMAN reviewed as expected.  Discussed medication options and treatment plan of prescribed medication as well as the risks, benefits, and side effects including potential falls, possible impaired driving and metabolic adversities among others. Patient is agreeable to call the office with any worsening of symptoms or onset of side effects. Patient is agreeable to call 911 or go to the nearest ER should he/she begin having SI/HI. No medication side effects or related complaints today.     MEDS ORDERED DURING VISIT:  No orders of the defined types were placed in this encounter.      No follow-ups on file.           This document has been electronically signed by RENAN De Dios  November 10, 2021 13:15 EST      Part of this note may be an electronic transcription/translation of spoken language to printed text using the Dragon Dictation System.

## 2021-11-11 VITALS
WEIGHT: 203 LBS | OXYGEN SATURATION: 98 % | TEMPERATURE: 97.5 F | HEIGHT: 78 IN | SYSTOLIC BLOOD PRESSURE: 135 MMHG | DIASTOLIC BLOOD PRESSURE: 98 MMHG | BODY MASS INDEX: 23.49 KG/M2 | HEART RATE: 70 BPM | RESPIRATION RATE: 18 BRPM

## 2021-11-11 NOTE — ED NOTES
Patient has multiple injuries identified as follows:  Abrasion to right forehead extending into right side of scalp  Approx 2 cm laceration to right ear at junction of attachment to scalp  Approx 5 cm diameter abrasion from pavement to right shoulder  Obvious deformity of right clavicle  Right knee abrasion  Left shoulder abrasion  Left wrist abrasions x 3  Left knee reddened     Catie Perry, MEDARDO  11/10/21 [id number]

## 2021-11-11 NOTE — ED NOTES
Wound to right ear irrigated with 500 ml of saline with Hibiclens. No bleeding noted after irrigation. Clean 4x4's placed over wound. Pt linens changed and clean absorbant pad placed behind pt head      Scott Ng  11/10/21 [id number]

## 2021-11-11 NOTE — ED NOTES
All wounds cleaned at this time with saline and hibiclens     Spray, MEDARDO Stark  11/10/21 [id number]

## 2021-11-11 NOTE — ED NOTES
Patients laceration behind right ear began bleeding after wound cleaned, merly placed behind patient at this time, provider made aware     Lincoln Packer RN  11/10/21 [id number]
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
## 2021-11-15 ENCOUNTER — TRANSCRIBE ORDERS (OUTPATIENT)
Dept: ADMINISTRATIVE | Facility: HOSPITAL | Age: 31
End: 2021-11-15

## 2021-11-15 ENCOUNTER — HOSPITAL ENCOUNTER (OUTPATIENT)
Dept: GENERAL RADIOLOGY | Facility: HOSPITAL | Age: 31
Discharge: HOME | End: 2021-11-15
Admitting: NURSE PRACTITIONER

## 2021-11-15 DIAGNOSIS — M25.569 KNEE PAIN, UNSPECIFIED CHRONICITY, UNSPECIFIED LATERALITY: ICD-10-CM

## 2021-11-15 DIAGNOSIS — M25.569 KNEE PAIN, UNSPECIFIED CHRONICITY, UNSPECIFIED LATERALITY: Primary | ICD-10-CM

## 2021-11-15 PROCEDURE — 73564 X-RAY EXAM KNEE 4 OR MORE: CPT | Performed by: RADIOLOGY

## 2021-11-15 PROCEDURE — 73564 X-RAY EXAM KNEE 4 OR MORE: CPT

## 2021-11-17 ENCOUNTER — TELEPHONE (OUTPATIENT)
Dept: PSYCHIATRY | Facility: CLINIC | Age: 31
End: 2021-11-17

## 2021-11-17 ENCOUNTER — OFFICE VISIT (OUTPATIENT)
Dept: PSYCHIATRY | Facility: CLINIC | Age: 31
End: 2021-11-17

## 2021-11-17 VITALS
BODY MASS INDEX: 24.18 KG/M2 | TEMPERATURE: 98.3 F | OXYGEN SATURATION: 98 % | HEIGHT: 78 IN | WEIGHT: 209 LBS | SYSTOLIC BLOOD PRESSURE: 140 MMHG | DIASTOLIC BLOOD PRESSURE: 98 MMHG | HEART RATE: 65 BPM

## 2021-11-17 DIAGNOSIS — F10.10 ALCOHOL ABUSE: ICD-10-CM

## 2021-11-17 DIAGNOSIS — Z79.899 MEDICATION MANAGEMENT: ICD-10-CM

## 2021-11-17 DIAGNOSIS — M25.50 ARTHRALGIA, UNSPECIFIED JOINT: ICD-10-CM

## 2021-11-17 DIAGNOSIS — F11.20 OPIOID TYPE DEPENDENCE, CONTINUOUS (HCC): Primary | ICD-10-CM

## 2021-11-17 PROCEDURE — 99214 OFFICE O/P EST MOD 30 MIN: CPT | Performed by: NURSE PRACTITIONER

## 2021-11-17 RX ORDER — BUPRENORPHINE HYDROCHLORIDE AND NALOXONE HYDROCHLORIDE DIHYDRATE 8; 2 MG/1; MG/1
2 TABLET SUBLINGUAL DAILY
Qty: 14 TABLET | Refills: 0 | Status: SHIPPED | OUTPATIENT
Start: 2021-11-17 | End: 2021-11-24 | Stop reason: SDUPTHER

## 2021-11-17 RX ORDER — IBUPROFEN 800 MG/1
800 TABLET ORAL EVERY 8 HOURS PRN
Qty: 30 TABLET | Refills: 0 | Status: SHIPPED | OUTPATIENT
Start: 2021-11-17 | End: 2021-12-01 | Stop reason: SDUPTHER
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
## 2021-11-24 ENCOUNTER — OFFICE VISIT (OUTPATIENT)
Dept: PSYCHIATRY | Facility: CLINIC | Age: 31
End: 2021-11-24

## 2021-11-24 VITALS
SYSTOLIC BLOOD PRESSURE: 170 MMHG | HEIGHT: 78 IN | WEIGHT: 199 LBS | OXYGEN SATURATION: 98 % | HEART RATE: 75 BPM | TEMPERATURE: 97.5 F | DIASTOLIC BLOOD PRESSURE: 112 MMHG | BODY MASS INDEX: 23.02 KG/M2

## 2021-11-24 DIAGNOSIS — F10.10 ALCOHOL ABUSE: ICD-10-CM

## 2021-11-24 DIAGNOSIS — F11.20 OPIOID TYPE DEPENDENCE, CONTINUOUS (HCC): Primary | ICD-10-CM

## 2021-11-24 DIAGNOSIS — Z79.899 MEDICATION MANAGEMENT: ICD-10-CM

## 2021-11-24 PROCEDURE — 99214 OFFICE O/P EST MOD 30 MIN: CPT | Performed by: NURSE PRACTITIONER

## 2021-11-24 RX ORDER — BUPRENORPHINE HYDROCHLORIDE AND NALOXONE HYDROCHLORIDE DIHYDRATE 8; 2 MG/1; MG/1
2 TABLET SUBLINGUAL DAILY
Qty: 14 TABLET | Refills: 0 | Status: SHIPPED | OUTPATIENT
Start: 2021-11-24 | End: 2021-12-01 | Stop reason: SDUPTHER

## 2021-11-24 NOTE — PROGRESS NOTES
This provider is located at [facility]. The Patient is seen remotely using Video. Patient is being seen via telehealth and confirm that they are in a secure environment for this session. The patient's condition being diagnosed/treated is appropriate for telemedicine. Provider identified as Daniel Olivas as well as credentials APRN ADRIENNE ANDERSONP-JUSTIN PINEDA.   The client/patient gave consent to be seen remotely, and when consent is given they understand that the consent allows for patient identifiable information to be sent to a third party as needed.   They may refuse to be seen remotely at any time. The electronic data is encrypted and password protected, and the patient has been advised of the potential risks to privacy not withstanding such measures.    Chief Complaint/History of Present Illness: Follow Up buprenorphine/naloxone Medicated Assisted Treatment for Opiate Use Disorder     Patient/Client Concerns/Updates: Patient continues to consume alcohol daily; reports he enjoys alcohol in the evening and a major trigger to continue drinking daily is not working at the moment-patient reports he hopes to resume employment in the next couple days; further discussion with patient as to the risk of CNS oversedation, overdose and death with alcohol and CNS depressants such as buprenorphine; will accommodate 1 further prescription at current Suboxone dose; will decrease buprenorphine dose next week to lower the risk of CNS oversedation as patient at this point has failed to reduce the risk on his and by continuing to consume alcohol    Triggers (Persons/Places/Things/Events/Thought/Emotions): Anxiety and lack of employment    Cravings: Craving for alcohol    Relapse Prevention: Counseling    Urine Drug Screen (today's visit) discussed: Positive buprenorphine, otherwise negative for substances tested    UDS Confirmation: Positive buprenorphine/nor-buprenorphine, no concerns for urine tampering pos ethyl glucuronide    [place]  (PDMP) Reviewed for Current/Active Medications: buprenorphine/naloxone as reviewed today    Past Surgical History:  Past Surgical History:   Procedure Laterality Date   • NO PAST SURGERIES         Problem List:  Patient Active Problem List   Diagnosis   • Alcohol-induced acute pancreatitis   • Pancreatitis       Allergy:   No Known Allergies     Current Medications:   Current Outpatient Medications   Medication Sig Dispense Refill   • buprenorphine-naloxone (SUBOXONE) 8-2 MG per SL tablet Place 2 tablets under the tongue Daily. 14 tablet 0   • cephalexin (KEFLEX) 500 MG capsule Take 1 capsule by mouth 3 (Three) Times a Day. 30 capsule 0   • cloNIDine (Catapres) 0.1 MG tablet Take 1 tablet by mouth 2 (Two) Times a Day. Take as needed for anxiety.  Insomnia.  Chills or sweats 60 tablet 2   • hydrOXYzine pamoate (Vistaril) 25 MG capsule Take 1 capsule by mouth 4 (Four) Times a Day As Needed for Anxiety (and/or insomia). 60 capsule 1   • ibuprofen (ADVIL,MOTRIN) 800 MG tablet Take 1 tablet by mouth Every 8 (Eight) Hours As Needed for Moderate Pain . 30 tablet 0   • mupirocin (BACTROBAN) 2 % ointment Apply  topically to the appropriate area as directed 3 (Three) Times a Day. 15 g 0   • naloxone (NARCAN) 4 MG/0.1ML nasal spray 1 spray into the nostril(s) as directed by provider As Needed (Opiate oversedation). Spray in 1 nostril every 2 to 3 minutes call 911 2 each 2   • orphenadrine (NORFLEX) 100 MG 12 hr tablet Take 1 tablet by mouth 2 (Two) Times a Day As Needed for Muscle Spasms or Mild Pain . 14 tablet 0   • sertraline (Zoloft) 25 MG tablet Take 1 tablet by mouth Daily for 30 days. 30 tablet 3     No current facility-administered medications for this visit.       Past Medical History:  Past Medical History:   Diagnosis Date   • Alcohol-induced acute pancreatitis 6/27/2019   • Drug use     Amphetamines and Suboxone   • Fatty liver    • Hepatitis C antibody test positive 2019   • Medical non-compliance    • Smoker           Social History     Socioeconomic History   • Marital status: Single   Tobacco Use   • Smoking status: Current Every Day Smoker     Packs/day: 0.50     Types: Cigarettes   • Smokeless tobacco: Current User   Vaping Use   • Vaping Use: Never used   Substance and Sexual Activity   • Alcohol use: Yes     Alcohol/week: 8.0 standard drinks     Types: 8 Shots of liquor per week     Comment: states occasional use, last use was yesterday, drank couple mixed drink   • Drug use: Yes     Types: Amphetamines     Comment: suboxone as well   • Sexual activity: Defer         Family History   Problem Relation Age of Onset   • Cancer Maternal Grandmother          Mental Status Exam:   Hygiene:   good  Cooperation:  Cooperative  Eye Contact:  Good  Psychomotor Behavior:  Appropriate  Affect:  Appropriate  Mood: panicky  Speech:  Normal  Thought Process:  Goal directed  Thought Content:  Normal  Suicidal:  None  Homicidal:  None  Hallucinations:  None  Delusion:  None  Memory:  Intact  Orientation:  Grossly intact  Reliability:  good  Insight:  Poor  Judgement:  Poor  Impulse Control:  Poor         Review of Systems:  Review of Systems   Constitutional: Negative for activity change, chills, diaphoresis and fatigue.   Respiratory: Negative for apnea, cough and shortness of breath.    Cardiovascular: Negative for chest pain, palpitations and leg swelling.   Gastrointestinal: Negative for abdominal pain, constipation, diarrhea, nausea and vomiting.   Genitourinary: Negative for difficulty urinating.   Musculoskeletal: Negative for arthralgias.   Skin: Negative for rash.   Neurological: Negative for dizziness, weakness and headaches.   Psychiatric/Behavioral: Negative for agitation, self-injury, sleep disturbance and suicidal ideas. The patient is nervous/anxious.          Physical Exam:  Physical Exam  Vitals reviewed.   Constitutional:       General: He is not in acute distress.     Appearance: Normal appearance. He is not  "ill-appearing or toxic-appearing.   Pulmonary:      Effort: Pulmonary effort is normal.   Musculoskeletal:         General: Normal range of motion.   Neurological:      General: No focal deficit present.      Mental Status: He is alert and oriented to person, place, and time.   Psychiatric:         Attention and Perception: Attention and perception normal.         Mood and Affect: Mood normal. Mood is not anxious or depressed.         Speech: Speech normal.         Behavior: Behavior normal. Behavior is cooperative.         Thought Content: Thought content normal.         Cognition and Memory: Cognition and memory normal.         Judgment: Judgment normal.         Vital Signs:   BP (!) 170/112   Pulse 75   Temp 97.5 °F (36.4 °C)   Ht 198.1 cm (77.99\")   Wt 90.3 kg (199 lb)   SpO2 98%   BMI 23.00 kg/m²      Lab Results:   Office Visit on 11/24/2021   Component Date Value Ref Range Status   • External Amphetamine Screen Urine 11/24/2021 Negative   Final   • Amphetamine Cut-Off 11/24/2021 1000NG/ML   Final   • External Benzodiazepine Screen Uri* 11/24/2021 Negative   Final   • Benzodiazipine Cut-Off 11/24/2021 300NG/ML   Final   • External Cocaine Screen Urine 11/24/2021 Negative   Final   • Cocaine Cut-Off 11/24/2021 300NG/ML   Final   • External THC Screen Urine 11/24/2021 Negative   Final   • THC Cut-Off 11/24/2021 50NG/ML   Final   • External Methadone Screen Urine 11/24/2021 Negative   Final   • Methadone Cut-Off 11/24/2021 300NG/ML   Final   • External Methamphetamine Screen Ur* 11/24/2021 Negative   Final   • Methamphetamine Cut-Off 11/24/2021 1000NG/ML   Final   • External Oxycodone Screen Urine 11/24/2021 Negative   Final   • Oxycodone Cut-Off 11/24/2021 100NG/ML   Final   • External Buprenorphine Screen Urine 11/24/2021 Positive   Final   • Buprenorphine Cut-Off 11/24/2021 10NG/ML   Final   • External MDMA 11/24/2021 Negative   Final   • MDMA Cut-Off 11/24/2021 500NG/ML   Final   • External Opiates " Screen Urine 11/24/2021 Negative   Final   • Opiates Cut-Off 11/24/2021 300NG/ML   Final   Office Visit on 11/17/2021   Component Date Value Ref Range Status   • External Amphetamine Screen Urine 11/17/2021 Negative   Final   • Amphetamine Cut-Off 11/17/2021 1000ng/ml   Final   • External Benzodiazepine Screen Uri* 11/17/2021 Negative   Final   • Benzodiazipine Cut-Off 11/17/2021 300ng/ml   Final   • External Cocaine Screen Urine 11/17/2021 Negative   Final   • Cocaine Cut-Off 11/17/2021 300ng/ml   Final   • External THC Screen Urine 11/17/2021 Negative   Final   • THC Cut-Off 11/17/2021 50ng/ml   Final   • External Methadone Screen Urine 11/17/2021 Negative   Final   • Methadone Cut-Off 11/17/2021 300ng/ml   Final   • External Methamphetamine Screen Ur* 11/17/2021 Negative   Final   • Methamphetamine Cut-Off 11/17/2021 1000ng/ml   Final   • External Oxycodone Screen Urine 11/17/2021 Negative   Final   • Oxycodone Cut-Off 11/17/2021 100ng/ml   Final   • External Buprenorphine Screen Urine 11/17/2021 Positive   Final   • Buprenorphine Cut-Off 11/17/2021 10ng/ml   Final   • External MDMA 11/17/2021 Negative   Final   • MDMA Cut-Off 11/17/2021 500ng/ml   Final   • External Opiates Screen Urine 11/17/2021 Negative   Final   • Opiates Cut-Off 11/17/2021 300ng/ml   Final   Admission on 11/10/2021, Discharged on 11/11/2021   Component Date Value Ref Range Status   • Ethanol 11/10/2021 234* 0 - 10 mg/dL Final   • Ethanol % 11/10/2021 0.234  % Final   Office Visit on 11/10/2021   Component Date Value Ref Range Status   • External Amphetamine Screen Urine 11/10/2021 Negative   Final   • Amphetamine Cut-Off 11/10/2021 1000NG/ML   Final   • External Benzodiazepine Screen Uri* 11/10/2021 Negative   Final   • Benzodiazipine Cut-Off 11/10/2021 300NG/ML   Final   • External Cocaine Screen Urine 11/10/2021 Negative   Final   • Cocaine Cut-Off 11/10/2021 300NG/ML   Final   • External THC Screen Urine 11/10/2021 Negative   Final   •  THC Cut-Off 11/10/2021 50NG/ML   Final   • External Methadone Screen Urine 11/10/2021 Negative   Final   • Methadone Cut-Off 11/10/2021 300NG/ML   Final   • External Methamphetamine Screen Ur* 11/10/2021 Negative   Final   • Methamphetamine Cut-Off 11/10/2021 1000NG/ML   Final   • External Oxycodone Screen Urine 11/10/2021 Negative   Final   • Oxycodone Cut-Off 11/10/2021 100NG/ML   Final   • External Buprenorphine Screen Urine 11/10/2021 Positive   Final   • Buprenorphine Cut-Off 11/10/2021 10NG/ML   Final   • External MDMA 11/10/2021 Negative   Final   • MDMA Cut-Off 11/10/2021 500NG/ML   Final   • External Opiates Screen Urine 11/10/2021 Negative   Final   • Opiates Cut-Off 11/10/2021 300NG/ML   Final   Office Visit on 11/03/2021   Component Date Value Ref Range Status   • External Amphetamine Screen Urine 11/03/2021 Negative   Final   • Amphetamine Cut-Off 11/03/2021 1000NG/ML   Final   • External Benzodiazepine Screen Uri* 11/03/2021 Negative   Final   • Benzodiazipine Cut-Off 11/03/2021 300NG/ML   Final   • External Cocaine Screen Urine 11/03/2021 Negative   Final   • Cocaine Cut-Off 11/03/2021 300NG/ML   Final   • External THC Screen Urine 11/03/2021 Negative   Final   • THC Cut-Off 11/03/2021 50NG/ML   Final   • External Methadone Screen Urine 11/03/2021 Negative   Final   • Methadone Cut-Off 11/03/2021 300NG/ML   Final   • External Methamphetamine Screen Ur* 11/03/2021 Negative   Final   • Methamphetamine Cut-Off 11/03/2021 1000NG/ML   Final   • External Oxycodone Screen Urine 11/03/2021 Negative   Final   • Oxycodone Cut-Off 11/03/2021 100NG/ML   Final   • External Buprenorphine Screen Urine 11/03/2021 Positive   Final   • Buprenorphine Cut-Off 11/03/2021 10NG/ML   Final   • External MDMA 11/03/2021 Negative   Final   • MDMA Cut-Off 11/03/2021 500NG/ML   Final   • External Opiates Screen Urine 11/03/2021 Negative   Final   • Opiates Cut-Off 11/03/2021 300NG/ML   Final   Office Visit on 10/20/2021    Component Date Value Ref Range Status   • External Amphetamine Screen Urine 10/20/2021 Negative   Final   • Amphetamine Cut-Off 10/20/2021 1000ng/ml   Final   • External Benzodiazepine Screen Uri* 10/20/2021 Negative   Final   • Benzodiazipine Cut-Off 10/20/2021 300ng/ml   Final   • External Cocaine Screen Urine 10/20/2021 Negative   Final   • Cocaine Cut-Off 10/20/2021 300ng/ml   Final   • External THC Screen Urine 10/20/2021 Negative   Final   • THC Cut-Off 10/20/2021 50ng/ml   Final   • External Methadone Screen Urine 10/20/2021 Negative   Final   • Methadone Cut-Off 10/20/2021 300ng/ml   Final   • External Methamphetamine Screen Ur* 10/20/2021 Negative   Final   • Methamphetamine Cut-Off 10/20/2021 1000ng/ml   Final   • External Oxycodone Screen Urine 10/20/2021 Negative   Final   • Oxycodone Cut-Off 10/20/2021 100ng/ml   Final   • External Buprenorphine Screen Urine 10/20/2021 Positive   Final   • Buprenorphine Cut-Off 10/20/2021 10ng/ml   Final   • External MDMA 10/20/2021 Negative   Final   • MDMA Cut-Off 10/20/2021 500ng/ml   Final   • External Opiates Screen Urine 10/20/2021 Negative   Final   • Opiates Cut-Off 10/20/2021 300ng/ml   Final   Office Visit on 10/12/2021   Component Date Value Ref Range Status   • External Amphetamine Screen Urine 10/12/2021 Negative   Final   • Amphetamine Cut-Off 10/12/2021 1000NG/ML   Final   • External Benzodiazepine Screen Uri* 10/12/2021 Negative   Final   • Benzodiazipine Cut-Off 10/12/2021 300NG/ML   Final   • External Cocaine Screen Urine 10/12/2021 Negative   Final   • Cocaine Cut-Off 10/12/2021 300NG/ML   Final   • External THC Screen Urine 10/12/2021 Negative   Final   • THC Cut-Off 10/12/2021 50NG/ML   Final   • External Methadone Screen Urine 10/12/2021 Negative   Final   • Methadone Cut-Off 10/12/2021 300NG/ML   Final   • External Methamphetamine Screen Ur* 10/12/2021 Negative   Final   • Methamphetamine Cut-Off 10/12/2021 1000NG/ML   Final   • External  Oxycodone Screen Urine 10/12/2021 Negative   Final   • Oxycodone Cut-Off 10/12/2021 100NG/ML   Final   • External Buprenorphine Screen Urine 10/12/2021 Positive   Final   • Buprenorphine Cut-Off 10/12/2021 10NG/ML   Final   • External MDMA 10/12/2021 Negative   Final   • MDMA Cut-Off 10/12/2021 500NG/ML   Final   • External Opiates Screen Urine 10/12/2021 Negative   Final   • Opiates Cut-Off 10/12/2021 300NG/ML   Final   Admission on 06/17/2021, Discharged on 06/19/2021   Component Date Value Ref Range Status   • Glucose 06/17/2021 141* 65 - 99 mg/dL Final   • BUN 06/17/2021 5* 6 - 20 mg/dL Final   • Creatinine 06/17/2021 0.57* 0.76 - 1.27 mg/dL Final   • Sodium 06/17/2021 140  136 - 145 mmol/L Final   • Potassium 06/17/2021 3.9  3.5 - 5.2 mmol/L Final    Slight hemolysis detected by analyzer. Results may be affected.   • Chloride 06/17/2021 106  98 - 107 mmol/L Final   • CO2 06/17/2021 22.9  22.0 - 29.0 mmol/L Final   • Calcium 06/17/2021 9.6  8.6 - 10.5 mg/dL Final   • Total Protein 06/17/2021 7.6  6.0 - 8.5 g/dL Final   • Albumin 06/17/2021 3.96  3.50 - 5.20 g/dL Final   • ALT (SGPT) 06/17/2021 99* 1 - 41 U/L Final   • AST (SGOT) 06/17/2021 156* 1 - 40 U/L Final   • Alkaline Phosphatase 06/17/2021 276* 39 - 117 U/L Final   • Total Bilirubin 06/17/2021 1.1  0.0 - 1.2 mg/dL Final   • eGFR Non African Amer 06/17/2021 >150  >60 mL/min/1.73 Final   • Globulin 06/17/2021 3.6  gm/dL Final   • A/G Ratio 06/17/2021 1.1  g/dL Final   • BUN/Creatinine Ratio 06/17/2021 8.8  7.0 - 25.0 Final   • Anion Gap 06/17/2021 11.1  5.0 - 15.0 mmol/L Final   • Lipase 06/17/2021 463* 13 - 60 U/L Final   • Amylase 06/17/2021 205* 28 - 100 U/L Final   • WBC 06/17/2021 10.73  3.40 - 10.80 10*3/mm3 Final   • RBC 06/17/2021 5.07  4.14 - 5.80 10*6/mm3 Final   • Hemoglobin 06/17/2021 16.3  13.0 - 17.7 g/dL Final   • Hematocrit 06/17/2021 47.7  37.5 - 51.0 % Final   • MCV 06/17/2021 94.1  79.0 - 97.0 fL Final   • MCH 06/17/2021 32.1  26.6 - 33.0  pg Final   • MCHC 06/17/2021 34.2  31.5 - 35.7 g/dL Final   • RDW 06/17/2021 12.3  12.3 - 15.4 % Final   • RDW-SD 06/17/2021 42.6  37.0 - 54.0 fl Final   • MPV 06/17/2021 10.1  6.0 - 12.0 fL Final   • Platelets 06/17/2021 168  140 - 450 10*3/mm3 Final   • Neutrophil % 06/17/2021 81.6* 42.7 - 76.0 % Final   • Lymphocyte % 06/17/2021 9.6* 19.6 - 45.3 % Final   • Monocyte % 06/17/2021 6.4  5.0 - 12.0 % Final   • Eosinophil % 06/17/2021 1.6  0.3 - 6.2 % Final   • Basophil % 06/17/2021 0.3  0.0 - 1.5 % Final   • Immature Grans % 06/17/2021 0.5  0.0 - 0.5 % Final   • Neutrophils, Absolute 06/17/2021 8.76* 1.70 - 7.00 10*3/mm3 Final   • Lymphocytes, Absolute 06/17/2021 1.03  0.70 - 3.10 10*3/mm3 Final   • Monocytes, Absolute 06/17/2021 0.69  0.10 - 0.90 10*3/mm3 Final   • Eosinophils, Absolute 06/17/2021 0.17  0.00 - 0.40 10*3/mm3 Final   • Basophils, Absolute 06/17/2021 0.03  0.00 - 0.20 10*3/mm3 Final   • Immature Grans, Absolute 06/17/2021 0.05  0.00 - 0.05 10*3/mm3 Final   • nRBC 06/17/2021 0.0  0.0 - 0.2 /100 WBC Final   • Ethanol 06/17/2021 <10  0 - 10 mg/dL Final   • Ethanol % 06/17/2021 <0.010  % Final   • Color, UA 06/17/2021 Yellow  Yellow, Straw Final   • Appearance, UA 06/17/2021 Clear  Clear Final   • pH, UA 06/17/2021 5.5  5.0 - 8.0 Final   • Specific Gravity, UA 06/17/2021 >1.030* 1.005 - 1.030 Final   • Glucose, UA 06/17/2021 Negative  Negative Final   • Ketones, UA 06/17/2021 Negative  Negative Final   • Bilirubin, UA 06/17/2021 Negative  Negative Final   • Blood, UA 06/17/2021 Negative  Negative Final   • Protein, UA 06/17/2021 Negative  Negative Final   • Leuk Esterase, UA 06/17/2021 Negative  Negative Final   • Nitrite, UA 06/17/2021 Negative  Negative Final   • Urobilinogen, UA 06/17/2021 1.0 E.U./dL  0.2 - 1.0 E.U./dL Final   • Amphetamine Screen, Urine 06/17/2021 Negative  Negative Final   • Barbiturates Screen, Urine 06/17/2021 Negative  Negative Final   • Benzodiazepine Screen, Urine 06/17/2021  Negative  Negative Final   • Cocaine Screen, Urine 06/17/2021 Negative  Negative Final   • Methadone Screen, Urine 06/17/2021 Negative  Negative Final   • Opiate Screen 06/17/2021 Negative  Negative Final   • Phencyclidine (PCP), Urine 06/17/2021 Negative  Negative Final   • THC, Screen, Urine 06/17/2021 Negative  Negative Final   • 6-ACETYL MORPHINE 06/17/2021 Negative  Negative Final   • Buprenorphine, Screen, Urine 06/17/2021 Positive* Negative Final   • Oxycodone Screen, Urine 06/17/2021 Negative  Negative Final   • COVID19 06/17/2021 Not Detected  Not Detected - Ref. Range Final   • Influenza A PCR 06/17/2021 Not Detected  Not Detected Final   • Influenza B PCR 06/17/2021 Not Detected  Not Detected Final   • Hemoglobin A1C 06/17/2021 5.50  4.80 - 5.60 % Final   • TSH 06/17/2021 2.360  0.270 - 4.200 uIU/mL Final   • QT Interval 06/18/2021 456  ms Final   • QTC Interval 06/18/2021 436  ms Final   • Hepatitis C Quantitation 06/18/2021 HCV Not Detected  IU/mL Final   • Test Information 06/18/2021 Comment   Final    The quantitative range of this assay is 15 IU/mL to 100 million IU/mL.   • Please note 06/18/2021 Comment   Final    This test was developed and its performance characteristics determined  by [employer].  It has not been cleared or approved by the [place] Food and  Drug Administration.  The FDA has determined that such clearance or approval is not  necessary. This test is used for clinical purposes.  It should not be  regarded as investigational or for research.   • Hepatitis C Genotype 06/18/2021 Comment   Final    Specimen has insufficient hepatitis C virus RNA to obtain genotyping  results. This genotyping assay should only be used for known HCV  positive patients with HCV RNA levels above 1000 IU/mL.   • Glucose 06/18/2021 121* 65 - 99 mg/dL Final   • BUN 06/18/2021 5* 6 - 20 mg/dL Final   • Creatinine 06/18/2021 0.55* 0.76 - 1.27 mg/dL Final   • Sodium 06/18/2021 138  136 - 145 mmol/L Final   • Potassium  06/18/2021 3.5  3.5 - 5.2 mmol/L Final    Slight hemolysis detected by analyzer. Results may be affected.   • Chloride 06/18/2021 103  98 - 107 mmol/L Final   • CO2 06/18/2021 22.1  22.0 - 29.0 mmol/L Final   • Calcium 06/18/2021 9.1  8.6 - 10.5 mg/dL Final   • Total Protein 06/18/2021 6.7  6.0 - 8.5 g/dL Final   • Albumin 06/18/2021 3.46* 3.50 - 5.20 g/dL Final   • ALT (SGPT) 06/18/2021 77* 1 - 41 U/L Final   • AST (SGOT) 06/18/2021 100* 1 - 40 U/L Final   • Alkaline Phosphatase 06/18/2021 237* 39 - 117 U/L Final   • Total Bilirubin 06/18/2021 1.5* 0.0 - 1.2 mg/dL Final   • eGFR Non African Amer 06/18/2021 >150  >60 mL/min/1.73 Final   • Globulin 06/18/2021 3.2  gm/dL Final   • A/G Ratio 06/18/2021 1.1  g/dL Final   • BUN/Creatinine Ratio 06/18/2021 9.1  7.0 - 25.0 Final   • Anion Gap 06/18/2021 12.9  5.0 - 15.0 mmol/L Final   • Protime 06/18/2021 13.4  12.8 - 14.5 Seconds Final    Note new Reference Range   • INR 06/18/2021 0.98  0.90 - 1.10 Final   • PTT 06/18/2021 28.0  25.5 - 35.4 seconds Final    Note new Reference Range   • Phosphorus 06/18/2021 2.8  2.5 - 4.5 mg/dL Final   • Magnesium 06/18/2021 1.5* 1.6 - 2.6 mg/dL Final   • WBC 06/18/2021 11.20* 3.40 - 10.80 10*3/mm3 Final   • RBC 06/18/2021 5.03  4.14 - 5.80 10*6/mm3 Final   • Hemoglobin 06/18/2021 16.2  13.0 - 17.7 g/dL Final   • Hematocrit 06/18/2021 48.1  37.5 - 51.0 % Final   • MCV 06/18/2021 95.6  79.0 - 97.0 fL Final   • MCH 06/18/2021 32.2  26.6 - 33.0 pg Final   • MCHC 06/18/2021 33.7  31.5 - 35.7 g/dL Final   • RDW 06/18/2021 12.2* 12.3 - 15.4 % Final   • RDW-SD 06/18/2021 42.6  37.0 - 54.0 fl Final   • MPV 06/18/2021 10.5  6.0 - 12.0 fL Final   • Platelets 06/18/2021 141  140 - 450 10*3/mm3 Final   • Vitamin B1, Whole Blood 06/18/2021 144.6  66.5 - 200.0 nmol/L Final   • Vitamin B-12 06/18/2021 352  211 - 946 pg/mL Final   • Folate 06/18/2021 2.31* 4.78 - 24.20 ng/mL Final   • Total Cholesterol 06/18/2021 183  0 - 200 mg/dL Final   •  Triglycerides 06/18/2021 128  0 - 150 mg/dL Final   • HDL Cholesterol 06/18/2021 61* 40 - 60 mg/dL Final   • LDL Cholesterol  06/18/2021 100  0 - 100 mg/dL Final   • VLDL Cholesterol 06/18/2021 22  5 - 40 mg/dL Final   • LDL/HDL Ratio 06/18/2021 1.58   Final   • Magnesium 06/19/2021 1.9  1.6 - 2.6 mg/dL Final   • Glucose 06/19/2021 102* 65 - 99 mg/dL Final   • BUN 06/19/2021 4* 6 - 20 mg/dL Final   • Creatinine 06/19/2021 0.52* 0.76 - 1.27 mg/dL Final   • Sodium 06/19/2021 132* 136 - 145 mmol/L Final   • Potassium 06/19/2021 3.9  3.5 - 5.2 mmol/L Final    Slight hemolysis detected by analyzer. Results may be affected.   • Chloride 06/19/2021 98  98 - 107 mmol/L Final   • CO2 06/19/2021 22.0  22.0 - 29.0 mmol/L Final   • Calcium 06/19/2021 8.9  8.6 - 10.5 mg/dL Final   • Total Protein 06/19/2021 6.8  6.0 - 8.5 g/dL Final   • Albumin 06/19/2021 3.16* 3.50 - 5.20 g/dL Final   • ALT (SGPT) 06/19/2021 49* 1 - 41 U/L Final   • AST (SGOT) 06/19/2021 68* 1 - 40 U/L Final   • Alkaline Phosphatase 06/19/2021 233* 39 - 117 U/L Final   • Total Bilirubin 06/19/2021 1.2  0.0 - 1.2 mg/dL Final   • eGFR Non African Amer 06/19/2021 >150  >60 mL/min/1.73 Final   • Globulin 06/19/2021 3.6  gm/dL Final   • A/G Ratio 06/19/2021 0.9  g/dL Final   • BUN/Creatinine Ratio 06/19/2021 7.7  7.0 - 25.0 Final   • Anion Gap 06/19/2021 12.0  5.0 - 15.0 mmol/L Final         Assessment/Plan   Diagnoses and all orders for this visit:    1. Opioid type dependence, continuous (HCC) (Primary)  -     buprenorphine-naloxone (SUBOXONE) 8-2 MG per SL tablet; Place 2 tablets under the tongue Daily.  Dispense: 14 tablet; Refill: 0    2. Medication management  -     [facility] Screen    3. Alcohol abuse    Other orders  -     SCANNED - LABS        Visit Diagnoses:    ICD-10-CM ICD-9-CM   1. Opioid type dependence, continuous (HCC)  F11.20 304.01   2. Medication management  Z79.899 V58.69   3. Alcohol abuse  F10.10 305.00       PLAN:  1. Safety: No acute  safety concerns  2. Risk Assessment: Risk of self-harm acutely is low. Risk of self-harm chronically is also low, but could be further elevated in the event of treatment noncompliance and/or AODA.    TREATMENT PLAN/GOALS: Continue supportive psychotherapy efforts and medications as indicated. Treatment and medication options discussed during today's visit. Patient acknowledged and verbally consented to continue with current treatment plan and was educated on the importance of compliance with treatment and follow-up appointments.    MEDICATION ISSUES:  ROMAN reviewed as expected.  Discussed medication options and treatment plan of prescribed medication as well as the risks, benefits, and side effects including potential falls, possible impaired driving and metabolic adversities among others. Patient is agreeable to call the office with any worsening of symptoms or onset of side effects. Patient is agreeable to call 911 or go to the nearest ER should he/she begin having SI/HI. No medication side effects or related complaints today.     MEDS ORDERED DURING VISIT:  New Medications Ordered This Visit   Medications   • buprenorphine-naloxone (SUBOXONE) 8-2 MG per SL tablet     Sig: Place 2 tablets under the tongue Daily.     Dispense:  14 tablet     Refill:  0       No follow-ups on file.           This document has been electronically signed by RENAN De Dios  November 24, 2021 15:17 EST      Part of this note may be an electronic transcription/translation of spoken language to printed text using the Dragon Dictation System.

## 2021-12-01 ENCOUNTER — OFFICE VISIT (OUTPATIENT)
Dept: PSYCHIATRY | Facility: CLINIC | Age: 31
End: 2021-12-01

## 2021-12-01 ENCOUNTER — TELEPHONE (OUTPATIENT)
Dept: PSYCHIATRY | Facility: CLINIC | Age: 31
End: 2021-12-01

## 2021-12-01 VITALS
BODY MASS INDEX: 23.49 KG/M2 | WEIGHT: 203 LBS | HEART RATE: 70 BPM | DIASTOLIC BLOOD PRESSURE: 102 MMHG | SYSTOLIC BLOOD PRESSURE: 138 MMHG | HEIGHT: 78 IN | OXYGEN SATURATION: 98 %

## 2021-12-01 DIAGNOSIS — F11.20 OPIOID TYPE DEPENDENCE, CONTINUOUS (HCC): Primary | ICD-10-CM

## 2021-12-01 DIAGNOSIS — M25.50 ARTHRALGIA, UNSPECIFIED JOINT: ICD-10-CM

## 2021-12-01 DIAGNOSIS — Z79.899 MEDICATION MANAGEMENT: ICD-10-CM

## 2021-12-01 PROCEDURE — 99213 OFFICE O/P EST LOW 20 MIN: CPT | Performed by: NURSE PRACTITIONER

## 2021-12-01 RX ORDER — IBUPROFEN 800 MG/1
800 TABLET ORAL EVERY 8 HOURS PRN
Qty: 30 TABLET | Refills: 0 | Status: SHIPPED | OUTPATIENT
Start: 2021-12-01 | End: 2022-01-13 | Stop reason: SDUPTHER

## 2021-12-01 RX ORDER — BUPRENORPHINE HYDROCHLORIDE AND NALOXONE HYDROCHLORIDE DIHYDRATE 8; 2 MG/1; MG/1
2 TABLET SUBLINGUAL DAILY
Qty: 28 TABLET | Refills: 0 | Status: SHIPPED | OUTPATIENT
Start: 2021-12-01 | End: 2022-01-05 | Stop reason: SDUPTHER

## 2021-12-01 NOTE — PROGRESS NOTES
This provider is located at [facility]. The Patient is seen remotely using Video. Patient is being seen via telehealth and confirm that they are in a secure environment for this session. The patient's condition being diagnosed/treated is appropriate for telemedicine. Provider identified as Daniel Olivas as well as credentials APRN ADRIENNE ANDERSONP-JUSTIN PINEDA.   The client/patient gave consent to be seen remotely, and when consent is given they understand that the consent allows for patient identifiable information to be sent to a third party as needed.   They may refuse to be seen remotely at any time. The electronic data is encrypted and password protected, and the patient has been advised of the potential risks to privacy not withstanding such measures.    Chief Complaint/History of Present Illness: Follow Up buprenorphine/naloxone Medicated Assisted Treatment for Opiate Use Disorder     Patient/Client Concerns/Updates: Patient reports he is now again  from his wife and is staying with his mother; patient reports he has resumed employment this week and thus has less idle time; patient reports he has continued to drink alcohol however in significant less amounts per last week's discussion-last use of alcohol 2 days ago; patient encouraged to continue consuming less alcohol    Triggers (Persons/Places/Things/Events/Thought/Emotions): Stress anxiety and recent break-up with wife    Cravings: Denies opiate cravings    Relapse Prevention: Counseling    Urine Drug Screen (today's visit) discussed: Positive buprenorphine, otherwise negative for substances tested    UDS Confirmation: Positive buprenorphine/nor-buprenorphine, no concerns for urine tampering, positive ethyl glucuronide    ROMAN (PDMP) Reviewed for Current/Active Medications: buprenorphine/naloxone as reviewed today    Past Surgical History:  Past Surgical History:   Procedure Laterality Date   • NO PAST SURGERIES         Problem List:  Patient Active  Problem List   Diagnosis   • Alcohol-induced acute pancreatitis   • Pancreatitis       Allergy:   No Known Allergies     Current Medications:   Current Outpatient Medications   Medication Sig Dispense Refill   • buprenorphine-naloxone (SUBOXONE) 8-2 MG per SL tablet Place 2 tablets under the tongue Daily. 14 tablet 0   • cephalexin (KEFLEX) 500 MG capsule Take 1 capsule by mouth 3 (Three) Times a Day. 30 capsule 0   • cloNIDine (Catapres) 0.1 MG tablet Take 1 tablet by mouth 2 (Two) Times a Day. Take as needed for anxiety.  Insomnia.  Chills or sweats 60 tablet 2   • hydrOXYzine pamoate (Vistaril) 25 MG capsule Take 1 capsule by mouth 4 (Four) Times a Day As Needed for Anxiety (and/or insomia). 60 capsule 1   • ibuprofen (ADVIL,MOTRIN) 800 MG tablet Take 1 tablet by mouth Every 8 (Eight) Hours As Needed for Moderate Pain . 30 tablet 0   • mupirocin (BACTROBAN) 2 % ointment Apply  topically to the appropriate area as directed 3 (Three) Times a Day. 15 g 0   • naloxone (NARCAN) 4 MG/0.1ML nasal spray 1 spray into the nostril(s) as directed by provider As Needed (Opiate oversedation). Spray in 1 nostril every 2 to 3 minutes call 911 2 each 2   • orphenadrine (NORFLEX) 100 MG 12 hr tablet Take 1 tablet by mouth 2 (Two) Times a Day As Needed for Muscle Spasms or Mild Pain . 14 tablet 0   • sertraline (Zoloft) 25 MG tablet Take 1 tablet by mouth Daily for 30 days. 30 tablet 3     No current facility-administered medications for this visit.       Past Medical History:  Past Medical History:   Diagnosis Date   • Alcohol-induced acute pancreatitis 6/27/2019   • Drug use     Amphetamines and Suboxone   • Fatty liver    • Hepatitis C antibody test positive 2019   • Medical non-compliance    • Smoker          Social History     Socioeconomic History   • Marital status: Single   Tobacco Use   • Smoking status: Current Every Day Smoker     Packs/day: 0.50     Types: Cigarettes   • Smokeless tobacco: Current User   Vaping Use   •  Vaping Use: Never used   Substance and Sexual Activity   • Alcohol use: Yes     Alcohol/week: 8.0 standard drinks     Types: 8 Shots of liquor per week     Comment: states occasional use, last use was yesterday, drank couple mixed drink   • Drug use: Yes     Types: Amphetamines     Comment: suboxone as well   • Sexual activity: Defer         Family History   Problem Relation Age of Onset   • Cancer Maternal Grandmother          Mental Status Exam:   Hygiene:   good  Cooperation:  Cooperative  Eye Contact:  Good  Psychomotor Behavior:  Appropriate  Affect:  Appropriate  Mood: normal  Speech:  Normal  Thought Process:  Goal directed  Thought Content:  Normal  Suicidal:  None  Homicidal:  None  Hallucinations:  None  Delusion:  None  Memory:  Intact  Orientation:  Grossly intact  Reliability:  good  Insight:  Fair  Judgement:  Fair  Impulse Control:  Fair         Review of Systems:  Review of Systems   Constitutional: Negative for activity change, chills, diaphoresis and fatigue.   Respiratory: Negative for apnea, cough and shortness of breath.    Cardiovascular: Negative for chest pain, palpitations and leg swelling.   Gastrointestinal: Negative for abdominal pain, constipation, diarrhea, nausea and vomiting.   Genitourinary: Negative for difficulty urinating.   Musculoskeletal: Positive for arthralgias.   Skin: Negative for rash.   Neurological: Negative for dizziness, weakness and headaches.   Psychiatric/Behavioral: Negative for agitation, self-injury, sleep disturbance and suicidal ideas. The patient is nervous/anxious.          Physical Exam:  Physical Exam  Vitals reviewed.   Constitutional:       General: He is not in acute distress.     Appearance: Normal appearance. He is not ill-appearing or toxic-appearing.   Pulmonary:      Effort: Pulmonary effort is normal.   Musculoskeletal:         General: Normal range of motion.   Neurological:      General: No focal deficit present.      Mental Status: He is alert  "and oriented to person, place, and time.   Psychiatric:         Attention and Perception: Attention and perception normal.         Mood and Affect: Mood normal. Mood is not anxious or depressed.         Speech: Speech normal.         Behavior: Behavior normal. Behavior is cooperative.         Thought Content: Thought content normal.         Cognition and Memory: Cognition and memory normal.         Judgment: Judgment normal.         Vital Signs:   BP (!) 138/102   Pulse 70   Ht 198.1 cm (77.99\")   Wt 92.1 kg (203 lb)   SpO2 98%   BMI 23.46 kg/m²      Lab Results:   Office Visit on 12/01/2021   Component Date Value Ref Range Status   • External Amphetamine Screen Urine 12/01/2021 Negative   Final   • Amphetamine Cut-Off 12/01/2021 1000NG/ML   Final   • External Benzodiazepine Screen Uri* 12/01/2021 Negative   Final   • Benzodiazipine Cut-Off 12/01/2021 300NG/ML   Final   • External Cocaine Screen Urine 12/01/2021 Negative   Final   • Cocaine Cut-Off 12/01/2021 300NG/ML   Final   • External THC Screen Urine 12/01/2021 Negative   Final   • THC Cut-Off 12/01/2021 50NG/ML   Final   • External Methadone Screen Urine 12/01/2021 Negative   Final   • Methadone Cut-Off 12/01/2021 300NG/ML   Final   • External Methamphetamine Screen Ur* 12/01/2021 Negative   Final   • Methamphetamine Cut-Off 12/01/2021 1000NG/ML   Final   • External Oxycodone Screen Urine 12/01/2021 Negative   Final   • Oxycodone Cut-Off 12/01/2021 100NG/ML   Final   • External Buprenorphine Screen Urine 12/01/2021 Positive   Final   • Buprenorphine Cut-Off 12/01/2021 10NG/ML   Final   • External MDMA 12/01/2021 Negative   Final   • MDMA Cut-Off 12/01/2021 500NG/ML   Final   • External Opiates Screen Urine 12/01/2021 Negative   Final   • Opiates Cut-Off 12/01/2021 300NG/ML   Final   Office Visit on 11/24/2021   Component Date Value Ref Range Status   • External Amphetamine Screen Urine 11/24/2021 Negative   Final   • Amphetamine Cut-Off 11/24/2021 " 1000NG/ML   Final   • External Benzodiazepine Screen Uri* 11/24/2021 Negative   Final   • Benzodiazipine Cut-Off 11/24/2021 300NG/ML   Final   • External Cocaine Screen Urine 11/24/2021 Negative   Final   • Cocaine Cut-Off 11/24/2021 300NG/ML   Final   • External THC Screen Urine 11/24/2021 Negative   Final   • THC Cut-Off 11/24/2021 50NG/ML   Final   • External Methadone Screen Urine 11/24/2021 Negative   Final   • Methadone Cut-Off 11/24/2021 300NG/ML   Final   • External Methamphetamine Screen Ur* 11/24/2021 Negative   Final   • Methamphetamine Cut-Off 11/24/2021 1000NG/ML   Final   • External Oxycodone Screen Urine 11/24/2021 Negative   Final   • Oxycodone Cut-Off 11/24/2021 100NG/ML   Final   • External Buprenorphine Screen Urine 11/24/2021 Positive   Final   • Buprenorphine Cut-Off 11/24/2021 10NG/ML   Final   • External MDMA 11/24/2021 Negative   Final   • MDMA Cut-Off 11/24/2021 500NG/ML   Final   • External Opiates Screen Urine 11/24/2021 Negative   Final   • Opiates Cut-Off 11/24/2021 300NG/ML   Final   Office Visit on 11/17/2021   Component Date Value Ref Range Status   • External Amphetamine Screen Urine 11/17/2021 Negative   Final   • Amphetamine Cut-Off 11/17/2021 1000ng/ml   Final   • External Benzodiazepine Screen Uri* 11/17/2021 Negative   Final   • Benzodiazipine Cut-Off 11/17/2021 300ng/ml   Final   • External Cocaine Screen Urine 11/17/2021 Negative   Final   • Cocaine Cut-Off 11/17/2021 300ng/ml   Final   • External THC Screen Urine 11/17/2021 Negative   Final   • THC Cut-Off 11/17/2021 50ng/ml   Final   • External Methadone Screen Urine 11/17/2021 Negative   Final   • Methadone Cut-Off 11/17/2021 300ng/ml   Final   • External Methamphetamine Screen Ur* 11/17/2021 Negative   Final   • Methamphetamine Cut-Off 11/17/2021 1000ng/ml   Final   • External Oxycodone Screen Urine 11/17/2021 Negative   Final   • Oxycodone Cut-Off 11/17/2021 100ng/ml   Final   • External Buprenorphine Screen Urine  11/17/2021 Positive   Final   • Buprenorphine Cut-Off 11/17/2021 10ng/ml   Final   • External MDMA 11/17/2021 Negative   Final   • MDMA Cut-Off 11/17/2021 500ng/ml   Final   • External Opiates Screen Urine 11/17/2021 Negative   Final   • Opiates Cut-Off 11/17/2021 300ng/ml   Final   Admission on 11/10/2021, Discharged on 11/11/2021   Component Date Value Ref Range Status   • Ethanol 11/10/2021 234* 0 - 10 mg/dL Final   • Ethanol % 11/10/2021 0.234  % Final   Office Visit on 11/10/2021   Component Date Value Ref Range Status   • External Amphetamine Screen Urine 11/10/2021 Negative   Final   • Amphetamine Cut-Off 11/10/2021 1000NG/ML   Final   • External Benzodiazepine Screen Uri* 11/10/2021 Negative   Final   • Benzodiazipine Cut-Off 11/10/2021 300NG/ML   Final   • External Cocaine Screen Urine 11/10/2021 Negative   Final   • Cocaine Cut-Off 11/10/2021 300NG/ML   Final   • External THC Screen Urine 11/10/2021 Negative   Final   • THC Cut-Off 11/10/2021 50NG/ML   Final   • External Methadone Screen Urine 11/10/2021 Negative   Final   • Methadone Cut-Off 11/10/2021 300NG/ML   Final   • External Methamphetamine Screen Ur* 11/10/2021 Negative   Final   • Methamphetamine Cut-Off 11/10/2021 1000NG/ML   Final   • External Oxycodone Screen Urine 11/10/2021 Negative   Final   • Oxycodone Cut-Off 11/10/2021 100NG/ML   Final   • External Buprenorphine Screen Urine 11/10/2021 Positive   Final   • Buprenorphine Cut-Off 11/10/2021 10NG/ML   Final   • External MDMA 11/10/2021 Negative   Final   • MDMA Cut-Off 11/10/2021 500NG/ML   Final   • External Opiates Screen Urine 11/10/2021 Negative   Final   • Opiates Cut-Off 11/10/2021 300NG/ML   Final   Office Visit on 11/03/2021   Component Date Value Ref Range Status   • External Amphetamine Screen Urine 11/03/2021 Negative   Final   • Amphetamine Cut-Off 11/03/2021 1000NG/ML   Final   • External Benzodiazepine Screen Uri* 11/03/2021 Negative   Final   • Benzodiazipine Cut-Off  11/03/2021 300NG/ML   Final   • External Cocaine Screen Urine 11/03/2021 Negative   Final   • Cocaine Cut-Off 11/03/2021 300NG/ML   Final   • External THC Screen Urine 11/03/2021 Negative   Final   • THC Cut-Off 11/03/2021 50NG/ML   Final   • External Methadone Screen Urine 11/03/2021 Negative   Final   • Methadone Cut-Off 11/03/2021 300NG/ML   Final   • External Methamphetamine Screen Ur* 11/03/2021 Negative   Final   • Methamphetamine Cut-Off 11/03/2021 1000NG/ML   Final   • External Oxycodone Screen Urine 11/03/2021 Negative   Final   • Oxycodone Cut-Off 11/03/2021 100NG/ML   Final   • External Buprenorphine Screen Urine 11/03/2021 Positive   Final   • Buprenorphine Cut-Off 11/03/2021 10NG/ML   Final   • External MDMA 11/03/2021 Negative   Final   • MDMA Cut-Off 11/03/2021 500NG/ML   Final   • External Opiates Screen Urine 11/03/2021 Negative   Final   • Opiates Cut-Off 11/03/2021 300NG/ML   Final   Office Visit on 10/20/2021   Component Date Value Ref Range Status   • External Amphetamine Screen Urine 10/20/2021 Negative   Final   • Amphetamine Cut-Off 10/20/2021 1000ng/ml   Final   • External Benzodiazepine Screen Uri* 10/20/2021 Negative   Final   • Benzodiazipine Cut-Off 10/20/2021 300ng/ml   Final   • External Cocaine Screen Urine 10/20/2021 Negative   Final   • Cocaine Cut-Off 10/20/2021 300ng/ml   Final   • External THC Screen Urine 10/20/2021 Negative   Final   • THC Cut-Off 10/20/2021 50ng/ml   Final   • External Methadone Screen Urine 10/20/2021 Negative   Final   • Methadone Cut-Off 10/20/2021 300ng/ml   Final   • External Methamphetamine Screen Ur* 10/20/2021 Negative   Final   • Methamphetamine Cut-Off 10/20/2021 1000ng/ml   Final   • External Oxycodone Screen Urine 10/20/2021 Negative   Final   • Oxycodone Cut-Off 10/20/2021 100ng/ml   Final   • External Buprenorphine Screen Urine 10/20/2021 Positive   Final   • Buprenorphine Cut-Off 10/20/2021 10ng/ml   Final   • External MDMA 10/20/2021 Negative    Final   • MDMA Cut-Off 10/20/2021 500ng/ml   Final   • External Opiates Screen Urine 10/20/2021 Negative   Final   • Opiates Cut-Off 10/20/2021 300ng/ml   Final   Office Visit on 10/12/2021   Component Date Value Ref Range Status   • External Amphetamine Screen Urine 10/12/2021 Negative   Final   • Amphetamine Cut-Off 10/12/2021 1000NG/ML   Final   • External Benzodiazepine Screen Uri* 10/12/2021 Negative   Final   • Benzodiazipine Cut-Off 10/12/2021 300NG/ML   Final   • External Cocaine Screen Urine 10/12/2021 Negative   Final   • Cocaine Cut-Off 10/12/2021 300NG/ML   Final   • External THC Screen Urine 10/12/2021 Negative   Final   • THC Cut-Off 10/12/2021 50NG/ML   Final   • External Methadone Screen Urine 10/12/2021 Negative   Final   • Methadone Cut-Off 10/12/2021 300NG/ML   Final   • External Methamphetamine Screen Ur* 10/12/2021 Negative   Final   • Methamphetamine Cut-Off 10/12/2021 1000NG/ML   Final   • External Oxycodone Screen Urine 10/12/2021 Negative   Final   • Oxycodone Cut-Off 10/12/2021 100NG/ML   Final   • External Buprenorphine Screen Urine 10/12/2021 Positive   Final   • Buprenorphine Cut-Off 10/12/2021 10NG/ML   Final   • External MDMA 10/12/2021 Negative   Final   • MDMA Cut-Off 10/12/2021 500NG/ML   Final   • External Opiates Screen Urine 10/12/2021 Negative   Final   • Opiates Cut-Off 10/12/2021 300NG/ML   Final   Admission on 06/17/2021, Discharged on 06/19/2021   Component Date Value Ref Range Status   • Glucose 06/17/2021 141* 65 - 99 mg/dL Final   • BUN 06/17/2021 5* 6 - 20 mg/dL Final   • Creatinine 06/17/2021 0.57* 0.76 - 1.27 mg/dL Final   • Sodium 06/17/2021 140  136 - 145 mmol/L Final   • Potassium 06/17/2021 3.9  3.5 - 5.2 mmol/L Final    Slight hemolysis detected by analyzer. Results may be affected.   • Chloride 06/17/2021 106  98 - 107 mmol/L Final   • CO2 06/17/2021 22.9  22.0 - 29.0 mmol/L Final   • Calcium 06/17/2021 9.6  8.6 - 10.5 mg/dL Final   • Total Protein 06/17/2021 7.6   6.0 - 8.5 g/dL Final   • Albumin 06/17/2021 3.96  3.50 - 5.20 g/dL Final   • ALT (SGPT) 06/17/2021 99* 1 - 41 U/L Final   • AST (SGOT) 06/17/2021 156* 1 - 40 U/L Final   • Alkaline Phosphatase 06/17/2021 276* 39 - 117 U/L Final   • Total Bilirubin 06/17/2021 1.1  0.0 - 1.2 mg/dL Final   • eGFR Non African Amer 06/17/2021 >150  >60 mL/min/1.73 Final   • Globulin 06/17/2021 3.6  gm/dL Final   • A/G Ratio 06/17/2021 1.1  g/dL Final   • BUN/Creatinine Ratio 06/17/2021 8.8  7.0 - 25.0 Final   • Anion Gap 06/17/2021 11.1  5.0 - 15.0 mmol/L Final   • Lipase 06/17/2021 463* 13 - 60 U/L Final   • Amylase 06/17/2021 205* 28 - 100 U/L Final   • WBC 06/17/2021 10.73  3.40 - 10.80 10*3/mm3 Final   • RBC 06/17/2021 5.07  4.14 - 5.80 10*6/mm3 Final   • Hemoglobin 06/17/2021 16.3  13.0 - 17.7 g/dL Final   • Hematocrit 06/17/2021 47.7  37.5 - 51.0 % Final   • MCV 06/17/2021 94.1  79.0 - 97.0 fL Final   • MCH 06/17/2021 32.1  26.6 - 33.0 pg Final   • MCHC 06/17/2021 34.2  31.5 - 35.7 g/dL Final   • RDW 06/17/2021 12.3  12.3 - 15.4 % Final   • RDW-SD 06/17/2021 42.6  37.0 - 54.0 fl Final   • MPV 06/17/2021 10.1  6.0 - 12.0 fL Final   • Platelets 06/17/2021 168  140 - 450 10*3/mm3 Final   • Neutrophil % 06/17/2021 81.6* 42.7 - 76.0 % Final   • Lymphocyte % 06/17/2021 9.6* 19.6 - 45.3 % Final   • Monocyte % 06/17/2021 6.4  5.0 - 12.0 % Final   • Eosinophil % 06/17/2021 1.6  0.3 - 6.2 % Final   • Basophil % 06/17/2021 0.3  0.0 - 1.5 % Final   • Immature Grans % 06/17/2021 0.5  0.0 - 0.5 % Final   • Neutrophils, Absolute 06/17/2021 8.76* 1.70 - 7.00 10*3/mm3 Final   • Lymphocytes, Absolute 06/17/2021 1.03  0.70 - 3.10 10*3/mm3 Final   • Monocytes, Absolute 06/17/2021 0.69  0.10 - 0.90 10*3/mm3 Final   • Eosinophils, Absolute 06/17/2021 0.17  0.00 - 0.40 10*3/mm3 Final   • Basophils, Absolute 06/17/2021 0.03  0.00 - 0.20 10*3/mm3 Final   • Immature Grans, Absolute 06/17/2021 0.05  0.00 - 0.05 10*3/mm3 Final   • nRBC 06/17/2021 0.0  0.0 -  0.2 /100 WBC Final   • Ethanol 06/17/2021 <10  0 - 10 mg/dL Final   • Ethanol % 06/17/2021 <0.010  % Final   • Color, UA 06/17/2021 Yellow  Yellow, Straw Final   • Appearance, UA 06/17/2021 Clear  Clear Final   • pH, UA 06/17/2021 5.5  5.0 - 8.0 Final   • Specific Gravity, UA 06/17/2021 >1.030* 1.005 - 1.030 Final   • Glucose, UA 06/17/2021 Negative  Negative Final   • Ketones, UA 06/17/2021 Negative  Negative Final   • Bilirubin, UA 06/17/2021 Negative  Negative Final   • Blood, UA 06/17/2021 Negative  Negative Final   • Protein, UA 06/17/2021 Negative  Negative Final   • Leuk Esterase, UA 06/17/2021 Negative  Negative Final   • Nitrite, UA 06/17/2021 Negative  Negative Final   • Urobilinogen, UA 06/17/2021 1.0 E.U./dL  0.2 - 1.0 E.U./dL Final   • Amphetamine Screen, Urine 06/17/2021 Negative  Negative Final   • Barbiturates Screen, Urine 06/17/2021 Negative  Negative Final   • Benzodiazepine Screen, Urine 06/17/2021 Negative  Negative Final   • Cocaine Screen, Urine 06/17/2021 Negative  Negative Final   • Methadone Screen, Urine 06/17/2021 Negative  Negative Final   • Opiate Screen 06/17/2021 Negative  Negative Final   • Phencyclidine (PCP), Urine 06/17/2021 Negative  Negative Final   • THC, Screen, Urine 06/17/2021 Negative  Negative Final   • 6-ACETYL MORPHINE 06/17/2021 Negative  Negative Final   • Buprenorphine, Screen, Urine 06/17/2021 Positive* Negative Final   • Oxycodone Screen, Urine 06/17/2021 Negative  Negative Final   • COVID19 06/17/2021 Not Detected  Not Detected - Ref. Range Final   • Influenza A PCR 06/17/2021 Not Detected  Not Detected Final   • Influenza B PCR 06/17/2021 Not Detected  Not Detected Final   • Hemoglobin A1C 06/17/2021 5.50  4.80 - 5.60 % Final   • TSH 06/17/2021 2.360  0.270 - 4.200 uIU/mL Final   • QT Interval 06/18/2021 456  ms Final   • QTC Interval 06/18/2021 436  ms Final   • Hepatitis C Quantitation 06/18/2021 HCV Not Detected  IU/mL Final   • Test Information 06/18/2021  Comment   Final    The quantitative range of this assay is 15 IU/mL to 100 million IU/mL.   • Please note 06/18/2021 Comment   Final    This test was developed and its performance characteristics determined  by [facility].  It has not been cleared or approved by the U.[place]. Food and  Drug Administration.  The FDA has determined that such clearance or approval is not  necessary. This test is used for clinical purposes.  It should not be  regarded as investigational or for research.   • Hepatitis C Genotype 06/18/2021 Comment   Final    Specimen has insufficient hepatitis C virus RNA to obtain genotyping  results. This genotyping assay should only be used for known HCV  positive patients with HCV RNA levels above 1000 IU/mL.   • Glucose 06/18/2021 121* 65 - 99 mg/dL Final   • BUN 06/18/2021 5* 6 - 20 mg/dL Final   • Creatinine 06/18/2021 0.55* 0.76 - 1.27 mg/dL Final   • Sodium 06/18/2021 138  136 - 145 mmol/L Final   • Potassium 06/18/2021 3.5  3.5 - 5.2 mmol/L Final    Slight hemolysis detected by analyzer. Results may be affected.   • Chloride 06/18/2021 103  98 - 107 mmol/L Final   • CO2 06/18/2021 22.1  22.0 - 29.0 mmol/L Final   • Calcium 06/18/2021 9.1  8.6 - 10.5 mg/dL Final   • Total Protein 06/18/2021 6.7  6.0 - 8.5 g/dL Final   • Albumin 06/18/2021 3.46* 3.50 - 5.20 g/dL Final   • ALT (SGPT) 06/18/2021 77* 1 - 41 U/L Final   • AST (SGOT) 06/18/2021 100* 1 - 40 U/L Final   • Alkaline Phosphatase 06/18/2021 237* 39 - 117 U/L Final   • Total Bilirubin 06/18/2021 1.5* 0.0 - 1.2 mg/dL Final   • eGFR Non African Amer 06/18/2021 >150  >60 mL/min/1.73 Final   • Globulin 06/18/2021 3.2  gm/dL Final   • A/G Ratio 06/18/2021 1.1  g/dL Final   • BUN/Creatinine Ratio 06/18/2021 9.1  7.0 - 25.0 Final   • Anion Gap 06/18/2021 12.9  5.0 - 15.0 mmol/L Final   • Protime 06/18/2021 13.4  12.8 - 14.5 Seconds Final    Note new Reference Range   • INR 06/18/2021 0.98  0.90 - 1.10 Final   • PTT 06/18/2021 28.0  25.5 - 35.4 seconds  Final    Note new Reference Range   • Phosphorus 06/18/2021 2.8  2.5 - 4.5 mg/dL Final   • Magnesium 06/18/2021 1.5* 1.6 - 2.6 mg/dL Final   • WBC 06/18/2021 11.20* 3.40 - 10.80 10*3/mm3 Final   • RBC 06/18/2021 5.03  4.14 - 5.80 10*6/mm3 Final   • Hemoglobin 06/18/2021 16.2  13.0 - 17.7 g/dL Final   • Hematocrit 06/18/2021 48.1  37.5 - 51.0 % Final   • MCV 06/18/2021 95.6  79.0 - 97.0 fL Final   • MCH 06/18/2021 32.2  26.6 - 33.0 pg Final   • MCHC 06/18/2021 33.7  31.5 - 35.7 g/dL Final   • RDW 06/18/2021 12.2* 12.3 - 15.4 % Final   • RDW-SD 06/18/2021 42.6  37.0 - 54.0 fl Final   • MPV 06/18/2021 10.5  6.0 - 12.0 fL Final   • Platelets 06/18/2021 141  140 - 450 10*3/mm3 Final   • Vitamin B1, Whole Blood 06/18/2021 144.6  66.5 - 200.0 nmol/L Final   • Vitamin B-12 06/18/2021 352  211 - 946 pg/mL Final   • Folate 06/18/2021 2.31* 4.78 - 24.20 ng/mL Final   • Total Cholesterol 06/18/2021 183  0 - 200 mg/dL Final   • Triglycerides 06/18/2021 128  0 - 150 mg/dL Final   • HDL Cholesterol 06/18/2021 61* 40 - 60 mg/dL Final   • LDL Cholesterol  06/18/2021 100  0 - 100 mg/dL Final   • VLDL Cholesterol 06/18/2021 22  5 - 40 mg/dL Final   • LDL/HDL Ratio 06/18/2021 1.58   Final   • Magnesium 06/19/2021 1.9  1.6 - 2.6 mg/dL Final   • Glucose 06/19/2021 102* 65 - 99 mg/dL Final   • BUN 06/19/2021 4* 6 - 20 mg/dL Final   • Creatinine 06/19/2021 0.52* 0.76 - 1.27 mg/dL Final   • Sodium 06/19/2021 132* 136 - 145 mmol/L Final   • Potassium 06/19/2021 3.9  3.5 - 5.2 mmol/L Final    Slight hemolysis detected by analyzer. Results may be affected.   • Chloride 06/19/2021 98  98 - 107 mmol/L Final   • CO2 06/19/2021 22.0  22.0 - 29.0 mmol/L Final   • Calcium 06/19/2021 8.9  8.6 - 10.5 mg/dL Final   • Total Protein 06/19/2021 6.8  6.0 - 8.5 g/dL Final   • Albumin 06/19/2021 3.16* 3.50 - 5.20 g/dL Final   • ALT (SGPT) 06/19/2021 49* 1 - 41 U/L Final   • AST (SGOT) 06/19/2021 68* 1 - 40 U/L Final   • Alkaline Phosphatase 06/19/2021 233*  39 - 117 U/L Final   • Total Bilirubin 06/19/2021 1.2  0.0 - 1.2 mg/dL Final   • eGFR Non African Amer 06/19/2021 >150  >60 mL/min/1.73 Final   • Globulin 06/19/2021 3.6  gm/dL Final   • A/G Ratio 06/19/2021 0.9  g/dL Final   • BUN/Creatinine Ratio 06/19/2021 7.7  7.0 - 25.0 Final   • Anion Gap 06/19/2021 12.0  5.0 - 15.0 mmol/L Final         Assessment/Plan   Diagnoses and all orders for this visit:    1. Opioid type dependence, continuous (HCC) (Primary)  -     buprenorphine-naloxone (SUBOXONE) 8-2 MG per SL tablet; Place 2 tablets under the tongue Daily.  Dispense: 28 tablet; Refill: 0    2. Medication management  -     [facility] Drug Screen    3. Arthralgia, unspecified joint  -     ibuprofen (ADVIL,MOTRIN) 800 MG tablet; Take 1 tablet by mouth Every 8 (Eight) Hours As Needed for Moderate Pain .  Dispense: 30 tablet; Refill: 0        Visit Diagnoses:    ICD-10-CM ICD-9-CM   1. Medication management  Z79.899 V58.69       PLAN:  1. Safety: No acute safety concerns  2. Risk Assessment: Risk of self-harm acutely is low. Risk of self-harm chronically is also low, but could be further elevated in the event of treatment noncompliance and/or AODA.    TREATMENT PLAN/GOALS: Continue supportive psychotherapy efforts and medications as indicated. Treatment and medication options discussed during today's visit. Patient acknowledged and verbally consented to continue with current treatment plan and was educated on the importance of compliance with treatment and follow-up appointments.    MEDICATION ISSUES:  ROMAN reviewed as expected.  Discussed medication options and treatment plan of prescribed medication as well as the risks, benefits, and side effects including potential falls, possible impaired driving and metabolic adversities among others. Patient is agreeable to call the office with any worsening of symptoms or onset of side effects. Patient is agreeable to call 911 or go to the nearest ER should he/she begin having  SI/HI. No medication side effects or related complaints today.     MEDS ORDERED DURING VISIT:  No orders of the defined types were placed in this encounter.      No follow-ups on file.           This document has been electronically signed by RENAN De Dios  December 1, 2021 14:39 EST      Part of this note may be an electronic transcription/translation of spoken language to printed text using the Dragon Dictation System.

## 2022-01-01 ENCOUNTER — HOSPITAL ENCOUNTER (EMERGENCY)
Facility: HOSPITAL | Age: 32
Discharge: HOME | End: 2022-01-01
Attending: EMERGENCY MEDICINE | Admitting: EMERGENCY MEDICINE

## 2022-01-01 VITALS
OXYGEN SATURATION: 100 % | SYSTOLIC BLOOD PRESSURE: 139 MMHG | HEART RATE: 74 BPM | DIASTOLIC BLOOD PRESSURE: 100 MMHG | TEMPERATURE: 97.1 F | BODY MASS INDEX: 24.3 KG/M2 | WEIGHT: 210 LBS | RESPIRATION RATE: 18 BRPM | HEIGHT: 78 IN

## 2022-01-01 DIAGNOSIS — F10.10 ALCOHOL ABUSE: Primary | ICD-10-CM

## 2022-01-01 LAB
ALBUMIN SERPL-MCNC: 3.86 G/DL (ref 3.5–5.2)
ALBUMIN/GLOB SERPL: 0.9 G/DL
ALP SERPL-CCNC: 452 U/L (ref 39–117)
ALT SERPL W P-5'-P-CCNC: 82 U/L (ref 1–41)
AMPHET+METHAMPHET UR QL: NEGATIVE
AMPHETAMINES UR QL: NEGATIVE
ANION GAP SERPL CALCULATED.3IONS-SCNC: 17.1 MMOL/L (ref 5–15)
AST SERPL-CCNC: 253 U/L (ref 1–40)
BARBITURATES UR QL SCN: NEGATIVE
BASOPHILS # BLD AUTO: 0.06 10*3/MM3 (ref 0–0.2)
BASOPHILS NFR BLD AUTO: 0.5 % (ref 0–1.5)
BENZODIAZ UR QL SCN: NEGATIVE
BILIRUB SERPL-MCNC: 0.7 MG/DL (ref 0–1.2)
BILIRUB UR QL STRIP: NEGATIVE
BUN SERPL-MCNC: 3 MG/DL (ref 6–20)
BUN/CREAT SERPL: 6.1 (ref 7–25)
BUPRENORPHINE SERPL-MCNC: POSITIVE NG/ML
CALCIUM SPEC-SCNC: 8.7 MG/DL (ref 8.6–10.5)
CANNABINOIDS SERPL QL: NEGATIVE
CHLORIDE SERPL-SCNC: 100 MMOL/L (ref 98–107)
CLARITY UR: CLEAR
CO2 SERPL-SCNC: 21.9 MMOL/L (ref 22–29)
COCAINE UR QL: NEGATIVE
COLOR UR: YELLOW
CREAT SERPL-MCNC: 0.49 MG/DL (ref 0.76–1.27)
DEPRECATED RDW RBC AUTO: 42.3 FL (ref 37–54)
EOSINOPHIL # BLD AUTO: 0.2 10*3/MM3 (ref 0–0.4)
EOSINOPHIL NFR BLD AUTO: 1.8 % (ref 0.3–6.2)
ERYTHROCYTE [DISTWIDTH] IN BLOOD BY AUTOMATED COUNT: 11.9 % (ref 12.3–15.4)
ETHANOL BLD-MCNC: 102 MG/DL (ref 0–10)
ETHANOL BLD-MCNC: 311 MG/DL (ref 0–10)
ETHANOL UR QL: 0.1 %
ETHANOL UR QL: 0.31 %
FLUAV RNA RESP QL NAA+PROBE: NOT DETECTED
FLUBV RNA RESP QL NAA+PROBE: NOT DETECTED
GFR SERPL CREATININE-BSD FRML MDRD: >150 ML/MIN/1.73
GLOBULIN UR ELPH-MCNC: 4.3 GM/DL
GLUCOSE SERPL-MCNC: 112 MG/DL (ref 65–99)
GLUCOSE UR STRIP-MCNC: NEGATIVE MG/DL
HCT VFR BLD AUTO: 42.3 % (ref 37.5–51)
HGB BLD-MCNC: 14.3 G/DL (ref 13–17.7)
HGB UR QL STRIP.AUTO: NEGATIVE
IMM GRANULOCYTES # BLD AUTO: 0.14 10*3/MM3 (ref 0–0.05)
IMM GRANULOCYTES NFR BLD AUTO: 1.3 % (ref 0–0.5)
KETONES UR QL STRIP: NEGATIVE
LEUKOCYTE ESTERASE UR QL STRIP.AUTO: NEGATIVE
LYMPHOCYTES # BLD AUTO: 2.28 10*3/MM3 (ref 0.7–3.1)
LYMPHOCYTES NFR BLD AUTO: 20.6 % (ref 19.6–45.3)
MAGNESIUM SERPL-MCNC: 1.7 MG/DL (ref 1.6–2.6)
MCH RBC QN AUTO: 32.6 PG (ref 26.6–33)
MCHC RBC AUTO-ENTMCNC: 33.8 G/DL (ref 31.5–35.7)
MCV RBC AUTO: 96.4 FL (ref 79–97)
METHADONE UR QL SCN: NEGATIVE
MONOCYTES # BLD AUTO: 0.55 10*3/MM3 (ref 0.1–0.9)
MONOCYTES NFR BLD AUTO: 5 % (ref 5–12)
NEUTROPHILS NFR BLD AUTO: 7.86 10*3/MM3 (ref 1.7–7)
NEUTROPHILS NFR BLD AUTO: 70.8 % (ref 42.7–76)
NITRITE UR QL STRIP: NEGATIVE
NRBC BLD AUTO-RTO: 0 /100 WBC (ref 0–0.2)
OPIATES UR QL: NEGATIVE
OXYCODONE UR QL SCN: NEGATIVE
PCP UR QL SCN: NEGATIVE
PH UR STRIP.AUTO: 6.5 [PH] (ref 5–8)
PLATELET # BLD AUTO: 196 10*3/MM3 (ref 140–450)
PMV BLD AUTO: 9.6 FL (ref 6–12)
POTASSIUM SERPL-SCNC: 3.6 MMOL/L (ref 3.5–5.2)
PROPOXYPH UR QL: NEGATIVE
PROT SERPL-MCNC: 8.2 G/DL (ref 6–8.5)
PROT UR QL STRIP: NEGATIVE
RBC # BLD AUTO: 4.39 10*6/MM3 (ref 4.14–5.8)
SARS-COV-2 RNA RESP QL NAA+PROBE: NOT DETECTED
SODIUM SERPL-SCNC: 139 MMOL/L (ref 136–145)
SP GR UR STRIP: <=1.005 (ref 1–1.03)
TRICYCLICS UR QL SCN: NEGATIVE
UROBILINOGEN UR QL STRIP: NORMAL
WBC NRBC COR # BLD: 11.09 10*3/MM3 (ref 3.4–10.8)

## 2022-01-01 PROCEDURE — 81003 URINALYSIS AUTO W/O SCOPE: CPT | Performed by: PHYSICIAN ASSISTANT

## 2022-01-01 PROCEDURE — 80053 COMPREHEN METABOLIC PANEL: CPT | Performed by: PHYSICIAN ASSISTANT

## 2022-01-01 PROCEDURE — 83735 ASSAY OF MAGNESIUM: CPT | Performed by: PHYSICIAN ASSISTANT

## 2022-01-01 PROCEDURE — 82077 ASSAY SPEC XCP UR&BREATH IA: CPT | Performed by: PHYSICIAN ASSISTANT

## 2022-01-01 PROCEDURE — 82077 ASSAY SPEC XCP UR&BREATH IA: CPT | Performed by: EMERGENCY MEDICINE

## 2022-01-01 PROCEDURE — 87636 SARSCOV2 & INF A&B AMP PRB: CPT | Performed by: PHYSICIAN ASSISTANT

## 2022-01-01 PROCEDURE — 85025 COMPLETE CBC W/AUTO DIFF WBC: CPT | Performed by: PHYSICIAN ASSISTANT

## 2022-01-01 PROCEDURE — 80306 DRUG TEST PRSMV INSTRMNT: CPT | Performed by: PHYSICIAN ASSISTANT

## 2022-01-01 PROCEDURE — C9803 HOPD COVID-19 SPEC COLLECT: HCPCS

## 2022-01-01 PROCEDURE — 36415 COLL VENOUS BLD VENIPUNCTURE: CPT

## 2022-01-01 PROCEDURE — 99284 EMERGENCY DEPT VISIT MOD MDM: CPT

## 2022-01-01 RX ORDER — NICOTINE 21 MG/24HR
1 PATCH, TRANSDERMAL 24 HOURS TRANSDERMAL ONCE
Status: DISCONTINUED | OUTPATIENT
Start: 2022-01-01 | End: 2022-01-02 | Stop reason: HOSPADM

## 2022-01-01 RX ADMIN — NICOTINE 1 PATCH: 21 PATCH, EXTENDED RELEASE TRANSDERMAL at 15:04

## 2022-01-01 NOTE — NURSING NOTE
Patient remains in room 5 waiting for his alcohol to go down. We are still waiting for him to be placed in ER room. Patient verbalizes no question or concerns at this time.

## 2022-01-01 NOTE — ED PROVIDER NOTES
Subjective   31-year-old white male presents secondary to need for alcohol detox.  Patient states has been drinking for quite some time and drinks about 6 double shots per day.  He denies any suicidal homicidal ideation.  Denies any exposure to Covid or Covid symptoms.  He voices no other complaints this time.          Review of Systems   Constitutional: Negative.  Negative for fever.   HENT: Negative.    Respiratory: Negative.    Cardiovascular: Negative.  Negative for chest pain.   Gastrointestinal: Negative.  Negative for abdominal pain.   Endocrine: Negative.    Genitourinary: Negative.  Negative for dysuria.   Skin: Negative.    Neurological: Negative.    Psychiatric/Behavioral: Negative.  Negative for suicidal ideas.   All other systems reviewed and are negative.      Past Medical History:   Diagnosis Date   • Alcohol-induced acute pancreatitis 6/27/2019   • Alcoholism (HCC)    • Drug use     Amphetamines and Suboxone   • Fatty liver    • Hepatitis C antibody test positive 2019   • Medical non-compliance    • Smoker        No Known Allergies    Past Surgical History:   Procedure Laterality Date   • NO PAST SURGERIES         Family History   Problem Relation Age of Onset   • Cancer Maternal Grandmother        Social History     Socioeconomic History   • Marital status:    Tobacco Use   • Smoking status: Current Every Day Smoker     Packs/day: 0.50     Types: Cigarettes   • Smokeless tobacco: Never Used   Vaping Use   • Vaping Use: Never used   Substance and Sexual Activity   • Alcohol use: Yes     Alcohol/week: 8.0 standard drinks     Types: 8 Shots of liquor per week     Comment: states occasional use, last use was yesterday, drank couple mixed drink   • Drug use: Yes     Comment: suboxone as prescribed   • Sexual activity: Defer           Objective   Physical Exam  Vitals and nursing note reviewed.   Constitutional:       General: He is not in acute distress.     Appearance: He is well-developed. He is  not diaphoretic.   HENT:      Head: Normocephalic and atraumatic.      Right Ear: External ear normal.      Left Ear: External ear normal.      Nose: Nose normal.   Eyes:      Conjunctiva/sclera: Conjunctivae normal.      Pupils: Pupils are equal, round, and reactive to light.   Neck:      Vascular: No JVD.      Trachea: No tracheal deviation.   Cardiovascular:      Rate and Rhythm: Normal rate and regular rhythm.      Heart sounds: Normal heart sounds. No murmur heard.      Pulmonary:      Effort: Pulmonary effort is normal. No respiratory distress.      Breath sounds: Normal breath sounds. No wheezing.   Abdominal:      General: Bowel sounds are normal.      Palpations: Abdomen is soft.      Tenderness: There is no abdominal tenderness.   Musculoskeletal:         General: No deformity. Normal range of motion.      Cervical back: Normal range of motion and neck supple.   Skin:     General: Skin is warm and dry.      Coloration: Skin is not pale.      Findings: No erythema or rash.   Neurological:      Mental Status: He is alert and oriented to person, place, and time.      Cranial Nerves: No cranial nerve deficit.   Psychiatric:         Behavior: Behavior normal.         Thought Content: Thought content normal. Thought content does not include homicidal or suicidal ideation.         Procedures           ED Course                                                 MDM  Number of Diagnoses or Management Options  Alcohol abuse: established and worsening     Amount and/or Complexity of Data Reviewed  Clinical lab tests: ordered and reviewed        Final diagnoses:   Alcohol abuse       ED Disposition  ED Disposition     ED Disposition Condition Comment    DC/Transfer to Behavioral Health Stable           No follow-up provider specified.       Medication List      No changes were made to your prescriptions during this visit.          Israel Gutiérrez PA  01/01/22 1938

## 2022-01-01 NOTE — NURSING NOTE
Intake assessment completed. Patient is a 31 year old male who was referred by a rehab to our ER for detox from alcohol. He reports that he has been drinking daily for the past four years. Currently drinking 6 double shots of fire ball whisky per day. He reports that his last drink was approximately 1 hour PTA. He has decided to seek help because of negative ETOH use is having on his family. He also reports buying gabapentin off of the street and taking 2-3 tablets PO daily.  He is currently not reporting any withdrawal symptoms. We will reassess this when alcohol level is lower. He denies SI, HI, or AVH. He reports poor sleep. He rates depression 4 on a 1-10 scale with 10 being the most severe. He rates anxiety 6 on this same scale.

## 2022-01-01 NOTE — NURSING NOTE
ETOH 311. ER provider aware. We are working towards moving him back to medical bed. Patient and family made aware.

## 2022-01-02 NOTE — NURSING NOTE
Spoke w/ Dr. Juárez over phone, intake information and labs provided at this time. Patient states he does want to go home tonight. Per Dr. Juárez patient does not meet admission criteria, and provider suggests outpatient referral information be provided. RBVO X2.

## 2022-01-02 NOTE — NURSING NOTE
Intake assessment completed at this time. 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 Patient states he quit drinking 5 days ago and relapsed at midnight for the new years, last drink noon today. CIWA 10 at this time, COWS 4. Patient states he also takes Gabapentin 800mg TID from the street, last use 3 days PTA. Patient denies any SI/HI/AVH. Patient states he prefers to go home tonight and check hisself into an outpatient rehab tomorrow.

## 2022-01-05 ENCOUNTER — OFFICE VISIT (OUTPATIENT)
Dept: PSYCHIATRY | Facility: CLINIC | Age: 32
End: 2022-01-05

## 2022-01-05 VITALS
HEIGHT: 78 IN | DIASTOLIC BLOOD PRESSURE: 80 MMHG | HEART RATE: 78 BPM | SYSTOLIC BLOOD PRESSURE: 118 MMHG | BODY MASS INDEX: 24.3 KG/M2 | WEIGHT: 210 LBS

## 2022-01-05 DIAGNOSIS — Z79.899 MEDICATION MANAGEMENT: ICD-10-CM

## 2022-01-05 DIAGNOSIS — F11.20 OPIOID TYPE DEPENDENCE, CONTINUOUS: Primary | ICD-10-CM

## 2022-01-05 PROCEDURE — 99213 OFFICE O/P EST LOW 20 MIN: CPT | Performed by: NURSE PRACTITIONER

## 2022-01-05 RX ORDER — BUPRENORPHINE HYDROCHLORIDE AND NALOXONE HYDROCHLORIDE DIHYDRATE 8; 2 MG/1; MG/1
2 TABLET SUBLINGUAL DAILY
Qty: 14 TABLET | Refills: 0 | Status: SHIPPED | OUTPATIENT
Start: 2022-01-05 | End: 2022-01-13 | Stop reason: SDUPTHER

## 2022-01-05 NOTE — PROGRESS NOTES
This provider is located at [facility]. The Patient is seen remotely using Video. Patient is being seen via telehealth and confirm that they are in a secure environment for this session. The patient's condition being diagnosed/treated is appropriate for telemedicine. Provider identified as Daniel Olivas as well as credentials APRN ADRIENNE ANDERSONP-JUSTIN PINEDA.   The client/patient gave consent to be seen remotely, and when consent is given they understand that the consent allows for patient identifiable information to be sent to a third party as needed.   They may refuse to be seen remotely at any time. The electronic data is encrypted and password protected, and the patient has been advised of the potential risks to privacy not withstanding such measures.    Reviewed recent emergency department documentation 4 days ago where patient was seen for alcohol intoxication    Chief Complaint/History of Present Illness: Follow Up buprenorphine/naloxone Medicated Assisted Treatment for Opiate Use Disorder     Patient/Client Concerns/Updates: Patient reports he has not consumed alcohol in 6 days and has no intention of drinking further; patient reports he presented to the ER for medically supervised detoxification and reports he was discharged home; patient reports his wife threatened to end the marriage should he continue consuming alcohol; I reiterated to the patient that if any further alcohol presents even once care with myself will be ceased and he will be mandated to attend residential treatment    Triggers (Persons/Places/Things/Events/Thought/Emotions): Anxiety    Cravings: Patient denies cravings for alcohol or other substances    Relapse Prevention: Counseling and will mandate residential treatment should alcohol use be resumed in any quantity    Urine Drug Screen (today's visit) discussed: Positive buprenorphine, otherwise negative for substances tested    UDS Confirmation: Positive buprenorphine/nor-buprenorphine  positive ethyl glucuronide    ROMAN (PDMP) Reviewed for Current/Active Medications: buprenorphine/naloxone as reviewed today    Past Surgical History:  Past Surgical History:   Procedure Laterality Date   • NO PAST SURGERIES         Problem List:  Patient Active Problem List   Diagnosis   • Alcohol-induced acute pancreatitis   • Pancreatitis       Allergy:   No Known Allergies     Current Medications:   Current Outpatient Medications   Medication Sig Dispense Refill   • buprenorphine-naloxone (SUBOXONE) 8-2 MG per SL tablet Place 2 tablets under the tongue Daily. 28 tablet 0   • cephalexin (KEFLEX) 500 MG capsule Take 1 capsule by mouth 3 (Three) Times a Day. 30 capsule 0   • cloNIDine (Catapres) 0.1 MG tablet Take 1 tablet by mouth 2 (Two) Times a Day. Take as needed for anxiety.  Insomnia.  Chills or sweats 60 tablet 2   • hydrOXYzine pamoate (Vistaril) 25 MG capsule Take 1 capsule by mouth 4 (Four) Times a Day As Needed for Anxiety (and/or insomia). 60 capsule 1   • ibuprofen (ADVIL,MOTRIN) 800 MG tablet Take 1 tablet by mouth Every 8 (Eight) Hours As Needed for Moderate Pain . 30 tablet 0   • mupirocin (BACTROBAN) 2 % ointment Apply  topically to the appropriate area as directed 3 (Three) Times a Day. 15 g 0   • naloxone (NARCAN) 4 MG/0.1ML nasal spray 1 spray into the nostril(s) as directed by provider As Needed (Opiate oversedation). Spray in 1 nostril every 2 to 3 minutes call 911 2 each 2   • orphenadrine (NORFLEX) 100 MG 12 hr tablet Take 1 tablet by mouth 2 (Two) Times a Day As Needed for Muscle Spasms or Mild Pain . 14 tablet 0   • sertraline (Zoloft) 25 MG tablet Take 1 tablet by mouth Daily for 30 days. 30 tablet 3     No current facility-administered medications for this visit.       Past Medical History:  Past Medical History:   Diagnosis Date   • Alcohol-induced acute pancreatitis 6/27/2019   • Alcoholism (HCC)    • Drug use     Amphetamines and Suboxone   • Fatty liver    • Hepatitis C antibody  test positive 2019   • Medical non-compliance    • Pancreatitis    • Smoker          Social History     Socioeconomic History   • Marital status:    Tobacco Use   • Smoking status: Current Every Day Smoker     Packs/day: 0.50     Types: Cigarettes   • Smokeless tobacco: Never Used   Vaping Use   • Vaping Use: Never used   Substance and Sexual Activity   • Alcohol use: Yes     Alcohol/week: 8.0 standard drinks     Types: 8 Shots of liquor per week     Comment: 5-6 double shots   • Drug use: Yes     Comment: suboxone as prescribed   • Sexual activity: Defer         Family History   Problem Relation Age of Onset   • Cancer Maternal Grandmother    • No Known Problems Mother    • No Known Problems Father          Mental Status Exam:   Hygiene:   good  Cooperation:  Cooperative  Eye Contact:  Good  Psychomotor Behavior:  Appropriate  Affect:  Appropriate  Mood: depressed and anxious  Speech:  Normal  Thought Process:  Goal directed  Thought Content:  Normal  Suicidal:  None  Homicidal:  None  Hallucinations:  None  Delusion:  None  Memory:  Intact  Orientation:  Grossly intact  Reliability:  fair  Insight:  Fair  Judgement:  Fair  Impulse Control:  Fair         Review of Systems:  Review of Systems   Constitutional: Negative for activity change, chills, diaphoresis and fatigue.   Respiratory: Negative for apnea, cough and shortness of breath.    Cardiovascular: Negative for chest pain, palpitations and leg swelling.   Gastrointestinal: Negative for abdominal pain, constipation, diarrhea, nausea and vomiting.   Genitourinary: Negative for difficulty urinating.   Musculoskeletal: Negative for arthralgias.   Skin: Negative for rash.   Neurological: Negative for dizziness, weakness and headaches.   Psychiatric/Behavioral: Negative for agitation, self-injury, sleep disturbance and suicidal ideas. The patient is not nervous/anxious.          Physical Exam:  Physical Exam  Vitals reviewed.   Constitutional:       General:  "He is not in acute distress.     Appearance: Normal appearance. He is not ill-appearing or toxic-appearing.   Pulmonary:      Effort: Pulmonary effort is normal.   Musculoskeletal:         General: Normal range of motion.   Neurological:      General: No focal deficit present.      Mental Status: He is alert and oriented to person, place, and time.   Psychiatric:         Attention and Perception: Attention and perception normal.         Mood and Affect: Mood normal. Mood is not anxious or depressed.         Speech: Speech normal.         Behavior: Behavior normal. Behavior is cooperative.         Thought Content: Thought content normal.         Cognition and Memory: Cognition and memory normal.         Judgment: Judgment normal.         Vital Signs:   /80   Pulse 78   Ht 198.1 cm (78\")   Wt 95.3 kg (210 lb)   BMI 24.27 kg/m²      Lab Results:   Office Visit on 01/05/2022   Component Date Value Ref Range Status   • External Amphetamine Screen Urine 01/05/2022 Negative   Final   • Amphetamine Cut-Off 01/05/2022 1000ng/ml   Final   • External Benzodiazepine Screen Uri* 01/05/2022 Negative   Final   • Benzodiazipine Cut-Off 01/05/2022 300ng/ml   Final   • External Cocaine Screen Urine 01/05/2022 Negative   Final   • Cocaine Cut-Off 01/05/2022 300ng/ml   Final   • External THC Screen Urine 01/05/2022 Negative   Final   • THC Cut-Off 01/05/2022 50ng/ml   Final   • External Methadone Screen Urine 01/05/2022 Negative   Final   • Methadone Cut-Off 01/05/2022 300ng/ml   Final   • External Methamphetamine Screen Ur* 01/05/2022 Negative   Final   • Methamphetamine Cut-Off 01/05/2022 1000ng/ml   Final   • External Oxycodone Screen Urine 01/05/2022 Negative   Final   • Oxycodone Cut-Off 01/05/2022 100ng/ml   Final   • External Buprenorphine Screen Urine 01/05/2022 Positive   Final   • Buprenorphine Cut-Off 01/05/2022 10ng/ml   Final   • External MDMA 01/05/2022 Negative   Final   • MDMA Cut-Off 01/05/2022 500ng/ml   " Final   • External Opiates Screen Urine 01/05/2022 Negative   Final   • Opiates Cut-Off 01/05/2022 300ng/ml   Final   Admission on 01/01/2022, Discharged on 01/01/2022   Component Date Value Ref Range Status   • Glucose 01/01/2022 112* 65 - 99 mg/dL Final   • BUN 01/01/2022 3* 6 - 20 mg/dL Final   • Creatinine 01/01/2022 0.49* 0.76 - 1.27 mg/dL Final   • Sodium 01/01/2022 139  136 - 145 mmol/L Final   • Potassium 01/01/2022 3.6  3.5 - 5.2 mmol/L Final   • Chloride 01/01/2022 100  98 - 107 mmol/L Final   • CO2 01/01/2022 21.9* 22.0 - 29.0 mmol/L Final   • Calcium 01/01/2022 8.7  8.6 - 10.5 mg/dL Final   • Total Protein 01/01/2022 8.2  6.0 - 8.5 g/dL Final   • Albumin 01/01/2022 3.86  3.50 - 5.20 g/dL Final   • ALT (SGPT) 01/01/2022 82* 1 - 41 U/L Final   • AST (SGOT) 01/01/2022 253* 1 - 40 U/L Final   • Alkaline Phosphatase 01/01/2022 452* 39 - 117 U/L Final   • Total Bilirubin 01/01/2022 0.7  0.0 - 1.2 mg/dL Final   • eGFR Non  Amer 01/01/2022 >150  >60 mL/min/1.73 Final   • Globulin 01/01/2022 4.3  gm/dL Final   • A/G Ratio 01/01/2022 0.9  g/dL Final   • BUN/Creatinine Ratio 01/01/2022 6.1* 7.0 - 25.0 Final   • Anion Gap 01/01/2022 17.1* 5.0 - 15.0 mmol/L Final   • Color, UA 01/01/2022 Yellow  Yellow, Straw Final   • Appearance, UA 01/01/2022 Clear  Clear Final   • pH, UA 01/01/2022 6.5  5.0 - 8.0 Final   • Specific Gravity, UA 01/01/2022 <=1.005  1.005 - 1.030 Final   • Glucose, UA 01/01/2022 Negative  Negative Final   • Ketones, UA 01/01/2022 Negative  Negative Final   • Bilirubin, UA 01/01/2022 Negative  Negative Final   • Blood, UA 01/01/2022 Negative  Negative Final   • Protein, UA 01/01/2022 Negative  Negative Final   • Leuk Esterase, UA 01/01/2022 Negative  Negative Final   • Nitrite, UA 01/01/2022 Negative  Negative Final   • Urobilinogen, UA 01/01/2022 1.0 E.U./dL  0.2 - 1.0 E.U./dL Final   • THC, Screen, Urine 01/01/2022 Negative  Negative Final   • Phencyclidine (PCP), Urine 01/01/2022 Negative   Negative Final   • Cocaine Screen, Urine 01/01/2022 Negative  Negative Final   • Methamphetamine, Ur 01/01/2022 Negative  Negative Final   • Opiate Screen 01/01/2022 Negative  Negative Final   • Amphetamine Screen, Urine 01/01/2022 Negative  Negative Final   • Benzodiazepine Screen, Urine 01/01/2022 Negative  Negative Final   • Tricyclic Antidepressants Screen 01/01/2022 Negative  Negative Final   • Methadone Screen, Urine 01/01/2022 Negative  Negative Final   • Barbiturates Screen, Urine 01/01/2022 Negative  Negative Final   • Oxycodone Screen, Urine 01/01/2022 Negative  Negative Final   • Propoxyphene Screen 01/01/2022 Negative  Negative Final   • Buprenorphine, Screen, Urine 01/01/2022 Positive* Negative Final   • Magnesium 01/01/2022 1.7  1.6 - 2.6 mg/dL Final   • Ethanol 01/01/2022 311* 0 - 10 mg/dL Final   • Ethanol % 01/01/2022 0.311  % Final   • WBC 01/01/2022 11.09* 3.40 - 10.80 10*3/mm3 Final   • RBC 01/01/2022 4.39  4.14 - 5.80 10*6/mm3 Final   • Hemoglobin 01/01/2022 14.3  13.0 - 17.7 g/dL Final   • Hematocrit 01/01/2022 42.3  37.5 - 51.0 % Final   • MCV 01/01/2022 96.4  79.0 - 97.0 fL Final   • MCH 01/01/2022 32.6  26.6 - 33.0 pg Final   • MCHC 01/01/2022 33.8  31.5 - 35.7 g/dL Final   • RDW 01/01/2022 11.9* 12.3 - 15.4 % Final   • RDW-SD 01/01/2022 42.3  37.0 - 54.0 fl Final   • MPV 01/01/2022 9.6  6.0 - 12.0 fL Final   • Platelets 01/01/2022 196  140 - 450 10*3/mm3 Final   • Neutrophil % 01/01/2022 70.8  42.7 - 76.0 % Final   • Lymphocyte % 01/01/2022 20.6  19.6 - 45.3 % Final   • Monocyte % 01/01/2022 5.0  5.0 - 12.0 % Final   • Eosinophil % 01/01/2022 1.8  0.3 - 6.2 % Final   • Basophil % 01/01/2022 0.5  0.0 - 1.5 % Final   • Immature Grans % 01/01/2022 1.3* 0.0 - 0.5 % Final   • Neutrophils, Absolute 01/01/2022 7.86* 1.70 - 7.00 10*3/mm3 Final   • Lymphocytes, Absolute 01/01/2022 2.28  0.70 - 3.10 10*3/mm3 Final   • Monocytes, Absolute 01/01/2022 0.55  0.10 - 0.90 10*3/mm3 Final   • Eosinophils,  Absolute 01/01/2022 0.20  0.00 - 0.40 10*3/mm3 Final   • Basophils, Absolute 01/01/2022 0.06  0.00 - 0.20 10*3/mm3 Final   • Immature Grans, Absolute 01/01/2022 0.14* 0.00 - 0.05 10*3/mm3 Final   • nRBC 01/01/2022 0.0  0.0 - 0.2 /100 WBC Final   • COVID19 01/01/2022 Not Detected  Not Detected - Ref. Range Final   • Influenza A PCR 01/01/2022 Not Detected  Not Detected Final   • Influenza B PCR 01/01/2022 Not Detected  Not Detected Final   • Ethanol 01/01/2022 102* 0 - 10 mg/dL Final   • Ethanol % 01/01/2022 0.102  % Final   Office Visit on 12/01/2021   Component Date Value Ref Range Status   • External Amphetamine Screen Urine 12/01/2021 Negative   Final   • Amphetamine Cut-Off 12/01/2021 1000NG/ML   Final   • External Benzodiazepine Screen Uri* 12/01/2021 Negative   Final   • Benzodiazipine Cut-Off 12/01/2021 300NG/ML   Final   • External Cocaine Screen Urine 12/01/2021 Negative   Final   • Cocaine Cut-Off 12/01/2021 300NG/ML   Final   • External THC Screen Urine 12/01/2021 Negative   Final   • THC Cut-Off 12/01/2021 50NG/ML   Final   • External Methadone Screen Urine 12/01/2021 Negative   Final   • Methadone Cut-Off 12/01/2021 300NG/ML   Final   • External Methamphetamine Screen Ur* 12/01/2021 Negative   Final   • Methamphetamine Cut-Off 12/01/2021 1000NG/ML   Final   • External Oxycodone Screen Urine 12/01/2021 Negative   Final   • Oxycodone Cut-Off 12/01/2021 100NG/ML   Final   • External Buprenorphine Screen Urine 12/01/2021 Positive   Final   • Buprenorphine Cut-Off 12/01/2021 10NG/ML   Final   • External MDMA 12/01/2021 Negative   Final   • MDMA Cut-Off 12/01/2021 500NG/ML   Final   • External Opiates Screen Urine 12/01/2021 Negative   Final   • Opiates Cut-Off 12/01/2021 300NG/ML   Final   Office Visit on 11/24/2021   Component Date Value Ref Range Status   • External Amphetamine Screen Urine 11/24/2021 Negative   Final   • Amphetamine Cut-Off 11/24/2021 1000NG/ML   Final   • External Benzodiazepine Screen  Uri* 11/24/2021 Negative   Final   • Benzodiazipine Cut-Off 11/24/2021 300NG/ML   Final   • External Cocaine Screen Urine 11/24/2021 Negative   Final   • Cocaine Cut-Off 11/24/2021 300NG/ML   Final   • External THC Screen Urine 11/24/2021 Negative   Final   • THC Cut-Off 11/24/2021 50NG/ML   Final   • External Methadone Screen Urine 11/24/2021 Negative   Final   • Methadone Cut-Off 11/24/2021 300NG/ML   Final   • External Methamphetamine Screen Ur* 11/24/2021 Negative   Final   • Methamphetamine Cut-Off 11/24/2021 1000NG/ML   Final   • External Oxycodone Screen Urine 11/24/2021 Negative   Final   • Oxycodone Cut-Off 11/24/2021 100NG/ML   Final   • External Buprenorphine Screen Urine 11/24/2021 Positive   Final   • Buprenorphine Cut-Off 11/24/2021 10NG/ML   Final   • External MDMA 11/24/2021 Negative   Final   • MDMA Cut-Off 11/24/2021 500NG/ML   Final   • External Opiates Screen Urine 11/24/2021 Negative   Final   • Opiates Cut-Off 11/24/2021 300NG/ML   Final   Office Visit on 11/17/2021   Component Date Value Ref Range Status   • External Amphetamine Screen Urine 11/17/2021 Negative   Final   • Amphetamine Cut-Off 11/17/2021 1000ng/ml   Final   • External Benzodiazepine Screen Uri* 11/17/2021 Negative   Final   • Benzodiazipine Cut-Off 11/17/2021 300ng/ml   Final   • External Cocaine Screen Urine 11/17/2021 Negative   Final   • Cocaine Cut-Off 11/17/2021 300ng/ml   Final   • External THC Screen Urine 11/17/2021 Negative   Final   • THC Cut-Off 11/17/2021 50ng/ml   Final   • External Methadone Screen Urine 11/17/2021 Negative   Final   • Methadone Cut-Off 11/17/2021 300ng/ml   Final   • External Methamphetamine Screen Ur* 11/17/2021 Negative   Final   • Methamphetamine Cut-Off 11/17/2021 1000ng/ml   Final   • External Oxycodone Screen Urine 11/17/2021 Negative   Final   • Oxycodone Cut-Off 11/17/2021 100ng/ml   Final   • External Buprenorphine Screen Urine 11/17/2021 Positive   Final   • Buprenorphine Cut-Off  11/17/2021 10ng/ml   Final   • External MDMA 11/17/2021 Negative   Final   • MDMA Cut-Off 11/17/2021 500ng/ml   Final   • External Opiates Screen Urine 11/17/2021 Negative   Final   • Opiates Cut-Off 11/17/2021 300ng/ml   Final   Admission on 11/10/2021, Discharged on 11/11/2021   Component Date Value Ref Range Status   • Ethanol 11/10/2021 234* 0 - 10 mg/dL Final   • Ethanol % 11/10/2021 0.234  % Final   Office Visit on 11/10/2021   Component Date Value Ref Range Status   • External Amphetamine Screen Urine 11/10/2021 Negative   Final   • Amphetamine Cut-Off 11/10/2021 1000NG/ML   Final   • External Benzodiazepine Screen Uri* 11/10/2021 Negative   Final   • Benzodiazipine Cut-Off 11/10/2021 300NG/ML   Final   • External Cocaine Screen Urine 11/10/2021 Negative   Final   • Cocaine Cut-Off 11/10/2021 300NG/ML   Final   • External THC Screen Urine 11/10/2021 Negative   Final   • THC Cut-Off 11/10/2021 50NG/ML   Final   • External Methadone Screen Urine 11/10/2021 Negative   Final   • Methadone Cut-Off 11/10/2021 300NG/ML   Final   • External Methamphetamine Screen Ur* 11/10/2021 Negative   Final   • Methamphetamine Cut-Off 11/10/2021 1000NG/ML   Final   • External Oxycodone Screen Urine 11/10/2021 Negative   Final   • Oxycodone Cut-Off 11/10/2021 100NG/ML   Final   • External Buprenorphine Screen Urine 11/10/2021 Positive   Final   • Buprenorphine Cut-Off 11/10/2021 10NG/ML   Final   • External MDMA 11/10/2021 Negative   Final   • MDMA Cut-Off 11/10/2021 500NG/ML   Final   • External Opiates Screen Urine 11/10/2021 Negative   Final   • Opiates Cut-Off 11/10/2021 300NG/ML   Final   Office Visit on 11/03/2021   Component Date Value Ref Range Status   • External Amphetamine Screen Urine 11/03/2021 Negative   Final   • Amphetamine Cut-Off 11/03/2021 1000NG/ML   Final   • External Benzodiazepine Screen Uri* 11/03/2021 Negative   Final   • Benzodiazipine Cut-Off 11/03/2021 300NG/ML   Final   • External Cocaine Screen Urine  11/03/2021 Negative   Final   • Cocaine Cut-Off 11/03/2021 300NG/ML   Final   • External THC Screen Urine 11/03/2021 Negative   Final   • THC Cut-Off 11/03/2021 50NG/ML   Final   • External Methadone Screen Urine 11/03/2021 Negative   Final   • Methadone Cut-Off 11/03/2021 300NG/ML   Final   • External Methamphetamine Screen Ur* 11/03/2021 Negative   Final   • Methamphetamine Cut-Off 11/03/2021 1000NG/ML   Final   • External Oxycodone Screen Urine 11/03/2021 Negative   Final   • Oxycodone Cut-Off 11/03/2021 100NG/ML   Final   • External Buprenorphine Screen Urine 11/03/2021 Positive   Final   • Buprenorphine Cut-Off 11/03/2021 10NG/ML   Final   • External MDMA 11/03/2021 Negative   Final   • MDMA Cut-Off 11/03/2021 500NG/ML   Final   • External Opiates Screen Urine 11/03/2021 Negative   Final   • Opiates Cut-Off 11/03/2021 300NG/ML   Final   Office Visit on 10/20/2021   Component Date Value Ref Range Status   • External Amphetamine Screen Urine 10/20/2021 Negative   Final   • Amphetamine Cut-Off 10/20/2021 1000ng/ml   Final   • External Benzodiazepine Screen Uri* 10/20/2021 Negative   Final   • Benzodiazipine Cut-Off 10/20/2021 300ng/ml   Final   • External Cocaine Screen Urine 10/20/2021 Negative   Final   • Cocaine Cut-Off 10/20/2021 300ng/ml   Final   • External THC Screen Urine 10/20/2021 Negative   Final   • THC Cut-Off 10/20/2021 50ng/ml   Final   • External Methadone Screen Urine 10/20/2021 Negative   Final   • Methadone Cut-Off 10/20/2021 300ng/ml   Final   • External Methamphetamine Screen Ur* 10/20/2021 Negative   Final   • Methamphetamine Cut-Off 10/20/2021 1000ng/ml   Final   • External Oxycodone Screen Urine 10/20/2021 Negative   Final   • Oxycodone Cut-Off 10/20/2021 100ng/ml   Final   • External Buprenorphine Screen Urine 10/20/2021 Positive   Final   • Buprenorphine Cut-Off 10/20/2021 10ng/ml   Final   • External MDMA 10/20/2021 Negative   Final   • MDMA Cut-Off 10/20/2021 500ng/ml   Final   •  External Opiates Screen Urine 10/20/2021 Negative   Final   • Opiates Cut-Off 10/20/2021 300ng/ml   Final   Office Visit on 10/12/2021   Component Date Value Ref Range Status   • External Amphetamine Screen Urine 10/12/2021 Negative   Final   • Amphetamine Cut-Off 10/12/2021 1000NG/ML   Final   • External Benzodiazepine Screen Uri* 10/12/2021 Negative   Final   • Benzodiazipine Cut-Off 10/12/2021 300NG/ML   Final   • External Cocaine Screen Urine 10/12/2021 Negative   Final   • Cocaine Cut-Off 10/12/2021 300NG/ML   Final   • External THC Screen Urine 10/12/2021 Negative   Final   • THC Cut-Off 10/12/2021 50NG/ML   Final   • External Methadone Screen Urine 10/12/2021 Negative   Final   • Methadone Cut-Off 10/12/2021 300NG/ML   Final   • External Methamphetamine Screen Ur* 10/12/2021 Negative   Final   • Methamphetamine Cut-Off 10/12/2021 1000NG/ML   Final   • External Oxycodone Screen Urine 10/12/2021 Negative   Final   • Oxycodone Cut-Off 10/12/2021 100NG/ML   Final   • External Buprenorphine Screen Urine 10/12/2021 Positive   Final   • Buprenorphine Cut-Off 10/12/2021 10NG/ML   Final   • External MDMA 10/12/2021 Negative   Final   • MDMA Cut-Off 10/12/2021 500NG/ML   Final   • External Opiates Screen Urine 10/12/2021 Negative   Final   • Opiates Cut-Off 10/12/2021 300NG/ML   Final   There may be more visits with results that are not included.         Assessment/Plan   Diagnoses and all orders for this visit:    1. Opioid type dependence, continuous (HCC) (Primary)  -     buprenorphine-naloxone (SUBOXONE) 8-2 MG per SL tablet; Place 2 tablets under the tongue Daily.  Dispense: 14 tablet; Refill: 0    2. Medication management  -     [facility] Screen        Visit Diagnoses:    ICD-10-CM ICD-9-CM   1. Medication management  Z79.899 V58.69       PLAN:  1. Safety: No acute safety concerns  2. Risk Assessment: Risk of self-harm acutely is low. Risk of self-harm chronically is also low, but could be further elevated in  the event of treatment noncompliance and/or AODA.    TREATMENT PLAN/GOALS: Continue supportive psychotherapy efforts and medications as indicated. Treatment and medication options discussed during today's visit. Patient acknowledged and verbally consented to continue with current treatment plan and was educated on the importance of compliance with treatment and follow-up appointments.    MEDICATION ISSUES:  ROMAN reviewed as expected.  Discussed medication options and treatment plan of prescribed medication as well as the risks, benefits, and side effects including potential falls, possible impaired driving and metabolic adversities among others. Patient is agreeable to call the office with any worsening of symptoms or onset of side effects. Patient is agreeable to call 911 or go to the nearest ER should he/she begin having SI/HI. No medication side effects or related complaints today.     MEDS ORDERED DURING VISIT:  No orders of the defined types were placed in this encounter.      No follow-ups on file.           This document has been electronically signed by RENAN De Dios  January 5, 2022 16:19 EST      Part of this note may be an electronic transcription/translation of spoken language to printed text using the Dragon Dictation System.

## 2022-01-13 ENCOUNTER — OFFICE VISIT (OUTPATIENT)
Dept: PSYCHIATRY | Facility: CLINIC | Age: 32
End: 2022-01-13

## 2022-01-13 VITALS
HEART RATE: 74 BPM | SYSTOLIC BLOOD PRESSURE: 126 MMHG | DIASTOLIC BLOOD PRESSURE: 80 MMHG | BODY MASS INDEX: 24.41 KG/M2 | HEIGHT: 78 IN | WEIGHT: 211 LBS

## 2022-01-13 DIAGNOSIS — F41.1 GENERALIZED ANXIETY DISORDER: ICD-10-CM

## 2022-01-13 DIAGNOSIS — M25.50 ARTHRALGIA, UNSPECIFIED JOINT: ICD-10-CM

## 2022-01-13 DIAGNOSIS — F11.20 OPIOID TYPE DEPENDENCE, CONTINUOUS: Primary | ICD-10-CM

## 2022-01-13 DIAGNOSIS — Z79.899 MEDICATION MANAGEMENT: ICD-10-CM

## 2022-01-13 DIAGNOSIS — G47.00 INSOMNIA, UNSPECIFIED TYPE: ICD-10-CM

## 2022-01-13 PROCEDURE — 99214 OFFICE O/P EST MOD 30 MIN: CPT | Performed by: NURSE PRACTITIONER

## 2022-01-13 RX ORDER — IBUPROFEN 800 MG/1
800 TABLET ORAL EVERY 8 HOURS PRN
Qty: 30 TABLET | Refills: 0 | Status: SHIPPED | OUTPATIENT
Start: 2022-01-13 | End: 2022-01-27 | Stop reason: SDUPTHER

## 2022-01-13 RX ORDER — CLONIDINE HYDROCHLORIDE 0.1 MG/1
0.1 TABLET ORAL 2 TIMES DAILY
Qty: 60 TABLET | Refills: 2 | Status: SHIPPED | OUTPATIENT
Start: 2022-01-13 | End: 2022-03-24 | Stop reason: SDUPTHER

## 2022-01-13 RX ORDER — BUPRENORPHINE HYDROCHLORIDE AND NALOXONE HYDROCHLORIDE DIHYDRATE 8; 2 MG/1; MG/1
2 TABLET SUBLINGUAL DAILY
Qty: 28 TABLET | Refills: 0 | Status: SHIPPED | OUTPATIENT
Start: 2022-01-13 | End: 2022-01-27 | Stop reason: SDUPTHER

## 2022-01-13 RX ORDER — HYDROXYZINE PAMOATE 25 MG/1
25 CAPSULE ORAL 4 TIMES DAILY PRN
Qty: 60 CAPSULE | Refills: 1 | Status: SHIPPED | OUTPATIENT
Start: 2022-01-13 | End: 2022-02-24

## 2022-01-13 NOTE — PROGRESS NOTES
This provider is located at [facility]. The Patient is seen remotely using Video. Patient is being seen via telehealth and confirm that they are in a secure environment for this session. The patient's condition being diagnosed/treated is appropriate for telemedicine. Provider identified as Daniel Olivas as well as credentials APRN MSN FNP-JUSTIN PINEDA.   The client/patient gave consent to be seen remotely, and when consent is given they understand that the consent allows for patient identifiable information to be sent to a third party as needed.   They may refuse to be seen remotely at any time. The electronic data is encrypted and password protected, and the patient has been advised of the potential risks to privacy not withstanding such measures.    Chief Complaint/History of Present Illness: Follow Up buprenorphine/naloxone Medicated Assisted Treatment for Opiate Use Disorder     Patient/Client Concerns/Updates: Patient states he is doing well having a good week, continued abstinence from alcohol; will continue Vistaril and clonidine for anxiety and insomnia and ibuprofen as needed for chronic joint pain    Triggers (Persons/Places/Things/Events/Thought/Emotions): Anxiety and insomnia    Cravings: Denies cravings for opiates or alcohol    Relapse Prevention: Counseling    Urine Drug Screen (today's visit) discussed: Positive buprenorphine, otherwise negative for substances tested    UDS Confirmation: Positive buprenorphine/nor-buprenorphine, no concerns for urine tampering, alcohol screen negative    ROMAN (PDMP) Reviewed for Current/Active Medications: buprenorphine/naloxone as reviewed today    Past Surgical History:  Past Surgical History:   Procedure Laterality Date   • NO PAST SURGERIES         Problem List:  Patient Active Problem List   Diagnosis   • Alcohol-induced acute pancreatitis   • Pancreatitis       Allergy:   No Known Allergies     Current Medications:   Current Outpatient Medications    Medication Sig Dispense Refill   • buprenorphine-naloxone (SUBOXONE) 8-2 MG per SL tablet Place 2 tablets under the tongue Daily. 14 tablet 0   • cephalexin (KEFLEX) 500 MG capsule Take 1 capsule by mouth 3 (Three) Times a Day. 30 capsule 0   • cloNIDine (Catapres) 0.1 MG tablet Take 1 tablet by mouth 2 (Two) Times a Day. Take as needed for anxiety.  Insomnia.  Chills or sweats 60 tablet 2   • hydrOXYzine pamoate (Vistaril) 25 MG capsule Take 1 capsule by mouth 4 (Four) Times a Day As Needed for Anxiety (and/or insomia). 60 capsule 1   • ibuprofen (ADVIL,MOTRIN) 800 MG tablet Take 1 tablet by mouth Every 8 (Eight) Hours As Needed for Moderate Pain . 30 tablet 0   • mupirocin (BACTROBAN) 2 % ointment Apply  topically to the appropriate area as directed 3 (Three) Times a Day. 15 g 0   • naloxone (NARCAN) 4 MG/0.1ML nasal spray 1 spray into the nostril(s) as directed by provider As Needed (Opiate oversedation). Spray in 1 nostril every 2 to 3 minutes call 911 2 each 2   • orphenadrine (NORFLEX) 100 MG 12 hr tablet Take 1 tablet by mouth 2 (Two) Times a Day As Needed for Muscle Spasms or Mild Pain . 14 tablet 0   • sertraline (Zoloft) 25 MG tablet Take 1 tablet by mouth Daily for 30 days. 30 tablet 3     No current facility-administered medications for this visit.       Past Medical History:  Past Medical History:   Diagnosis Date   • Alcohol-induced acute pancreatitis 6/27/2019   • Alcoholism (HCC)    • Drug use     Amphetamines and Suboxone   • Fatty liver    • Hepatitis C antibody test positive 2019   • Medical non-compliance    • Pancreatitis    • Smoker          Social History     Socioeconomic History   • Marital status:    Tobacco Use   • Smoking status: Current Every Day Smoker     Packs/day: 0.50     Types: Cigarettes   • Smokeless tobacco: Never Used   Vaping Use   • Vaping Use: Never used   Substance and Sexual Activity   • Alcohol use: Yes     Alcohol/week: 8.0 standard drinks     Types: 8 Shots of  liquor per week     Comment: 5-6 double shots   • Drug use: Yes     Comment: suboxone as prescribed   • Sexual activity: Defer         Family History   Problem Relation Age of Onset   • Cancer Maternal Grandmother    • No Known Problems Mother    • No Known Problems Father          Mental Status Exam:   Hygiene:   good  Cooperation:  Cooperative  Eye Contact:  Good  Psychomotor Behavior:  Appropriate  Affect:  Appropriate  Mood: normal  Speech:  Normal  Thought Process:  Goal directed  Thought Content:  Normal  Suicidal:  None  Homicidal:  None  Hallucinations:  None  Delusion:  None  Memory:  Intact  Orientation:  Grossly intact  Reliability:  good  Insight:  Good  Judgement:  Good  Impulse Control:  Good         Review of Systems:  Review of Systems   Constitutional: Negative for activity change, chills, diaphoresis and fatigue.   Respiratory: Negative for apnea, cough and shortness of breath.    Cardiovascular: Negative for chest pain, palpitations and leg swelling.   Gastrointestinal: Negative for abdominal pain, constipation, diarrhea, nausea and vomiting.   Genitourinary: Negative for difficulty urinating.   Musculoskeletal: Positive for arthralgias.   Skin: Negative for rash.   Neurological: Negative for dizziness, weakness and headaches.   Psychiatric/Behavioral: Positive for sleep disturbance. Negative for agitation, self-injury and suicidal ideas. The patient is nervous/anxious.          Physical Exam:  Physical Exam  Vitals reviewed.   Constitutional:       General: He is not in acute distress.     Appearance: Normal appearance. He is not ill-appearing or toxic-appearing.   Pulmonary:      Effort: Pulmonary effort is normal.   Musculoskeletal:         General: Normal range of motion.   Neurological:      General: No focal deficit present.      Mental Status: He is alert and oriented to person, place, and time.   Psychiatric:         Attention and Perception: Attention and perception normal.         Mood  and Affect: Mood normal. Mood is not anxious or depressed.         Speech: Speech normal.         Behavior: Behavior normal. Behavior is cooperative.         Thought Content: Thought content normal.         Cognition and Memory: Cognition and memory normal.         Judgment: Judgment normal.         Vital Signs:   There were no vitals taken for this visit.     Lab Results:   Office Visit on 01/05/2022   Component Date Value Ref Range Status   • External Amphetamine Screen Urine 01/05/2022 Negative   Final   • Amphetamine Cut-Off 01/05/2022 1000ng/ml   Final   • External Benzodiazepine Screen Uri* 01/05/2022 Negative   Final   • Benzodiazipine Cut-Off 01/05/2022 300ng/ml   Final   • External Cocaine Screen Urine 01/05/2022 Negative   Final   • Cocaine Cut-Off 01/05/2022 300ng/ml   Final   • External THC Screen Urine 01/05/2022 Negative   Final   • THC Cut-Off 01/05/2022 50ng/ml   Final   • External Methadone Screen Urine 01/05/2022 Negative   Final   • Methadone Cut-Off 01/05/2022 300ng/ml   Final   • External Methamphetamine Screen Ur* 01/05/2022 Negative   Final   • Methamphetamine Cut-Off 01/05/2022 1000ng/ml   Final   • External Oxycodone Screen Urine 01/05/2022 Negative   Final   • Oxycodone Cut-Off 01/05/2022 100ng/ml   Final   • External Buprenorphine Screen Urine 01/05/2022 Positive   Final   • Buprenorphine Cut-Off 01/05/2022 10ng/ml   Final   • External MDMA 01/05/2022 Negative   Final   • MDMA Cut-Off 01/05/2022 500ng/ml   Final   • External Opiates Screen Urine 01/05/2022 Negative   Final   • Opiates Cut-Off 01/05/2022 300ng/ml   Final   Admission on 01/01/2022, Discharged on 01/01/2022   Component Date Value Ref Range Status   • Glucose 01/01/2022 112* 65 - 99 mg/dL Final   • BUN 01/01/2022 3* 6 - 20 mg/dL Final   • Creatinine 01/01/2022 0.49* 0.76 - 1.27 mg/dL Final   • Sodium 01/01/2022 139  136 - 145 mmol/L Final   • Potassium 01/01/2022 3.6  3.5 - 5.2 mmol/L Final   • Chloride 01/01/2022 100  98 -  107 mmol/L Final   • CO2 01/01/2022 21.9* 22.0 - 29.0 mmol/L Final   • Calcium 01/01/2022 8.7  8.6 - 10.5 mg/dL Final   • Total Protein 01/01/2022 8.2  6.0 - 8.5 g/dL Final   • Albumin 01/01/2022 3.86  3.50 - 5.20 g/dL Final   • ALT (SGPT) 01/01/2022 82* 1 - 41 U/L Final   • AST (SGOT) 01/01/2022 253* 1 - 40 U/L Final   • Alkaline Phosphatase 01/01/2022 452* 39 - 117 U/L Final   • Total Bilirubin 01/01/2022 0.7  0.0 - 1.2 mg/dL Final   • eGFR Non  Amer 01/01/2022 >150  >60 mL/min/1.73 Final   • Globulin 01/01/2022 4.3  gm/dL Final   • A/G Ratio 01/01/2022 0.9  g/dL Final   • BUN/Creatinine Ratio 01/01/2022 6.1* 7.0 - 25.0 Final   • Anion Gap 01/01/2022 17.1* 5.0 - 15.0 mmol/L Final   • Color, UA 01/01/2022 Yellow  Yellow, Straw Final   • Appearance, UA 01/01/2022 Clear  Clear Final   • pH, UA 01/01/2022 6.5  5.0 - 8.0 Final   • Specific Gravity, UA 01/01/2022 <=1.005  1.005 - 1.030 Final   • Glucose, UA 01/01/2022 Negative  Negative Final   • Ketones, UA 01/01/2022 Negative  Negative Final   • Bilirubin, UA 01/01/2022 Negative  Negative Final   • Blood, UA 01/01/2022 Negative  Negative Final   • Protein, UA 01/01/2022 Negative  Negative Final   • Leuk Esterase, UA 01/01/2022 Negative  Negative Final   • Nitrite, UA 01/01/2022 Negative  Negative Final   • Urobilinogen, UA 01/01/2022 1.0 E.U./dL  0.2 - 1.0 E.U./dL Final   • THC, Screen, Urine 01/01/2022 Negative  Negative Final   • Phencyclidine (PCP), Urine 01/01/2022 Negative  Negative Final   • Cocaine Screen, Urine 01/01/2022 Negative  Negative Final   • Methamphetamine, Ur 01/01/2022 Negative  Negative Final   • Opiate Screen 01/01/2022 Negative  Negative Final   • Amphetamine Screen, Urine 01/01/2022 Negative  Negative Final   • Benzodiazepine Screen, Urine 01/01/2022 Negative  Negative Final   • Tricyclic Antidepressants Screen 01/01/2022 Negative  Negative Final   • Methadone Screen, Urine 01/01/2022 Negative  Negative Final   • Barbiturates Screen,  Urine 01/01/2022 Negative  Negative Final   • Oxycodone Screen, Urine 01/01/2022 Negative  Negative Final   • Propoxyphene Screen 01/01/2022 Negative  Negative Final   • Buprenorphine, Screen, Urine 01/01/2022 Positive* Negative Final   • Magnesium 01/01/2022 1.7  1.6 - 2.6 mg/dL Final   • Ethanol 01/01/2022 311* 0 - 10 mg/dL Final   • Ethanol % 01/01/2022 0.311  % Final   • WBC 01/01/2022 11.09* 3.40 - 10.80 10*3/mm3 Final   • RBC 01/01/2022 4.39  4.14 - 5.80 10*6/mm3 Final   • Hemoglobin 01/01/2022 14.3  13.0 - 17.7 g/dL Final   • Hematocrit 01/01/2022 42.3  37.5 - 51.0 % Final   • MCV 01/01/2022 96.4  79.0 - 97.0 fL Final   • MCH 01/01/2022 32.6  26.6 - 33.0 pg Final   • MCHC 01/01/2022 33.8  31.5 - 35.7 g/dL Final   • RDW 01/01/2022 11.9* 12.3 - 15.4 % Final   • RDW-SD 01/01/2022 42.3  37.0 - 54.0 fl Final   • MPV 01/01/2022 9.6  6.0 - 12.0 fL Final   • Platelets 01/01/2022 196  140 - 450 10*3/mm3 Final   • Neutrophil % 01/01/2022 70.8  42.7 - 76.0 % Final   • Lymphocyte % 01/01/2022 20.6  19.6 - 45.3 % Final   • Monocyte % 01/01/2022 5.0  5.0 - 12.0 % Final   • Eosinophil % 01/01/2022 1.8  0.3 - 6.2 % Final   • Basophil % 01/01/2022 0.5  0.0 - 1.5 % Final   • Immature Grans % 01/01/2022 1.3* 0.0 - 0.5 % Final   • Neutrophils, Absolute 01/01/2022 7.86* 1.70 - 7.00 10*3/mm3 Final   • Lymphocytes, Absolute 01/01/2022 2.28  0.70 - 3.10 10*3/mm3 Final   • Monocytes, Absolute 01/01/2022 0.55  0.10 - 0.90 10*3/mm3 Final   • Eosinophils, Absolute 01/01/2022 0.20  0.00 - 0.40 10*3/mm3 Final   • Basophils, Absolute 01/01/2022 0.06  0.00 - 0.20 10*3/mm3 Final   • Immature Grans, Absolute 01/01/2022 0.14* 0.00 - 0.05 10*3/mm3 Final   • nRBC 01/01/2022 0.0  0.0 - 0.2 /100 WBC Final   • COVID19 01/01/2022 Not Detected  Not Detected - Ref. Range Final   • Influenza A PCR 01/01/2022 Not Detected  Not Detected Final   • Influenza B PCR 01/01/2022 Not Detected  Not Detected Final   • Ethanol 01/01/2022 102* 0 - 10 mg/dL Final    • Ethanol % 01/01/2022 0.102  % Final   Office Visit on 12/01/2021   Component Date Value Ref Range Status   • External Amphetamine Screen Urine 12/01/2021 Negative   Final   • Amphetamine Cut-Off 12/01/2021 1000NG/ML   Final   • External Benzodiazepine Screen Uri* 12/01/2021 Negative   Final   • Benzodiazipine Cut-Off 12/01/2021 300NG/ML   Final   • External Cocaine Screen Urine 12/01/2021 Negative   Final   • Cocaine Cut-Off 12/01/2021 300NG/ML   Final   • External THC Screen Urine 12/01/2021 Negative   Final   • THC Cut-Off 12/01/2021 50NG/ML   Final   • External Methadone Screen Urine 12/01/2021 Negative   Final   • Methadone Cut-Off 12/01/2021 300NG/ML   Final   • External Methamphetamine Screen Ur* 12/01/2021 Negative   Final   • Methamphetamine Cut-Off 12/01/2021 1000NG/ML   Final   • External Oxycodone Screen Urine 12/01/2021 Negative   Final   • Oxycodone Cut-Off 12/01/2021 100NG/ML   Final   • External Buprenorphine Screen Urine 12/01/2021 Positive   Final   • Buprenorphine Cut-Off 12/01/2021 10NG/ML   Final   • External MDMA 12/01/2021 Negative   Final   • MDMA Cut-Off 12/01/2021 500NG/ML   Final   • External Opiates Screen Urine 12/01/2021 Negative   Final   • Opiates Cut-Off 12/01/2021 300NG/ML   Final   Office Visit on 11/24/2021   Component Date Value Ref Range Status   • External Amphetamine Screen Urine 11/24/2021 Negative   Final   • Amphetamine Cut-Off 11/24/2021 1000NG/ML   Final   • External Benzodiazepine Screen Uri* 11/24/2021 Negative   Final   • Benzodiazipine Cut-Off 11/24/2021 300NG/ML   Final   • External Cocaine Screen Urine 11/24/2021 Negative   Final   • Cocaine Cut-Off 11/24/2021 300NG/ML   Final   • External THC Screen Urine 11/24/2021 Negative   Final   • THC Cut-Off 11/24/2021 50NG/ML   Final   • External Methadone Screen Urine 11/24/2021 Negative   Final   • Methadone Cut-Off 11/24/2021 300NG/ML   Final   • External Methamphetamine Screen Ur* 11/24/2021 Negative   Final   •  Methamphetamine Cut-Off 11/24/2021 1000NG/ML   Final   • External Oxycodone Screen Urine 11/24/2021 Negative   Final   • Oxycodone Cut-Off 11/24/2021 100NG/ML   Final   • External Buprenorphine Screen Urine 11/24/2021 Positive   Final   • Buprenorphine Cut-Off 11/24/2021 10NG/ML   Final   • External MDMA 11/24/2021 Negative   Final   • MDMA Cut-Off 11/24/2021 500NG/ML   Final   • External Opiates Screen Urine 11/24/2021 Negative   Final   • Opiates Cut-Off 11/24/2021 300NG/ML   Final   Office Visit on 11/17/2021   Component Date Value Ref Range Status   • External Amphetamine Screen Urine 11/17/2021 Negative   Final   • Amphetamine Cut-Off 11/17/2021 1000ng/ml   Final   • External Benzodiazepine Screen Uri* 11/17/2021 Negative   Final   • Benzodiazipine Cut-Off 11/17/2021 300ng/ml   Final   • External Cocaine Screen Urine 11/17/2021 Negative   Final   • Cocaine Cut-Off 11/17/2021 300ng/ml   Final   • External THC Screen Urine 11/17/2021 Negative   Final   • THC Cut-Off 11/17/2021 50ng/ml   Final   • External Methadone Screen Urine 11/17/2021 Negative   Final   • Methadone Cut-Off 11/17/2021 300ng/ml   Final   • External Methamphetamine Screen Ur* 11/17/2021 Negative   Final   • Methamphetamine Cut-Off 11/17/2021 1000ng/ml   Final   • External Oxycodone Screen Urine 11/17/2021 Negative   Final   • Oxycodone Cut-Off 11/17/2021 100ng/ml   Final   • External Buprenorphine Screen Urine 11/17/2021 Positive   Final   • Buprenorphine Cut-Off 11/17/2021 10ng/ml   Final   • External MDMA 11/17/2021 Negative   Final   • MDMA Cut-Off 11/17/2021 500ng/ml   Final   • External Opiates Screen Urine 11/17/2021 Negative   Final   • Opiates Cut-Off 11/17/2021 300ng/ml   Final   Admission on 11/10/2021, Discharged on 11/11/2021   Component Date Value Ref Range Status   • Ethanol 11/10/2021 234* 0 - 10 mg/dL Final   • Ethanol % 11/10/2021 0.234  % Final   Office Visit on 11/10/2021   Component Date Value Ref Range Status   • External  Amphetamine Screen Urine 11/10/2021 Negative   Final   • Amphetamine Cut-Off 11/10/2021 1000NG/ML   Final   • External Benzodiazepine Screen Uri* 11/10/2021 Negative   Final   • Benzodiazipine Cut-Off 11/10/2021 300NG/ML   Final   • External Cocaine Screen Urine 11/10/2021 Negative   Final   • Cocaine Cut-Off 11/10/2021 300NG/ML   Final   • External THC Screen Urine 11/10/2021 Negative   Final   • THC Cut-Off 11/10/2021 50NG/ML   Final   • External Methadone Screen Urine 11/10/2021 Negative   Final   • Methadone Cut-Off 11/10/2021 300NG/ML   Final   • External Methamphetamine Screen Ur* 11/10/2021 Negative   Final   • Methamphetamine Cut-Off 11/10/2021 1000NG/ML   Final   • External Oxycodone Screen Urine 11/10/2021 Negative   Final   • Oxycodone Cut-Off 11/10/2021 100NG/ML   Final   • External Buprenorphine Screen Urine 11/10/2021 Positive   Final   • Buprenorphine Cut-Off 11/10/2021 10NG/ML   Final   • External MDMA 11/10/2021 Negative   Final   • MDMA Cut-Off 11/10/2021 500NG/ML   Final   • External Opiates Screen Urine 11/10/2021 Negative   Final   • Opiates Cut-Off 11/10/2021 300NG/ML   Final   Office Visit on 11/03/2021   Component Date Value Ref Range Status   • External Amphetamine Screen Urine 11/03/2021 Negative   Final   • Amphetamine Cut-Off 11/03/2021 1000NG/ML   Final   • External Benzodiazepine Screen Uri* 11/03/2021 Negative   Final   • Benzodiazipine Cut-Off 11/03/2021 300NG/ML   Final   • External Cocaine Screen Urine 11/03/2021 Negative   Final   • Cocaine Cut-Off 11/03/2021 300NG/ML   Final   • External THC Screen Urine 11/03/2021 Negative   Final   • THC Cut-Off 11/03/2021 50NG/ML   Final   • External Methadone Screen Urine 11/03/2021 Negative   Final   • Methadone Cut-Off 11/03/2021 300NG/ML   Final   • External Methamphetamine Screen Ur* 11/03/2021 Negative   Final   • Methamphetamine Cut-Off 11/03/2021 1000NG/ML   Final   • External Oxycodone Screen Urine 11/03/2021 Negative   Final   •  Oxycodone Cut-Off 11/03/2021 100NG/ML   Final   • External Buprenorphine Screen Urine 11/03/2021 Positive   Final   • Buprenorphine Cut-Off 11/03/2021 10NG/ML   Final   • External MDMA 11/03/2021 Negative   Final   • MDMA Cut-Off 11/03/2021 500NG/ML   Final   • External Opiates Screen Urine 11/03/2021 Negative   Final   • Opiates Cut-Off 11/03/2021 300NG/ML   Final   Office Visit on 10/20/2021   Component Date Value Ref Range Status   • External Amphetamine Screen Urine 10/20/2021 Negative   Final   • Amphetamine Cut-Off 10/20/2021 1000ng/ml   Final   • External Benzodiazepine Screen Uri* 10/20/2021 Negative   Final   • Benzodiazipine Cut-Off 10/20/2021 300ng/ml   Final   • External Cocaine Screen Urine 10/20/2021 Negative   Final   • Cocaine Cut-Off 10/20/2021 300ng/ml   Final   • External THC Screen Urine 10/20/2021 Negative   Final   • THC Cut-Off 10/20/2021 50ng/ml   Final   • External Methadone Screen Urine 10/20/2021 Negative   Final   • Methadone Cut-Off 10/20/2021 300ng/ml   Final   • External Methamphetamine Screen Ur* 10/20/2021 Negative   Final   • Methamphetamine Cut-Off 10/20/2021 1000ng/ml   Final   • External Oxycodone Screen Urine 10/20/2021 Negative   Final   • Oxycodone Cut-Off 10/20/2021 100ng/ml   Final   • External Buprenorphine Screen Urine 10/20/2021 Positive   Final   • Buprenorphine Cut-Off 10/20/2021 10ng/ml   Final   • External MDMA 10/20/2021 Negative   Final   • MDMA Cut-Off 10/20/2021 500ng/ml   Final   • External Opiates Screen Urine 10/20/2021 Negative   Final   • Opiates Cut-Off 10/20/2021 300ng/ml   Final   Office Visit on 10/12/2021   Component Date Value Ref Range Status   • External Amphetamine Screen Urine 10/12/2021 Negative   Final   • Amphetamine Cut-Off 10/12/2021 1000NG/ML   Final   • External Benzodiazepine Screen Uri* 10/12/2021 Negative   Final   • Benzodiazipine Cut-Off 10/12/2021 300NG/ML   Final   • External Cocaine Screen Urine 10/12/2021 Negative   Final   • Cocaine  Cut-Off 10/12/2021 300NG/ML   Final   • External THC Screen Urine 10/12/2021 Negative   Final   • THC Cut-Off 10/12/2021 50NG/ML   Final   • External Methadone Screen Urine 10/12/2021 Negative   Final   • Methadone Cut-Off 10/12/2021 300NG/ML   Final   • External Methamphetamine Screen Ur* 10/12/2021 Negative   Final   • Methamphetamine Cut-Off 10/12/2021 1000NG/ML   Final   • External Oxycodone Screen Urine 10/12/2021 Negative   Final   • Oxycodone Cut-Off 10/12/2021 100NG/ML   Final   • External Buprenorphine Screen Urine 10/12/2021 Positive   Final   • Buprenorphine Cut-Off 10/12/2021 10NG/ML   Final   • External MDMA 10/12/2021 Negative   Final   • MDMA Cut-Off 10/12/2021 500NG/ML   Final   • External Opiates Screen Urine 10/12/2021 Negative   Final   • Opiates Cut-Off 10/12/2021 300NG/ML   Final   There may be more visits with results that are not included.         Assessment/Plan   Diagnoses and all orders for this visit:    1. Opioid type dependence, continuous (HCC) (Primary)  -     buprenorphine-naloxone (SUBOXONE) 8-2 MG per SL tablet; Place 2 tablets under the tongue Daily.  Dispense: 28 tablet; Refill: 0    2. Medication management  -     [facility] Screen    3. Generalized anxiety disorder  -     hydrOXYzine pamoate (Vistaril) 25 MG capsule; Take 1 capsule by mouth 4 (Four) Times a Day As Needed for Anxiety (and/or insomia).  Dispense: 60 capsule; Refill: 1  -     cloNIDine (Catapres) 0.1 MG tablet; Take 1 tablet by mouth 2 (Two) Times a Day. Take as needed for anxiety.  Insomnia.  Chills or sweats  Dispense: 60 tablet; Refill: 2    4. Insomnia, unspecified type  -     hydrOXYzine pamoate (Vistaril) 25 MG capsule; Take 1 capsule by mouth 4 (Four) Times a Day As Needed for Anxiety (and/or insomia).  Dispense: 60 capsule; Refill: 1  -     cloNIDine (Catapres) 0.1 MG tablet; Take 1 tablet by mouth 2 (Two) Times a Day. Take as needed for anxiety.  Insomnia.  Chills or sweats  Dispense: 60 tablet; Refill:  2    5. Arthralgia, unspecified joint  -     ibuprofen (ADVIL,MOTRIN) 800 MG tablet; Take 1 tablet by mouth Every 8 (Eight) Hours As Needed for Moderate Pain .  Dispense: 30 tablet; Refill: 0        Visit Diagnoses:  No diagnosis found.    PLAN:  1. Safety: No acute safety concerns  2. Risk Assessment: Risk of self-harm acutely is low. Risk of self-harm chronically is also low, but could be further elevated in the event of treatment noncompliance and/or AODA.    TREATMENT PLAN/GOALS: Continue supportive psychotherapy efforts and medications as indicated. Treatment and medication options discussed during today's visit. Patient acknowledged and verbally consented to continue with current treatment plan and was educated on the importance of compliance with treatment and follow-up appointments.    MEDICATION ISSUES:  ROMAN reviewed as expected.  Discussed medication options and treatment plan of prescribed medication as well as the risks, benefits, and side effects including potential falls, possible impaired driving and metabolic adversities among others. Patient is agreeable to call the office with any worsening of symptoms or onset of side effects. Patient is agreeable to call 911 or go to the nearest ER should he/she begin having SI/HI. No medication side effects or related complaints today.     MEDS ORDERED DURING VISIT:  No orders of the defined types were placed in this encounter.      No follow-ups on file.           This document has been electronically signed by RENAN De Dios  January 13, 2022 08:04 EST      Part of this note may be an electronic transcription/translation of spoken language to printed text using the Dragon Dictation System.

## 2022-01-27 ENCOUNTER — OFFICE VISIT (OUTPATIENT)
Dept: PSYCHIATRY | Facility: CLINIC | Age: 32
End: 2022-01-27

## 2022-01-27 VITALS
HEIGHT: 78 IN | HEART RATE: 90 BPM | SYSTOLIC BLOOD PRESSURE: 120 MMHG | WEIGHT: 220 LBS | BODY MASS INDEX: 25.45 KG/M2 | DIASTOLIC BLOOD PRESSURE: 82 MMHG

## 2022-01-27 DIAGNOSIS — Z79.899 MEDICATION MANAGEMENT: ICD-10-CM

## 2022-01-27 DIAGNOSIS — M25.50 ARTHRALGIA, UNSPECIFIED JOINT: ICD-10-CM

## 2022-01-27 DIAGNOSIS — F11.20 OPIOID TYPE DEPENDENCE, CONTINUOUS: Primary | ICD-10-CM

## 2022-01-27 PROCEDURE — 99213 OFFICE O/P EST LOW 20 MIN: CPT | Performed by: NURSE PRACTITIONER

## 2022-01-27 RX ORDER — IBUPROFEN 800 MG/1
800 TABLET ORAL EVERY 8 HOURS PRN
Qty: 30 TABLET | Refills: 0 | Status: SHIPPED | OUTPATIENT
Start: 2022-01-27 | End: 2022-02-24 | Stop reason: SDUPTHER

## 2022-01-27 RX ORDER — BUPRENORPHINE HYDROCHLORIDE AND NALOXONE HYDROCHLORIDE DIHYDRATE 8; 2 MG/1; MG/1
2 TABLET SUBLINGUAL DAILY
Qty: 56 TABLET | Refills: 0 | Status: SHIPPED | OUTPATIENT
Start: 2022-01-27 | End: 2022-02-24 | Stop reason: SDUPTHER

## 2022-01-27 NOTE — PROGRESS NOTES
This provider is located at [facility]. The Patient is seen remotely using Video. Patient is being seen via telehealth and confirm that they are in a secure environment for this session. The patient's condition being diagnosed/treated is appropriate for telemedicine. Provider identified as Daniel Olivas as well as credentials APRN MSN FNP-JUSTIN PINEDA.   The client/patient gave consent to be seen remotely, and when consent is given they understand that the consent allows for patient identifiable information to be sent to a third party as needed.   They may refuse to be seen remotely at any time. The electronic data is encrypted and password protected, and the patient has been advised of the potential risks to privacy not withstanding such measures.    Chief Complaint/History of Present Illness: Follow Up buprenorphine/naloxone Medicated Assisted Treatment for Opiate Use Disorder     Patient/Client Concerns/Updates: Patient doing well no concerns, maintaining full-time employment; will accommodate monthly scripts, reiterated alcohol must not be used, patient agrees    Triggers (Persons/Places/Things/Events/Thought/Emotions): Denies triggers for relapse    Cravings: Denies cravings for opiates or alcohol    Relapse Prevention: Counseling    Urine Drug Screen (today's visit) discussed: Positive buprenorphine, otherwise negative for substances tested    UDS Confirmation: Positive buprenorphine/nor-buprenorphine, no concerns for urine tampering    ROMAN (PDMP) Reviewed for Current/Active Medications: buprenorphine/naloxone as reviewed today    Past Surgical History:  Past Surgical History:   Procedure Laterality Date   • NO PAST SURGERIES         Problem List:  Patient Active Problem List   Diagnosis   • Alcohol-induced acute pancreatitis   • Pancreatitis       Allergy:   No Known Allergies     Current Medications:   Current Outpatient Medications   Medication Sig Dispense Refill   • buprenorphine-naloxone (SUBOXONE)  8-2 MG per SL tablet Place 2 tablets under the tongue Daily. 28 tablet 0   • cephalexin (KEFLEX) 500 MG capsule Take 1 capsule by mouth 3 (Three) Times a Day. 30 capsule 0   • cloNIDine (Catapres) 0.1 MG tablet Take 1 tablet by mouth 2 (Two) Times a Day. Take as needed for anxiety.  Insomnia.  Chills or sweats 60 tablet 2   • hydrOXYzine pamoate (Vistaril) 25 MG capsule Take 1 capsule by mouth 4 (Four) Times a Day As Needed for Anxiety (and/or insomia). 60 capsule 1   • ibuprofen (ADVIL,MOTRIN) 800 MG tablet Take 1 tablet by mouth Every 8 (Eight) Hours As Needed for Moderate Pain . 30 tablet 0   • mupirocin (BACTROBAN) 2 % ointment Apply  topically to the appropriate area as directed 3 (Three) Times a Day. 15 g 0   • naloxone (NARCAN) 4 MG/0.1ML nasal spray 1 spray into the nostril(s) as directed by provider As Needed (Opiate oversedation). Spray in 1 nostril every 2 to 3 minutes call 911 2 each 2   • orphenadrine (NORFLEX) 100 MG 12 hr tablet Take 1 tablet by mouth 2 (Two) Times a Day As Needed for Muscle Spasms or Mild Pain . 14 tablet 0   • sertraline (Zoloft) 25 MG tablet Take 1 tablet by mouth Daily for 30 days. 30 tablet 3     No current facility-administered medications for this visit.       Past Medical History:  Past Medical History:   Diagnosis Date   • Alcohol-induced acute pancreatitis 6/27/2019   • Alcoholism (HCC)    • Drug use     Amphetamines and Suboxone   • Fatty liver    • Hepatitis C antibody test positive 2019   • Medical non-compliance    • Pancreatitis    • Smoker          Social History     Socioeconomic History   • Marital status:    Tobacco Use   • Smoking status: Current Every Day Smoker     Packs/day: 0.50     Types: Cigarettes   • Smokeless tobacco: Never Used   Vaping Use   • Vaping Use: Never used   Substance and Sexual Activity   • Alcohol use: Yes     Alcohol/week: 8.0 standard drinks     Types: 8 Shots of liquor per week     Comment: 5-6 double shots   • Drug use: Yes      Comment: suboxone as prescribed   • Sexual activity: Defer         Family History   Problem Relation Age of Onset   • Cancer Maternal Grandmother    • No Known Problems Mother    • No Known Problems Father          Mental Status Exam:   Hygiene:   good  Cooperation:  Cooperative  Eye Contact:  Good  Psychomotor Behavior:  Appropriate  Affect:  Appropriate  Mood: normal  Speech:  Normal  Thought Process:  Goal directed  Thought Content:  Normal  Suicidal:  None  Homicidal:  None  Hallucinations:  None  Delusion:  None  Memory:  Intact  Orientation:  Grossly intact  Reliability:  good  Insight:  Good  Judgement:  Good  Impulse Control:  Good         Review of Systems:  Review of Systems   Constitutional: Negative for activity change, chills, diaphoresis and fatigue.   Respiratory: Negative for apnea, cough and shortness of breath.    Cardiovascular: Negative for chest pain, palpitations and leg swelling.   Gastrointestinal: Negative for abdominal pain, constipation, diarrhea, nausea and vomiting.   Genitourinary: Negative for difficulty urinating.   Musculoskeletal: Positive for arthralgias.   Skin: Negative for rash.   Neurological: Negative for dizziness, weakness and headaches.   Psychiatric/Behavioral: Negative for agitation, self-injury, sleep disturbance and suicidal ideas. The patient is not nervous/anxious.          Physical Exam:  Physical Exam  Vitals reviewed.   Constitutional:       General: He is not in acute distress.     Appearance: Normal appearance. He is not ill-appearing or toxic-appearing.   Pulmonary:      Effort: Pulmonary effort is normal.   Musculoskeletal:         General: Normal range of motion.   Neurological:      General: No focal deficit present.      Mental Status: He is alert and oriented to person, place, and time.   Psychiatric:         Attention and Perception: Attention and perception normal.         Mood and Affect: Mood normal. Mood is not anxious or depressed.         Speech:  "Speech normal.         Behavior: Behavior normal. Behavior is cooperative.         Thought Content: Thought content normal.         Cognition and Memory: Cognition and memory normal.         Judgment: Judgment normal.         Vital Signs:   /82   Pulse 90   Ht 198.1 cm (78\")   Wt 99.8 kg (220 lb)   BMI 25.42 kg/m²      Lab Results:   Office Visit on 01/27/2022   Component Date Value Ref Range Status   • External Amphetamine Screen Urine 01/27/2022 Negative   Final   • Amphetamine Cut-Off 01/27/2022 1000ng/ml   Final   • External Benzodiazepine Screen Uri* 01/27/2022 Negative   Final   • Benzodiazipine Cut-Off 01/27/2022 300ng/ml   Final   • External Cocaine Screen Urine 01/27/2022 Negative   Final   • Cocaine Cut-Off 01/27/2022 300ng/ml   Final   • External THC Screen Urine 01/27/2022 Negative   Final   • THC Cut-Off 01/27/2022 50ng/ml   Final   • External Methadone Screen Urine 01/27/2022 Negative   Final   • Methadone Cut-Off 01/27/2022 300ng/ml   Final   • External Methamphetamine Screen Ur* 01/27/2022 Negative   Final   • Methamphetamine Cut-Off 01/27/2022 1000ng/ml   Final   • External Oxycodone Screen Urine 01/27/2022 Negative   Final   • Oxycodone Cut-Off 01/27/2022 100ng/ml   Final   • External Buprenorphine Screen Urine 01/27/2022 Positive   Final   • Buprenorphine Cut-Off 01/27/2022 10ng/ml   Final   • External MDMA 01/27/2022 Negative   Final   • MDMA Cut-Off 01/27/2022 500ng/ml   Final   • External Opiates Screen Urine 01/27/2022 Negative   Final   • Opiates Cut-Off 01/27/2022 300ng/ml   Final   Office Visit on 01/13/2022   Component Date Value Ref Range Status   • External Amphetamine Screen Urine 01/13/2022 Negative   Final   • Amphetamine Cut-Off 01/13/2022 1000ng/ml   Final   • External Benzodiazepine Screen Uri* 01/13/2022 Negative   Final   • Benzodiazipine Cut-Off 01/13/2022 300ng/ml   Final   • External Cocaine Screen Urine 01/13/2022 Negative   Final   • Cocaine Cut-Off 01/13/2022 " 300ng/ml   Final   • External THC Screen Urine 01/13/2022 Negative   Final   • THC Cut-Off 01/13/2022 50ng/ml   Final   • External Methadone Screen Urine 01/13/2022 Negative   Final   • Methadone Cut-Off 01/13/2022 300ng/ml   Final   • External Methamphetamine Screen Ur* 01/13/2022 Negative   Final   • Methamphetamine Cut-Off 01/13/2022 1000ng/ml   Final   • External Oxycodone Screen Urine 01/13/2022 Negative   Final   • Oxycodone Cut-Off 01/13/2022 100ng/ml   Final   • External Buprenorphine Screen Urine 01/13/2022 Positive   Final   • Buprenorphine Cut-Off 01/13/2022 10ng/ml   Final   • External MDMA 01/13/2022 Negative   Final   • MDMA Cut-Off 01/13/2022 500ng/ml   Final   • External Opiates Screen Urine 01/13/2022 Negative   Final   • Opiates Cut-Off 01/13/2022 300ng/ml   Final   Office Visit on 01/05/2022   Component Date Value Ref Range Status   • External Amphetamine Screen Urine 01/05/2022 Negative   Final   • Amphetamine Cut-Off 01/05/2022 1000ng/ml   Final   • External Benzodiazepine Screen Uri* 01/05/2022 Negative   Final   • Benzodiazipine Cut-Off 01/05/2022 300ng/ml   Final   • External Cocaine Screen Urine 01/05/2022 Negative   Final   • Cocaine Cut-Off 01/05/2022 300ng/ml   Final   • External THC Screen Urine 01/05/2022 Negative   Final   • THC Cut-Off 01/05/2022 50ng/ml   Final   • External Methadone Screen Urine 01/05/2022 Negative   Final   • Methadone Cut-Off 01/05/2022 300ng/ml   Final   • External Methamphetamine Screen Ur* 01/05/2022 Negative   Final   • Methamphetamine Cut-Off 01/05/2022 1000ng/ml   Final   • External Oxycodone Screen Urine 01/05/2022 Negative   Final   • Oxycodone Cut-Off 01/05/2022 100ng/ml   Final   • External Buprenorphine Screen Urine 01/05/2022 Positive   Final   • Buprenorphine Cut-Off 01/05/2022 10ng/ml   Final   • External MDMA 01/05/2022 Negative   Final   • MDMA Cut-Off 01/05/2022 500ng/ml   Final   • External Opiates Screen Urine 01/05/2022 Negative   Final   •  Opiates Cut-Off 01/05/2022 300ng/ml   Final   Admission on 01/01/2022, Discharged on 01/01/2022   Component Date Value Ref Range Status   • Glucose 01/01/2022 112* 65 - 99 mg/dL Final   • BUN 01/01/2022 3* 6 - 20 mg/dL Final   • Creatinine 01/01/2022 0.49* 0.76 - 1.27 mg/dL Final   • Sodium 01/01/2022 139  136 - 145 mmol/L Final   • Potassium 01/01/2022 3.6  3.5 - 5.2 mmol/L Final   • Chloride 01/01/2022 100  98 - 107 mmol/L Final   • CO2 01/01/2022 21.9* 22.0 - 29.0 mmol/L Final   • Calcium 01/01/2022 8.7  8.6 - 10.5 mg/dL Final   • Total Protein 01/01/2022 8.2  6.0 - 8.5 g/dL Final   • Albumin 01/01/2022 3.86  3.50 - 5.20 g/dL Final   • ALT (SGPT) 01/01/2022 82* 1 - 41 U/L Final   • AST (SGOT) 01/01/2022 253* 1 - 40 U/L Final   • Alkaline Phosphatase 01/01/2022 452* 39 - 117 U/L Final   • Total Bilirubin 01/01/2022 0.7  0.0 - 1.2 mg/dL Final   • eGFR Non  Amer 01/01/2022 >150  >60 mL/min/1.73 Final   • Globulin 01/01/2022 4.3  gm/dL Final   • A/G Ratio 01/01/2022 0.9  g/dL Final   • BUN/Creatinine Ratio 01/01/2022 6.1* 7.0 - 25.0 Final   • Anion Gap 01/01/2022 17.1* 5.0 - 15.0 mmol/L Final   • Color, UA 01/01/2022 Yellow  Yellow, Straw Final   • Appearance, UA 01/01/2022 Clear  Clear Final   • pH, UA 01/01/2022 6.5  5.0 - 8.0 Final   • Specific Gravity, UA 01/01/2022 <=1.005  1.005 - 1.030 Final   • Glucose, UA 01/01/2022 Negative  Negative Final   • Ketones, UA 01/01/2022 Negative  Negative Final   • Bilirubin, UA 01/01/2022 Negative  Negative Final   • Blood, UA 01/01/2022 Negative  Negative Final   • Protein, UA 01/01/2022 Negative  Negative Final   • Leuk Esterase, UA 01/01/2022 Negative  Negative Final   • Nitrite, UA 01/01/2022 Negative  Negative Final   • Urobilinogen, UA 01/01/2022 1.0 E.U./dL  0.2 - 1.0 E.U./dL Final   • THC, Screen, Urine 01/01/2022 Negative  Negative Final   • Phencyclidine (PCP), Urine 01/01/2022 Negative  Negative Final   • Cocaine Screen, Urine 01/01/2022 Negative  Negative  Final   • Methamphetamine, Ur 01/01/2022 Negative  Negative Final   • Opiate Screen 01/01/2022 Negative  Negative Final   • Amphetamine Screen, Urine 01/01/2022 Negative  Negative Final   • Benzodiazepine Screen, Urine 01/01/2022 Negative  Negative Final   • Tricyclic Antidepressants Screen 01/01/2022 Negative  Negative Final   • Methadone Screen, Urine 01/01/2022 Negative  Negative Final   • Barbiturates Screen, Urine 01/01/2022 Negative  Negative Final   • Oxycodone Screen, Urine 01/01/2022 Negative  Negative Final   • Propoxyphene Screen 01/01/2022 Negative  Negative Final   • Buprenorphine, Screen, Urine 01/01/2022 Positive* Negative Final   • Magnesium 01/01/2022 1.7  1.6 - 2.6 mg/dL Final   • Ethanol 01/01/2022 311* 0 - 10 mg/dL Final   • Ethanol % 01/01/2022 0.311  % Final   • WBC 01/01/2022 11.09* 3.40 - 10.80 10*3/mm3 Final   • RBC 01/01/2022 4.39  4.14 - 5.80 10*6/mm3 Final   • Hemoglobin 01/01/2022 14.3  13.0 - 17.7 g/dL Final   • Hematocrit 01/01/2022 42.3  37.5 - 51.0 % Final   • MCV 01/01/2022 96.4  79.0 - 97.0 fL Final   • MCH 01/01/2022 32.6  26.6 - 33.0 pg Final   • MCHC 01/01/2022 33.8  31.5 - 35.7 g/dL Final   • RDW 01/01/2022 11.9* 12.3 - 15.4 % Final   • RDW-SD 01/01/2022 42.3  37.0 - 54.0 fl Final   • MPV 01/01/2022 9.6  6.0 - 12.0 fL Final   • Platelets 01/01/2022 196  140 - 450 10*3/mm3 Final   • Neutrophil % 01/01/2022 70.8  42.7 - 76.0 % Final   • Lymphocyte % 01/01/2022 20.6  19.6 - 45.3 % Final   • Monocyte % 01/01/2022 5.0  5.0 - 12.0 % Final   • Eosinophil % 01/01/2022 1.8  0.3 - 6.2 % Final   • Basophil % 01/01/2022 0.5  0.0 - 1.5 % Final   • Immature Grans % 01/01/2022 1.3* 0.0 - 0.5 % Final   • Neutrophils, Absolute 01/01/2022 7.86* 1.70 - 7.00 10*3/mm3 Final   • Lymphocytes, Absolute 01/01/2022 2.28  0.70 - 3.10 10*3/mm3 Final   • Monocytes, Absolute 01/01/2022 0.55  0.10 - 0.90 10*3/mm3 Final   • Eosinophils, Absolute 01/01/2022 0.20  0.00 - 0.40 10*3/mm3 Final   • Basophils,  Absolute 01/01/2022 0.06  0.00 - 0.20 10*3/mm3 Final   • Immature Grans, Absolute 01/01/2022 0.14* 0.00 - 0.05 10*3/mm3 Final   • nRBC 01/01/2022 0.0  0.0 - 0.2 /100 WBC Final   • COVID19 01/01/2022 Not Detected  Not Detected - Ref. Range Final   • Influenza A PCR 01/01/2022 Not Detected  Not Detected Final   • Influenza B PCR 01/01/2022 Not Detected  Not Detected Final   • Ethanol 01/01/2022 102* 0 - 10 mg/dL Final   • Ethanol % 01/01/2022 0.102  % Final   Office Visit on 12/01/2021   Component Date Value Ref Range Status   • External Amphetamine Screen Urine 12/01/2021 Negative   Final   • Amphetamine Cut-Off 12/01/2021 1000NG/ML   Final   • External Benzodiazepine Screen Uri* 12/01/2021 Negative   Final   • Benzodiazipine Cut-Off 12/01/2021 300NG/ML   Final   • External Cocaine Screen Urine 12/01/2021 Negative   Final   • Cocaine Cut-Off 12/01/2021 300NG/ML   Final   • External THC Screen Urine 12/01/2021 Negative   Final   • THC Cut-Off 12/01/2021 50NG/ML   Final   • External Methadone Screen Urine 12/01/2021 Negative   Final   • Methadone Cut-Off 12/01/2021 300NG/ML   Final   • External Methamphetamine Screen Ur* 12/01/2021 Negative   Final   • Methamphetamine Cut-Off 12/01/2021 1000NG/ML   Final   • External Oxycodone Screen Urine 12/01/2021 Negative   Final   • Oxycodone Cut-Off 12/01/2021 100NG/ML   Final   • External Buprenorphine Screen Urine 12/01/2021 Positive   Final   • Buprenorphine Cut-Off 12/01/2021 10NG/ML   Final   • External MDMA 12/01/2021 Negative   Final   • MDMA Cut-Off 12/01/2021 500NG/ML   Final   • External Opiates Screen Urine 12/01/2021 Negative   Final   • Opiates Cut-Off 12/01/2021 300NG/ML   Final   Office Visit on 11/24/2021   Component Date Value Ref Range Status   • External Amphetamine Screen Urine 11/24/2021 Negative   Final   • Amphetamine Cut-Off 11/24/2021 1000NG/ML   Final   • External Benzodiazepine Screen Uri* 11/24/2021 Negative   Final   • Benzodiazipine Cut-Off  11/24/2021 300NG/ML   Final   • External Cocaine Screen Urine 11/24/2021 Negative   Final   • Cocaine Cut-Off 11/24/2021 300NG/ML   Final   • External THC Screen Urine 11/24/2021 Negative   Final   • THC Cut-Off 11/24/2021 50NG/ML   Final   • External Methadone Screen Urine 11/24/2021 Negative   Final   • Methadone Cut-Off 11/24/2021 300NG/ML   Final   • External Methamphetamine Screen Ur* 11/24/2021 Negative   Final   • Methamphetamine Cut-Off 11/24/2021 1000NG/ML   Final   • External Oxycodone Screen Urine 11/24/2021 Negative   Final   • Oxycodone Cut-Off 11/24/2021 100NG/ML   Final   • External Buprenorphine Screen Urine 11/24/2021 Positive   Final   • Buprenorphine Cut-Off 11/24/2021 10NG/ML   Final   • External MDMA 11/24/2021 Negative   Final   • MDMA Cut-Off 11/24/2021 500NG/ML   Final   • External Opiates Screen Urine 11/24/2021 Negative   Final   • Opiates Cut-Off 11/24/2021 300NG/ML   Final   Office Visit on 11/17/2021   Component Date Value Ref Range Status   • External Amphetamine Screen Urine 11/17/2021 Negative   Final   • Amphetamine Cut-Off 11/17/2021 1000ng/ml   Final   • External Benzodiazepine Screen Uri* 11/17/2021 Negative   Final   • Benzodiazipine Cut-Off 11/17/2021 300ng/ml   Final   • External Cocaine Screen Urine 11/17/2021 Negative   Final   • Cocaine Cut-Off 11/17/2021 300ng/ml   Final   • External THC Screen Urine 11/17/2021 Negative   Final   • THC Cut-Off 11/17/2021 50ng/ml   Final   • External Methadone Screen Urine 11/17/2021 Negative   Final   • Methadone Cut-Off 11/17/2021 300ng/ml   Final   • External Methamphetamine Screen Ur* 11/17/2021 Negative   Final   • Methamphetamine Cut-Off 11/17/2021 1000ng/ml   Final   • External Oxycodone Screen Urine 11/17/2021 Negative   Final   • Oxycodone Cut-Off 11/17/2021 100ng/ml   Final   • External Buprenorphine Screen Urine 11/17/2021 Positive   Final   • Buprenorphine Cut-Off 11/17/2021 10ng/ml   Final   • External MDMA 11/17/2021 Negative    Final   • MDMA Cut-Off 11/17/2021 500ng/ml   Final   • External Opiates Screen Urine 11/17/2021 Negative   Final   • Opiates Cut-Off 11/17/2021 300ng/ml   Final   Admission on 11/10/2021, Discharged on 11/11/2021   Component Date Value Ref Range Status   • Ethanol 11/10/2021 234* 0 - 10 mg/dL Final   • Ethanol % 11/10/2021 0.234  % Final   Office Visit on 11/10/2021   Component Date Value Ref Range Status   • External Amphetamine Screen Urine 11/10/2021 Negative   Final   • Amphetamine Cut-Off 11/10/2021 1000NG/ML   Final   • External Benzodiazepine Screen Uri* 11/10/2021 Negative   Final   • Benzodiazipine Cut-Off 11/10/2021 300NG/ML   Final   • External Cocaine Screen Urine 11/10/2021 Negative   Final   • Cocaine Cut-Off 11/10/2021 300NG/ML   Final   • External THC Screen Urine 11/10/2021 Negative   Final   • THC Cut-Off 11/10/2021 50NG/ML   Final   • External Methadone Screen Urine 11/10/2021 Negative   Final   • Methadone Cut-Off 11/10/2021 300NG/ML   Final   • External Methamphetamine Screen Ur* 11/10/2021 Negative   Final   • Methamphetamine Cut-Off 11/10/2021 1000NG/ML   Final   • External Oxycodone Screen Urine 11/10/2021 Negative   Final   • Oxycodone Cut-Off 11/10/2021 100NG/ML   Final   • External Buprenorphine Screen Urine 11/10/2021 Positive   Final   • Buprenorphine Cut-Off 11/10/2021 10NG/ML   Final   • External MDMA 11/10/2021 Negative   Final   • MDMA Cut-Off 11/10/2021 500NG/ML   Final   • External Opiates Screen Urine 11/10/2021 Negative   Final   • Opiates Cut-Off 11/10/2021 300NG/ML   Final   Office Visit on 11/03/2021   Component Date Value Ref Range Status   • External Amphetamine Screen Urine 11/03/2021 Negative   Final   • Amphetamine Cut-Off 11/03/2021 1000NG/ML   Final   • External Benzodiazepine Screen Uri* 11/03/2021 Negative   Final   • Benzodiazipine Cut-Off 11/03/2021 300NG/ML   Final   • External Cocaine Screen Urine 11/03/2021 Negative   Final   • Cocaine Cut-Off 11/03/2021 300NG/ML    Final   • External THC Screen Urine 11/03/2021 Negative   Final   • THC Cut-Off 11/03/2021 50NG/ML   Final   • External Methadone Screen Urine 11/03/2021 Negative   Final   • Methadone Cut-Off 11/03/2021 300NG/ML   Final   • External Methamphetamine Screen Ur* 11/03/2021 Negative   Final   • Methamphetamine Cut-Off 11/03/2021 1000NG/ML   Final   • External Oxycodone Screen Urine 11/03/2021 Negative   Final   • Oxycodone Cut-Off 11/03/2021 100NG/ML   Final   • External Buprenorphine Screen Urine 11/03/2021 Positive   Final   • Buprenorphine Cut-Off 11/03/2021 10NG/ML   Final   • External MDMA 11/03/2021 Negative   Final   • MDMA Cut-Off 11/03/2021 500NG/ML   Final   • External Opiates Screen Urine 11/03/2021 Negative   Final   • Opiates Cut-Off 11/03/2021 300NG/ML   Final   There may be more visits with results that are not included.         Assessment/Plan   Diagnoses and all orders for this visit:    1. Opioid type dependence, continuous (HCC) (Primary)  -     buprenorphine-naloxone (SUBOXONE) 8-2 MG per SL tablet; Place 2 tablets under the tongue Daily.  Dispense: 56 tablet; Refill: 0    2. Medication management  -     [facility] Screen    3. Arthralgia, unspecified joint  -     ibuprofen (ADVIL,MOTRIN) 800 MG tablet; Take 1 tablet by mouth Every 8 (Eight) Hours As Needed for Moderate Pain .  Dispense: 30 tablet; Refill: 0    Other orders  -     SCANNED - LABS        Visit Diagnoses:    ICD-10-CM ICD-9-CM   1. Medication management  Z79.899 V58.69       PLAN:  1. Safety: No acute safety concerns  2. Risk Assessment: Risk of self-harm acutely is low. Risk of self-harm chronically is also low, but could be further elevated in the event of treatment noncompliance and/or AODA.    TREATMENT PLAN/GOALS: Continue supportive psychotherapy efforts and medications as indicated. Treatment and medication options discussed during today's visit. Patient acknowledged and verbally consented to continue with current treatment  plan and was educated on the importance of compliance with treatment and follow-up appointments.    MEDICATION ISSUES:  ROMAN reviewed as expected.  Discussed medication options and treatment plan of prescribed medication as well as the risks, benefits, and side effects including potential falls, possible impaired driving and metabolic adversities among others. Patient is agreeable to call the office with any worsening of symptoms or onset of side effects. Patient is agreeable to call 911 or go to the nearest ER should he/she begin having SI/HI. No medication side effects or related complaints today.     MEDS ORDERED DURING VISIT:  No orders of the defined types were placed in this encounter.      No follow-ups on file.           This document has been electronically signed by RENAN De Dios  January 27, 2022 16:24 EST      Part of this note may be an electronic transcription/translation of spoken language to printed text using the Dragon Dictation System.

## 2022-02-24 ENCOUNTER — OFFICE VISIT (OUTPATIENT)
Dept: PSYCHIATRY | Facility: CLINIC | Age: 32
End: 2022-02-24

## 2022-02-24 VITALS
DIASTOLIC BLOOD PRESSURE: 82 MMHG | WEIGHT: 220 LBS | HEIGHT: 78 IN | HEART RATE: 78 BPM | SYSTOLIC BLOOD PRESSURE: 124 MMHG | BODY MASS INDEX: 25.45 KG/M2

## 2022-02-24 DIAGNOSIS — F32.A DEPRESSION, UNSPECIFIED DEPRESSION TYPE: ICD-10-CM

## 2022-02-24 DIAGNOSIS — Z79.899 MEDICATION MANAGEMENT: ICD-10-CM

## 2022-02-24 DIAGNOSIS — G47.00 INSOMNIA, UNSPECIFIED TYPE: ICD-10-CM

## 2022-02-24 DIAGNOSIS — M25.50 ARTHRALGIA, UNSPECIFIED JOINT: ICD-10-CM

## 2022-02-24 DIAGNOSIS — F11.20 OPIOID TYPE DEPENDENCE, CONTINUOUS: Primary | ICD-10-CM

## 2022-02-24 DIAGNOSIS — F41.1 GENERALIZED ANXIETY DISORDER: ICD-10-CM

## 2022-02-24 PROCEDURE — 99214 OFFICE O/P EST MOD 30 MIN: CPT | Performed by: NURSE PRACTITIONER

## 2022-02-24 RX ORDER — IBUPROFEN 800 MG/1
800 TABLET ORAL EVERY 8 HOURS PRN
Qty: 30 TABLET | Refills: 0 | Status: SHIPPED | OUTPATIENT
Start: 2022-02-24 | End: 2022-03-28

## 2022-02-24 RX ORDER — HYDROXYZINE PAMOATE 50 MG/1
50 CAPSULE ORAL 4 TIMES DAILY PRN
Qty: 60 CAPSULE | Refills: 2 | Status: SHIPPED | OUTPATIENT
Start: 2022-02-24 | End: 2022-03-24 | Stop reason: SDUPTHER

## 2022-02-24 RX ORDER — BUPRENORPHINE HYDROCHLORIDE AND NALOXONE HYDROCHLORIDE DIHYDRATE 8; 2 MG/1; MG/1
2 TABLET SUBLINGUAL DAILY
Qty: 56 TABLET | Refills: 0 | Status: SHIPPED | OUTPATIENT
Start: 2022-02-24 | End: 2022-03-24 | Stop reason: SDUPTHER

## 2022-02-24 NOTE — PROGRESS NOTES
This provider is located at [facility]. The Patient is seen remotely located at the [facility] ([facility]) using Video. Patient is being seen via telehealth and confirm that they are in a secure environment for this session. The patient's condition being diagnosed/treated is appropriate for telemedicine. Provider identified as Daniel Olivas as well as credentials APRN MSN FNP-JUSTIN ALVAREZ-REYNOLD.   The client/patient gave consent to be seen remotely, and when consent is given they understand that the consent allows for patient identifiable information to be sent to a third party as needed.   They may refuse to be seen remotely at any time. The electronic data is encrypted and password protected, and the patient has been advised of the potential risks to privacy not withstanding such measures.      Chief Complaint/History of Present Illness: Follow Up buprenorphine/naloxone Medicated Assisted Treatment for Opiate Use Disorder     Patient/Client Concerns/Updates: Discussed chronic elevation of LFTs; patient has ceased alcohol use however reports he has a history of hep C; will reassess LFTs and hepatitis C with reflex for further information.  Patient reports he uses nonprescribed gabapentin on occasion for heightened anxiety-advised patient this is a controlled substance and do not use if not prescribed.  For this trigger of anxiety will increase Zoloft to 50 mg and hydroxyzine 200 mg as needed-no concern for depression or SI    Triggers (Persons/Places/Things/Events/Thought/Emotions): Joint pain and chronic anxiety    Cravings: Denies cravings for opiates    Relapse Prevention: Counseling    Urine Drug Screen (today's visit) discussed: Positive buprenorphine, otherwise negative for substances tested    UDS Confirmation: Positive buprenorphine/nor-buprenorphine, no concerns for urine tampering, positive nonprescribed gabapentin, alcohol screen negative    Most recent pertinent laboratory studies reviewed:  1/1/2022-LFTs chronically elevated will reevaluate for trend considering patient has ceased alcohol use    ROMNA (PDMP) Reviewed for Current/Active Medications: buprenorphine/naloxone as reviewed today    Past Surgical History:  Past Surgical History:   Procedure Laterality Date   • NO PAST SURGERIES         Problem List:  Patient Active Problem List   Diagnosis   • Alcohol-induced acute pancreatitis   • Pancreatitis       Allergy:   No Known Allergies     Current Medications:   Current Outpatient Medications   Medication Sig Dispense Refill   • buprenorphine-naloxone (SUBOXONE) 8-2 MG per SL tablet Place 2 tablets under the tongue Daily. 56 tablet 0   • cephalexin (KEFLEX) 500 MG capsule Take 1 capsule by mouth 3 (Three) Times a Day. 30 capsule 0   • cloNIDine (Catapres) 0.1 MG tablet Take 1 tablet by mouth 2 (Two) Times a Day. Take as needed for anxiety.  Insomnia.  Chills or sweats 60 tablet 2   • hydrOXYzine pamoate (Vistaril) 50 MG capsule Take 1 capsule by mouth 4 (Four) Times a Day As Needed for Anxiety (and/or insomia). 60 capsule 2   • ibuprofen (ADVIL,MOTRIN) 800 MG tablet Take 1 tablet by mouth Every 8 (Eight) Hours As Needed for Moderate Pain . 30 tablet 0   • mupirocin (BACTROBAN) 2 % ointment Apply  topically to the appropriate area as directed 3 (Three) Times a Day. 15 g 0   • naloxone (NARCAN) 4 MG/0.1ML nasal spray 1 spray into the nostril(s) as directed by provider As Needed (Opiate oversedation). Spray in 1 nostril every 2 to 3 minutes call 911 2 each 2   • orphenadrine (NORFLEX) 100 MG 12 hr tablet Take 1 tablet by mouth 2 (Two) Times a Day As Needed for Muscle Spasms or Mild Pain . 14 tablet 0   • sertraline (Zoloft) 50 MG tablet Take 1 tablet by mouth Daily for 30 days. 30 tablet 3     No current facility-administered medications for this visit.       Past Medical History:  Past Medical History:   Diagnosis Date   • Alcohol-induced acute pancreatitis 6/27/2019   • Alcoholism (HCC)    • Drug  use     Amphetamines and Suboxone   • Fatty liver    • Hepatitis C antibody test positive 2019   • Medical non-compliance    • Pancreatitis    • Smoker          Social History     Socioeconomic History   • Marital status:    Tobacco Use   • Smoking status: Current Every Day Smoker     Packs/day: 0.50     Types: Cigarettes   • Smokeless tobacco: Never Used   Vaping Use   • Vaping Use: Never used   Substance and Sexual Activity   • Alcohol use: Yes     Alcohol/week: 8.0 standard drinks     Types: 8 Shots of liquor per week     Comment: 5-6 double shots   • Drug use: Yes     Comment: suboxone as prescribed   • Sexual activity: Defer         Family History   Problem Relation Age of Onset   • Cancer Maternal Grandmother    • No Known Problems Mother    • No Known Problems Father          Mental Status Exam:   Hygiene:   good  Cooperation:  Cooperative  Eye Contact:  Good  Psychomotor Behavior:  Appropriate  Affect:  Appropriate  Mood: anxious  Speech:  Normal  Thought Process:  Goal directed  Thought Content:  Normal  Suicidal:  None  Homicidal:  None  Hallucinations:  None  Delusion:  None  Memory:  Intact  Orientation:  Grossly intact  Reliability:  good  Insight:  Good  Judgement:  Good  Impulse Control:  Good         Review of Systems:  Review of Systems   Constitutional: Negative for activity change, chills, diaphoresis and fatigue.   Respiratory: Negative for apnea, cough and shortness of breath.    Cardiovascular: Negative for chest pain, palpitations and leg swelling.   Gastrointestinal: Negative for abdominal pain, constipation, diarrhea, nausea and vomiting.   Genitourinary: Negative for difficulty urinating.   Musculoskeletal: Positive for arthralgias.   Skin: Negative for rash.   Neurological: Negative for dizziness, weakness and headaches.   Psychiatric/Behavioral: Negative for agitation, self-injury, sleep disturbance and suicidal ideas. The patient is nervous/anxious.          Physical  "Exam:  Physical Exam  Vitals reviewed.   Constitutional:       General: He is not in acute distress.     Appearance: Normal appearance. He is not ill-appearing or toxic-appearing.   Pulmonary:      Effort: Pulmonary effort is normal.   Musculoskeletal:         General: Normal range of motion.   Neurological:      General: No focal deficit present.      Mental Status: He is alert and oriented to person, place, and time.   Psychiatric:         Attention and Perception: Attention and perception normal.         Mood and Affect: Mood is anxious. Mood is not depressed.         Speech: Speech normal.         Behavior: Behavior normal. Behavior is cooperative.         Thought Content: Thought content normal.         Cognition and Memory: Cognition and memory normal.         Judgment: Judgment normal.         Vital Signs:   /82   Pulse 78   Ht 198.1 cm (78\")   Wt 99.8 kg (220 lb)   BMI 25.42 kg/m²      Lab Results:   Office Visit on 02/24/2022   Component Date Value Ref Range Status   • External Amphetamine Screen Urine 02/24/2022 Negative   Final   • Amphetamine Cut-Off 02/24/2022 1000ng/ml   Final   • External Benzodiazepine Screen Uri* 02/24/2022 Negative   Final   • Benzodiazipine Cut-Off 02/24/2022 300ng/ml   Final   • External Cocaine Screen Urine 02/24/2022 Negative   Final   • Cocaine Cut-Off 02/24/2022 300ng/ml   Final   • External THC Screen Urine 02/24/2022 Negative   Final   • THC Cut-Off 02/24/2022 50ng/ml   Final   • External Methadone Screen Urine 02/24/2022 Negative   Final   • Methadone Cut-Off 02/24/2022 300ng/ml   Final   • External Methamphetamine Screen Ur* 02/24/2022 Negative   Final   • Methamphetamine Cut-Off 02/24/2022 1000ng/ml   Final   • External Oxycodone Screen Urine 02/24/2022 Negative   Final   • Oxycodone Cut-Off 02/24/2022 100ng/ml   Final   • External Buprenorphine Screen Urine 02/24/2022 Positive   Final   • Buprenorphine Cut-Off 02/24/2022 10ng/ml   Final   • External MDMA " 02/24/2022 Negative   Final   • MDMA Cut-Off 02/24/2022 500ng/ml   Final   • External Opiates Screen Urine 02/24/2022 Negative   Final   • Opiates Cut-Off 02/24/2022 300ng/ml   Final   Office Visit on 01/27/2022   Component Date Value Ref Range Status   • External Amphetamine Screen Urine 01/27/2022 Negative   Final   • Amphetamine Cut-Off 01/27/2022 1000ng/ml   Final   • External Benzodiazepine Screen Uri* 01/27/2022 Negative   Final   • Benzodiazipine Cut-Off 01/27/2022 300ng/ml   Final   • External Cocaine Screen Urine 01/27/2022 Negative   Final   • Cocaine Cut-Off 01/27/2022 300ng/ml   Final   • External THC Screen Urine 01/27/2022 Negative   Final   • THC Cut-Off 01/27/2022 50ng/ml   Final   • External Methadone Screen Urine 01/27/2022 Negative   Final   • Methadone Cut-Off 01/27/2022 300ng/ml   Final   • External Methamphetamine Screen Ur* 01/27/2022 Negative   Final   • Methamphetamine Cut-Off 01/27/2022 1000ng/ml   Final   • External Oxycodone Screen Urine 01/27/2022 Negative   Final   • Oxycodone Cut-Off 01/27/2022 100ng/ml   Final   • External Buprenorphine Screen Urine 01/27/2022 Positive   Final   • Buprenorphine Cut-Off 01/27/2022 10ng/ml   Final   • External MDMA 01/27/2022 Negative   Final   • MDMA Cut-Off 01/27/2022 500ng/ml   Final   • External Opiates Screen Urine 01/27/2022 Negative   Final   • Opiates Cut-Off 01/27/2022 300ng/ml   Final   Office Visit on 01/13/2022   Component Date Value Ref Range Status   • External Amphetamine Screen Urine 01/13/2022 Negative   Final   • Amphetamine Cut-Off 01/13/2022 1000ng/ml   Final   • External Benzodiazepine Screen Uri* 01/13/2022 Negative   Final   • Benzodiazipine Cut-Off 01/13/2022 300ng/ml   Final   • External Cocaine Screen Urine 01/13/2022 Negative   Final   • Cocaine Cut-Off 01/13/2022 300ng/ml   Final   • External THC Screen Urine 01/13/2022 Negative   Final   • THC Cut-Off 01/13/2022 50ng/ml   Final   • External Methadone Screen Urine 01/13/2022  Negative   Final   • Methadone Cut-Off 01/13/2022 300ng/ml   Final   • External Methamphetamine Screen Ur* 01/13/2022 Negative   Final   • Methamphetamine Cut-Off 01/13/2022 1000ng/ml   Final   • External Oxycodone Screen Urine 01/13/2022 Negative   Final   • Oxycodone Cut-Off 01/13/2022 100ng/ml   Final   • External Buprenorphine Screen Urine 01/13/2022 Positive   Final   • Buprenorphine Cut-Off 01/13/2022 10ng/ml   Final   • External MDMA 01/13/2022 Negative   Final   • MDMA Cut-Off 01/13/2022 500ng/ml   Final   • External Opiates Screen Urine 01/13/2022 Negative   Final   • Opiates Cut-Off 01/13/2022 300ng/ml   Final   Office Visit on 01/05/2022   Component Date Value Ref Range Status   • External Amphetamine Screen Urine 01/05/2022 Negative   Final   • Amphetamine Cut-Off 01/05/2022 1000ng/ml   Final   • External Benzodiazepine Screen Uri* 01/05/2022 Negative   Final   • Benzodiazipine Cut-Off 01/05/2022 300ng/ml   Final   • External Cocaine Screen Urine 01/05/2022 Negative   Final   • Cocaine Cut-Off 01/05/2022 300ng/ml   Final   • External THC Screen Urine 01/05/2022 Negative   Final   • THC Cut-Off 01/05/2022 50ng/ml   Final   • External Methadone Screen Urine 01/05/2022 Negative   Final   • Methadone Cut-Off 01/05/2022 300ng/ml   Final   • External Methamphetamine Screen Ur* 01/05/2022 Negative   Final   • Methamphetamine Cut-Off 01/05/2022 1000ng/ml   Final   • External Oxycodone Screen Urine 01/05/2022 Negative   Final   • Oxycodone Cut-Off 01/05/2022 100ng/ml   Final   • External Buprenorphine Screen Urine 01/05/2022 Positive   Final   • Buprenorphine Cut-Off 01/05/2022 10ng/ml   Final   • External MDMA 01/05/2022 Negative   Final   • MDMA Cut-Off 01/05/2022 500ng/ml   Final   • External Opiates Screen Urine 01/05/2022 Negative   Final   • Opiates Cut-Off 01/05/2022 300ng/ml   Final   Admission on 01/01/2022, Discharged on 01/01/2022   Component Date Value Ref Range Status   • Glucose 01/01/2022 112* 65 -  99 mg/dL Final   • BUN 01/01/2022 3* 6 - 20 mg/dL Final   • Creatinine 01/01/2022 0.49* 0.76 - 1.27 mg/dL Final   • Sodium 01/01/2022 139  136 - 145 mmol/L Final   • Potassium 01/01/2022 3.6  3.5 - 5.2 mmol/L Final   • Chloride 01/01/2022 100  98 - 107 mmol/L Final   • CO2 01/01/2022 21.9* 22.0 - 29.0 mmol/L Final   • Calcium 01/01/2022 8.7  8.6 - 10.5 mg/dL Final   • Total Protein 01/01/2022 8.2  6.0 - 8.5 g/dL Final   • Albumin 01/01/2022 3.86  3.50 - 5.20 g/dL Final   • ALT (SGPT) 01/01/2022 82* 1 - 41 U/L Final   • AST (SGOT) 01/01/2022 253* 1 - 40 U/L Final   • Alkaline Phosphatase 01/01/2022 452* 39 - 117 U/L Final   • Total Bilirubin 01/01/2022 0.7  0.0 - 1.2 mg/dL Final   • eGFR Non  Amer 01/01/2022 >150  >60 mL/min/1.73 Final   • Globulin 01/01/2022 4.3  gm/dL Final   • A/G Ratio 01/01/2022 0.9  g/dL Final   • BUN/Creatinine Ratio 01/01/2022 6.1* 7.0 - 25.0 Final   • Anion Gap 01/01/2022 17.1* 5.0 - 15.0 mmol/L Final   • Color, UA 01/01/2022 Yellow  Yellow, Straw Final   • Appearance, UA 01/01/2022 Clear  Clear Final   • pH, UA 01/01/2022 6.5  5.0 - 8.0 Final   • Specific Gravity, UA 01/01/2022 <=1.005  1.005 - 1.030 Final   • Glucose, UA 01/01/2022 Negative  Negative Final   • Ketones, UA 01/01/2022 Negative  Negative Final   • Bilirubin, UA 01/01/2022 Negative  Negative Final   • Blood, UA 01/01/2022 Negative  Negative Final   • Protein, UA 01/01/2022 Negative  Negative Final   • Leuk Esterase, UA 01/01/2022 Negative  Negative Final   • Nitrite, UA 01/01/2022 Negative  Negative Final   • Urobilinogen, UA 01/01/2022 1.0 E.U./dL  0.2 - 1.0 E.U./dL Final   • THC, Screen, Urine 01/01/2022 Negative  Negative Final   • Phencyclidine (PCP), Urine 01/01/2022 Negative  Negative Final   • Cocaine Screen, Urine 01/01/2022 Negative  Negative Final   • Methamphetamine, Ur 01/01/2022 Negative  Negative Final   • Opiate Screen 01/01/2022 Negative  Negative Final   • Amphetamine Screen, Urine 01/01/2022 Negative   Negative Final   • Benzodiazepine Screen, Urine 01/01/2022 Negative  Negative Final   • Tricyclic Antidepressants Screen 01/01/2022 Negative  Negative Final   • Methadone Screen, Urine 01/01/2022 Negative  Negative Final   • Barbiturates Screen, Urine 01/01/2022 Negative  Negative Final   • Oxycodone Screen, Urine 01/01/2022 Negative  Negative Final   • Propoxyphene Screen 01/01/2022 Negative  Negative Final   • Buprenorphine, Screen, Urine 01/01/2022 Positive* Negative Final   • Magnesium 01/01/2022 1.7  1.6 - 2.6 mg/dL Final   • Ethanol 01/01/2022 311* 0 - 10 mg/dL Final   • Ethanol % 01/01/2022 0.311  % Final   • WBC 01/01/2022 11.09* 3.40 - 10.80 10*3/mm3 Final   • RBC 01/01/2022 4.39  4.14 - 5.80 10*6/mm3 Final   • Hemoglobin 01/01/2022 14.3  13.0 - 17.7 g/dL Final   • Hematocrit 01/01/2022 42.3  37.5 - 51.0 % Final   • MCV 01/01/2022 96.4  79.0 - 97.0 fL Final   • MCH 01/01/2022 32.6  26.6 - 33.0 pg Final   • MCHC 01/01/2022 33.8  31.5 - 35.7 g/dL Final   • RDW 01/01/2022 11.9* 12.3 - 15.4 % Final   • RDW-SD 01/01/2022 42.3  37.0 - 54.0 fl Final   • MPV 01/01/2022 9.6  6.0 - 12.0 fL Final   • Platelets 01/01/2022 196  140 - 450 10*3/mm3 Final   • Neutrophil % 01/01/2022 70.8  42.7 - 76.0 % Final   • Lymphocyte % 01/01/2022 20.6  19.6 - 45.3 % Final   • Monocyte % 01/01/2022 5.0  5.0 - 12.0 % Final   • Eosinophil % 01/01/2022 1.8  0.3 - 6.2 % Final   • Basophil % 01/01/2022 0.5  0.0 - 1.5 % Final   • Immature Grans % 01/01/2022 1.3* 0.0 - 0.5 % Final   • Neutrophils, Absolute 01/01/2022 7.86* 1.70 - 7.00 10*3/mm3 Final   • Lymphocytes, Absolute 01/01/2022 2.28  0.70 - 3.10 10*3/mm3 Final   • Monocytes, Absolute 01/01/2022 0.55  0.10 - 0.90 10*3/mm3 Final   • Eosinophils, Absolute 01/01/2022 0.20  0.00 - 0.40 10*3/mm3 Final   • Basophils, Absolute 01/01/2022 0.06  0.00 - 0.20 10*3/mm3 Final   • Immature Grans, Absolute 01/01/2022 0.14* 0.00 - 0.05 10*3/mm3 Final   • nRBC 01/01/2022 0.0  0.0 - 0.2 /100 WBC Final    • COVID19 01/01/2022 Not Detected  Not Detected - Ref. Range Final   • Influenza A PCR 01/01/2022 Not Detected  Not Detected Final   • Influenza B PCR 01/01/2022 Not Detected  Not Detected Final   • Ethanol 01/01/2022 102* 0 - 10 mg/dL Final   • Ethanol % 01/01/2022 0.102  % Final   Office Visit on 12/01/2021   Component Date Value Ref Range Status   • External Amphetamine Screen Urine 12/01/2021 Negative   Final   • Amphetamine Cut-Off 12/01/2021 1000NG/ML   Final   • External Benzodiazepine Screen Uri* 12/01/2021 Negative   Final   • Benzodiazipine Cut-Off 12/01/2021 300NG/ML   Final   • External Cocaine Screen Urine 12/01/2021 Negative   Final   • Cocaine Cut-Off 12/01/2021 300NG/ML   Final   • External THC Screen Urine 12/01/2021 Negative   Final   • THC Cut-Off 12/01/2021 50NG/ML   Final   • External Methadone Screen Urine 12/01/2021 Negative   Final   • Methadone Cut-Off 12/01/2021 300NG/ML   Final   • External Methamphetamine Screen Ur* 12/01/2021 Negative   Final   • Methamphetamine Cut-Off 12/01/2021 1000NG/ML   Final   • External Oxycodone Screen Urine 12/01/2021 Negative   Final   • Oxycodone Cut-Off 12/01/2021 100NG/ML   Final   • External Buprenorphine Screen Urine 12/01/2021 Positive   Final   • Buprenorphine Cut-Off 12/01/2021 10NG/ML   Final   • External MDMA 12/01/2021 Negative   Final   • MDMA Cut-Off 12/01/2021 500NG/ML   Final   • External Opiates Screen Urine 12/01/2021 Negative   Final   • Opiates Cut-Off 12/01/2021 300NG/ML   Final   Office Visit on 11/24/2021   Component Date Value Ref Range Status   • External Amphetamine Screen Urine 11/24/2021 Negative   Final   • Amphetamine Cut-Off 11/24/2021 1000NG/ML   Final   • External Benzodiazepine Screen Uri* 11/24/2021 Negative   Final   • Benzodiazipine Cut-Off 11/24/2021 300NG/ML   Final   • External Cocaine Screen Urine 11/24/2021 Negative   Final   • Cocaine Cut-Off 11/24/2021 300NG/ML   Final   • External THC Screen Urine 11/24/2021  Negative   Final   • THC Cut-Off 11/24/2021 50NG/ML   Final   • External Methadone Screen Urine 11/24/2021 Negative   Final   • Methadone Cut-Off 11/24/2021 300NG/ML   Final   • External Methamphetamine Screen Ur* 11/24/2021 Negative   Final   • Methamphetamine Cut-Off 11/24/2021 1000NG/ML   Final   • External Oxycodone Screen Urine 11/24/2021 Negative   Final   • Oxycodone Cut-Off 11/24/2021 100NG/ML   Final   • External Buprenorphine Screen Urine 11/24/2021 Positive   Final   • Buprenorphine Cut-Off 11/24/2021 10NG/ML   Final   • External MDMA 11/24/2021 Negative   Final   • MDMA Cut-Off 11/24/2021 500NG/ML   Final   • External Opiates Screen Urine 11/24/2021 Negative   Final   • Opiates Cut-Off 11/24/2021 300NG/ML   Final   Office Visit on 11/17/2021   Component Date Value Ref Range Status   • External Amphetamine Screen Urine 11/17/2021 Negative   Final   • Amphetamine Cut-Off 11/17/2021 1000ng/ml   Final   • External Benzodiazepine Screen Uri* 11/17/2021 Negative   Final   • Benzodiazipine Cut-Off 11/17/2021 300ng/ml   Final   • External Cocaine Screen Urine 11/17/2021 Negative   Final   • Cocaine Cut-Off 11/17/2021 300ng/ml   Final   • External THC Screen Urine 11/17/2021 Negative   Final   • THC Cut-Off 11/17/2021 50ng/ml   Final   • External Methadone Screen Urine 11/17/2021 Negative   Final   • Methadone Cut-Off 11/17/2021 300ng/ml   Final   • External Methamphetamine Screen Ur* 11/17/2021 Negative   Final   • Methamphetamine Cut-Off 11/17/2021 1000ng/ml   Final   • External Oxycodone Screen Urine 11/17/2021 Negative   Final   • Oxycodone Cut-Off 11/17/2021 100ng/ml   Final   • External Buprenorphine Screen Urine 11/17/2021 Positive   Final   • Buprenorphine Cut-Off 11/17/2021 10ng/ml   Final   • External MDMA 11/17/2021 Negative   Final   • MDMA Cut-Off 11/17/2021 500ng/ml   Final   • External Opiates Screen Urine 11/17/2021 Negative   Final   • Opiates Cut-Off 11/17/2021 300ng/ml   Final   Admission on  11/10/2021, Discharged on 11/11/2021   Component Date Value Ref Range Status   • Ethanol 11/10/2021 234* 0 - 10 mg/dL Final   • Ethanol % 11/10/2021 0.234  % Final   Office Visit on 11/10/2021   Component Date Value Ref Range Status   • External Amphetamine Screen Urine 11/10/2021 Negative   Final   • Amphetamine Cut-Off 11/10/2021 1000NG/ML   Final   • External Benzodiazepine Screen Uri* 11/10/2021 Negative   Final   • Benzodiazipine Cut-Off 11/10/2021 300NG/ML   Final   • External Cocaine Screen Urine 11/10/2021 Negative   Final   • Cocaine Cut-Off 11/10/2021 300NG/ML   Final   • External THC Screen Urine 11/10/2021 Negative   Final   • THC Cut-Off 11/10/2021 50NG/ML   Final   • External Methadone Screen Urine 11/10/2021 Negative   Final   • Methadone Cut-Off 11/10/2021 300NG/ML   Final   • External Methamphetamine Screen Ur* 11/10/2021 Negative   Final   • Methamphetamine Cut-Off 11/10/2021 1000NG/ML   Final   • External Oxycodone Screen Urine 11/10/2021 Negative   Final   • Oxycodone Cut-Off 11/10/2021 100NG/ML   Final   • External Buprenorphine Screen Urine 11/10/2021 Positive   Final   • Buprenorphine Cut-Off 11/10/2021 10NG/ML   Final   • External MDMA 11/10/2021 Negative   Final   • MDMA Cut-Off 11/10/2021 500NG/ML   Final   • External Opiates Screen Urine 11/10/2021 Negative   Final   • Opiates Cut-Off 11/10/2021 300NG/ML   Final   There may be more visits with results that are not included.         Assessment/Plan   Diagnoses and all orders for this visit:    1. Opioid type dependence, continuous (HCC) (Primary)  -     buprenorphine-naloxone (SUBOXONE) 8-2 MG per SL tablet; Place 2 tablets under the tongue Daily.  Dispense: 56 tablet; Refill: 0    2. Medication management  -     [facility] Drug Screen  -     Comprehensive Metabolic Panel; Future  -     HIV-1 / O / 2 Ag / Antibody 4th Generation  -     HCV RNA By PCR, Qn Rfx Clara; Future    3. Depression, unspecified depression type  -     sertraline (Zoloft)  50 MG tablet; Take 1 tablet by mouth Daily for 30 days.  Dispense: 30 tablet; Refill: 3    4. Generalized anxiety disorder  -     sertraline (Zoloft) 50 MG tablet; Take 1 tablet by mouth Daily for 30 days.  Dispense: 30 tablet; Refill: 3  -     hydrOXYzine pamoate (Vistaril) 50 MG capsule; Take 1 capsule by mouth 4 (Four) Times a Day As Needed for Anxiety (and/or insomia).  Dispense: 60 capsule; Refill: 2    5. Insomnia, unspecified type  -     hydrOXYzine pamoate (Vistaril) 50 MG capsule; Take 1 capsule by mouth 4 (Four) Times a Day As Needed for Anxiety (and/or insomia).  Dispense: 60 capsule; Refill: 2    6. Arthralgia, unspecified joint  -     ibuprofen (ADVIL,MOTRIN) 800 MG tablet; Take 1 tablet by mouth Every 8 (Eight) Hours As Needed for Moderate Pain .  Dispense: 30 tablet; Refill: 0    Other orders  -     SCANNED - LABS        Visit Diagnoses:    ICD-10-CM ICD-9-CM   1. Opioid type dependence, continuous (HCC)  F11.20 304.01   2. Medication management  Z79.899 V58.69   3. Depression, unspecified depression type  F32.A 311   4. Generalized anxiety disorder  F41.1 300.02   5. Insomnia, unspecified type  G47.00 780.52   6. Arthralgia, unspecified joint  M25.50 719.40       PLAN:  1. Safety: No acute safety concerns  2. Risk Assessment: Risk of self-harm acutely is low. Risk of self-harm chronically is also low, but could be further elevated in the event of treatment noncompliance and/or AODA.    TREATMENT PLAN/GOALS: Continue supportive psychotherapy efforts and medications as indicated. Treatment and medication options discussed during today's visit. Patient acknowledged and verbally consented to continue with current treatment plan and was educated on the importance of compliance with treatment and follow-up appointments.    MEDICATION ISSUES:  ROMAN reviewed as expected.  Discussed medication options and treatment plan of prescribed medication as well as the risks, benefits, and side effects including  potential falls, possible impaired driving and metabolic adversities among others. Patient is agreeable to call the office with any worsening of symptoms or onset of side effects. Patient is agreeable to call 911 or go to the nearest ER should he/she begin having SI/HI. No medication side effects or related complaints today.     MEDS ORDERED DURING VISIT:  New Medications Ordered This Visit   Medications   • sertraline (Zoloft) 50 MG tablet     Sig: Take 1 tablet by mouth Daily for 30 days.     Dispense:  30 tablet     Refill:  3   • hydrOXYzine pamoate (Vistaril) 50 MG capsule     Sig: Take 1 capsule by mouth 4 (Four) Times a Day As Needed for Anxiety (and/or insomia).     Dispense:  60 capsule     Refill:  2   • buprenorphine-naloxone (SUBOXONE) 8-2 MG per SL tablet     Sig: Place 2 tablets under the tongue Daily.     Dispense:  56 tablet     Refill:  0   • ibuprofen (ADVIL,MOTRIN) 800 MG tablet     Sig: Take 1 tablet by mouth Every 8 (Eight) Hours As Needed for Moderate Pain .     Dispense:  30 tablet     Refill:  0       No follow-ups on file.           This document has been electronically signed by RENAN De Dios  February 24, 2022 16:08 EST      Part of this note may be an electronic transcription/translation of spoken language to printed text using the Dragon Dictation System.

## 2022-03-16 ENCOUNTER — LAB (OUTPATIENT)
Dept: LAB | Facility: HOSPITAL | Age: 32
End: 2022-03-16

## 2022-03-16 DIAGNOSIS — Z79.899 MEDICATION MANAGEMENT: ICD-10-CM

## 2022-03-16 PROCEDURE — 87522 HEPATITIS C REVRS TRNSCRPJ: CPT

## 2022-03-16 PROCEDURE — 86803 HEPATITIS C AB TEST: CPT | Performed by: NURSE PRACTITIONER

## 2022-03-16 PROCEDURE — 87340 HEPATITIS B SURFACE AG IA: CPT | Performed by: NURSE PRACTITIONER

## 2022-03-16 PROCEDURE — 86704 HEP B CORE ANTIBODY TOTAL: CPT | Performed by: NURSE PRACTITIONER

## 2022-03-16 PROCEDURE — G0432 EIA HIV-1/HIV-2 SCREEN: HCPCS | Performed by: NURSE PRACTITIONER

## 2022-03-16 PROCEDURE — 86706 HEP B SURFACE ANTIBODY: CPT | Performed by: NURSE PRACTITIONER

## 2022-03-16 PROCEDURE — 86708 HEPATITIS A ANTIBODY: CPT | Performed by: NURSE PRACTITIONER

## 2022-03-16 PROCEDURE — 85025 COMPLETE CBC W/AUTO DIFF WBC: CPT

## 2022-03-16 PROCEDURE — 36415 COLL VENOUS BLD VENIPUNCTURE: CPT | Performed by: NURSE PRACTITIONER

## 2022-03-16 PROCEDURE — 80053 COMPREHEN METABOLIC PANEL: CPT

## 2022-03-17 LAB
ALBUMIN SERPL-MCNC: 4.2 G/DL (ref 3.5–5.2)
ALBUMIN/GLOB SERPL: 1.1 G/DL
ALP SERPL-CCNC: 111 U/L (ref 39–117)
ALT SERPL W P-5'-P-CCNC: 22 U/L (ref 1–41)
ANION GAP SERPL CALCULATED.3IONS-SCNC: 10.9 MMOL/L (ref 5–15)
AST SERPL-CCNC: 33 U/L (ref 1–40)
BASOPHILS # BLD AUTO: 0.03 10*3/MM3 (ref 0–0.2)
BASOPHILS NFR BLD AUTO: 0.4 % (ref 0–1.5)
BILIRUB SERPL-MCNC: 0.4 MG/DL (ref 0–1.2)
BUN SERPL-MCNC: 13 MG/DL (ref 6–20)
BUN/CREAT SERPL: 16.7 (ref 7–25)
CALCIUM SPEC-SCNC: 9.7 MG/DL (ref 8.6–10.5)
CHLORIDE SERPL-SCNC: 102 MMOL/L (ref 98–107)
CO2 SERPL-SCNC: 23.1 MMOL/L (ref 22–29)
CREAT SERPL-MCNC: 0.78 MG/DL (ref 0.76–1.27)
DEPRECATED RDW RBC AUTO: 36.5 FL (ref 37–54)
EGFRCR SERPLBLD CKD-EPI 2021: 122.3 ML/MIN/1.73
EOSINOPHIL # BLD AUTO: 0.35 10*3/MM3 (ref 0–0.4)
EOSINOPHIL NFR BLD AUTO: 4.6 % (ref 0.3–6.2)
ERYTHROCYTE [DISTWIDTH] IN BLOOD BY AUTOMATED COUNT: 11.8 % (ref 12.3–15.4)
GLOBULIN UR ELPH-MCNC: 3.9 GM/DL
GLUCOSE SERPL-MCNC: 124 MG/DL (ref 65–99)
HAV AB SER QL IA: POSITIVE
HBV CORE AB SERPL QL IA: NEGATIVE
HBV SURFACE AB SER RIA-ACNC: REACTIVE
HBV SURFACE AG SERPL QL IA: NORMAL
HCT VFR BLD AUTO: 39.1 % (ref 37.5–51)
HCV AB SER DONR QL: REACTIVE
HCV GENTYP SERPL NAA+PROBE: NORMAL
HCV RNA SERPL NAA+PROBE-ACNC: NORMAL IU/ML
HCV RNA SERPL NAA+PROBE-LOG IU: NORMAL {LOG_IU}/ML
HGB BLD-MCNC: 13.1 G/DL (ref 13–17.7)
HIV1+2 AB SER QL: NORMAL
IMM GRANULOCYTES # BLD AUTO: 0.04 10*3/MM3 (ref 0–0.05)
IMM GRANULOCYTES NFR BLD AUTO: 0.5 % (ref 0–0.5)
LYMPHOCYTES # BLD AUTO: 2.48 10*3/MM3 (ref 0.7–3.1)
LYMPHOCYTES NFR BLD AUTO: 32.7 % (ref 19.6–45.3)
MCH RBC QN AUTO: 28.7 PG (ref 26.6–33)
MCHC RBC AUTO-ENTMCNC: 33.5 G/DL (ref 31.5–35.7)
MCV RBC AUTO: 85.7 FL (ref 79–97)
MONOCYTES # BLD AUTO: 0.62 10*3/MM3 (ref 0.1–0.9)
MONOCYTES NFR BLD AUTO: 8.2 % (ref 5–12)
NEUTROPHILS NFR BLD AUTO: 4.06 10*3/MM3 (ref 1.7–7)
NEUTROPHILS NFR BLD AUTO: 53.6 % (ref 42.7–76)
NRBC BLD AUTO-RTO: 0 /100 WBC (ref 0–0.2)
PLATELET # BLD AUTO: 205 10*3/MM3 (ref 140–450)
PMV BLD AUTO: 12.6 FL (ref 6–12)
POTASSIUM SERPL-SCNC: 4 MMOL/L (ref 3.5–5.2)
PROT SERPL-MCNC: 8.1 G/DL (ref 6–8.5)
RBC # BLD AUTO: 4.56 10*6/MM3 (ref 4.14–5.8)
REF LAB TEST REF RANGE: NORMAL
SODIUM SERPL-SCNC: 136 MMOL/L (ref 136–145)
WBC NRBC COR # BLD: 7.58 10*3/MM3 (ref 3.4–10.8)

## 2022-03-24 ENCOUNTER — TELEMEDICINE (OUTPATIENT)
Dept: PSYCHIATRY | Facility: CLINIC | Age: 32
End: 2022-03-24

## 2022-03-24 VITALS — BODY MASS INDEX: 26.84 KG/M2 | WEIGHT: 232 LBS | HEART RATE: 71 BPM | HEIGHT: 78 IN

## 2022-03-24 DIAGNOSIS — F32.A DEPRESSION, UNSPECIFIED DEPRESSION TYPE: ICD-10-CM

## 2022-03-24 DIAGNOSIS — Z79.899 MEDICATION MANAGEMENT: ICD-10-CM

## 2022-03-24 DIAGNOSIS — M25.50 ARTHRALGIA, UNSPECIFIED JOINT: ICD-10-CM

## 2022-03-24 DIAGNOSIS — F11.20 OPIOID TYPE DEPENDENCE, CONTINUOUS: Primary | ICD-10-CM

## 2022-03-24 DIAGNOSIS — G47.00 INSOMNIA, UNSPECIFIED TYPE: ICD-10-CM

## 2022-03-24 DIAGNOSIS — F41.1 GENERALIZED ANXIETY DISORDER: ICD-10-CM

## 2022-03-24 LAB
EXTERNAL AMPHETAMINE SCREEN URINE: NEGATIVE
EXTERNAL BENZODIAZEPINE SCREEN URINE: NEGATIVE
EXTERNAL BUPRENORPHINE SCREEN URINE: POSITIVE
EXTERNAL COCAINE SCREEN URINE: NEGATIVE
EXTERNAL MDMA: NEGATIVE
EXTERNAL METHADONE SCREEN URINE: NEGATIVE
EXTERNAL METHAMPHETAMINE SCREEN URINE: NEGATIVE
EXTERNAL OPIATES SCREEN URINE: NEGATIVE
EXTERNAL OXYCODONE SCREEN URINE: NEGATIVE
EXTERNAL THC SCREEN URINE: NEGATIVE

## 2022-03-24 PROCEDURE — 99214 OFFICE O/P EST MOD 30 MIN: CPT | Performed by: NURSE PRACTITIONER

## 2022-03-24 RX ORDER — BUPRENORPHINE HYDROCHLORIDE AND NALOXONE HYDROCHLORIDE DIHYDRATE 8; 2 MG/1; MG/1
2 TABLET SUBLINGUAL DAILY
Qty: 56 TABLET | Refills: 0 | Status: SHIPPED | OUTPATIENT
Start: 2022-03-24 | End: 2022-04-20 | Stop reason: SDUPTHER

## 2022-03-24 RX ORDER — HYDROXYZINE PAMOATE 50 MG/1
50 CAPSULE ORAL 4 TIMES DAILY PRN
Qty: 90 CAPSULE | Refills: 2 | Status: SHIPPED | OUTPATIENT
Start: 2022-03-24 | End: 2022-06-09 | Stop reason: SDUPTHER

## 2022-03-24 RX ORDER — CLONIDINE HYDROCHLORIDE 0.1 MG/1
0.1 TABLET ORAL 2 TIMES DAILY
Qty: 60 TABLET | Refills: 2 | Status: SHIPPED | OUTPATIENT
Start: 2022-03-24 | End: 2022-06-09 | Stop reason: SDUPTHER

## 2022-03-24 NOTE — PROGRESS NOTES
This provider is located at [facility]. The Patient is seen remotely located at the [facility] ([facility]) using Video. Patient is being seen via telehealth and confirm that they are in a secure environment for this session. The patient's condition being diagnosed/treated is appropriate for telemedicine. Provider identified as Daniel Olivas as well as credentials APRN MSN FNP-C ANTONIO-REYNOLD.   The client/patient gave consent to be seen remotely, and when consent is given they understand that the consent allows for patient identifiable information to be sent to a third party as needed.   They may refuse to be seen remotely at any time. The electronic data is encrypted and password protected, and the patient has been advised of the potential risks to privacy not withstanding such measures.    Concern for nonprescribed illicit gabapentin    Chief Complaint/History of Present Illness: Follow Up buprenorphine/naloxone Medicated Assisted Treatment for Opiate Use Disorder     Patient/Client Concerns/Updates: Zoloft and hydroxyzine increased last evaluation for anxiety, patient reports these medications have been helpful however states intermittent periods of heightened anxiety remain-will increase hydroxyzine continue clonidine    Triggers (Persons/Places/Things/Events/Thought/Emotions): Anxiety    Cravings: Denies opiate cravings    Relapse Prevention: Counseling    Urine Drug Screen (today's visit) discussed: Positive buprenorphine, otherwise negative for substances tested    UDS Confirmation: Positive buprenorphine/nor-buprenorphine, positive nonprescribed gabapentin and THC    Most recent pertinent laboratory studies reviewed: 3/16/2022-LFTs within normal limits; hepatitis C virus not detected    ROMAN (PDMP) Reviewed for Current/Active Medications: buprenorphine/naloxone as reviewed today    Past Surgical History:  Past Surgical History:   Procedure Laterality Date   • NO PAST SURGERIES          Problem List:  Patient Active Problem List   Diagnosis   • Alcohol-induced acute pancreatitis   • Pancreatitis       Allergy:   No Known Allergies     Current Medications:   Current Outpatient Medications   Medication Sig Dispense Refill   • buprenorphine-naloxone (SUBOXONE) 8-2 MG per SL tablet Place 2 tablets under the tongue Daily. 56 tablet 0   • cloNIDine (Catapres) 0.1 MG tablet Take 1 tablet by mouth 2 (Two) Times a Day. Take as needed for anxiety.  Insomnia.  Chills or sweats 60 tablet 2   • hydrOXYzine pamoate (Vistaril) 50 MG capsule Take 1 capsule by mouth 4 (Four) Times a Day As Needed for Anxiety (and/or insomia). Take 1 to 2 tablets as needed for anxiety every 6 hours 90 capsule 2   • ibuprofen (ADVIL,MOTRIN) 800 MG tablet Take 1 tablet by mouth Every 8 (Eight) Hours As Needed for Moderate Pain . 30 tablet 0   • naloxone (NARCAN) 4 MG/0.1ML nasal spray 1 spray into the nostril(s) as directed by provider As Needed (Opiate oversedation). Spray in 1 nostril every 2 to 3 minutes call 911 2 each 2   • sertraline (Zoloft) 50 MG tablet Take 1 tablet by mouth Daily for 30 days. 30 tablet 3   • cephalexin (KEFLEX) 500 MG capsule Take 1 capsule by mouth 3 (Three) Times a Day. 30 capsule 0   • mupirocin (BACTROBAN) 2 % ointment Apply  topically to the appropriate area as directed 3 (Three) Times a Day. 15 g 0   • orphenadrine (NORFLEX) 100 MG 12 hr tablet Take 1 tablet by mouth 2 (Two) Times a Day As Needed for Muscle Spasms or Mild Pain . 14 tablet 0     No current facility-administered medications for this visit.       Past Medical History:  Past Medical History:   Diagnosis Date   • Alcohol-induced acute pancreatitis 6/27/2019   • Alcoholism (HCC)    • Drug use     Amphetamines and Suboxone   • Fatty liver    • Hepatitis C antibody test positive 2019   • Medical non-compliance    • Pancreatitis    • Smoker          Social History     Socioeconomic History   • Marital status:    Tobacco Use   •  Smoking status: Current Every Day Smoker     Packs/day: 0.50     Types: Cigarettes   • Smokeless tobacco: Never Used   Vaping Use   • Vaping Use: Never used   Substance and Sexual Activity   • Alcohol use: Yes     Alcohol/week: 8.0 standard drinks     Types: 8 Shots of liquor per week     Comment: 5-6 double shots   • Drug use: Yes     Comment: suboxone as prescribed   • Sexual activity: Defer         Family History   Problem Relation Age of Onset   • Cancer Maternal Grandmother    • No Known Problems Mother    • No Known Problems Father          Mental Status Exam:   Hygiene:   good  Cooperation:  Cooperative  Eye Contact:  Good  Psychomotor Behavior:  Appropriate  Affect:  Appropriate  Mood: normal  Speech:  Normal  Thought Process:  Goal directed  Thought Content:  Normal  Suicidal:  None  Homicidal:  None  Hallucinations:  None  Delusion:  None  Memory:  Intact  Orientation:  Grossly intact  Reliability:  good  Insight:  Good  Judgement:  Good  Impulse Control:  Good         Review of Systems:  Review of Systems   Constitutional: Negative for activity change, chills, diaphoresis and fatigue.   Respiratory: Negative for apnea, cough and shortness of breath.    Cardiovascular: Negative for chest pain, palpitations and leg swelling.   Gastrointestinal: Negative for abdominal pain, constipation, diarrhea, nausea and vomiting.   Genitourinary: Negative for difficulty urinating.   Musculoskeletal: Negative for arthralgias.   Skin: Negative for rash.   Neurological: Negative for dizziness, weakness and headaches.   Psychiatric/Behavioral: Negative for agitation, self-injury, sleep disturbance and suicidal ideas. The patient is nervous/anxious.          Physical Exam:  Physical Exam  Vitals reviewed.   Constitutional:       General: He is not in acute distress.     Appearance: Normal appearance. He is not ill-appearing or toxic-appearing.   Pulmonary:      Effort: Pulmonary effort is normal.   Musculoskeletal:          "General: Normal range of motion.   Neurological:      General: No focal deficit present.      Mental Status: He is alert and oriented to person, place, and time.   Psychiatric:         Attention and Perception: Attention and perception normal.         Mood and Affect: Mood is anxious. Mood is not depressed.         Speech: Speech normal.         Behavior: Behavior normal. Behavior is cooperative.         Thought Content: Thought content normal.         Cognition and Memory: Cognition and memory normal.         Judgment: Judgment normal.         Vital Signs:   Pulse 71   Ht 198.1 cm (77.99\")   Wt 105 kg (232 lb)   BMI 26.82 kg/m²      Lab Results:   Telemedicine on 03/24/2022   Component Date Value Ref Range Status   • External Amphetamine Screen Urine 03/24/2022 Negative   Final   • External Benzodiazepine Screen Uri* 03/24/2022 Negative   Final   • External Cocaine Screen Urine 03/24/2022 Negative   Final   • External THC Screen Urine 03/24/2022 Negative   Final   • External Methadone Screen Urine 03/24/2022 Negative   Final   • External Methamphetamine Screen Ur* 03/24/2022 Negative   Final   • External Oxycodone Screen Urine 03/24/2022 Negative   Final   • External Buprenorphine Screen Urine 03/24/2022 Positive   Final   • External MDMA 03/24/2022 Negative   Final   • External Opiates Screen Urine 03/24/2022 Negative   Final   Lab on 03/16/2022   Component Date Value Ref Range Status   • Glucose 03/16/2022 124 (A) 65 - 99 mg/dL Final   • BUN 03/16/2022 13  6 - 20 mg/dL Final   • Creatinine 03/16/2022 0.78  0.76 - 1.27 mg/dL Final   • Sodium 03/16/2022 136  136 - 145 mmol/L Final   • Potassium 03/16/2022 4.0  3.5 - 5.2 mmol/L Final   • Chloride 03/16/2022 102  98 - 107 mmol/L Final   • CO2 03/16/2022 23.1  22.0 - 29.0 mmol/L Final   • Calcium 03/16/2022 9.7  8.6 - 10.5 mg/dL Final   • Total Protein 03/16/2022 8.1  6.0 - 8.5 g/dL Final   • Albumin 03/16/2022 4.20  3.50 - 5.20 g/dL Final   • ALT (SGPT) 03/16/2022 " 22  1 - 41 U/L Final   • AST (SGOT) 03/16/2022 33  1 - 40 U/L Final   • Alkaline Phosphatase 03/16/2022 111  39 - 117 U/L Final   • Total Bilirubin 03/16/2022 0.4  0.0 - 1.2 mg/dL Final   • Globulin 03/16/2022 3.9  gm/dL Final   • A/G Ratio 03/16/2022 1.1  g/dL Final   • BUN/Creatinine Ratio 03/16/2022 16.7  7.0 - 25.0 Final   • Anion Gap 03/16/2022 10.9  5.0 - 15.0 mmol/L Final   • eGFR 03/16/2022 122.3  >60.0 mL/min/1.73 Final    National Kidney Foundation and American Society of Nephrology (ASN) Task Force recommended calculation based on the Chronic Kidney Disease Epidemiology Collaboration (CKD-EPI) equation refit without adjustment for race.   • Hepatitis C Quantitation 03/16/2022 HCV Not Detected  IU/mL Final   • HCV log10 03/16/2022    Final    Unable to calculate result since non-numeric result obtained for  component test.   • HCV Test Information 03/16/2022 Comment   Final    The quantitative range of this assay is 15 IU/mL to 100 million IU/mL.   • HCV Genotype 03/16/2022    Final    Not indicated   • WBC 03/16/2022 7.58  3.40 - 10.80 10*3/mm3 Final   • RBC 03/16/2022 4.56  4.14 - 5.80 10*6/mm3 Final   • Hemoglobin 03/16/2022 13.1  13.0 - 17.7 g/dL Final   • Hematocrit 03/16/2022 39.1  37.5 - 51.0 % Final   • MCV 03/16/2022 85.7  79.0 - 97.0 fL Final   • MCH 03/16/2022 28.7  26.6 - 33.0 pg Final   • MCHC 03/16/2022 33.5  31.5 - 35.7 g/dL Final   • RDW 03/16/2022 11.8 (A) 12.3 - 15.4 % Final   • RDW-SD 03/16/2022 36.5 (A) 37.0 - 54.0 fl Final   • MPV 03/16/2022 12.6 (A) 6.0 - 12.0 fL Final   • Platelets 03/16/2022 205  140 - 450 10*3/mm3 Final   • Neutrophil % 03/16/2022 53.6  42.7 - 76.0 % Final   • Lymphocyte % 03/16/2022 32.7  19.6 - 45.3 % Final   • Monocyte % 03/16/2022 8.2  5.0 - 12.0 % Final   • Eosinophil % 03/16/2022 4.6  0.3 - 6.2 % Final   • Basophil % 03/16/2022 0.4  0.0 - 1.5 % Final   • Immature Grans % 03/16/2022 0.5  0.0 - 0.5 % Final   • Neutrophils, Absolute 03/16/2022 4.06  1.70 - 7.00  10*3/mm3 Final   • Lymphocytes, Absolute 03/16/2022 2.48  0.70 - 3.10 10*3/mm3 Final   • Monocytes, Absolute 03/16/2022 0.62  0.10 - 0.90 10*3/mm3 Final   • Eosinophils, Absolute 03/16/2022 0.35  0.00 - 0.40 10*3/mm3 Final   • Basophils, Absolute 03/16/2022 0.03  0.00 - 0.20 10*3/mm3 Final   • Immature Grans, Absolute 03/16/2022 0.04  0.00 - 0.05 10*3/mm3 Final   • nRBC 03/16/2022 0.0  0.0 - 0.2 /100 WBC Final   Office Visit on 02/24/2022   Component Date Value Ref Range Status   • External Amphetamine Screen Urine 02/24/2022 Negative   Final   • Amphetamine Cut-Off 02/24/2022 1000ng/ml   Final   • External Benzodiazepine Screen Uri* 02/24/2022 Negative   Final   • Benzodiazipine Cut-Off 02/24/2022 300ng/ml   Final   • External Cocaine Screen Urine 02/24/2022 Negative   Final   • Cocaine Cut-Off 02/24/2022 300ng/ml   Final   • External THC Screen Urine 02/24/2022 Negative   Final   • THC Cut-Off 02/24/2022 50ng/ml   Final   • External Methadone Screen Urine 02/24/2022 Negative   Final   • Methadone Cut-Off 02/24/2022 300ng/ml   Final   • External Methamphetamine Screen Ur* 02/24/2022 Negative   Final   • Methamphetamine Cut-Off 02/24/2022 1000ng/ml   Final   • External Oxycodone Screen Urine 02/24/2022 Negative   Final   • Oxycodone Cut-Off 02/24/2022 100ng/ml   Final   • External Buprenorphine Screen Urine 02/24/2022 Positive   Final   • Buprenorphine Cut-Off 02/24/2022 10ng/ml   Final   • External MDMA 02/24/2022 Negative   Final   • MDMA Cut-Off 02/24/2022 500ng/ml   Final   • External Opiates Screen Urine 02/24/2022 Negative   Final   • Opiates Cut-Off 02/24/2022 300ng/ml   Final   Office Visit on 01/27/2022   Component Date Value Ref Range Status   • External Amphetamine Screen Urine 01/27/2022 Negative   Final   • Amphetamine Cut-Off 01/27/2022 1000ng/ml   Final   • External Benzodiazepine Screen Uri* 01/27/2022 Negative   Final   • Benzodiazipine Cut-Off 01/27/2022 300ng/ml   Final   • External Cocaine Screen  Urine 01/27/2022 Negative   Final   • Cocaine Cut-Off 01/27/2022 300ng/ml   Final   • External THC Screen Urine 01/27/2022 Negative   Final   • THC Cut-Off 01/27/2022 50ng/ml   Final   • External Methadone Screen Urine 01/27/2022 Negative   Final   • Methadone Cut-Off 01/27/2022 300ng/ml   Final   • External Methamphetamine Screen Ur* 01/27/2022 Negative   Final   • Methamphetamine Cut-Off 01/27/2022 1000ng/ml   Final   • External Oxycodone Screen Urine 01/27/2022 Negative   Final   • Oxycodone Cut-Off 01/27/2022 100ng/ml   Final   • External Buprenorphine Screen Urine 01/27/2022 Positive   Final   • Buprenorphine Cut-Off 01/27/2022 10ng/ml   Final   • External MDMA 01/27/2022 Negative   Final   • MDMA Cut-Off 01/27/2022 500ng/ml   Final   • External Opiates Screen Urine 01/27/2022 Negative   Final   • Opiates Cut-Off 01/27/2022 300ng/ml   Final   Office Visit on 01/13/2022   Component Date Value Ref Range Status   • External Amphetamine Screen Urine 01/13/2022 Negative   Final   • Amphetamine Cut-Off 01/13/2022 1000ng/ml   Final   • External Benzodiazepine Screen Uri* 01/13/2022 Negative   Final   • Benzodiazipine Cut-Off 01/13/2022 300ng/ml   Final   • External Cocaine Screen Urine 01/13/2022 Negative   Final   • Cocaine Cut-Off 01/13/2022 300ng/ml   Final   • External THC Screen Urine 01/13/2022 Negative   Final   • THC Cut-Off 01/13/2022 50ng/ml   Final   • External Methadone Screen Urine 01/13/2022 Negative   Final   • Methadone Cut-Off 01/13/2022 300ng/ml   Final   • External Methamphetamine Screen Ur* 01/13/2022 Negative   Final   • Methamphetamine Cut-Off 01/13/2022 1000ng/ml   Final   • External Oxycodone Screen Urine 01/13/2022 Negative   Final   • Oxycodone Cut-Off 01/13/2022 100ng/ml   Final   • External Buprenorphine Screen Urine 01/13/2022 Positive   Final   • Buprenorphine Cut-Off 01/13/2022 10ng/ml   Final   • External MDMA 01/13/2022 Negative   Final   • MDMA Cut-Off 01/13/2022 500ng/ml   Final   •  External Opiates Screen Urine 01/13/2022 Negative   Final   • Opiates Cut-Off 01/13/2022 300ng/ml   Final   Office Visit on 01/05/2022   Component Date Value Ref Range Status   • External Amphetamine Screen Urine 01/05/2022 Negative   Final   • Amphetamine Cut-Off 01/05/2022 1000ng/ml   Final   • External Benzodiazepine Screen Uri* 01/05/2022 Negative   Final   • Benzodiazipine Cut-Off 01/05/2022 300ng/ml   Final   • External Cocaine Screen Urine 01/05/2022 Negative   Final   • Cocaine Cut-Off 01/05/2022 300ng/ml   Final   • External THC Screen Urine 01/05/2022 Negative   Final   • THC Cut-Off 01/05/2022 50ng/ml   Final   • External Methadone Screen Urine 01/05/2022 Negative   Final   • Methadone Cut-Off 01/05/2022 300ng/ml   Final   • External Methamphetamine Screen Ur* 01/05/2022 Negative   Final   • Methamphetamine Cut-Off 01/05/2022 1000ng/ml   Final   • External Oxycodone Screen Urine 01/05/2022 Negative   Final   • Oxycodone Cut-Off 01/05/2022 100ng/ml   Final   • External Buprenorphine Screen Urine 01/05/2022 Positive   Final   • Buprenorphine Cut-Off 01/05/2022 10ng/ml   Final   • External MDMA 01/05/2022 Negative   Final   • MDMA Cut-Off 01/05/2022 500ng/ml   Final   • External Opiates Screen Urine 01/05/2022 Negative   Final   • Opiates Cut-Off 01/05/2022 300ng/ml   Final   Admission on 01/01/2022, Discharged on 01/01/2022   Component Date Value Ref Range Status   • Glucose 01/01/2022 112 (A) 65 - 99 mg/dL Final   • BUN 01/01/2022 3 (A) 6 - 20 mg/dL Final   • Creatinine 01/01/2022 0.49 (A) 0.76 - 1.27 mg/dL Final   • Sodium 01/01/2022 139  136 - 145 mmol/L Final   • Potassium 01/01/2022 3.6  3.5 - 5.2 mmol/L Final   • Chloride 01/01/2022 100  98 - 107 mmol/L Final   • CO2 01/01/2022 21.9 (A) 22.0 - 29.0 mmol/L Final   • Calcium 01/01/2022 8.7  8.6 - 10.5 mg/dL Final   • Total Protein 01/01/2022 8.2  6.0 - 8.5 g/dL Final   • Albumin 01/01/2022 3.86  3.50 - 5.20 g/dL Final   • ALT (SGPT) 01/01/2022 82 (A) 1  - 41 U/L Final   • AST (SGOT) 01/01/2022 253 (A) 1 - 40 U/L Final   • Alkaline Phosphatase 01/01/2022 452 (A) 39 - 117 U/L Final   • Total Bilirubin 01/01/2022 0.7  0.0 - 1.2 mg/dL Final   • eGFR Non  Amer 01/01/2022 >150  >60 mL/min/1.73 Final   • Globulin 01/01/2022 4.3  gm/dL Final   • A/G Ratio 01/01/2022 0.9  g/dL Final   • BUN/Creatinine Ratio 01/01/2022 6.1 (A) 7.0 - 25.0 Final   • Anion Gap 01/01/2022 17.1 (A) 5.0 - 15.0 mmol/L Final   • Color, UA 01/01/2022 Yellow  Yellow, Straw Final   • Appearance, UA 01/01/2022 Clear  Clear Final   • pH, UA 01/01/2022 6.5  5.0 - 8.0 Final   • Specific Gravity, UA 01/01/2022 <=1.005  1.005 - 1.030 Final   • Glucose, UA 01/01/2022 Negative  Negative Final   • Ketones, UA 01/01/2022 Negative  Negative Final   • Bilirubin, UA 01/01/2022 Negative  Negative Final   • Blood, UA 01/01/2022 Negative  Negative Final   • Protein, UA 01/01/2022 Negative  Negative Final   • Leuk Esterase, UA 01/01/2022 Negative  Negative Final   • Nitrite, UA 01/01/2022 Negative  Negative Final   • Urobilinogen, UA 01/01/2022 1.0 E.U./dL  0.2 - 1.0 E.U./dL Final   • THC, Screen, Urine 01/01/2022 Negative  Negative Final   • Phencyclidine (PCP), Urine 01/01/2022 Negative  Negative Final   • Cocaine Screen, Urine 01/01/2022 Negative  Negative Final   • Methamphetamine, Ur 01/01/2022 Negative  Negative Final   • Opiate Screen 01/01/2022 Negative  Negative Final   • Amphetamine Screen, Urine 01/01/2022 Negative  Negative Final   • Benzodiazepine Screen, Urine 01/01/2022 Negative  Negative Final   • Tricyclic Antidepressants Screen 01/01/2022 Negative  Negative Final   • Methadone Screen, Urine 01/01/2022 Negative  Negative Final   • Barbiturates Screen, Urine 01/01/2022 Negative  Negative Final   • Oxycodone Screen, Urine 01/01/2022 Negative  Negative Final   • Propoxyphene Screen 01/01/2022 Negative  Negative Final   • Buprenorphine, Screen, Urine 01/01/2022 Positive (A) Negative Final   •  Magnesium 01/01/2022 1.7  1.6 - 2.6 mg/dL Final   • Ethanol 01/01/2022 311 (A) 0 - 10 mg/dL Final   • Ethanol % 01/01/2022 0.311  % Final   • WBC 01/01/2022 11.09 (A) 3.40 - 10.80 10*3/mm3 Final   • RBC 01/01/2022 4.39  4.14 - 5.80 10*6/mm3 Final   • Hemoglobin 01/01/2022 14.3  13.0 - 17.7 g/dL Final   • Hematocrit 01/01/2022 42.3  37.5 - 51.0 % Final   • MCV 01/01/2022 96.4  79.0 - 97.0 fL Final   • MCH 01/01/2022 32.6  26.6 - 33.0 pg Final   • MCHC 01/01/2022 33.8  31.5 - 35.7 g/dL Final   • RDW 01/01/2022 11.9 (A) 12.3 - 15.4 % Final   • RDW-SD 01/01/2022 42.3  37.0 - 54.0 fl Final   • MPV 01/01/2022 9.6  6.0 - 12.0 fL Final   • Platelets 01/01/2022 196  140 - 450 10*3/mm3 Final   • Neutrophil % 01/01/2022 70.8  42.7 - 76.0 % Final   • Lymphocyte % 01/01/2022 20.6  19.6 - 45.3 % Final   • Monocyte % 01/01/2022 5.0  5.0 - 12.0 % Final   • Eosinophil % 01/01/2022 1.8  0.3 - 6.2 % Final   • Basophil % 01/01/2022 0.5  0.0 - 1.5 % Final   • Immature Grans % 01/01/2022 1.3 (A) 0.0 - 0.5 % Final   • Neutrophils, Absolute 01/01/2022 7.86 (A) 1.70 - 7.00 10*3/mm3 Final   • Lymphocytes, Absolute 01/01/2022 2.28  0.70 - 3.10 10*3/mm3 Final   • Monocytes, Absolute 01/01/2022 0.55  0.10 - 0.90 10*3/mm3 Final   • Eosinophils, Absolute 01/01/2022 0.20  0.00 - 0.40 10*3/mm3 Final   • Basophils, Absolute 01/01/2022 0.06  0.00 - 0.20 10*3/mm3 Final   • Immature Grans, Absolute 01/01/2022 0.14 (A) 0.00 - 0.05 10*3/mm3 Final   • nRBC 01/01/2022 0.0  0.0 - 0.2 /100 WBC Final   • COVID19 01/01/2022 Not Detected  Not Detected - Ref. Range Final   • Influenza A PCR 01/01/2022 Not Detected  Not Detected Final   • Influenza B PCR 01/01/2022 Not Detected  Not Detected Final   • Ethanol 01/01/2022 102 (A) 0 - 10 mg/dL Final   • Ethanol % 01/01/2022 0.102  % Final   Office Visit on 12/01/2021   Component Date Value Ref Range Status   • External Amphetamine Screen Urine 12/01/2021 Negative   Final   • Amphetamine Cut-Off 12/01/2021 1000NG/ML    Final   • External Benzodiazepine Screen Uri* 12/01/2021 Negative   Final   • Benzodiazipine Cut-Off 12/01/2021 300NG/ML   Final   • External Cocaine Screen Urine 12/01/2021 Negative   Final   • Cocaine Cut-Off 12/01/2021 300NG/ML   Final   • External THC Screen Urine 12/01/2021 Negative   Final   • THC Cut-Off 12/01/2021 50NG/ML   Final   • External Methadone Screen Urine 12/01/2021 Negative   Final   • Methadone Cut-Off 12/01/2021 300NG/ML   Final   • External Methamphetamine Screen Ur* 12/01/2021 Negative   Final   • Methamphetamine Cut-Off 12/01/2021 1000NG/ML   Final   • External Oxycodone Screen Urine 12/01/2021 Negative   Final   • Oxycodone Cut-Off 12/01/2021 100NG/ML   Final   • External Buprenorphine Screen Urine 12/01/2021 Positive   Final   • Buprenorphine Cut-Off 12/01/2021 10NG/ML   Final   • External MDMA 12/01/2021 Negative   Final   • MDMA Cut-Off 12/01/2021 500NG/ML   Final   • External Opiates Screen Urine 12/01/2021 Negative   Final   • Opiates Cut-Off 12/01/2021 300NG/ML   Final   Office Visit on 11/24/2021   Component Date Value Ref Range Status   • External Amphetamine Screen Urine 11/24/2021 Negative   Final   • Amphetamine Cut-Off 11/24/2021 1000NG/ML   Final   • External Benzodiazepine Screen Uri* 11/24/2021 Negative   Final   • Benzodiazipine Cut-Off 11/24/2021 300NG/ML   Final   • External Cocaine Screen Urine 11/24/2021 Negative   Final   • Cocaine Cut-Off 11/24/2021 300NG/ML   Final   • External THC Screen Urine 11/24/2021 Negative   Final   • THC Cut-Off 11/24/2021 50NG/ML   Final   • External Methadone Screen Urine 11/24/2021 Negative   Final   • Methadone Cut-Off 11/24/2021 300NG/ML   Final   • External Methamphetamine Screen Ur* 11/24/2021 Negative   Final   • Methamphetamine Cut-Off 11/24/2021 1000NG/ML   Final   • External Oxycodone Screen Urine 11/24/2021 Negative   Final   • Oxycodone Cut-Off 11/24/2021 100NG/ML   Final   • External Buprenorphine Screen Urine 11/24/2021  Positive   Final   • Buprenorphine Cut-Off 11/24/2021 10NG/ML   Final   • External MDMA 11/24/2021 Negative   Final   • MDMA Cut-Off 11/24/2021 500NG/ML   Final   • External Opiates Screen Urine 11/24/2021 Negative   Final   • Opiates Cut-Off 11/24/2021 300NG/ML   Final   Office Visit on 11/17/2021   Component Date Value Ref Range Status   • External Amphetamine Screen Urine 11/17/2021 Negative   Final   • Amphetamine Cut-Off 11/17/2021 1000ng/ml   Final   • External Benzodiazepine Screen Uri* 11/17/2021 Negative   Final   • Benzodiazipine Cut-Off 11/17/2021 300ng/ml   Final   • External Cocaine Screen Urine 11/17/2021 Negative   Final   • Cocaine Cut-Off 11/17/2021 300ng/ml   Final   • External THC Screen Urine 11/17/2021 Negative   Final   • THC Cut-Off 11/17/2021 50ng/ml   Final   • External Methadone Screen Urine 11/17/2021 Negative   Final   • Methadone Cut-Off 11/17/2021 300ng/ml   Final   • External Methamphetamine Screen Ur* 11/17/2021 Negative   Final   • Methamphetamine Cut-Off 11/17/2021 1000ng/ml   Final   • External Oxycodone Screen Urine 11/17/2021 Negative   Final   • Oxycodone Cut-Off 11/17/2021 100ng/ml   Final   • External Buprenorphine Screen Urine 11/17/2021 Positive   Final   • Buprenorphine Cut-Off 11/17/2021 10ng/ml   Final   • External MDMA 11/17/2021 Negative   Final   • MDMA Cut-Off 11/17/2021 500ng/ml   Final   • External Opiates Screen Urine 11/17/2021 Negative   Final   • Opiates Cut-Off 11/17/2021 300ng/ml   Final   There may be more visits with results that are not included.         Assessment/Plan   Diagnoses and all orders for this visit:    1. Opioid type dependence, continuous (HCC) (Primary)  -     buprenorphine-naloxone (SUBOXONE) 8-2 MG per SL tablet; Place 2 tablets under the tongue Daily.  Dispense: 56 tablet; Refill: 0    2. Medication management  -     [facility] Screen    3. Generalized anxiety disorder  -     hydrOXYzine pamoate (Vistaril) 50 MG capsule; Take 1 capsule  by mouth 4 (Four) Times a Day As Needed for Anxiety (and/or insomia). Take 1 to 2 tablets as needed for anxiety every 6 hours  Dispense: 90 capsule; Refill: 2  -     cloNIDine (Catapres) 0.1 MG tablet; Take 1 tablet by mouth 2 (Two) Times a Day. Take as needed for anxiety.  Insomnia.  Chills or sweats  Dispense: 60 tablet; Refill: 2    4. Insomnia, unspecified type  -     hydrOXYzine pamoate (Vistaril) 50 MG capsule; Take 1 capsule by mouth 4 (Four) Times a Day As Needed for Anxiety (and/or insomia). Take 1 to 2 tablets as needed for anxiety every 6 hours  Dispense: 90 capsule; Refill: 2  -     cloNIDine (Catapres) 0.1 MG tablet; Take 1 tablet by mouth 2 (Two) Times a Day. Take as needed for anxiety.  Insomnia.  Chills or sweats  Dispense: 60 tablet; Refill: 2    5. Depression, unspecified depression type    Other orders  -     SCANNED - LABS        Visit Diagnoses:    ICD-10-CM ICD-9-CM   1. Opioid type dependence, continuous (HCC)  F11.20 304.01   2. Medication management  Z79.899 V58.69   3. Generalized anxiety disorder  F41.1 300.02   4. Insomnia, unspecified type  G47.00 780.52   5. Depression, unspecified depression type  F32.A 311       PLAN:  1. Safety: No acute safety concerns  2. Risk Assessment: Risk of self-harm acutely is low. Risk of self-harm chronically is also low, but could be further elevated in the event of treatment noncompliance and/or AODA.    TREATMENT PLAN/GOALS: Continue supportive psychotherapy efforts and medications as indicated. Treatment and medication options discussed during today's visit. Patient acknowledged and verbally consented to continue with current treatment plan and was educated on the importance of compliance with treatment and follow-up appointments.    MEDICATION ISSUES:  ROMAN reviewed as expected.  Discussed medication options and treatment plan of prescribed medication as well as the risks, benefits, and side effects including potential falls, possible impaired  driving and metabolic adversities among others. Patient is agreeable to call the office with any worsening of symptoms or onset of side effects. Patient is agreeable to call 911 or go to the nearest ER should he/she begin having SI/HI. No medication side effects or related complaints today.     MEDS ORDERED DURING VISIT:  New Medications Ordered This Visit   Medications   • buprenorphine-naloxone (SUBOXONE) 8-2 MG per SL tablet     Sig: Place 2 tablets under the tongue Daily.     Dispense:  56 tablet     Refill:  0   • hydrOXYzine pamoate (Vistaril) 50 MG capsule     Sig: Take 1 capsule by mouth 4 (Four) Times a Day As Needed for Anxiety (and/or insomia). Take 1 to 2 tablets as needed for anxiety every 6 hours     Dispense:  90 capsule     Refill:  2   • cloNIDine (Catapres) 0.1 MG tablet     Sig: Take 1 tablet by mouth 2 (Two) Times a Day. Take as needed for anxiety.  Insomnia.  Chills or sweats     Dispense:  60 tablet     Refill:  2       No follow-ups on file.           This document has been electronically signed by RENAN De Dios  March 27, 2022 10:15 EDT      Part of this note may be an electronic transcription/translation of spoken language to printed text using the Dragon Dictation System.

## 2022-03-28 RX ORDER — IBUPROFEN 800 MG/1
TABLET ORAL
Qty: 30 TABLET | Refills: 0 | Status: SHIPPED | OUTPATIENT
Start: 2022-03-28 | End: 2022-04-22 | Stop reason: SDUPTHER

## 2022-04-20 ENCOUNTER — TELEMEDICINE (OUTPATIENT)
Dept: PSYCHIATRY | Facility: CLINIC | Age: 32
End: 2022-04-20

## 2022-04-20 VITALS
BODY MASS INDEX: 27.88 KG/M2 | DIASTOLIC BLOOD PRESSURE: 78 MMHG | WEIGHT: 241 LBS | HEART RATE: 78 BPM | HEIGHT: 78 IN | SYSTOLIC BLOOD PRESSURE: 126 MMHG

## 2022-04-20 DIAGNOSIS — F41.1 GENERALIZED ANXIETY DISORDER: ICD-10-CM

## 2022-04-20 DIAGNOSIS — F11.20 OPIOID TYPE DEPENDENCE, CONTINUOUS: Primary | ICD-10-CM

## 2022-04-20 DIAGNOSIS — Z79.899 MEDICATION MANAGEMENT: ICD-10-CM

## 2022-04-20 PROCEDURE — 99214 OFFICE O/P EST MOD 30 MIN: CPT | Performed by: NURSE PRACTITIONER

## 2022-04-20 RX ORDER — BUPRENORPHINE HYDROCHLORIDE AND NALOXONE HYDROCHLORIDE DIHYDRATE 8; 2 MG/1; MG/1
2 TABLET SUBLINGUAL DAILY
Qty: 14 TABLET | Refills: 0 | Status: SHIPPED | OUTPATIENT
Start: 2022-04-20 | End: 2022-04-27 | Stop reason: SDUPTHER

## 2022-04-20 RX ORDER — QUETIAPINE FUMARATE 25 MG/1
25 TABLET, FILM COATED ORAL NIGHTLY
Qty: 30 TABLET | Refills: 2 | Status: SHIPPED | OUTPATIENT
Start: 2022-04-20 | End: 2022-06-09 | Stop reason: SDUPTHER

## 2022-04-20 NOTE — PROGRESS NOTES
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 as indicated. Treatment and medication options discussed during today's visit. Patient acknowledged and verbally consented to continue with current treatment plan and was educated on the importance of compliance with treatment and follow-up appointments.    MEDICATION ISSUES:  ROMAN reviewed as expected.  Discussed medication options and treatment plan of prescribed medication as well as the risks, benefits, and side effects including potential falls, possible impaired driving and metabolic adversities among others. Patient is agreeable to call the office with any worsening of symptoms or onset of side effects. Patient is agreeable to call 911 or go to the nearest ER should he/she begin having SI/HI. No medication side effects or related complaints today.     MEDS ORDERED DURING VISIT:  New Medications Ordered This Visit   Medications   • QUEtiapine (SEROquel) 25 MG tablet     Sig: Take 1 tablet by mouth Every Night.     Dispense:  30 tablet     Refill:  2   • buprenorphine-naloxone (SUBOXONE) 8-2 MG per SL tablet     Sig: Place 2 tablets under the tongue Daily.     Dispense:  14 tablet     Refill:  0       No follow-ups on file.           This document has been electronically signed by RENAN De Dios  April 20, 2022 16:40 EDT      Part of this note may be an electronic  transcription/translation of spoken language to printed text using the Dragon Dictation System.

## 2022-04-22 ENCOUNTER — OFFICE VISIT (OUTPATIENT)
Dept: FAMILY MEDICINE CLINIC | Age: 32
End: 2022-04-22

## 2022-04-22 ENCOUNTER — HOSPITAL ENCOUNTER (OUTPATIENT)
Dept: GENERAL RADIOLOGY | Facility: HOSPITAL | Age: 32
Discharge: HOME | End: 2022-04-22
Admitting: NURSE PRACTITIONER

## 2022-04-22 VITALS
SYSTOLIC BLOOD PRESSURE: 128 MMHG | HEIGHT: 78 IN | DIASTOLIC BLOOD PRESSURE: 80 MMHG | TEMPERATURE: 98 F | WEIGHT: 241 LBS | HEART RATE: 73 BPM | RESPIRATION RATE: 19 BRPM | BODY MASS INDEX: 27.88 KG/M2 | OXYGEN SATURATION: 96 %

## 2022-04-22 DIAGNOSIS — M25.561 CHRONIC PAIN OF RIGHT KNEE: ICD-10-CM

## 2022-04-22 DIAGNOSIS — M25.561 CHRONIC PAIN OF RIGHT KNEE: Primary | ICD-10-CM

## 2022-04-22 DIAGNOSIS — G89.29 CHRONIC PAIN OF RIGHT KNEE: Primary | ICD-10-CM

## 2022-04-22 DIAGNOSIS — G89.29 CHRONIC PAIN OF RIGHT KNEE: ICD-10-CM

## 2022-04-22 PROCEDURE — 99214 OFFICE O/P EST MOD 30 MIN: CPT | Performed by: NURSE PRACTITIONER

## 2022-04-22 PROCEDURE — 73562 X-RAY EXAM OF KNEE 3: CPT | Performed by: RADIOLOGY

## 2022-04-22 PROCEDURE — 73562 X-RAY EXAM OF KNEE 3: CPT

## 2022-04-22 RX ORDER — IBUPROFEN 800 MG/1
800 TABLET ORAL EVERY 6 HOURS PRN
Qty: 120 TABLET | Refills: 0 | Status: SHIPPED | OUTPATIENT
Start: 2022-04-22 | End: 2022-06-23 | Stop reason: SDUPTHER

## 2022-04-25 PROBLEM — M25.561 CHRONIC PAIN OF RIGHT KNEE: Status: ACTIVE | Noted: 2022-04-25

## 2022-04-25 PROBLEM — G89.29 CHRONIC PAIN OF RIGHT KNEE: Status: ACTIVE | Noted: 2022-04-25

## 2022-04-25 NOTE — PROGRESS NOTES
"Chief Complaint  Knee Pain    Subjective          Franck Toure presents to [facility]  Patient presents to the clinic today to establish care.  Patient is currently a participant in the MAT program in the [facility].  Was noted on UDS that patient was positive for gabapentin without documented prescription.  Patient states that he was taking those for chronic right knee pain, and they were effective.  Patient here today for evaluation of right knee pain and edema.      Objective   Vital Signs:   /80 (BP Location: Left arm, Patient Position: Sitting, Cuff Size: Adult)   Pulse 73   Temp 98 °F (36.7 °C)   Resp 19   Ht 198.1 cm (77.99\")   Wt 109 kg (241 lb)   SpO2 96%   BMI 27.86 kg/m²            Physical Exam  Vitals reviewed.   Constitutional:       Appearance: Normal appearance. He is well-developed. He is not ill-appearing.   HENT:      Right Ear: Tympanic membrane normal.      Left Ear: Tympanic membrane normal.      Nose: Nose normal.      Mouth/Throat:      Mouth: Mucous membranes are moist.   Eyes:      Pupils: Pupils are equal, round, and reactive to light.   Cardiovascular:      Rate and Rhythm: Normal rate and regular rhythm.      Pulses: Normal pulses.      Heart sounds: Normal heart sounds. No murmur heard.  Pulmonary:      Effort: Pulmonary effort is normal. No respiratory distress.      Breath sounds: Normal breath sounds. No wheezing.   Abdominal:      General: Bowel sounds are normal.      Palpations: Abdomen is soft.      Tenderness: There is no abdominal tenderness. There is no guarding or rebound.   Musculoskeletal:         General: Normal range of motion.      Cervical back: Normal range of motion and neck supple.      Right knee: Swelling, bony tenderness and crepitus present. Normal alignment and normal patellar mobility.      Instability Tests: Anterior drawer test negative. Posterior drawer test negative.   Skin:     General: Skin is warm and dry. "   Neurological:      Mental Status: He is alert and oriented to person, place, and time.   Psychiatric:         Mood and Affect: Mood normal.         Behavior: Behavior normal.        Result Review :                 Assessment and Plan    Diagnoses and all orders for this visit:    1. Chronic pain of right knee (Primary)  -     XR Knee 3 View Right; Future  -      Knee Orthosis, Elastic With Joints (Hinged Knee Brace)  -     ibuprofen (ADVIL,MOTRIN) 800 MG tablet; Take 1 tablet by mouth Every 6 (Six) Hours As Needed for Mild Pain .  Dispense: 120 tablet; Refill: 0  -     MRI Knee Right Without Contrast; Future        Follow Up   Return in about 2 weeks (around 5/6/2022) for Next scheduled follow up.  Patient was given instructions and counseling regarding his condition or for health maintenance advice. Please see specific information pulled into the AVS if appropriate.

## 2022-04-27 ENCOUNTER — TELEMEDICINE (OUTPATIENT)
Dept: PSYCHIATRY | Facility: CLINIC | Age: 32
End: 2022-04-27

## 2022-04-27 VITALS
HEART RATE: 72 BPM | SYSTOLIC BLOOD PRESSURE: 119 MMHG | DIASTOLIC BLOOD PRESSURE: 79 MMHG | WEIGHT: 243.6 LBS | HEIGHT: 78 IN | BODY MASS INDEX: 28.19 KG/M2

## 2022-04-27 DIAGNOSIS — F11.20 OPIOID TYPE DEPENDENCE, CONTINUOUS: Primary | ICD-10-CM

## 2022-04-27 DIAGNOSIS — Z79.899 MEDICATION MANAGEMENT: ICD-10-CM

## 2022-04-27 LAB
EXTERNAL AMPHETAMINE SCREEN URINE: NEGATIVE
EXTERNAL BENZODIAZEPINE SCREEN URINE: NEGATIVE
EXTERNAL BUPRENORPHINE SCREEN URINE: POSITIVE
EXTERNAL COCAINE SCREEN URINE: NEGATIVE
EXTERNAL MDMA: NEGATIVE
EXTERNAL METHADONE SCREEN URINE: NEGATIVE
EXTERNAL METHAMPHETAMINE SCREEN URINE: NEGATIVE
EXTERNAL OPIATES SCREEN URINE: NEGATIVE
EXTERNAL OXYCODONE SCREEN URINE: NEGATIVE
EXTERNAL THC SCREEN URINE: POSITIVE

## 2022-04-27 PROCEDURE — 99213 OFFICE O/P EST LOW 20 MIN: CPT | Performed by: NURSE PRACTITIONER

## 2022-04-27 RX ORDER — BUPRENORPHINE HYDROCHLORIDE AND NALOXONE HYDROCHLORIDE DIHYDRATE 8; 2 MG/1; MG/1
2 TABLET SUBLINGUAL DAILY
Qty: 28 TABLET | Refills: 0 | Status: SHIPPED | OUTPATIENT
Start: 2022-04-27 | End: 2022-05-11 | Stop reason: SDUPTHER

## 2022-05-11 ENCOUNTER — TELEMEDICINE (OUTPATIENT)
Dept: PSYCHIATRY | Facility: CLINIC | Age: 32
End: 2022-05-11

## 2022-05-11 VITALS
SYSTOLIC BLOOD PRESSURE: 131 MMHG | HEIGHT: 78 IN | DIASTOLIC BLOOD PRESSURE: 87 MMHG | BODY MASS INDEX: 27.17 KG/M2 | HEART RATE: 66 BPM | WEIGHT: 234.8 LBS

## 2022-05-11 DIAGNOSIS — F11.20 OPIOID TYPE DEPENDENCE, CONTINUOUS: Primary | ICD-10-CM

## 2022-05-11 DIAGNOSIS — Z79.899 MEDICATION MANAGEMENT: ICD-10-CM

## 2022-05-11 PROCEDURE — 99213 OFFICE O/P EST LOW 20 MIN: CPT | Performed by: NURSE PRACTITIONER

## 2022-05-11 RX ORDER — BUPRENORPHINE HYDROCHLORIDE AND NALOXONE HYDROCHLORIDE DIHYDRATE 8; 2 MG/1; MG/1
2 TABLET SUBLINGUAL DAILY
Qty: 28 TABLET | Refills: 0 | Status: SHIPPED | OUTPATIENT
Start: 2022-05-11 | End: 2022-06-02 | Stop reason: SDUPTHER

## 2022-05-11 NOTE — PROGRESS NOTES
This provider is located at [facility]. The Patient is seen remotely located at the [facility] ([facility]) using Video. Patient is being seen via telehealth and confirm that they are in a secure environment for this session. The patient's condition being diagnosed/treated is appropriate for telemedicine. Provider identified as Daniel Olivas as well as credentials APRN MSN FNP-C ANTONIO-REYNOLD.   The client/patient gave consent to be seen remotely, and when consent is given they understand that the consent allows for patient identifiable information to be sent to a third party as needed.   They may refuse to be seen remotely at any time. The electronic data is encrypted and password protected, and the patient has been advised of the potential risks to privacy not withstanding such measures.    Chief Complaint/History of Present Illness: Follow Up buprenorphine/naloxone Medicated Assisted Treatment for Opiate Use Disorder     Patient/Client Concerns/Updates: Continuing Zoloft Vistaril, seroquel and clonidine for anxiety, patient reports he is doing well; last evaluation recommend the patient to titrate Seroquel dosage down to one half of a tablet due to excessive a.m. grogginess; patient reports the slight reduction in dosage has been beneficial and patient feels his anxiety is well controlled and he is sleeping well with no excessive a.m. grogginess; patient working with primary care in the [facility] per my recommendation and is preparing for an MRI of his knee tomorrow    Triggers (Persons/Places/Things/Events/Thought/Emotions): Chronic joint pain and chronic anxiety    Cravings: Denies cravings    Relapse Prevention: Counseling    Urine Drug Screen (today's visit) discussed: Positive buprenorphine and positive THC    UDS Confirmation (Most recent/Resulted): Positive buprenorphine/nor-buprenorphine, no concerns for an tampering positive THC and nonprescribed gabapentin    Most recent pertinent  laboratory studies reviewed: 3/16/2022-LFTs within normal limits; hepatitis C virus not detected    ROMAN (PDMP) Reviewed for Current/Active Medications: buprenorphine/naloxone as reviewed today    Past Surgical History:  Past Surgical History:   Procedure Laterality Date   • NO PAST SURGERIES         Problem List:  Patient Active Problem List   Diagnosis   • Alcohol-induced acute pancreatitis   • Pancreatitis   • Chronic pain of right knee       Allergy:   No Known Allergies     Current Medications:   Current Outpatient Medications   Medication Sig Dispense Refill   • buprenorphine-naloxone (SUBOXONE) 8-2 MG per SL tablet Place 2 tablets under the tongue Daily. 28 tablet 0   • cephalexin (KEFLEX) 500 MG capsule Take 1 capsule by mouth 3 (Three) Times a Day. 30 capsule 0   • cloNIDine (Catapres) 0.1 MG tablet Take 1 tablet by mouth 2 (Two) Times a Day. Take as needed for anxiety.  Insomnia.  Chills or sweats 60 tablet 2   • hydrOXYzine pamoate (Vistaril) 50 MG capsule Take 1 capsule by mouth 4 (Four) Times a Day As Needed for Anxiety (and/or insomia). Take 1 to 2 tablets as needed for anxiety every 6 hours 90 capsule 2   • ibuprofen (ADVIL,MOTRIN) 800 MG tablet Take 1 tablet by mouth Every 6 (Six) Hours As Needed for Mild Pain . 120 tablet 0   • naloxone (NARCAN) 4 MG/0.1ML nasal spray 1 spray into the nostril(s) as directed by provider As Needed (Opiate oversedation). Spray in 1 nostril every 2 to 3 minutes call 911 2 each 2   • QUEtiapine (SEROquel) 25 MG tablet Take 1 tablet by mouth Every Night. 30 tablet 2   • mupirocin (BACTROBAN) 2 % ointment Apply  topically to the appropriate area as directed 3 (Three) Times a Day. 15 g 0   • orphenadrine (NORFLEX) 100 MG 12 hr tablet Take 1 tablet by mouth 2 (Two) Times a Day As Needed for Muscle Spasms or Mild Pain . 14 tablet 0   • sertraline (Zoloft) 50 MG tablet Take 1 tablet by mouth Daily for 30 days. 30 tablet 3     No current facility-administered medications for  this visit.       Past Medical History:  Past Medical History:   Diagnosis Date   • Alcohol-induced acute pancreatitis 6/27/2019   • Alcoholism (HCC)    • Drug use     Amphetamines and Suboxone   • Fatty liver    • Hepatitis C antibody test positive 2019   • Medical non-compliance    • Pancreatitis    • Smoker          Social History     Socioeconomic History   • Marital status:    Tobacco Use   • Smoking status: Current Every Day Smoker     Packs/day: 0.50     Types: Cigarettes   • Smokeless tobacco: Never Used   Vaping Use   • Vaping Use: Never used   Substance and Sexual Activity   • Alcohol use: Yes     Alcohol/week: 8.0 standard drinks     Types: 8 Shots of liquor per week     Comment: 5-6 double shots   • Drug use: Yes     Comment: suboxone as prescribed   • Sexual activity: Defer         Family History   Problem Relation Age of Onset   • Cancer Maternal Grandmother    • No Known Problems Mother    • No Known Problems Father          Mental Status Exam:   Hygiene:   good  Cooperation:  Cooperative  Eye Contact:  Good  Psychomotor Behavior:  Appropriate  Affect:  Appropriate  Mood: normal  Speech:  Normal  Thought Process:  Goal directed  Thought Content:  Normal  Suicidal:  None  Homicidal:  None  Hallucinations:  None  Delusion:  None  Memory:  Intact  Orientation:  Grossly intact  Reliability:  good  Insight:  Good  Judgement:  Good  Impulse Control:  Good         Review of Systems:  Review of Systems   Constitutional: Negative for activity change, chills, diaphoresis and fatigue.   Respiratory: Negative for apnea, cough and shortness of breath.    Cardiovascular: Negative for chest pain, palpitations and leg swelling.   Gastrointestinal: Negative for abdominal pain, constipation, diarrhea, nausea and vomiting.   Genitourinary: Negative for difficulty urinating.   Musculoskeletal: Positive for arthralgias.   Skin: Negative for rash.   Neurological: Negative for dizziness, weakness and headaches.  "  Psychiatric/Behavioral: Negative for agitation, self-injury, sleep disturbance and suicidal ideas. The patient is not nervous/anxious.          Physical Exam:  Physical Exam  Vitals reviewed.   Constitutional:       General: He is not in acute distress.     Appearance: Normal appearance. He is not ill-appearing or toxic-appearing.   Pulmonary:      Effort: Pulmonary effort is normal.   Musculoskeletal:         General: Normal range of motion.   Neurological:      General: No focal deficit present.      Mental Status: He is alert and oriented to person, place, and time.   Psychiatric:         Attention and Perception: Attention and perception normal.         Mood and Affect: Mood normal. Mood is not anxious or depressed.         Speech: Speech normal.         Behavior: Behavior normal. Behavior is cooperative.         Thought Content: Thought content normal.         Cognition and Memory: Cognition and memory normal.         Judgment: Judgment normal.         Vital Signs:   /87   Pulse 66   Ht 198.1 cm (77.99\")   Wt 107 kg (234 lb 12.8 oz)   BMI 27.14 kg/m²      Lab Results:   Telemedicine on 05/11/2022   Component Date Value Ref Range Status   • External Amphetamine Screen Urine 05/11/2022 Negative   Final   • External Benzodiazepine Screen Uri* 05/11/2022 Negative   Final   • External Cocaine Screen Urine 05/11/2022 Negative   Final   • External THC Screen Urine 05/11/2022 Positive (A)  Final   • External Methadone Screen Urine 05/11/2022 Negative   Final   • External Methamphetamine Screen Ur* 05/11/2022 Negative   Final   • External Oxycodone Screen Urine 05/11/2022 Negative   Final   • External Buprenorphine Screen Urine 05/11/2022 Positive (A)  Final   • External MDMA 05/11/2022 Negative   Final   • External Opiates Screen Urine 05/11/2022 Negative   Final   Telemedicine on 04/27/2022   Component Date Value Ref Range Status   • External Amphetamine Screen Urine 04/27/2022 Negative   Final   • " External Benzodiazepine Screen Uri* 04/27/2022 Negative   Final   • External Cocaine Screen Urine 04/27/2022 Negative   Final   • External THC Screen Urine 04/27/2022 Positive (A)  Final   • External Methadone Screen Urine 04/27/2022 Negative   Final   • External Methamphetamine Screen Ur* 04/27/2022 Negative   Final   • External Oxycodone Screen Urine 04/27/2022 Negative   Final   • External Buprenorphine Screen Urine 04/27/2022 Positive (A)  Final   • External MDMA 04/27/2022 Negative   Final   • External Opiates Screen Urine 04/27/2022 Negative   Final   Telemedicine on 04/20/2022   Component Date Value Ref Range Status   • External Amphetamine Screen Urine 04/20/2022 Negative   Final   • External Benzodiazepine Screen Uri* 04/20/2022 Negative   Final   • External Cocaine Screen Urine 04/20/2022 Negative   Final   • External THC Screen Urine 04/20/2022 Negative   Final   • External Methadone Screen Urine 04/20/2022 Negative   Final   • External Methamphetamine Screen Ur* 04/20/2022 Negative   Final   • External Oxycodone Screen Urine 04/20/2022 Negative   Final   • External Buprenorphine Screen Urine 04/20/2022 Positive   Final   • External MDMA 04/20/2022 Negative   Final   • External Opiates Screen Urine 04/20/2022 Negative   Final   Telemedicine on 03/24/2022   Component Date Value Ref Range Status   • External Amphetamine Screen Urine 03/24/2022 Negative   Final   • External Benzodiazepine Screen Uri* 03/24/2022 Negative   Final   • External Cocaine Screen Urine 03/24/2022 Negative   Final   • External THC Screen Urine 03/24/2022 Negative   Final   • External Methadone Screen Urine 03/24/2022 Negative   Final   • External Methamphetamine Screen Ur* 03/24/2022 Negative   Final   • External Oxycodone Screen Urine 03/24/2022 Negative   Final   • External Buprenorphine Screen Urine 03/24/2022 Positive   Final   • External MDMA 03/24/2022 Negative   Final   • External Opiates Screen Urine 03/24/2022 Negative    Final   Lab on 03/16/2022   Component Date Value Ref Range Status   • Glucose 03/16/2022 124 (A) 65 - 99 mg/dL Final   • BUN 03/16/2022 13  6 - 20 mg/dL Final   • Creatinine 03/16/2022 0.78  0.76 - 1.27 mg/dL Final   • Sodium 03/16/2022 136  136 - 145 mmol/L Final   • Potassium 03/16/2022 4.0  3.5 - 5.2 mmol/L Final   • Chloride 03/16/2022 102  98 - 107 mmol/L Final   • CO2 03/16/2022 23.1  22.0 - 29.0 mmol/L Final   • Calcium 03/16/2022 9.7  8.6 - 10.5 mg/dL Final   • Total Protein 03/16/2022 8.1  6.0 - 8.5 g/dL Final   • Albumin 03/16/2022 4.20  3.50 - 5.20 g/dL Final   • ALT (SGPT) 03/16/2022 22  1 - 41 U/L Final   • AST (SGOT) 03/16/2022 33  1 - 40 U/L Final   • Alkaline Phosphatase 03/16/2022 111  39 - 117 U/L Final   • Total Bilirubin 03/16/2022 0.4  0.0 - 1.2 mg/dL Final   • Globulin 03/16/2022 3.9  gm/dL Final   • A/G Ratio 03/16/2022 1.1  g/dL Final   • BUN/Creatinine Ratio 03/16/2022 16.7  7.0 - 25.0 Final   • Anion Gap 03/16/2022 10.9  5.0 - 15.0 mmol/L Final   • eGFR 03/16/2022 122.3  >60.0 mL/min/1.73 Final    National Kidney Foundation and American Society of Nephrology (ASN) Task Force recommended calculation based on the Chronic Kidney Disease Epidemiology Collaboration (CKD-EPI) equation refit without adjustment for race.   • Hepatitis C Quantitation 03/16/2022 HCV Not Detected  IU/mL Final   • HCV log10 03/16/2022    Final    Unable to calculate result since non-numeric result obtained for  component test.   • HCV Test Information 03/16/2022 Comment   Final    The quantitative range of this assay is 15 IU/mL to 100 million IU/mL.   • HCV Genotype 03/16/2022    Final    Not indicated   • WBC 03/16/2022 7.58  3.40 - 10.80 10*3/mm3 Final   • RBC 03/16/2022 4.56  4.14 - 5.80 10*6/mm3 Final   • Hemoglobin 03/16/2022 13.1  13.0 - 17.7 g/dL Final   • Hematocrit 03/16/2022 39.1  37.5 - 51.0 % Final   • MCV 03/16/2022 85.7  79.0 - 97.0 fL Final   • MCH 03/16/2022 28.7  26.6 - 33.0 pg Final   • MCHC  03/16/2022 33.5  31.5 - 35.7 g/dL Final   • RDW 03/16/2022 11.8 (A) 12.3 - 15.4 % Final   • RDW-SD 03/16/2022 36.5 (A) 37.0 - 54.0 fl Final   • MPV 03/16/2022 12.6 (A) 6.0 - 12.0 fL Final   • Platelets 03/16/2022 205  140 - 450 10*3/mm3 Final   • Neutrophil % 03/16/2022 53.6  42.7 - 76.0 % Final   • Lymphocyte % 03/16/2022 32.7  19.6 - 45.3 % Final   • Monocyte % 03/16/2022 8.2  5.0 - 12.0 % Final   • Eosinophil % 03/16/2022 4.6  0.3 - 6.2 % Final   • Basophil % 03/16/2022 0.4  0.0 - 1.5 % Final   • Immature Grans % 03/16/2022 0.5  0.0 - 0.5 % Final   • Neutrophils, Absolute 03/16/2022 4.06  1.70 - 7.00 10*3/mm3 Final   • Lymphocytes, Absolute 03/16/2022 2.48  0.70 - 3.10 10*3/mm3 Final   • Monocytes, Absolute 03/16/2022 0.62  0.10 - 0.90 10*3/mm3 Final   • Eosinophils, Absolute 03/16/2022 0.35  0.00 - 0.40 10*3/mm3 Final   • Basophils, Absolute 03/16/2022 0.03  0.00 - 0.20 10*3/mm3 Final   • Immature Grans, Absolute 03/16/2022 0.04  0.00 - 0.05 10*3/mm3 Final   • nRBC 03/16/2022 0.0  0.0 - 0.2 /100 WBC Final   Office Visit on 02/24/2022   Component Date Value Ref Range Status   • External Amphetamine Screen Urine 02/24/2022 Negative   Final   • Amphetamine Cut-Off 02/24/2022 1000ng/ml   Final   • External Benzodiazepine Screen Uri* 02/24/2022 Negative   Final   • Benzodiazipine Cut-Off 02/24/2022 300ng/ml   Final   • External Cocaine Screen Urine 02/24/2022 Negative   Final   • Cocaine Cut-Off 02/24/2022 300ng/ml   Final   • External THC Screen Urine 02/24/2022 Negative   Final   • THC Cut-Off 02/24/2022 50ng/ml   Final   • External Methadone Screen Urine 02/24/2022 Negative   Final   • Methadone Cut-Off 02/24/2022 300ng/ml   Final   • External Methamphetamine Screen Ur* 02/24/2022 Negative   Final   • Methamphetamine Cut-Off 02/24/2022 1000ng/ml   Final   • External Oxycodone Screen Urine 02/24/2022 Negative   Final   • Oxycodone Cut-Off 02/24/2022 100ng/ml   Final   • External Buprenorphine Screen Urine  02/24/2022 Positive   Final   • Buprenorphine Cut-Off 02/24/2022 10ng/ml   Final   • External MDMA 02/24/2022 Negative   Final   • MDMA Cut-Off 02/24/2022 500ng/ml   Final   • External Opiates Screen Urine 02/24/2022 Negative   Final   • Opiates Cut-Off 02/24/2022 300ng/ml   Final   Office Visit on 01/27/2022   Component Date Value Ref Range Status   • External Amphetamine Screen Urine 01/27/2022 Negative   Final   • Amphetamine Cut-Off 01/27/2022 1000ng/ml   Final   • External Benzodiazepine Screen Uri* 01/27/2022 Negative   Final   • Benzodiazipine Cut-Off 01/27/2022 300ng/ml   Final   • External Cocaine Screen Urine 01/27/2022 Negative   Final   • Cocaine Cut-Off 01/27/2022 300ng/ml   Final   • External THC Screen Urine 01/27/2022 Negative   Final   • THC Cut-Off 01/27/2022 50ng/ml   Final   • External Methadone Screen Urine 01/27/2022 Negative   Final   • Methadone Cut-Off 01/27/2022 300ng/ml   Final   • External Methamphetamine Screen Ur* 01/27/2022 Negative   Final   • Methamphetamine Cut-Off 01/27/2022 1000ng/ml   Final   • External Oxycodone Screen Urine 01/27/2022 Negative   Final   • Oxycodone Cut-Off 01/27/2022 100ng/ml   Final   • External Buprenorphine Screen Urine 01/27/2022 Positive   Final   • Buprenorphine Cut-Off 01/27/2022 10ng/ml   Final   • External MDMA 01/27/2022 Negative   Final   • MDMA Cut-Off 01/27/2022 500ng/ml   Final   • External Opiates Screen Urine 01/27/2022 Negative   Final   • Opiates Cut-Off 01/27/2022 300ng/ml   Final   Office Visit on 01/13/2022   Component Date Value Ref Range Status   • External Amphetamine Screen Urine 01/13/2022 Negative   Final   • Amphetamine Cut-Off 01/13/2022 1000ng/ml   Final   • External Benzodiazepine Screen Uri* 01/13/2022 Negative   Final   • Benzodiazipine Cut-Off 01/13/2022 300ng/ml   Final   • External Cocaine Screen Urine 01/13/2022 Negative   Final   • Cocaine Cut-Off 01/13/2022 300ng/ml   Final   • External THC Screen Urine 01/13/2022 Negative    Final   • THC Cut-Off 01/13/2022 50ng/ml   Final   • External Methadone Screen Urine 01/13/2022 Negative   Final   • Methadone Cut-Off 01/13/2022 300ng/ml   Final   • External Methamphetamine Screen Ur* 01/13/2022 Negative   Final   • Methamphetamine Cut-Off 01/13/2022 1000ng/ml   Final   • External Oxycodone Screen Urine 01/13/2022 Negative   Final   • Oxycodone Cut-Off 01/13/2022 100ng/ml   Final   • External Buprenorphine Screen Urine 01/13/2022 Positive   Final   • Buprenorphine Cut-Off 01/13/2022 10ng/ml   Final   • External MDMA 01/13/2022 Negative   Final   • MDMA Cut-Off 01/13/2022 500ng/ml   Final   • External Opiates Screen Urine 01/13/2022 Negative   Final   • Opiates Cut-Off 01/13/2022 300ng/ml   Final   Office Visit on 01/05/2022   Component Date Value Ref Range Status   • External Amphetamine Screen Urine 01/05/2022 Negative   Final   • Amphetamine Cut-Off 01/05/2022 1000ng/ml   Final   • External Benzodiazepine Screen Uri* 01/05/2022 Negative   Final   • Benzodiazipine Cut-Off 01/05/2022 300ng/ml   Final   • External Cocaine Screen Urine 01/05/2022 Negative   Final   • Cocaine Cut-Off 01/05/2022 300ng/ml   Final   • External THC Screen Urine 01/05/2022 Negative   Final   • THC Cut-Off 01/05/2022 50ng/ml   Final   • External Methadone Screen Urine 01/05/2022 Negative   Final   • Methadone Cut-Off 01/05/2022 300ng/ml   Final   • External Methamphetamine Screen Ur* 01/05/2022 Negative   Final   • Methamphetamine Cut-Off 01/05/2022 1000ng/ml   Final   • External Oxycodone Screen Urine 01/05/2022 Negative   Final   • Oxycodone Cut-Off 01/05/2022 100ng/ml   Final   • External Buprenorphine Screen Urine 01/05/2022 Positive   Final   • Buprenorphine Cut-Off 01/05/2022 10ng/ml   Final   • External MDMA 01/05/2022 Negative   Final   • MDMA Cut-Off 01/05/2022 500ng/ml   Final   • External Opiates Screen Urine 01/05/2022 Negative   Final   • Opiates Cut-Off 01/05/2022 300ng/ml   Final   Admission on 01/01/2022,  Discharged on 01/01/2022   Component Date Value Ref Range Status   • Glucose 01/01/2022 112 (A) 65 - 99 mg/dL Final   • BUN 01/01/2022 3 (A) 6 - 20 mg/dL Final   • Creatinine 01/01/2022 0.49 (A) 0.76 - 1.27 mg/dL Final   • Sodium 01/01/2022 139  136 - 145 mmol/L Final   • Potassium 01/01/2022 3.6  3.5 - 5.2 mmol/L Final   • Chloride 01/01/2022 100  98 - 107 mmol/L Final   • CO2 01/01/2022 21.9 (A) 22.0 - 29.0 mmol/L Final   • Calcium 01/01/2022 8.7  8.6 - 10.5 mg/dL Final   • Total Protein 01/01/2022 8.2  6.0 - 8.5 g/dL Final   • Albumin 01/01/2022 3.86  3.50 - 5.20 g/dL Final   • ALT (SGPT) 01/01/2022 82 (A) 1 - 41 U/L Final   • AST (SGOT) 01/01/2022 253 (A) 1 - 40 U/L Final   • Alkaline Phosphatase 01/01/2022 452 (A) 39 - 117 U/L Final   • Total Bilirubin 01/01/2022 0.7  0.0 - 1.2 mg/dL Final   • eGFR Non  Amer 01/01/2022 >150  >60 mL/min/1.73 Final   • Globulin 01/01/2022 4.3  gm/dL Final   • A/G Ratio 01/01/2022 0.9  g/dL Final   • BUN/Creatinine Ratio 01/01/2022 6.1 (A) 7.0 - 25.0 Final   • Anion Gap 01/01/2022 17.1 (A) 5.0 - 15.0 mmol/L Final   • Color, UA 01/01/2022 Yellow  Yellow, Straw Final   • Appearance, UA 01/01/2022 Clear  Clear Final   • pH, UA 01/01/2022 6.5  5.0 - 8.0 Final   • Specific Gravity, UA 01/01/2022 <=1.005  1.005 - 1.030 Final   • Glucose, UA 01/01/2022 Negative  Negative Final   • Ketones, UA 01/01/2022 Negative  Negative Final   • Bilirubin, UA 01/01/2022 Negative  Negative Final   • Blood, UA 01/01/2022 Negative  Negative Final   • Protein, UA 01/01/2022 Negative  Negative Final   • Leuk Esterase, UA 01/01/2022 Negative  Negative Final   • Nitrite, UA 01/01/2022 Negative  Negative Final   • Urobilinogen, UA 01/01/2022 1.0 E.U./dL  0.2 - 1.0 E.U./dL Final   • THC, Screen, Urine 01/01/2022 Negative  Negative Final   • Phencyclidine (PCP), Urine 01/01/2022 Negative  Negative Final   • Cocaine Screen, Urine 01/01/2022 Negative  Negative Final   • Methamphetamine, Ur 01/01/2022  Negative  Negative Final   • Opiate Screen 01/01/2022 Negative  Negative Final   • Amphetamine Screen, Urine 01/01/2022 Negative  Negative Final   • Benzodiazepine Screen, Urine 01/01/2022 Negative  Negative Final   • Tricyclic Antidepressants Screen 01/01/2022 Negative  Negative Final   • Methadone Screen, Urine 01/01/2022 Negative  Negative Final   • Barbiturates Screen, Urine 01/01/2022 Negative  Negative Final   • Oxycodone Screen, Urine 01/01/2022 Negative  Negative Final   • Propoxyphene Screen 01/01/2022 Negative  Negative Final   • Buprenorphine, Screen, Urine 01/01/2022 Positive (A) Negative Final   • Magnesium 01/01/2022 1.7  1.6 - 2.6 mg/dL Final   • Ethanol 01/01/2022 311 (A) 0 - 10 mg/dL Final   • Ethanol % 01/01/2022 0.311  % Final   • WBC 01/01/2022 11.09 (A) 3.40 - 10.80 10*3/mm3 Final   • RBC 01/01/2022 4.39  4.14 - 5.80 10*6/mm3 Final   • Hemoglobin 01/01/2022 14.3  13.0 - 17.7 g/dL Final   • Hematocrit 01/01/2022 42.3  37.5 - 51.0 % Final   • MCV 01/01/2022 96.4  79.0 - 97.0 fL Final   • MCH 01/01/2022 32.6  26.6 - 33.0 pg Final   • MCHC 01/01/2022 33.8  31.5 - 35.7 g/dL Final   • RDW 01/01/2022 11.9 (A) 12.3 - 15.4 % Final   • RDW-SD 01/01/2022 42.3  37.0 - 54.0 fl Final   • MPV 01/01/2022 9.6  6.0 - 12.0 fL Final   • Platelets 01/01/2022 196  140 - 450 10*3/mm3 Final   • Neutrophil % 01/01/2022 70.8  42.7 - 76.0 % Final   • Lymphocyte % 01/01/2022 20.6  19.6 - 45.3 % Final   • Monocyte % 01/01/2022 5.0  5.0 - 12.0 % Final   • Eosinophil % 01/01/2022 1.8  0.3 - 6.2 % Final   • Basophil % 01/01/2022 0.5  0.0 - 1.5 % Final   • Immature Grans % 01/01/2022 1.3 (A) 0.0 - 0.5 % Final   • Neutrophils, Absolute 01/01/2022 7.86 (A) 1.70 - 7.00 10*3/mm3 Final   • Lymphocytes, Absolute 01/01/2022 2.28  0.70 - 3.10 10*3/mm3 Final   • Monocytes, Absolute 01/01/2022 0.55  0.10 - 0.90 10*3/mm3 Final   • Eosinophils, Absolute 01/01/2022 0.20  0.00 - 0.40 10*3/mm3 Final   • Basophils, Absolute 01/01/2022 0.06   0.00 - 0.20 10*3/mm3 Final   • Immature Grans, Absolute 01/01/2022 0.14 (A) 0.00 - 0.05 10*3/mm3 Final   • nRBC 01/01/2022 0.0  0.0 - 0.2 /100 WBC Final   • COVID19 01/01/2022 Not Detected  Not Detected - Ref. Range Final   • Influenza A PCR 01/01/2022 Not Detected  Not Detected Final   • Influenza B PCR 01/01/2022 Not Detected  Not Detected Final   • Ethanol 01/01/2022 102 (A) 0 - 10 mg/dL Final   • Ethanol % 01/01/2022 0.102  % Final   There may be more visits with results that are not included.         Assessment & Plan   Diagnoses and all orders for this visit:    1. Opioid type dependence, continuous (HCC) (Primary)  -     buprenorphine-naloxone (SUBOXONE) 8-2 MG per SL tablet; Place 2 tablets under the tongue Daily.  Dispense: 28 tablet; Refill: 0    2. Medication management  -     [facility] Screen    Other orders  -     SCANNED - LABS  -     SCANNED - LABS        Visit Diagnoses:    ICD-10-CM ICD-9-CM   1. Opioid type dependence, continuous (HCC)  F11.20 304.01   2. Medication management  Z79.899 V58.69       PLAN:  1. Safety: No acute safety concerns  2. Risk Assessment: Risk of self-harm acutely is low. Risk of self-harm chronically is also low, but could be further elevated in the event of treatment noncompliance and/or AODA.    TREATMENT PLAN/GOALS: Continue supportive psychotherapy efforts and medications as indicated. Treatment and medication options discussed during today's visit. Patient acknowledged and verbally consented to continue with current treatment plan and was educated on the importance of compliance with treatment and follow-up appointments.    MEDICATION ISSUES:  ROMAN reviewed as expected.  Discussed medication options and treatment plan of prescribed medication as well as the risks, benefits, and side effects including potential falls, possible impaired driving and metabolic adversities among others. Patient is agreeable to call the office with any worsening of symptoms or onset of  side effects. Patient is agreeable to call 911 or go to the nearest ER should he/she begin having SI/HI. No medication side effects or related complaints today.     MEDS ORDERED DURING VISIT:  New Medications Ordered This Visit   Medications   • buprenorphine-naloxone (SUBOXONE) 8-2 MG per SL tablet     Sig: Place 2 tablets under the tongue Daily.     Dispense:  28 tablet     Refill:  0       No follow-ups on file.           This document has been electronically signed by RENAN De Dios  May 11, 2022 09:51 EDT      Part of this note may be an electronic transcription/translation of spoken language to printed text using the Dragon Dictation System.

## 2022-05-12 ENCOUNTER — APPOINTMENT (OUTPATIENT)
Dept: MRI IMAGING | Facility: HOSPITAL | Age: 32
End: 2022-05-12

## 2022-05-19 ENCOUNTER — HOSPITAL ENCOUNTER (OUTPATIENT)
Dept: MRI IMAGING | Facility: HOSPITAL | Age: 32
Discharge: HOME | End: 2022-05-19
Admitting: NURSE PRACTITIONER

## 2022-05-19 DIAGNOSIS — G89.29 CHRONIC PAIN OF RIGHT KNEE: ICD-10-CM

## 2022-05-19 DIAGNOSIS — M25.561 CHRONIC PAIN OF RIGHT KNEE: ICD-10-CM

## 2022-05-19 PROCEDURE — 73721 MRI JNT OF LWR EXTRE W/O DYE: CPT

## 2022-05-19 PROCEDURE — 73721 MRI JNT OF LWR EXTRE W/O DYE: CPT | Performed by: RADIOLOGY

## 2022-05-31 DIAGNOSIS — F11.20 OPIOID TYPE DEPENDENCE, CONTINUOUS: ICD-10-CM

## 2022-05-31 RX ORDER — BUPRENORPHINE HYDROCHLORIDE AND NALOXONE HYDROCHLORIDE DIHYDRATE 8; 2 MG/1; MG/1
2 TABLET SUBLINGUAL DAILY
Qty: 28 TABLET | Refills: 0 | Status: CANCELLED | OUTPATIENT
Start: 2022-05-31

## 2022-06-02 ENCOUNTER — OFFICE VISIT (OUTPATIENT)
Dept: PSYCHIATRY | Facility: CLINIC | Age: 32
End: 2022-06-02

## 2022-06-02 VITALS
WEIGHT: 237.2 LBS | BODY MASS INDEX: 27.44 KG/M2 | HEART RATE: 75 BPM | SYSTOLIC BLOOD PRESSURE: 136 MMHG | DIASTOLIC BLOOD PRESSURE: 89 MMHG | HEIGHT: 78 IN

## 2022-06-02 DIAGNOSIS — Z79.899 MEDICATION MANAGEMENT: ICD-10-CM

## 2022-06-02 DIAGNOSIS — F11.20 OPIOID TYPE DEPENDENCE, CONTINUOUS: Primary | ICD-10-CM

## 2022-06-02 LAB
EXTERNAL AMPHETAMINE SCREEN URINE: POSITIVE
EXTERNAL BENZODIAZEPINE SCREEN URINE: NEGATIVE
EXTERNAL BUPRENORPHINE SCREEN URINE: POSITIVE
EXTERNAL COCAINE SCREEN URINE: NEGATIVE
EXTERNAL MDMA: NEGATIVE
EXTERNAL METHADONE SCREEN URINE: NEGATIVE
EXTERNAL METHAMPHETAMINE SCREEN URINE: POSITIVE
EXTERNAL OPIATES SCREEN URINE: NEGATIVE
EXTERNAL OXYCODONE SCREEN URINE: NEGATIVE
EXTERNAL THC SCREEN URINE: POSITIVE

## 2022-06-02 PROCEDURE — 99213 OFFICE O/P EST LOW 20 MIN: CPT | Performed by: NURSE PRACTITIONER

## 2022-06-02 RX ORDER — BUPRENORPHINE HYDROCHLORIDE AND NALOXONE HYDROCHLORIDE DIHYDRATE 8; 2 MG/1; MG/1
2 TABLET SUBLINGUAL DAILY
Qty: 14 TABLET | Refills: 0 | Status: SHIPPED | OUTPATIENT
Start: 2022-06-02 | End: 2022-06-09 | Stop reason: SDUPTHER
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
## 2022-06-09 ENCOUNTER — TELEMEDICINE (OUTPATIENT)
Dept: PSYCHIATRY | Facility: CLINIC | Age: 32
End: 2022-06-09

## 2022-06-09 VITALS
DIASTOLIC BLOOD PRESSURE: 102 MMHG | HEART RATE: 86 BPM | HEIGHT: 78 IN | BODY MASS INDEX: 27.58 KG/M2 | WEIGHT: 238.4 LBS | SYSTOLIC BLOOD PRESSURE: 147 MMHG

## 2022-06-09 DIAGNOSIS — F41.1 GENERALIZED ANXIETY DISORDER: ICD-10-CM

## 2022-06-09 DIAGNOSIS — F32.A DEPRESSION, UNSPECIFIED DEPRESSION TYPE: ICD-10-CM

## 2022-06-09 DIAGNOSIS — Z79.899 MEDICATION MANAGEMENT: ICD-10-CM

## 2022-06-09 DIAGNOSIS — G47.00 INSOMNIA, UNSPECIFIED TYPE: ICD-10-CM

## 2022-06-09 DIAGNOSIS — F11.20 OPIOID TYPE DEPENDENCE, CONTINUOUS: Primary | ICD-10-CM

## 2022-06-09 LAB
EXTERNAL AMPHETAMINE SCREEN URINE: POSITIVE
EXTERNAL BENZODIAZEPINE SCREEN URINE: NEGATIVE
EXTERNAL BUPRENORPHINE SCREEN URINE: POSITIVE
EXTERNAL COCAINE SCREEN URINE: NEGATIVE
EXTERNAL MDMA: NEGATIVE
EXTERNAL METHADONE SCREEN URINE: NEGATIVE
EXTERNAL METHAMPHETAMINE SCREEN URINE: NEGATIVE
EXTERNAL OPIATES SCREEN URINE: NEGATIVE
EXTERNAL OXYCODONE SCREEN URINE: NEGATIVE
EXTERNAL THC SCREEN URINE: POSITIVE

## 2022-06-09 PROCEDURE — 99214 OFFICE O/P EST MOD 30 MIN: CPT | Performed by: NURSE PRACTITIONER

## 2022-06-09 RX ORDER — CLONIDINE HYDROCHLORIDE 0.1 MG/1
0.1 TABLET ORAL 2 TIMES DAILY
Qty: 60 TABLET | Refills: 2 | Status: SHIPPED | OUTPATIENT
Start: 2022-06-09 | End: 2022-08-22 | Stop reason: SDUPTHER

## 2022-06-09 RX ORDER — HYDROXYZINE PAMOATE 50 MG/1
50 CAPSULE ORAL 4 TIMES DAILY PRN
Qty: 90 CAPSULE | Refills: 2 | Status: SHIPPED | OUTPATIENT
Start: 2022-06-09 | End: 2022-11-21 | Stop reason: SDUPTHER

## 2022-06-09 RX ORDER — BUPRENORPHINE HYDROCHLORIDE AND NALOXONE HYDROCHLORIDE DIHYDRATE 8; 2 MG/1; MG/1
2 TABLET SUBLINGUAL DAILY
Qty: 28 TABLET | Refills: 0 | Status: SHIPPED | OUTPATIENT
Start: 2022-06-09 | End: 2022-06-23 | Stop reason: SDUPTHER

## 2022-06-09 RX ORDER — IBUPROFEN 600 MG/1
TABLET ORAL
COMMUNITY
Start: 2022-05-31 | End: 2022-06-23 | Stop reason: SDUPTHER

## 2022-06-09 RX ORDER — QUETIAPINE FUMARATE 25 MG/1
25 TABLET, FILM COATED ORAL NIGHTLY
Qty: 30 TABLET | Refills: 2 | Status: SHIPPED | OUTPATIENT
Start: 2022-06-09 | End: 2022-08-22 | Stop reason: SDUPTHER

## 2022-06-09 NOTE — PROGRESS NOTES
This provider is located at [facility]. The Patient is seen remotely located at the [facility] ([facility]) using Video. Patient is being seen via telehealth and confirm that they are in a secure environment for this session. The patient's condition being diagnosed/treated is appropriate for telemedicine. Provider identified as Daniel Olivas as well as credentials APRN MSN FNP-C ANTONIO-REYNOLD.   The client/patient gave consent to be seen remotely, and when consent is given they understand that the consent allows for patient identifiable information to be sent to a third party as needed.   They may refuse to be seen remotely at any time. The electronic data is encrypted and password protected, and the patient has been advised of the potential risks to privacy not withstanding such measures.    Chief Complaint/History of Present Illness: Follow Up buprenorphine/naloxone Medicated Assisted Treatment for Opiate Use Disorder     Patient/Client Concerns/Updates:  Continuing Zoloft Vistaril, seroquel and clonidine for anxiety patient reports he is doing well on his medications with no concerns, feels anxiety and overall mood is stable, denies any further methamphetamine use.  Reports he uses last prescribed phentermine today and will not be acquiring any further prescriptions and thus states this medication should clear from his urine results going forward.  Working with primary care here in the [facility] for evaluation of etiology of chronic joint pain    Triggers (Persons/Places/Things/Events/Thought/Emotions): Chronic joint pain and anxiety    Cravings:   Denies cravings  Relapse Prevention: Counseling    Urine Drug Screen (today's visit) discussed: Positive buprenorphine positive amphetamine and THC    UDS Confirmation (Most recent/Resulted): Positive buprenorphine/nor-buprenorphine, positive THC phentermine gabapentin and methamphetamine    Most recent pertinent laboratory studies reviewed:   3/16/2022-LFTs within normal limits; hepatitis C virus not detected    ROMAN (PDMP) Reviewed for Current/Active Medications: buprenorphine/naloxone as reviewed today    Past Surgical History:  Past Surgical History:   Procedure Laterality Date   • NO PAST SURGERIES         Problem List:  Patient Active Problem List   Diagnosis   • Alcohol-induced acute pancreatitis   • Pancreatitis   • Chronic pain of right knee       Allergy:   No Known Allergies     Current Medications:   Current Outpatient Medications   Medication Sig Dispense Refill   • buprenorphine-naloxone (SUBOXONE) 8-2 MG per SL tablet Place 2 tablets under the tongue Daily. 28 tablet 0   • cephalexin (KEFLEX) 500 MG capsule Take 1 capsule by mouth 3 (Three) Times a Day. 30 capsule 0   • cloNIDine (Catapres) 0.1 MG tablet Take 1 tablet by mouth 2 (Two) Times a Day. Take as needed for anxiety.  Insomnia.  Chills or sweats 60 tablet 2   • hydrOXYzine pamoate (Vistaril) 50 MG capsule Take 1 capsule by mouth 4 (Four) Times a Day As Needed for Anxiety (and/or insomia). Take 1 to 2 tablets as needed for anxiety every 6 hours 90 capsule 2   • ibuprofen (ADVIL,MOTRIN) 800 MG tablet Take 1 tablet by mouth Every 6 (Six) Hours As Needed for Mild Pain . 120 tablet 0   • mupirocin (BACTROBAN) 2 % ointment Apply  topically to the appropriate area as directed 3 (Three) Times a Day. 15 g 0   • naloxone (NARCAN) 4 MG/0.1ML nasal spray 1 spray into the nostril(s) as directed by provider As Needed (Opiate oversedation). Spray in 1 nostril every 2 to 3 minutes call 911 2 each 2   • orphenadrine (NORFLEX) 100 MG 12 hr tablet Take 1 tablet by mouth 2 (Two) Times a Day As Needed for Muscle Spasms or Mild Pain . 14 tablet 0   • QUEtiapine (SEROquel) 25 MG tablet Take 1 tablet by mouth Every Night. 30 tablet 2   • sertraline (Zoloft) 50 MG tablet Take 1 tablet by mouth Daily for 30 days. 30 tablet 3   • ibuprofen (ADVIL,MOTRIN) 600 MG tablet TAKE ONE TABLET BY MOUTH EVERY 6 HOURS  AS NEEDED FOR PAIN OR INFLAMMATION       No current facility-administered medications for this visit.       Past Medical History:  Past Medical History:   Diagnosis Date   • Alcohol-induced acute pancreatitis 6/27/2019   • Alcoholism (HCC)    • Drug use     Amphetamines and Suboxone   • Fatty liver    • Hepatitis C antibody test positive 2019   • Medical non-compliance    • Pancreatitis    • Smoker          Social History     Socioeconomic History   • Marital status:    Tobacco Use   • Smoking status: Current Every Day Smoker     Packs/day: 0.50     Types: Cigarettes   • Smokeless tobacco: Never Used   Vaping Use   • Vaping Use: Never used   Substance and Sexual Activity   • Alcohol use: Yes     Alcohol/week: 8.0 standard drinks     Types: 8 Shots of liquor per week     Comment: 5-6 double shots   • Drug use: Yes     Comment: suboxone as prescribed   • Sexual activity: Defer         Family History   Problem Relation Age of Onset   • Cancer Maternal Grandmother    • No Known Problems Mother    • No Known Problems Father          Mental Status Exam:   Hygiene:   good  Cooperation:  Cooperative  Eye Contact:  Good  Psychomotor Behavior:  Appropriate  Affect:  Appropriate  Mood: normal  Speech:  Normal  Thought Process:  Goal directed  Thought Content:  Normal  Suicidal:  None  Homicidal:  None  Hallucinations:  None  Delusion:  None  Memory:  Intact  Orientation:  Grossly intact  Reliability:  good  Insight:  Good  Judgement:  Good  Impulse Control:  Good         Review of Systems:  Review of Systems   Constitutional: Negative for activity change, chills, diaphoresis and fatigue.   Respiratory: Negative for apnea, cough and shortness of breath.    Cardiovascular: Negative for chest pain, palpitations and leg swelling.   Gastrointestinal: Negative for abdominal pain, constipation, diarrhea, nausea and vomiting.   Genitourinary: Negative for difficulty urinating.   Musculoskeletal: Positive for arthralgias.  "  Skin: Negative for rash.   Neurological: Negative for dizziness, weakness and headaches.   Psychiatric/Behavioral: Negative for agitation, self-injury, sleep disturbance and suicidal ideas. The patient is not nervous/anxious.          Physical Exam:  Physical Exam  Vitals reviewed.   Constitutional:       General: He is not in acute distress.     Appearance: Normal appearance. He is not ill-appearing or toxic-appearing.   Pulmonary:      Effort: Pulmonary effort is normal.   Musculoskeletal:         General: Normal range of motion.   Neurological:      General: No focal deficit present.      Mental Status: He is alert and oriented to person, place, and time.   Psychiatric:         Attention and Perception: Attention and perception normal.         Mood and Affect: Mood normal. Mood is not anxious or depressed.         Speech: Speech normal.         Behavior: Behavior normal. Behavior is cooperative.         Thought Content: Thought content normal.         Cognition and Memory: Cognition and memory normal.         Judgment: Judgment normal.         Vital Signs:   BP (!) 147/102   Pulse 86   Ht 198.1 cm (77.99\")   Wt 108 kg (238 lb 6.4 oz)   BMI 27.56 kg/m²      Lab Results:   Telemedicine on 06/09/2022   Component Date Value Ref Range Status   • External Amphetamine Screen Urine 06/09/2022 Positive (A)  Final   • External Benzodiazepine Screen Uri* 06/09/2022 Negative   Final   • External Cocaine Screen Urine 06/09/2022 Negative   Final   • External THC Screen Urine 06/09/2022 Positive (A)  Final   • External Methadone Screen Urine 06/09/2022 Negative   Final   • External Methamphetamine Screen Ur* 06/09/2022 Negative   Final   • External Oxycodone Screen Urine 06/09/2022 Negative   Final   • External Buprenorphine Screen Urine 06/09/2022 Positive (A)  Final   • External MDMA 06/09/2022 Negative   Final   • External Opiates Screen Urine 06/09/2022 Negative   Final   Office Visit on 06/02/2022   Component Date " Value Ref Range Status   • External Amphetamine Screen Urine 06/02/2022 Positive (A)  Final   • External Benzodiazepine Screen Uri* 06/02/2022 Negative   Final   • External Cocaine Screen Urine 06/02/2022 Negative   Final   • External THC Screen Urine 06/02/2022 Positive (A)  Final   • External Methadone Screen Urine 06/02/2022 Negative   Final   • External Methamphetamine Screen Ur* 06/02/2022 Positive (A)  Final   • External Oxycodone Screen Urine 06/02/2022 Negative   Final   • External Buprenorphine Screen Urine 06/02/2022 Positive (A)  Final   • External MDMA 06/02/2022 Negative   Final   • External Opiates Screen Urine 06/02/2022 Negative   Final   Telemedicine on 05/11/2022   Component Date Value Ref Range Status   • External Amphetamine Screen Urine 05/11/2022 Negative   Final   • External Benzodiazepine Screen Uri* 05/11/2022 Negative   Final   • External Cocaine Screen Urine 05/11/2022 Negative   Final   • External THC Screen Urine 05/11/2022 Positive (A)  Final   • External Methadone Screen Urine 05/11/2022 Negative   Final   • External Methamphetamine Screen Ur* 05/11/2022 Negative   Final   • External Oxycodone Screen Urine 05/11/2022 Negative   Final   • External Buprenorphine Screen Urine 05/11/2022 Positive (A)  Final   • External MDMA 05/11/2022 Negative   Final   • External Opiates Screen Urine 05/11/2022 Negative   Final   Telemedicine on 04/27/2022   Component Date Value Ref Range Status   • External Amphetamine Screen Urine 04/27/2022 Negative   Final   • External Benzodiazepine Screen Uri* 04/27/2022 Negative   Final   • External Cocaine Screen Urine 04/27/2022 Negative   Final   • External THC Screen Urine 04/27/2022 Positive (A)  Final   • External Methadone Screen Urine 04/27/2022 Negative   Final   • External Methamphetamine Screen Ur* 04/27/2022 Negative   Final   • External Oxycodone Screen Urine 04/27/2022 Negative   Final   • External Buprenorphine Screen Urine 04/27/2022 Positive (A)   Final   • External MDMA 04/27/2022 Negative   Final   • External Opiates Screen Urine 04/27/2022 Negative   Final   Telemedicine on 04/20/2022   Component Date Value Ref Range Status   • External Amphetamine Screen Urine 04/20/2022 Negative   Final   • External Benzodiazepine Screen Uri* 04/20/2022 Negative   Final   • External Cocaine Screen Urine 04/20/2022 Negative   Final   • External THC Screen Urine 04/20/2022 Negative   Final   • External Methadone Screen Urine 04/20/2022 Negative   Final   • External Methamphetamine Screen Ur* 04/20/2022 Negative   Final   • External Oxycodone Screen Urine 04/20/2022 Negative   Final   • External Buprenorphine Screen Urine 04/20/2022 Positive   Final   • External MDMA 04/20/2022 Negative   Final   • External Opiates Screen Urine 04/20/2022 Negative   Final   Telemedicine on 03/24/2022   Component Date Value Ref Range Status   • External Amphetamine Screen Urine 03/24/2022 Negative   Final   • External Benzodiazepine Screen Uri* 03/24/2022 Negative   Final   • External Cocaine Screen Urine 03/24/2022 Negative   Final   • External THC Screen Urine 03/24/2022 Negative   Final   • External Methadone Screen Urine 03/24/2022 Negative   Final   • External Methamphetamine Screen Ur* 03/24/2022 Negative   Final   • External Oxycodone Screen Urine 03/24/2022 Negative   Final   • External Buprenorphine Screen Urine 03/24/2022 Positive   Final   • External MDMA 03/24/2022 Negative   Final   • External Opiates Screen Urine 03/24/2022 Negative   Final   Lab on 03/16/2022   Component Date Value Ref Range Status   • Glucose 03/16/2022 124 (A) 65 - 99 mg/dL Final   • BUN 03/16/2022 13  6 - 20 mg/dL Final   • Creatinine 03/16/2022 0.78  0.76 - 1.27 mg/dL Final   • Sodium 03/16/2022 136  136 - 145 mmol/L Final   • Potassium 03/16/2022 4.0  3.5 - 5.2 mmol/L Final   • Chloride 03/16/2022 102  98 - 107 mmol/L Final   • CO2 03/16/2022 23.1  22.0 - 29.0 mmol/L Final   • Calcium 03/16/2022 9.7  8.6  - 10.5 mg/dL Final   • Total Protein 03/16/2022 8.1  6.0 - 8.5 g/dL Final   • Albumin 03/16/2022 4.20  3.50 - 5.20 g/dL Final   • ALT (SGPT) 03/16/2022 22  1 - 41 U/L Final   • AST (SGOT) 03/16/2022 33  1 - 40 U/L Final   • Alkaline Phosphatase 03/16/2022 111  39 - 117 U/L Final   • Total Bilirubin 03/16/2022 0.4  0.0 - 1.2 mg/dL Final   • Globulin 03/16/2022 3.9  gm/dL Final   • A/G Ratio 03/16/2022 1.1  g/dL Final   • BUN/Creatinine Ratio 03/16/2022 16.7  7.0 - 25.0 Final   • Anion Gap 03/16/2022 10.9  5.0 - 15.0 mmol/L Final   • eGFR 03/16/2022 122.3  >60.0 mL/min/1.73 Final    National Kidney Foundation and American Society of Nephrology (ASN) Task Force recommended calculation based on the Chronic Kidney Disease Epidemiology Collaboration (CKD-EPI) equation refit without adjustment for race.   • Hepatitis C Quantitation 03/16/2022 HCV Not Detected  IU/mL Final   • HCV log10 03/16/2022    Final    Unable to calculate result since non-numeric result obtained for  component test.   • HCV Test Information 03/16/2022 Comment   Final    The quantitative range of this assay is 15 IU/mL to 100 million IU/mL.   • HCV Genotype 03/16/2022    Final    Not indicated   • WBC 03/16/2022 7.58  3.40 - 10.80 10*3/mm3 Final   • RBC 03/16/2022 4.56  4.14 - 5.80 10*6/mm3 Final   • Hemoglobin 03/16/2022 13.1  13.0 - 17.7 g/dL Final   • Hematocrit 03/16/2022 39.1  37.5 - 51.0 % Final   • MCV 03/16/2022 85.7  79.0 - 97.0 fL Final   • MCH 03/16/2022 28.7  26.6 - 33.0 pg Final   • MCHC 03/16/2022 33.5  31.5 - 35.7 g/dL Final   • RDW 03/16/2022 11.8 (A) 12.3 - 15.4 % Final   • RDW-SD 03/16/2022 36.5 (A) 37.0 - 54.0 fl Final   • MPV 03/16/2022 12.6 (A) 6.0 - 12.0 fL Final   • Platelets 03/16/2022 205  140 - 450 10*3/mm3 Final   • Neutrophil % 03/16/2022 53.6  42.7 - 76.0 % Final   • Lymphocyte % 03/16/2022 32.7  19.6 - 45.3 % Final   • Monocyte % 03/16/2022 8.2  5.0 - 12.0 % Final   • Eosinophil % 03/16/2022 4.6  0.3 - 6.2 % Final   •  Basophil % 03/16/2022 0.4  0.0 - 1.5 % Final   • Immature Grans % 03/16/2022 0.5  0.0 - 0.5 % Final   • Neutrophils, Absolute 03/16/2022 4.06  1.70 - 7.00 10*3/mm3 Final   • Lymphocytes, Absolute 03/16/2022 2.48  0.70 - 3.10 10*3/mm3 Final   • Monocytes, Absolute 03/16/2022 0.62  0.10 - 0.90 10*3/mm3 Final   • Eosinophils, Absolute 03/16/2022 0.35  0.00 - 0.40 10*3/mm3 Final   • Basophils, Absolute 03/16/2022 0.03  0.00 - 0.20 10*3/mm3 Final   • Immature Grans, Absolute 03/16/2022 0.04  0.00 - 0.05 10*3/mm3 Final   • nRBC 03/16/2022 0.0  0.0 - 0.2 /100 WBC Final   Office Visit on 02/24/2022   Component Date Value Ref Range Status   • External Amphetamine Screen Urine 02/24/2022 Negative   Final   • Amphetamine Cut-Off 02/24/2022 1000ng/ml   Final   • External Benzodiazepine Screen Uri* 02/24/2022 Negative   Final   • Benzodiazipine Cut-Off 02/24/2022 300ng/ml   Final   • External Cocaine Screen Urine 02/24/2022 Negative   Final   • Cocaine Cut-Off 02/24/2022 300ng/ml   Final   • External THC Screen Urine 02/24/2022 Negative   Final   • THC Cut-Off 02/24/2022 50ng/ml   Final   • External Methadone Screen Urine 02/24/2022 Negative   Final   • Methadone Cut-Off 02/24/2022 300ng/ml   Final   • External Methamphetamine Screen Ur* 02/24/2022 Negative   Final   • Methamphetamine Cut-Off 02/24/2022 1000ng/ml   Final   • External Oxycodone Screen Urine 02/24/2022 Negative   Final   • Oxycodone Cut-Off 02/24/2022 100ng/ml   Final   • External Buprenorphine Screen Urine 02/24/2022 Positive   Final   • Buprenorphine Cut-Off 02/24/2022 10ng/ml   Final   • External MDMA 02/24/2022 Negative   Final   • MDMA Cut-Off 02/24/2022 500ng/ml   Final   • External Opiates Screen Urine 02/24/2022 Negative   Final   • Opiates Cut-Off 02/24/2022 300ng/ml   Final   Office Visit on 01/27/2022   Component Date Value Ref Range Status   • External Amphetamine Screen Urine 01/27/2022 Negative   Final   • Amphetamine Cut-Off 01/27/2022 1000ng/ml    Final   • External Benzodiazepine Screen Uri* 01/27/2022 Negative   Final   • Benzodiazipine Cut-Off 01/27/2022 300ng/ml   Final   • External Cocaine Screen Urine 01/27/2022 Negative   Final   • Cocaine Cut-Off 01/27/2022 300ng/ml   Final   • External THC Screen Urine 01/27/2022 Negative   Final   • THC Cut-Off 01/27/2022 50ng/ml   Final   • External Methadone Screen Urine 01/27/2022 Negative   Final   • Methadone Cut-Off 01/27/2022 300ng/ml   Final   • External Methamphetamine Screen Ur* 01/27/2022 Negative   Final   • Methamphetamine Cut-Off 01/27/2022 1000ng/ml   Final   • External Oxycodone Screen Urine 01/27/2022 Negative   Final   • Oxycodone Cut-Off 01/27/2022 100ng/ml   Final   • External Buprenorphine Screen Urine 01/27/2022 Positive   Final   • Buprenorphine Cut-Off 01/27/2022 10ng/ml   Final   • External MDMA 01/27/2022 Negative   Final   • MDMA Cut-Off 01/27/2022 500ng/ml   Final   • External Opiates Screen Urine 01/27/2022 Negative   Final   • Opiates Cut-Off 01/27/2022 300ng/ml   Final   Office Visit on 01/13/2022   Component Date Value Ref Range Status   • External Amphetamine Screen Urine 01/13/2022 Negative   Final   • Amphetamine Cut-Off 01/13/2022 1000ng/ml   Final   • External Benzodiazepine Screen Uri* 01/13/2022 Negative   Final   • Benzodiazipine Cut-Off 01/13/2022 300ng/ml   Final   • External Cocaine Screen Urine 01/13/2022 Negative   Final   • Cocaine Cut-Off 01/13/2022 300ng/ml   Final   • External THC Screen Urine 01/13/2022 Negative   Final   • THC Cut-Off 01/13/2022 50ng/ml   Final   • External Methadone Screen Urine 01/13/2022 Negative   Final   • Methadone Cut-Off 01/13/2022 300ng/ml   Final   • External Methamphetamine Screen Ur* 01/13/2022 Negative   Final   • Methamphetamine Cut-Off 01/13/2022 1000ng/ml   Final   • External Oxycodone Screen Urine 01/13/2022 Negative   Final   • Oxycodone Cut-Off 01/13/2022 100ng/ml   Final   • External Buprenorphine Screen Urine 01/13/2022 Positive    Final   • Buprenorphine Cut-Off 01/13/2022 10ng/ml   Final   • External MDMA 01/13/2022 Negative   Final   • MDMA Cut-Off 01/13/2022 500ng/ml   Final   • External Opiates Screen Urine 01/13/2022 Negative   Final   • Opiates Cut-Off 01/13/2022 300ng/ml   Final   There may be more visits with results that are not included.         Assessment & Plan   Diagnoses and all orders for this visit:    1. Opioid type dependence, continuous (HCC) (Primary)  -     buprenorphine-naloxone (SUBOXONE) 8-2 MG per SL tablet; Place 2 tablets under the tongue Daily.  Dispense: 28 tablet; Refill: 0    2. Medication management  -     [facility] Screen    3. Generalized anxiety disorder  -     QUEtiapine (SEROquel) 25 MG tablet; Take 1 tablet by mouth Every Night.  Dispense: 30 tablet; Refill: 2  -     sertraline (Zoloft) 50 MG tablet; Take 1 tablet by mouth Daily for 30 days.  Dispense: 30 tablet; Refill: 3  -     hydrOXYzine pamoate (Vistaril) 50 MG capsule; Take 1 capsule by mouth 4 (Four) Times a Day As Needed for Anxiety (and/or insomia). Take 1 to 2 tablets as needed for anxiety every 6 hours  Dispense: 90 capsule; Refill: 2  -     cloNIDine (Catapres) 0.1 MG tablet; Take 1 tablet by mouth 2 (Two) Times a Day. Take as needed for anxiety.  Insomnia.  Chills or sweats  Dispense: 60 tablet; Refill: 2    4. Depression, unspecified depression type  -     sertraline (Zoloft) 50 MG tablet; Take 1 tablet by mouth Daily for 30 days.  Dispense: 30 tablet; Refill: 3    5. Insomnia, unspecified type  -     hydrOXYzine pamoate (Vistaril) 50 MG capsule; Take 1 capsule by mouth 4 (Four) Times a Day As Needed for Anxiety (and/or insomia). Take 1 to 2 tablets as needed for anxiety every 6 hours  Dispense: 90 capsule; Refill: 2  -     cloNIDine (Catapres) 0.1 MG tablet; Take 1 tablet by mouth 2 (Two) Times a Day. Take as needed for anxiety.  Insomnia.  Chills or sweats  Dispense: 60 tablet; Refill: 2    Other orders  -     SCANNED -  LABS        Visit Diagnoses:    ICD-10-CM ICD-9-CM   1. Opioid type dependence, continuous (HCC)  F11.20 304.01   2. Medication management  Z79.899 V58.69   3. Generalized anxiety disorder  F41.1 300.02   4. Depression, unspecified depression type  F32.A 311   5. Insomnia, unspecified type  G47.00 780.52       PLAN:  1. Safety: No acute safety concerns  2. Risk Assessment: Risk of self-harm acutely is low. Risk of self-harm chronically is also low, but could be further elevated in the event of treatment noncompliance and/or AODA.    TREATMENT PLAN/GOALS: Continue supportive psychotherapy efforts and medications as indicated. Treatment and medication options discussed during today's visit. Patient acknowledged and verbally consented to continue with current treatment plan and was educated on the importance of compliance with treatment and follow-up appointments.    MEDICATION ISSUES:  ROMAN reviewed as expected.  Discussed medication options and treatment plan of prescribed medication as well as the risks, benefits, and side effects including potential falls, possible impaired driving and metabolic adversities among others. Patient is agreeable to call the office with any worsening of symptoms or onset of side effects. Patient is agreeable to call 911 or go to the nearest ER should he/she begin having SI/HI. No medication side effects or related complaints today.     MEDS ORDERED DURING VISIT:  New Medications Ordered This Visit   Medications   • QUEtiapine (SEROquel) 25 MG tablet     Sig: Take 1 tablet by mouth Every Night.     Dispense:  30 tablet     Refill:  2   • sertraline (Zoloft) 50 MG tablet     Sig: Take 1 tablet by mouth Daily for 30 days.     Dispense:  30 tablet     Refill:  3   • hydrOXYzine pamoate (Vistaril) 50 MG capsule     Sig: Take 1 capsule by mouth 4 (Four) Times a Day As Needed for Anxiety (and/or insomia). Take 1 to 2 tablets as needed for anxiety every 6 hours     Dispense:  90 capsule      Refill:  2   • cloNIDine (Catapres) 0.1 MG tablet     Sig: Take 1 tablet by mouth 2 (Two) Times a Day. Take as needed for anxiety.  Insomnia.  Chills or sweats     Dispense:  60 tablet     Refill:  2   • buprenorphine-naloxone (SUBOXONE) 8-2 MG per SL tablet     Sig: Place 2 tablets under the tongue Daily.     Dispense:  28 tablet     Refill:  0       No follow-ups on file.           This document has been electronically signed by RENAN De Dios  June 9, 2022 12:03 EDT      Part of this note may be an electronic transcription/translation of spoken language to printed text using the Dragon Dictation System.

## 2022-06-23 ENCOUNTER — TELEMEDICINE (OUTPATIENT)
Dept: PSYCHIATRY | Facility: CLINIC | Age: 32
End: 2022-06-23

## 2022-06-23 VITALS
SYSTOLIC BLOOD PRESSURE: 134 MMHG | BODY MASS INDEX: 27.56 KG/M2 | DIASTOLIC BLOOD PRESSURE: 86 MMHG | HEIGHT: 78 IN | HEART RATE: 76 BPM

## 2022-06-23 DIAGNOSIS — F11.20 OPIOID TYPE DEPENDENCE, CONTINUOUS: Primary | ICD-10-CM

## 2022-06-23 DIAGNOSIS — Z79.899 MEDICATION MANAGEMENT: ICD-10-CM

## 2022-06-23 DIAGNOSIS — M25.59 PAIN IN OTHER JOINT: ICD-10-CM

## 2022-06-23 PROCEDURE — 99213 OFFICE O/P EST LOW 20 MIN: CPT | Performed by: NURSE PRACTITIONER

## 2022-06-23 RX ORDER — IBUPROFEN 600 MG/1
600 TABLET ORAL EVERY 6 HOURS PRN
Qty: 30 TABLET | Refills: 2 | Status: SHIPPED | OUTPATIENT
Start: 2022-06-23 | End: 2022-08-01

## 2022-06-23 RX ORDER — BUPRENORPHINE HYDROCHLORIDE AND NALOXONE HYDROCHLORIDE DIHYDRATE 8; 2 MG/1; MG/1
2 TABLET SUBLINGUAL DAILY
Qty: 28 TABLET | Refills: 0 | Status: SHIPPED | OUTPATIENT
Start: 2022-06-23 | End: 2022-07-18 | Stop reason: SDUPTHER

## 2022-06-23 NOTE — PROGRESS NOTES
This provider is located at [facility]. The Patient is seen remotely located at the [facility] ([facility]) using Video. Patient is being seen via telehealth and confirm that they are in a secure environment for this session. The patient's condition being diagnosed/treated is appropriate for telemedicine. Provider identified as Daniel Olivas as well as credentials APRN MSN FNP-C ANTONIO-AP.   The client/patient gave consent to be seen remotely, and when consent is given they understand that the consent allows for patient identifiable information to be sent to a third party as needed.   They may refuse to be seen remotely at any time. The electronic data is encrypted and password protected, and the patient has been advised of the potential risks to privacy not withstanding such measures.    Chief Complaint/History of Present Illness: Follow Up buprenorphine/naloxone Medicated Assisted Treatment for Opiate Use Disorder     Patient/Client Concerns/Updates: Continuing Zoloft Vistaril, seroquel and clonidine for anxiety patient doing well and mood is overall stable no concern for depression or SI, patient denies further phentermine or stimulant use, advised patient to follow-up with an orthopedist for his chronic joint pain    Triggers (Persons/Places/Things/Events/Thought/Emotions): Chronic stable anxiety and chronic joint pain    Cravings: Denies cravings    Relapse Prevention: Counseling    Urine Drug Screen (today's visit) discussed: Positive buprenorphine, otherwise negative for substances tested    UDS Confirmation (Most recent/Resulted): Positive buprenorphine/nor-buprenorphine, positive THC phentermine and nonprescribed gabapentin    Most recent pertinent laboratory studies reviewed: 3/16/2022-LFTs within normal limits; hepatitis C virus not detected      ROMAN (PDMP) Reviewed for Current/Active Medications: buprenorphine/naloxone as reviewed today    Past Surgical History:  Past Surgical  History:   Procedure Laterality Date   • NO PAST SURGERIES         Problem List:  Patient Active Problem List   Diagnosis   • Alcohol-induced acute pancreatitis   • Pancreatitis   • Chronic pain of right knee       Allergy:   No Known Allergies     Current Medications:   Current Outpatient Medications   Medication Sig Dispense Refill   • buprenorphine-naloxone (SUBOXONE) 8-2 MG per SL tablet Place 2 tablets under the tongue Daily. 28 tablet 0   • cephalexin (KEFLEX) 500 MG capsule Take 1 capsule by mouth 3 (Three) Times a Day. 30 capsule 0   • cloNIDine (Catapres) 0.1 MG tablet Take 1 tablet by mouth 2 (Two) Times a Day. Take as needed for anxiety.  Insomnia.  Chills or sweats 60 tablet 2   • hydrOXYzine pamoate (Vistaril) 50 MG capsule Take 1 capsule by mouth 4 (Four) Times a Day As Needed for Anxiety (and/or insomia). Take 1 to 2 tablets as needed for anxiety every 6 hours 90 capsule 2   • ibuprofen (ADVIL,MOTRIN) 600 MG tablet TAKE ONE TABLET BY MOUTH EVERY 6 HOURS AS NEEDED FOR PAIN OR INFLAMMATION     • ibuprofen (ADVIL,MOTRIN) 800 MG tablet Take 1 tablet by mouth Every 6 (Six) Hours As Needed for Mild Pain . 120 tablet 0   • mupirocin (BACTROBAN) 2 % ointment Apply  topically to the appropriate area as directed 3 (Three) Times a Day. 15 g 0   • naloxone (NARCAN) 4 MG/0.1ML nasal spray 1 spray into the nostril(s) as directed by provider As Needed (Opiate oversedation). Spray in 1 nostril every 2 to 3 minutes call 911 2 each 2   • orphenadrine (NORFLEX) 100 MG 12 hr tablet Take 1 tablet by mouth 2 (Two) Times a Day As Needed for Muscle Spasms or Mild Pain . 14 tablet 0   • QUEtiapine (SEROquel) 25 MG tablet Take 1 tablet by mouth Every Night. 30 tablet 2   • sertraline (Zoloft) 50 MG tablet Take 1 tablet by mouth Daily for 30 days. 30 tablet 3     No current facility-administered medications for this visit.       Past Medical History:  Past Medical History:   Diagnosis Date   • Alcohol-induced acute pancreatitis  6/27/2019   • Alcoholism (HCC)    • Drug use     Amphetamines and Suboxone   • Fatty liver    • Hepatitis C antibody test positive 2019   • Medical non-compliance    • Pancreatitis    • Smoker          Social History     Socioeconomic History   • Marital status:    Tobacco Use   • Smoking status: Current Every Day Smoker     Packs/day: 0.50     Types: Cigarettes   • Smokeless tobacco: Never Used   Vaping Use   • Vaping Use: Never used   Substance and Sexual Activity   • Alcohol use: Yes     Alcohol/week: 8.0 standard drinks     Types: 8 Shots of liquor per week     Comment: 5-6 double shots   • Drug use: Yes     Comment: suboxone as prescribed   • Sexual activity: Defer         Family History   Problem Relation Age of Onset   • Cancer Maternal Grandmother    • No Known Problems Mother    • No Known Problems Father          Mental Status Exam:   Hygiene:   good  Cooperation:  Cooperative  Eye Contact:  Good  Psychomotor Behavior:  Appropriate  Affect:  Appropriate  Mood: anxious  Speech:  Normal  Thought Process:  Goal directed  Thought Content:  Normal  Suicidal:  None  Homicidal:  None  Hallucinations:  None  Delusion:  None  Memory:  Intact  Orientation:  Grossly intact  Reliability:  good  Insight:  Good  Judgement:  Good  Impulse Control:  Good         Review of Systems:  Review of Systems   Constitutional: Negative for activity change, chills, diaphoresis and fatigue.   Respiratory: Negative for apnea, cough and shortness of breath.    Cardiovascular: Negative for chest pain, palpitations and leg swelling.   Gastrointestinal: Negative for abdominal pain, constipation, diarrhea, nausea and vomiting.   Genitourinary: Negative for difficulty urinating.   Musculoskeletal: Positive for arthralgias.   Skin: Negative for rash.   Neurological: Negative for dizziness, weakness and headaches.   Psychiatric/Behavioral: Negative for agitation, self-injury, sleep disturbance and suicidal ideas. The patient is not  "nervous/anxious.          Physical Exam:  Physical Exam  Vitals reviewed.   Constitutional:       General: He is not in acute distress.     Appearance: Normal appearance. He is not ill-appearing or toxic-appearing.   Pulmonary:      Effort: Pulmonary effort is normal.   Musculoskeletal:         General: Normal range of motion.   Neurological:      General: No focal deficit present.      Mental Status: He is alert and oriented to person, place, and time.   Psychiatric:         Attention and Perception: Attention and perception normal.         Mood and Affect: Mood normal. Mood is not anxious or depressed.         Speech: Speech normal.         Behavior: Behavior normal. Behavior is cooperative.         Thought Content: Thought content normal.         Cognition and Memory: Cognition and memory normal.         Judgment: Judgment normal.         Vital Signs:   /86   Pulse 76   Ht 198.1 cm (77.99\")   BMI 27.56 kg/m²      Lab Results:   Telemedicine on 06/23/2022   Component Date Value Ref Range Status   • External Amphetamine Screen Urine 06/23/2022 Negative   Final   • External Benzodiazepine Screen Uri* 06/23/2022 Negative   Final   • External Cocaine Screen Urine 06/23/2022 Negative   Final   • External THC Screen Urine 06/23/2022 Negative   Final   • External Methadone Screen Urine 06/23/2022 Negative   Final   • External Methamphetamine Screen Ur* 06/23/2022 Negative   Final   • External Oxycodone Screen Urine 06/23/2022 Negative   Final   • External Buprenorphine Screen Urine 06/23/2022 Positive (A)  Final   • External MDMA 06/23/2022 Negative   Final   • External Opiates Screen Urine 06/23/2022 Negative   Final   Telemedicine on 06/09/2022   Component Date Value Ref Range Status   • External Amphetamine Screen Urine 06/09/2022 Positive (A)  Final   • External Benzodiazepine Screen Uri* 06/09/2022 Negative   Final   • External Cocaine Screen Urine 06/09/2022 Negative   Final   • External THC Screen Urine " 06/09/2022 Positive (A)  Final   • External Methadone Screen Urine 06/09/2022 Negative   Final   • External Methamphetamine Screen Ur* 06/09/2022 Negative   Final   • External Oxycodone Screen Urine 06/09/2022 Negative   Final   • External Buprenorphine Screen Urine 06/09/2022 Positive (A)  Final   • External MDMA 06/09/2022 Negative   Final   • External Opiates Screen Urine 06/09/2022 Negative   Final   Office Visit on 06/02/2022   Component Date Value Ref Range Status   • External Amphetamine Screen Urine 06/02/2022 Positive (A)  Final   • External Benzodiazepine Screen Uri* 06/02/2022 Negative   Final   • External Cocaine Screen Urine 06/02/2022 Negative   Final   • External THC Screen Urine 06/02/2022 Positive (A)  Final   • External Methadone Screen Urine 06/02/2022 Negative   Final   • External Methamphetamine Screen Ur* 06/02/2022 Positive (A)  Final   • External Oxycodone Screen Urine 06/02/2022 Negative   Final   • External Buprenorphine Screen Urine 06/02/2022 Positive (A)  Final   • External MDMA 06/02/2022 Negative   Final   • External Opiates Screen Urine 06/02/2022 Negative   Final   Telemedicine on 05/11/2022   Component Date Value Ref Range Status   • External Amphetamine Screen Urine 05/11/2022 Negative   Final   • External Benzodiazepine Screen Uri* 05/11/2022 Negative   Final   • External Cocaine Screen Urine 05/11/2022 Negative   Final   • External THC Screen Urine 05/11/2022 Positive (A)  Final   • External Methadone Screen Urine 05/11/2022 Negative   Final   • External Methamphetamine Screen Ur* 05/11/2022 Negative   Final   • External Oxycodone Screen Urine 05/11/2022 Negative   Final   • External Buprenorphine Screen Urine 05/11/2022 Positive (A)  Final   • External MDMA 05/11/2022 Negative   Final   • External Opiates Screen Urine 05/11/2022 Negative   Final   Telemedicine on 04/27/2022   Component Date Value Ref Range Status   • External Amphetamine Screen Urine 04/27/2022 Negative   Final    • External Benzodiazepine Screen Uri* 04/27/2022 Negative   Final   • External Cocaine Screen Urine 04/27/2022 Negative   Final   • External THC Screen Urine 04/27/2022 Positive (A)  Final   • External Methadone Screen Urine 04/27/2022 Negative   Final   • External Methamphetamine Screen Ur* 04/27/2022 Negative   Final   • External Oxycodone Screen Urine 04/27/2022 Negative   Final   • External Buprenorphine Screen Urine 04/27/2022 Positive (A)  Final   • External MDMA 04/27/2022 Negative   Final   • External Opiates Screen Urine 04/27/2022 Negative   Final   Telemedicine on 04/20/2022   Component Date Value Ref Range Status   • External Amphetamine Screen Urine 04/20/2022 Negative   Final   • External Benzodiazepine Screen Uri* 04/20/2022 Negative   Final   • External Cocaine Screen Urine 04/20/2022 Negative   Final   • External THC Screen Urine 04/20/2022 Negative   Final   • External Methadone Screen Urine 04/20/2022 Negative   Final   • External Methamphetamine Screen Ur* 04/20/2022 Negative   Final   • External Oxycodone Screen Urine 04/20/2022 Negative   Final   • External Buprenorphine Screen Urine 04/20/2022 Positive   Final   • External MDMA 04/20/2022 Negative   Final   • External Opiates Screen Urine 04/20/2022 Negative   Final   Telemedicine on 03/24/2022   Component Date Value Ref Range Status   • External Amphetamine Screen Urine 03/24/2022 Negative   Final   • External Benzodiazepine Screen Uri* 03/24/2022 Negative   Final   • External Cocaine Screen Urine 03/24/2022 Negative   Final   • External THC Screen Urine 03/24/2022 Negative   Final   • External Methadone Screen Urine 03/24/2022 Negative   Final   • External Methamphetamine Screen Ur* 03/24/2022 Negative   Final   • External Oxycodone Screen Urine 03/24/2022 Negative   Final   • External Buprenorphine Screen Urine 03/24/2022 Positive   Final   • External MDMA 03/24/2022 Negative   Final   • External Opiates Screen Urine 03/24/2022 Negative    Final   Lab on 03/16/2022   Component Date Value Ref Range Status   • Glucose 03/16/2022 124 (A) 65 - 99 mg/dL Final   • BUN 03/16/2022 13  6 - 20 mg/dL Final   • Creatinine 03/16/2022 0.78  0.76 - 1.27 mg/dL Final   • Sodium 03/16/2022 136  136 - 145 mmol/L Final   • Potassium 03/16/2022 4.0  3.5 - 5.2 mmol/L Final   • Chloride 03/16/2022 102  98 - 107 mmol/L Final   • CO2 03/16/2022 23.1  22.0 - 29.0 mmol/L Final   • Calcium 03/16/2022 9.7  8.6 - 10.5 mg/dL Final   • Total Protein 03/16/2022 8.1  6.0 - 8.5 g/dL Final   • Albumin 03/16/2022 4.20  3.50 - 5.20 g/dL Final   • ALT (SGPT) 03/16/2022 22  1 - 41 U/L Final   • AST (SGOT) 03/16/2022 33  1 - 40 U/L Final   • Alkaline Phosphatase 03/16/2022 111  39 - 117 U/L Final   • Total Bilirubin 03/16/2022 0.4  0.0 - 1.2 mg/dL Final   • Globulin 03/16/2022 3.9  gm/dL Final   • A/G Ratio 03/16/2022 1.1  g/dL Final   • BUN/Creatinine Ratio 03/16/2022 16.7  7.0 - 25.0 Final   • Anion Gap 03/16/2022 10.9  5.0 - 15.0 mmol/L Final   • eGFR 03/16/2022 122.3  >60.0 mL/min/1.73 Final    National Kidney Foundation and American Society of Nephrology (ASN) Task Force recommended calculation based on the Chronic Kidney Disease Epidemiology Collaboration (CKD-EPI) equation refit without adjustment for race.   • Hepatitis C Quantitation 03/16/2022 HCV Not Detected  IU/mL Final   • HCV log10 03/16/2022    Final    Unable to calculate result since non-numeric result obtained for  component test.   • HCV Test Information 03/16/2022 Comment   Final    The quantitative range of this assay is 15 IU/mL to 100 million IU/mL.   • HCV Genotype 03/16/2022    Final    Not indicated   • WBC 03/16/2022 7.58  3.40 - 10.80 10*3/mm3 Final   • RBC 03/16/2022 4.56  4.14 - 5.80 10*6/mm3 Final   • Hemoglobin 03/16/2022 13.1  13.0 - 17.7 g/dL Final   • Hematocrit 03/16/2022 39.1  37.5 - 51.0 % Final   • MCV 03/16/2022 85.7  79.0 - 97.0 fL Final   • MCH 03/16/2022 28.7  26.6 - 33.0 pg Final   • MCHC  03/16/2022 33.5  31.5 - 35.7 g/dL Final   • RDW 03/16/2022 11.8 (A) 12.3 - 15.4 % Final   • RDW-SD 03/16/2022 36.5 (A) 37.0 - 54.0 fl Final   • MPV 03/16/2022 12.6 (A) 6.0 - 12.0 fL Final   • Platelets 03/16/2022 205  140 - 450 10*3/mm3 Final   • Neutrophil % 03/16/2022 53.6  42.7 - 76.0 % Final   • Lymphocyte % 03/16/2022 32.7  19.6 - 45.3 % Final   • Monocyte % 03/16/2022 8.2  5.0 - 12.0 % Final   • Eosinophil % 03/16/2022 4.6  0.3 - 6.2 % Final   • Basophil % 03/16/2022 0.4  0.0 - 1.5 % Final   • Immature Grans % 03/16/2022 0.5  0.0 - 0.5 % Final   • Neutrophils, Absolute 03/16/2022 4.06  1.70 - 7.00 10*3/mm3 Final   • Lymphocytes, Absolute 03/16/2022 2.48  0.70 - 3.10 10*3/mm3 Final   • Monocytes, Absolute 03/16/2022 0.62  0.10 - 0.90 10*3/mm3 Final   • Eosinophils, Absolute 03/16/2022 0.35  0.00 - 0.40 10*3/mm3 Final   • Basophils, Absolute 03/16/2022 0.03  0.00 - 0.20 10*3/mm3 Final   • Immature Grans, Absolute 03/16/2022 0.04  0.00 - 0.05 10*3/mm3 Final   • nRBC 03/16/2022 0.0  0.0 - 0.2 /100 WBC Final   Office Visit on 02/24/2022   Component Date Value Ref Range Status   • External Amphetamine Screen Urine 02/24/2022 Negative   Final   • Amphetamine Cut-Off 02/24/2022 1000ng/ml   Final   • External Benzodiazepine Screen Uri* 02/24/2022 Negative   Final   • Benzodiazipine Cut-Off 02/24/2022 300ng/ml   Final   • External Cocaine Screen Urine 02/24/2022 Negative   Final   • Cocaine Cut-Off 02/24/2022 300ng/ml   Final   • External THC Screen Urine 02/24/2022 Negative   Final   • THC Cut-Off 02/24/2022 50ng/ml   Final   • External Methadone Screen Urine 02/24/2022 Negative   Final   • Methadone Cut-Off 02/24/2022 300ng/ml   Final   • External Methamphetamine Screen Ur* 02/24/2022 Negative   Final   • Methamphetamine Cut-Off 02/24/2022 1000ng/ml   Final   • External Oxycodone Screen Urine 02/24/2022 Negative   Final   • Oxycodone Cut-Off 02/24/2022 100ng/ml   Final   • External Buprenorphine Screen Urine  02/24/2022 Positive   Final   • Buprenorphine Cut-Off 02/24/2022 10ng/ml   Final   • External MDMA 02/24/2022 Negative   Final   • MDMA Cut-Off 02/24/2022 500ng/ml   Final   • External Opiates Screen Urine 02/24/2022 Negative   Final   • Opiates Cut-Off 02/24/2022 300ng/ml   Final   Office Visit on 01/27/2022   Component Date Value Ref Range Status   • External Amphetamine Screen Urine 01/27/2022 Negative   Final   • Amphetamine Cut-Off 01/27/2022 1000ng/ml   Final   • External Benzodiazepine Screen Uri* 01/27/2022 Negative   Final   • Benzodiazipine Cut-Off 01/27/2022 300ng/ml   Final   • External Cocaine Screen Urine 01/27/2022 Negative   Final   • Cocaine Cut-Off 01/27/2022 300ng/ml   Final   • External THC Screen Urine 01/27/2022 Negative   Final   • THC Cut-Off 01/27/2022 50ng/ml   Final   • External Methadone Screen Urine 01/27/2022 Negative   Final   • Methadone Cut-Off 01/27/2022 300ng/ml   Final   • External Methamphetamine Screen Ur* 01/27/2022 Negative   Final   • Methamphetamine Cut-Off 01/27/2022 1000ng/ml   Final   • External Oxycodone Screen Urine 01/27/2022 Negative   Final   • Oxycodone Cut-Off 01/27/2022 100ng/ml   Final   • External Buprenorphine Screen Urine 01/27/2022 Positive   Final   • Buprenorphine Cut-Off 01/27/2022 10ng/ml   Final   • External MDMA 01/27/2022 Negative   Final   • MDMA Cut-Off 01/27/2022 500ng/ml   Final   • External Opiates Screen Urine 01/27/2022 Negative   Final   • Opiates Cut-Off 01/27/2022 300ng/ml   Final   There may be more visits with results that are not included.         Assessment & Plan   Diagnoses and all orders for this visit:    1. Opioid type dependence, continuous (HCC) (Primary)  -     buprenorphine-naloxone (SUBOXONE) 8-2 MG per SL tablet; Place 2 tablets under the tongue Daily.  Dispense: 28 tablet; Refill: 0    2. Medication management  -     [facility] Drug Screen    3. Pain in other joint  -     ibuprofen (ADVIL,MOTRIN) 600 MG tablet; Take 1 tablet by  mouth Every 6 (Six) Hours As Needed for Mild Pain .  Dispense: 30 tablet; Refill: 2    Other orders  -     SCANNED - LABS        Visit Diagnoses:    ICD-10-CM ICD-9-CM   1. Medication management  Z79.899 V58.69       PLAN:  1. Safety: No acute safety concerns  2. Risk Assessment: Risk of self-harm acutely is low. Risk of self-harm chronically is also low, but could be further elevated in the event of treatment noncompliance and/or AODA.    TREATMENT PLAN/GOALS: Continue supportive psychotherapy efforts and medications as indicated. Treatment and medication options discussed during today's visit. Patient acknowledged and verbally consented to continue with current treatment plan and was educated on the importance of compliance with treatment and follow-up appointments.    MEDICATION ISSUES:  ROMAN reviewed as expected.  Discussed medication options and treatment plan of prescribed medication as well as the risks, benefits, and side effects including potential falls, possible impaired driving and metabolic adversities among others. Patient is agreeable to call the office with any worsening of symptoms or onset of side effects. Patient is agreeable to call 911 or go to the nearest ER should he/she begin having SI/HI. No medication side effects or related complaints today.     MEDS ORDERED DURING VISIT:  No orders of the defined types were placed in this encounter.      No follow-ups on file.           This document has been electronically signed by RENAN De Dios  June 23, 2022 10:59 EDT      Part of this note may be an electronic transcription/translation of spoken language to printed text using the Dragon Dictation System.

## 2022-07-18 ENCOUNTER — TELEMEDICINE (OUTPATIENT)
Dept: PSYCHIATRY | Facility: CLINIC | Age: 32
End: 2022-07-18

## 2022-07-18 VITALS
WEIGHT: 244.4 LBS | HEART RATE: 82 BPM | HEIGHT: 78 IN | DIASTOLIC BLOOD PRESSURE: 104 MMHG | BODY MASS INDEX: 28.28 KG/M2 | SYSTOLIC BLOOD PRESSURE: 155 MMHG

## 2022-07-18 DIAGNOSIS — Z79.899 MEDICATION MANAGEMENT: ICD-10-CM

## 2022-07-18 DIAGNOSIS — F11.20 OPIOID TYPE DEPENDENCE, CONTINUOUS: Primary | ICD-10-CM

## 2022-07-18 PROCEDURE — 99213 OFFICE O/P EST LOW 20 MIN: CPT | Performed by: NURSE PRACTITIONER

## 2022-07-18 RX ORDER — BUPRENORPHINE HYDROCHLORIDE AND NALOXONE HYDROCHLORIDE DIHYDRATE 8; 2 MG/1; MG/1
2 TABLET SUBLINGUAL DAILY
Qty: 28 TABLET | Refills: 0 | Status: SHIPPED | OUTPATIENT
Start: 2022-07-18 | End: 2022-08-01 | Stop reason: SDUPTHER

## 2022-07-18 NOTE — PROGRESS NOTES
This provider is located at [facility]. The Patient is seen remotely located at the [facility] ([facility]) using Video. Patient is being seen via telehealth and confirm that they are in a secure environment for this session. The patient's condition being diagnosed/treated is appropriate for telemedicine. Provider identified as Daniel Olivas as well as credentials APRN MSN FNP-C ANTONIO-REYNOLD.   The client/patient gave consent to be seen remotely, and when consent is given they understand that the consent allows for patient identifiable information to be sent to a third party as needed.   They may refuse to be seen remotely at any time. The electronic data is encrypted and password protected, and the patient has been advised of the potential risks to privacy not withstanding such measures.    Chief Complaint/History of Present Illness: Follow Up buprenorphine/naloxone Medicated Assisted Treatment for Opiate Use Disorder     Patient/Client Concerns/Updates: Continuing Zoloft Vistaril, seroquel and clonidine for anxiety no concerns or side effects; mood overall stable and overall no concerns today    Triggers (Persons/Places/Things/Events/Thought/Emotions): Life stress    Cravings:   Denies cravings    Relapse Prevention: Counseling    Urine Drug Screen (today's visit) discussed: Positive buprenorphine, otherwise negative for substances tested    UDS Confirmation (Most recent/Resulted): Positive buprenorphine/nor-buprenorphine, no concerns for urine tampering otherwise positive for nonprescribed gabapentin    Most recent pertinent laboratory studies reviewed: 3/16/2022-LFTs within normal limits; hepatitis C virus not detected    ROMAN (PDMP) Reviewed for Current/Active Medications: buprenorphine/naloxone as reviewed today    Past Surgical History:  Past Surgical History:   Procedure Laterality Date   • NO PAST SURGERIES         Problem List:  Patient Active Problem List   Diagnosis   • Alcohol-induced  acute pancreatitis   • Pancreatitis   • Chronic pain of right knee       Allergy:   No Known Allergies     Current Medications:   Current Outpatient Medications   Medication Sig Dispense Refill   • buprenorphine-naloxone (SUBOXONE) 8-2 MG per SL tablet Place 2 tablets under the tongue Daily. 28 tablet 0   • cephalexin (KEFLEX) 500 MG capsule Take 1 capsule by mouth 3 (Three) Times a Day. 30 capsule 0   • cloNIDine (Catapres) 0.1 MG tablet Take 1 tablet by mouth 2 (Two) Times a Day. Take as needed for anxiety.  Insomnia.  Chills or sweats 60 tablet 2   • hydrOXYzine pamoate (Vistaril) 50 MG capsule Take 1 capsule by mouth 4 (Four) Times a Day As Needed for Anxiety (and/or insomia). Take 1 to 2 tablets as needed for anxiety every 6 hours 90 capsule 2   • ibuprofen (ADVIL,MOTRIN) 600 MG tablet Take 1 tablet by mouth Every 6 (Six) Hours As Needed for Mild Pain . 30 tablet 2   • mupirocin (BACTROBAN) 2 % ointment Apply  topically to the appropriate area as directed 3 (Three) Times a Day. 15 g 0   • naloxone (NARCAN) 4 MG/0.1ML nasal spray 1 spray into the nostril(s) as directed by provider As Needed (Opiate oversedation). Spray in 1 nostril every 2 to 3 minutes call 911 2 each 2   • orphenadrine (NORFLEX) 100 MG 12 hr tablet Take 1 tablet by mouth 2 (Two) Times a Day As Needed for Muscle Spasms or Mild Pain . 14 tablet 0   • QUEtiapine (SEROquel) 25 MG tablet Take 1 tablet by mouth Every Night. 30 tablet 2   • sertraline (Zoloft) 50 MG tablet Take 1 tablet by mouth Daily for 30 days. 30 tablet 3     No current facility-administered medications for this visit.       Past Medical History:  Past Medical History:   Diagnosis Date   • Alcohol-induced acute pancreatitis 6/27/2019   • Alcoholism (HCC)    • Drug use     Amphetamines and Suboxone   • Fatty liver    • Hepatitis C antibody test positive 2019   • Medical non-compliance    • Pancreatitis    • Smoker          Social History     Socioeconomic History   • Marital  status:    Tobacco Use   • Smoking status: Current Every Day Smoker     Packs/day: 0.50     Types: Cigarettes   • Smokeless tobacco: Never Used   Vaping Use   • Vaping Use: Never used   Substance and Sexual Activity   • Alcohol use: Yes     Alcohol/week: 8.0 standard drinks     Types: 8 Shots of liquor per week     Comment: 5-6 double shots   • Drug use: Yes     Comment: suboxone as prescribed   • Sexual activity: Defer         Family History   Problem Relation Age of Onset   • Cancer Maternal Grandmother    • No Known Problems Mother    • No Known Problems Father          Mental Status Exam:   Hygiene:   good  Cooperation:  Cooperative  Eye Contact:  Good  Psychomotor Behavior:  Appropriate  Affect:  Appropriate  Mood: anxious  Speech:  Normal  Thought Process:  Goal directed  Thought Content:  Normal  Suicidal:  None  Homicidal:  None  Hallucinations:  None  Delusion:  None  Memory:  Intact  Orientation:  Grossly intact  Reliability:  good  Insight:  Good  Judgement:  Good  Impulse Control:  Good         Review of Systems:  Review of Systems   Constitutional: Negative for activity change, chills, diaphoresis and fatigue.   Respiratory: Negative for apnea, cough and shortness of breath.    Cardiovascular: Negative for chest pain, palpitations and leg swelling.   Gastrointestinal: Negative for abdominal pain, constipation, diarrhea, nausea and vomiting.   Genitourinary: Negative for difficulty urinating.   Musculoskeletal: Negative for arthralgias.   Skin: Negative for rash.   Neurological: Negative for dizziness, weakness and headaches.   Psychiatric/Behavioral: Negative for agitation, self-injury, sleep disturbance and suicidal ideas. The patient is nervous/anxious.          Physical Exam:  Physical Exam  Vitals reviewed.   Constitutional:       General: He is not in acute distress.     Appearance: Normal appearance. He is not ill-appearing or toxic-appearing.   Pulmonary:      Effort: Pulmonary effort is  "normal.   Musculoskeletal:         General: Normal range of motion.   Neurological:      General: No focal deficit present.      Mental Status: He is alert and oriented to person, place, and time.   Psychiatric:         Attention and Perception: Attention and perception normal.         Mood and Affect: Mood is anxious. Mood is not depressed.         Speech: Speech normal.         Behavior: Behavior normal. Behavior is cooperative.         Thought Content: Thought content normal.         Cognition and Memory: Cognition and memory normal.         Judgment: Judgment normal.         Vital Signs:   BP (!) 155/104   Pulse 82   Ht 198.1 cm (77.99\")   Wt 111 kg (244 lb 6.4 oz)   BMI 28.25 kg/m²      Lab Results:   Telemedicine on 07/18/2022   Component Date Value Ref Range Status   • External Amphetamine Screen Urine 07/18/2022 Negative   Final   • External Benzodiazepine Screen Uri* 07/18/2022 Negative   Final   • External Cocaine Screen Urine 07/18/2022 Negative   Final   • External THC Screen Urine 07/18/2022 Negative   Final   • External Methadone Screen Urine 07/18/2022 Negative   Final   • External Methamphetamine Screen Ur* 07/18/2022 Negative   Final   • External Oxycodone Screen Urine 07/18/2022 Negative   Final   • External Buprenorphine Screen Urine 07/18/2022 Positive (A)  Final   • External MDMA 07/18/2022 Negative   Final   • External Opiates Screen Urine 07/18/2022 Negative   Final   Telemedicine on 06/23/2022   Component Date Value Ref Range Status   • External Amphetamine Screen Urine 06/23/2022 Negative   Final   • External Benzodiazepine Screen Uri* 06/23/2022 Negative   Final   • External Cocaine Screen Urine 06/23/2022 Negative   Final   • External THC Screen Urine 06/23/2022 Negative   Final   • External Methadone Screen Urine 06/23/2022 Negative   Final   • External Methamphetamine Screen Ur* 06/23/2022 Negative   Final   • External Oxycodone Screen Urine 06/23/2022 Negative   Final   • External " Buprenorphine Screen Urine 06/23/2022 Positive (A)  Final   • External MDMA 06/23/2022 Negative   Final   • External Opiates Screen Urine 06/23/2022 Negative   Final   Telemedicine on 06/09/2022   Component Date Value Ref Range Status   • External Amphetamine Screen Urine 06/09/2022 Positive (A)  Final   • External Benzodiazepine Screen Uri* 06/09/2022 Negative   Final   • External Cocaine Screen Urine 06/09/2022 Negative   Final   • External THC Screen Urine 06/09/2022 Positive (A)  Final   • External Methadone Screen Urine 06/09/2022 Negative   Final   • External Methamphetamine Screen Ur* 06/09/2022 Negative   Final   • External Oxycodone Screen Urine 06/09/2022 Negative   Final   • External Buprenorphine Screen Urine 06/09/2022 Positive (A)  Final   • External MDMA 06/09/2022 Negative   Final   • External Opiates Screen Urine 06/09/2022 Negative   Final   Office Visit on 06/02/2022   Component Date Value Ref Range Status   • External Amphetamine Screen Urine 06/02/2022 Positive (A)  Final   • External Benzodiazepine Screen Uri* 06/02/2022 Negative   Final   • External Cocaine Screen Urine 06/02/2022 Negative   Final   • External THC Screen Urine 06/02/2022 Positive (A)  Final   • External Methadone Screen Urine 06/02/2022 Negative   Final   • External Methamphetamine Screen Ur* 06/02/2022 Positive (A)  Final   • External Oxycodone Screen Urine 06/02/2022 Negative   Final   • External Buprenorphine Screen Urine 06/02/2022 Positive (A)  Final   • External MDMA 06/02/2022 Negative   Final   • External Opiates Screen Urine 06/02/2022 Negative   Final   Telemedicine on 05/11/2022   Component Date Value Ref Range Status   • External Amphetamine Screen Urine 05/11/2022 Negative   Final   • External Benzodiazepine Screen Uri* 05/11/2022 Negative   Final   • External Cocaine Screen Urine 05/11/2022 Negative   Final   • External THC Screen Urine 05/11/2022 Positive (A)  Final   • External Methadone Screen Urine 05/11/2022  Negative   Final   • External Methamphetamine Screen Ur* 05/11/2022 Negative   Final   • External Oxycodone Screen Urine 05/11/2022 Negative   Final   • External Buprenorphine Screen Urine 05/11/2022 Positive (A)  Final   • External MDMA 05/11/2022 Negative   Final   • External Opiates Screen Urine 05/11/2022 Negative   Final   Telemedicine on 04/27/2022   Component Date Value Ref Range Status   • External Amphetamine Screen Urine 04/27/2022 Negative   Final   • External Benzodiazepine Screen Uri* 04/27/2022 Negative   Final   • External Cocaine Screen Urine 04/27/2022 Negative   Final   • External THC Screen Urine 04/27/2022 Positive (A)  Final   • External Methadone Screen Urine 04/27/2022 Negative   Final   • External Methamphetamine Screen Ur* 04/27/2022 Negative   Final   • External Oxycodone Screen Urine 04/27/2022 Negative   Final   • External Buprenorphine Screen Urine 04/27/2022 Positive (A)  Final   • External MDMA 04/27/2022 Negative   Final   • External Opiates Screen Urine 04/27/2022 Negative   Final   Telemedicine on 04/20/2022   Component Date Value Ref Range Status   • External Amphetamine Screen Urine 04/20/2022 Negative   Final   • External Benzodiazepine Screen Uri* 04/20/2022 Negative   Final   • External Cocaine Screen Urine 04/20/2022 Negative   Final   • External THC Screen Urine 04/20/2022 Negative   Final   • External Methadone Screen Urine 04/20/2022 Negative   Final   • External Methamphetamine Screen Ur* 04/20/2022 Negative   Final   • External Oxycodone Screen Urine 04/20/2022 Negative   Final   • External Buprenorphine Screen Urine 04/20/2022 Positive   Final   • External MDMA 04/20/2022 Negative   Final   • External Opiates Screen Urine 04/20/2022 Negative   Final   Telemedicine on 03/24/2022   Component Date Value Ref Range Status   • External Amphetamine Screen Urine 03/24/2022 Negative   Final   • External Benzodiazepine Screen Uri* 03/24/2022 Negative   Final   • External Cocaine  Screen Urine 03/24/2022 Negative   Final   • External THC Screen Urine 03/24/2022 Negative   Final   • External Methadone Screen Urine 03/24/2022 Negative   Final   • External Methamphetamine Screen Ur* 03/24/2022 Negative   Final   • External Oxycodone Screen Urine 03/24/2022 Negative   Final   • External Buprenorphine Screen Urine 03/24/2022 Positive   Final   • External MDMA 03/24/2022 Negative   Final   • External Opiates Screen Urine 03/24/2022 Negative   Final   Lab on 03/16/2022   Component Date Value Ref Range Status   • Glucose 03/16/2022 124 (A) 65 - 99 mg/dL Final   • BUN 03/16/2022 13  6 - 20 mg/dL Final   • Creatinine 03/16/2022 0.78  0.76 - 1.27 mg/dL Final   • Sodium 03/16/2022 136  136 - 145 mmol/L Final   • Potassium 03/16/2022 4.0  3.5 - 5.2 mmol/L Final   • Chloride 03/16/2022 102  98 - 107 mmol/L Final   • CO2 03/16/2022 23.1  22.0 - 29.0 mmol/L Final   • Calcium 03/16/2022 9.7  8.6 - 10.5 mg/dL Final   • Total Protein 03/16/2022 8.1  6.0 - 8.5 g/dL Final   • Albumin 03/16/2022 4.20  3.50 - 5.20 g/dL Final   • ALT (SGPT) 03/16/2022 22  1 - 41 U/L Final   • AST (SGOT) 03/16/2022 33  1 - 40 U/L Final   • Alkaline Phosphatase 03/16/2022 111  39 - 117 U/L Final   • Total Bilirubin 03/16/2022 0.4  0.0 - 1.2 mg/dL Final   • Globulin 03/16/2022 3.9  gm/dL Final   • A/G Ratio 03/16/2022 1.1  g/dL Final   • BUN/Creatinine Ratio 03/16/2022 16.7  7.0 - 25.0 Final   • Anion Gap 03/16/2022 10.9  5.0 - 15.0 mmol/L Final   • eGFR 03/16/2022 122.3  >60.0 mL/min/1.73 Final    National Kidney Foundation and American Society of Nephrology (ASN) Task Force recommended calculation based on the Chronic Kidney Disease Epidemiology Collaboration (CKD-EPI) equation refit without adjustment for race.   • Hepatitis C Quantitation 03/16/2022 HCV Not Detected  IU/mL Final   • HCV log10 03/16/2022    Final    Unable to calculate result since non-numeric result obtained for  component test.   • HCV Test Information 03/16/2022  Comment   Final    The quantitative range of this assay is 15 IU/mL to 100 million IU/mL.   • HCV Genotype 03/16/2022    Final    Not indicated   • WBC 03/16/2022 7.58  3.40 - 10.80 10*3/mm3 Final   • RBC 03/16/2022 4.56  4.14 - 5.80 10*6/mm3 Final   • Hemoglobin 03/16/2022 13.1  13.0 - 17.7 g/dL Final   • Hematocrit 03/16/2022 39.1  37.5 - 51.0 % Final   • MCV 03/16/2022 85.7  79.0 - 97.0 fL Final   • MCH 03/16/2022 28.7  26.6 - 33.0 pg Final   • MCHC 03/16/2022 33.5  31.5 - 35.7 g/dL Final   • RDW 03/16/2022 11.8 (A) 12.3 - 15.4 % Final   • RDW-SD 03/16/2022 36.5 (A) 37.0 - 54.0 fl Final   • MPV 03/16/2022 12.6 (A) 6.0 - 12.0 fL Final   • Platelets 03/16/2022 205  140 - 450 10*3/mm3 Final   • Neutrophil % 03/16/2022 53.6  42.7 - 76.0 % Final   • Lymphocyte % 03/16/2022 32.7  19.6 - 45.3 % Final   • Monocyte % 03/16/2022 8.2  5.0 - 12.0 % Final   • Eosinophil % 03/16/2022 4.6  0.3 - 6.2 % Final   • Basophil % 03/16/2022 0.4  0.0 - 1.5 % Final   • Immature Grans % 03/16/2022 0.5  0.0 - 0.5 % Final   • Neutrophils, Absolute 03/16/2022 4.06  1.70 - 7.00 10*3/mm3 Final   • Lymphocytes, Absolute 03/16/2022 2.48  0.70 - 3.10 10*3/mm3 Final   • Monocytes, Absolute 03/16/2022 0.62  0.10 - 0.90 10*3/mm3 Final   • Eosinophils, Absolute 03/16/2022 0.35  0.00 - 0.40 10*3/mm3 Final   • Basophils, Absolute 03/16/2022 0.03  0.00 - 0.20 10*3/mm3 Final   • Immature Grans, Absolute 03/16/2022 0.04  0.00 - 0.05 10*3/mm3 Final   • nRBC 03/16/2022 0.0  0.0 - 0.2 /100 WBC Final   Office Visit on 02/24/2022   Component Date Value Ref Range Status   • External Amphetamine Screen Urine 02/24/2022 Negative   Final   • Amphetamine Cut-Off 02/24/2022 1000ng/ml   Final   • External Benzodiazepine Screen Uri* 02/24/2022 Negative   Final   • Benzodiazipine Cut-Off 02/24/2022 300ng/ml   Final   • External Cocaine Screen Urine 02/24/2022 Negative   Final   • Cocaine Cut-Off 02/24/2022 300ng/ml   Final   • External THC Screen Urine 02/24/2022  Negative   Final   • THC Cut-Off 02/24/2022 50ng/ml   Final   • External Methadone Screen Urine 02/24/2022 Negative   Final   • Methadone Cut-Off 02/24/2022 300ng/ml   Final   • External Methamphetamine Screen Ur* 02/24/2022 Negative   Final   • Methamphetamine Cut-Off 02/24/2022 1000ng/ml   Final   • External Oxycodone Screen Urine 02/24/2022 Negative   Final   • Oxycodone Cut-Off 02/24/2022 100ng/ml   Final   • External Buprenorphine Screen Urine 02/24/2022 Positive   Final   • Buprenorphine Cut-Off 02/24/2022 10ng/ml   Final   • External MDMA 02/24/2022 Negative   Final   • MDMA Cut-Off 02/24/2022 500ng/ml   Final   • External Opiates Screen Urine 02/24/2022 Negative   Final   • Opiates Cut-Off 02/24/2022 300ng/ml   Final   There may be more visits with results that are not included.         Assessment & Plan   Diagnoses and all orders for this visit:    1. Opioid type dependence, continuous (HCC) (Primary)  -     buprenorphine-naloxone (SUBOXONE) 8-2 MG per SL tablet; Place 2 tablets under the tongue Daily.  Dispense: 28 tablet; Refill: 0    2. Medication management  -     [facility] Drug Screen        Visit Diagnoses:    ICD-10-CM ICD-9-CM   1. Opioid type dependence, continuous (HCC)  F11.20 304.01   2. Medication management  Z79.899 V58.69       PLAN:  1. Safety: No acute safety concerns  2. Risk Assessment: Risk of self-harm acutely is low. Risk of self-harm chronically is also low, but could be further elevated in the event of treatment noncompliance and/or AODA.    TREATMENT PLAN/GOALS: Continue supportive psychotherapy efforts and medications as indicated. Treatment and medication options discussed during today's visit. Patient acknowledged and verbally consented to continue with current treatment plan and was educated on the importance of compliance with treatment and follow-up appointments.    MEDICATION ISSUES:  ROMAN reviewed as expected.  Discussed medication options and treatment plan of prescribed  medication as well as the risks, benefits, and side effects including potential falls, possible impaired driving and metabolic adversities among others. Patient is agreeable to call the office with any worsening of symptoms or onset of side effects. Patient is agreeable to call 911 or go to the nearest ER should he/she begin having SI/HI. No medication side effects or related complaints today.     MEDS ORDERED DURING VISIT:  New Medications Ordered This Visit   Medications   • buprenorphine-naloxone (SUBOXONE) 8-2 MG per SL tablet     Sig: Place 2 tablets under the tongue Daily.     Dispense:  28 tablet     Refill:  0       No follow-ups on file.           This document has been electronically signed by RENAN De Dios  July 18, 2022 14:00 EDT      Part of this note may be an electronic transcription/translation of spoken language to printed text using the Dragon Dictation System.

## 2022-07-29 DIAGNOSIS — M25.59 PAIN IN OTHER JOINT: ICD-10-CM

## 2022-08-01 ENCOUNTER — TELEMEDICINE (OUTPATIENT)
Dept: PSYCHIATRY | Facility: CLINIC | Age: 32
End: 2022-08-01

## 2022-08-01 VITALS
BODY MASS INDEX: 29.11 KG/M2 | HEIGHT: 78 IN | WEIGHT: 251.6 LBS | SYSTOLIC BLOOD PRESSURE: 152 MMHG | HEART RATE: 63 BPM | DIASTOLIC BLOOD PRESSURE: 109 MMHG

## 2022-08-01 DIAGNOSIS — F11.20 OPIOID TYPE DEPENDENCE, CONTINUOUS: Primary | ICD-10-CM

## 2022-08-01 DIAGNOSIS — Z79.899 MEDICATION MANAGEMENT: ICD-10-CM

## 2022-08-01 PROCEDURE — 99213 OFFICE O/P EST LOW 20 MIN: CPT | Performed by: NURSE PRACTITIONER

## 2022-08-01 RX ORDER — IBUPROFEN 600 MG/1
TABLET ORAL
Qty: 30 TABLET | Refills: 2 | Status: SHIPPED | OUTPATIENT
Start: 2022-08-01 | End: 2022-08-22 | Stop reason: SDUPTHER

## 2022-08-01 RX ORDER — BUPRENORPHINE HYDROCHLORIDE AND NALOXONE HYDROCHLORIDE DIHYDRATE 8; 2 MG/1; MG/1
2 TABLET SUBLINGUAL DAILY
Qty: 42 TABLET | Refills: 0 | Status: SHIPPED | OUTPATIENT
Start: 2022-08-01 | End: 2022-08-22 | Stop reason: SDUPTHER

## 2022-08-01 NOTE — PROGRESS NOTES
This provider is located at [facility]. The Patient is seen remotely located at the [facility] ([facility]) using Video. Patient is being seen via telehealth and confirm that they are in a secure environment for this session. The patient's condition being diagnosed/treated is appropriate for telemedicine. Provider identified as Daniel Olivas as well as credentials APRN MSN FNP-C ANTONIO-REYNOLD.   The client/patient gave consent to be seen remotely, and when consent is given they understand that the consent allows for patient identifiable information to be sent to a third party as needed.   They may refuse to be seen remotely at any time. The electronic data is encrypted and password protected, and the patient has been advised of the potential risks to privacy not withstanding such measures.    Next evaluation will taper buprenorphine due to continued illicit gabapentin use    Chief Complaint/History of Present Illness: Follow Up buprenorphine/naloxone Medicated Assisted Treatment for Opiate Use Disorder     Patient/Client Concerns/Updates:  Continuing Zoloft Vistaril, seroquel and clonidine for anxiety, no concerns with any prescribed medications, mood is stable and overall doing well; patient has had over 6 months to be evaluated by either primary care provider and orthopedist for chronic joint pain and subsequent use of nonprescribed gabapentin; patient has failed to be evaluated for such; will initiate a tapering of buprenorphine as patient continues to use nonprescribed gabapentin and has failed to be evaluated for his chronic joint pain    Triggers (Persons/Places/Things/Events/Thought/Emotions): Chronic anxiety and chronic joint pain    Cravings: Denies cravings    Relapse Prevention: Counseling    Urine Drug Screen (today's visit) discussed: Positive buprenorphine, otherwise negative for substances tested    UDS Confirmation (Most recent/Resulted): Positive buprenorphine/nor-buprenorphine, no  concerns for urine tampering and otherwise positive for nonprescribed gabapentin    Most recent pertinent laboratory studies reviewed: 3/16/2022-LFTs within normal limits; hepatitis C virus not detected    ROMAN (PDMP) Reviewed for Current/Active Medications: buprenorphine/naloxone as reviewed today    Past Surgical History:  Past Surgical History:   Procedure Laterality Date   • NO PAST SURGERIES         Problem List:  Patient Active Problem List   Diagnosis   • Alcohol-induced acute pancreatitis   • Pancreatitis   • Chronic pain of right knee       Allergy:   No Known Allergies     Current Medications:   Current Outpatient Medications   Medication Sig Dispense Refill   • buprenorphine-naloxone (SUBOXONE) 8-2 MG per SL tablet Place 2 tablets under the tongue Daily. 42 tablet 0   • cloNIDine (Catapres) 0.1 MG tablet Take 1 tablet by mouth 2 (Two) Times a Day. Take as needed for anxiety.  Insomnia.  Chills or sweats 60 tablet 2   • hydrOXYzine pamoate (Vistaril) 50 MG capsule Take 1 capsule by mouth 4 (Four) Times a Day As Needed for Anxiety (and/or insomia). Take 1 to 2 tablets as needed for anxiety every 6 hours 90 capsule 2   • ibuprofen (ADVIL,MOTRIN) 600 MG tablet TAKE ONE TABLET BY MOUTH EVERY 6 HOURS AS NEEDED FOR MILD PAIN 30 tablet 2   • naloxone (NARCAN) 4 MG/0.1ML nasal spray 1 spray into the nostril(s) as directed by provider As Needed (Opiate oversedation). Spray in 1 nostril every 2 to 3 minutes call 911 2 each 2   • orphenadrine (NORFLEX) 100 MG 12 hr tablet Take 1 tablet by mouth 2 (Two) Times a Day As Needed for Muscle Spasms or Mild Pain . 14 tablet 0   • QUEtiapine (SEROquel) 25 MG tablet Take 1 tablet by mouth Every Night. 30 tablet 2   • cephalexin (KEFLEX) 500 MG capsule Take 1 capsule by mouth 3 (Three) Times a Day. 30 capsule 0   • mupirocin (BACTROBAN) 2 % ointment Apply  topically to the appropriate area as directed 3 (Three) Times a Day. 15 g 0   • sertraline (Zoloft) 50 MG tablet Take 1  tablet by mouth Daily for 30 days. 30 tablet 3     No current facility-administered medications for this visit.       Past Medical History:  Past Medical History:   Diagnosis Date   • Alcohol-induced acute pancreatitis 6/27/2019   • Alcoholism (HCC)    • Drug use     Amphetamines and Suboxone   • Fatty liver    • Hepatitis C antibody test positive 2019   • Medical non-compliance    • Pancreatitis    • Smoker          Social History     Socioeconomic History   • Marital status:    Tobacco Use   • Smoking status: Current Every Day Smoker     Packs/day: 0.50     Types: Cigarettes   • Smokeless tobacco: Never Used   Vaping Use   • Vaping Use: Never used   Substance and Sexual Activity   • Alcohol use: Yes     Alcohol/week: 8.0 standard drinks     Types: 8 Shots of liquor per week     Comment: 5-6 double shots   • Drug use: Yes     Comment: suboxone as prescribed   • Sexual activity: Defer         Family History   Problem Relation Age of Onset   • Cancer Maternal Grandmother    • No Known Problems Mother    • No Known Problems Father          Mental Status Exam:   Hygiene:   good  Cooperation:  Cooperative  Eye Contact:  Good  Psychomotor Behavior:  Appropriate  Affect:  Appropriate  Mood: normal  Speech:  Normal  Thought Process:  Goal directed  Thought Content:  Normal  Suicidal:  None  Homicidal:  None  Hallucinations:  None  Delusion:  None  Memory:  Intact  Orientation:  Grossly intact  Reliability:  good  Insight:  Good  Judgement:  Good  Impulse Control:  Good         Review of Systems:  Review of Systems   Constitutional: Negative for activity change, chills, diaphoresis and fatigue.   Respiratory: Negative for apnea, cough and shortness of breath.    Cardiovascular: Negative for chest pain, palpitations and leg swelling.   Gastrointestinal: Negative for constipation, diarrhea, nausea and vomiting.   Genitourinary: Negative for difficulty urinating.   Musculoskeletal: Positive for arthralgias.   Skin:  "Negative for rash.   Neurological: Negative for dizziness, weakness and headaches.   Psychiatric/Behavioral: Negative for agitation, self-injury, sleep disturbance and suicidal ideas. The patient is nervous/anxious.          Physical Exam:  Physical Exam  Vitals reviewed.   Constitutional:       General: He is not in acute distress.     Appearance: Normal appearance. He is not ill-appearing or toxic-appearing.   Pulmonary:      Effort: Pulmonary effort is normal.   Musculoskeletal:         General: Normal range of motion.   Neurological:      General: No focal deficit present.      Mental Status: He is alert and oriented to person, place, and time.   Psychiatric:         Attention and Perception: Attention and perception normal.         Mood and Affect: Mood is anxious. Mood is not depressed.         Speech: Speech normal.         Behavior: Behavior normal. Behavior is cooperative.         Thought Content: Thought content normal.         Cognition and Memory: Cognition and memory normal.         Judgment: Judgment normal.         Vital Signs:   BP (!) 152/109   Pulse 63   Ht 198.1 cm (77.99\")   Wt 114 kg (251 lb 9.6 oz)   BMI 29.08 kg/m²      Lab Results:   Telemedicine on 08/01/2022   Component Date Value Ref Range Status   • External Amphetamine Screen Urine 08/01/2022 Negative   Final-Edited   • External Benzodiazepine Screen Uri* 08/01/2022 Negative   Final-Edited   • External Cocaine Screen Urine 08/01/2022 Negative   Final-Edited   • External THC Screen Urine 08/01/2022 Negative   Corrected   • External Methadone Screen Urine 08/01/2022 Negative   Final-Edited   • External Methamphetamine Screen Ur* 08/01/2022 Negative   Final-Edited   • External Oxycodone Screen Urine 08/01/2022 Negative   Final-Edited   • External Buprenorphine Screen Urine 08/01/2022 Positive (A)  Final-Edited   • External MDMA 08/01/2022 Negative   Final-Edited   • External Opiates Screen Urine 08/01/2022 Negative   Final-Edited "   Telemedicine on 07/18/2022   Component Date Value Ref Range Status   • External Amphetamine Screen Urine 07/18/2022 Negative   Final   • External Benzodiazepine Screen Uri* 07/18/2022 Negative   Final   • External Cocaine Screen Urine 07/18/2022 Negative   Final   • External THC Screen Urine 07/18/2022 Negative   Final   • External Methadone Screen Urine 07/18/2022 Negative   Final   • External Methamphetamine Screen Ur* 07/18/2022 Negative   Final   • External Oxycodone Screen Urine 07/18/2022 Negative   Final   • External Buprenorphine Screen Urine 07/18/2022 Positive (A)  Final   • External MDMA 07/18/2022 Negative   Final   • External Opiates Screen Urine 07/18/2022 Negative   Final   Telemedicine on 06/23/2022   Component Date Value Ref Range Status   • External Amphetamine Screen Urine 06/23/2022 Negative   Final   • External Benzodiazepine Screen Uri* 06/23/2022 Negative   Final   • External Cocaine Screen Urine 06/23/2022 Negative   Final   • External THC Screen Urine 06/23/2022 Negative   Final   • External Methadone Screen Urine 06/23/2022 Negative   Final   • External Methamphetamine Screen Ur* 06/23/2022 Negative   Final   • External Oxycodone Screen Urine 06/23/2022 Negative   Final   • External Buprenorphine Screen Urine 06/23/2022 Positive (A)  Final   • External MDMA 06/23/2022 Negative   Final   • External Opiates Screen Urine 06/23/2022 Negative   Final   Telemedicine on 06/09/2022   Component Date Value Ref Range Status   • External Amphetamine Screen Urine 06/09/2022 Positive (A)  Final   • External Benzodiazepine Screen Uri* 06/09/2022 Negative   Final   • External Cocaine Screen Urine 06/09/2022 Negative   Final   • External THC Screen Urine 06/09/2022 Positive (A)  Final   • External Methadone Screen Urine 06/09/2022 Negative   Final   • External Methamphetamine Screen Ur* 06/09/2022 Negative   Final   • External Oxycodone Screen Urine 06/09/2022 Negative   Final   • External Buprenorphine  Screen Urine 06/09/2022 Positive (A)  Final   • External MDMA 06/09/2022 Negative   Final   • External Opiates Screen Urine 06/09/2022 Negative   Final   Office Visit on 06/02/2022   Component Date Value Ref Range Status   • External Amphetamine Screen Urine 06/02/2022 Positive (A)  Final   • External Benzodiazepine Screen Uri* 06/02/2022 Negative   Final   • External Cocaine Screen Urine 06/02/2022 Negative   Final   • External THC Screen Urine 06/02/2022 Positive (A)  Final   • External Methadone Screen Urine 06/02/2022 Negative   Final   • External Methamphetamine Screen Ur* 06/02/2022 Positive (A)  Final   • External Oxycodone Screen Urine 06/02/2022 Negative   Final   • External Buprenorphine Screen Urine 06/02/2022 Positive (A)  Final   • External MDMA 06/02/2022 Negative   Final   • External Opiates Screen Urine 06/02/2022 Negative   Final   Telemedicine on 05/11/2022   Component Date Value Ref Range Status   • External Amphetamine Screen Urine 05/11/2022 Negative   Final   • External Benzodiazepine Screen Uri* 05/11/2022 Negative   Final   • External Cocaine Screen Urine 05/11/2022 Negative   Final   • External THC Screen Urine 05/11/2022 Positive (A)  Final   • External Methadone Screen Urine 05/11/2022 Negative   Final   • External Methamphetamine Screen Ur* 05/11/2022 Negative   Final   • External Oxycodone Screen Urine 05/11/2022 Negative   Final   • External Buprenorphine Screen Urine 05/11/2022 Positive (A)  Final   • External MDMA 05/11/2022 Negative   Final   • External Opiates Screen Urine 05/11/2022 Negative   Final   Telemedicine on 04/27/2022   Component Date Value Ref Range Status   • External Amphetamine Screen Urine 04/27/2022 Negative   Final   • External Benzodiazepine Screen Uri* 04/27/2022 Negative   Final   • External Cocaine Screen Urine 04/27/2022 Negative   Final   • External THC Screen Urine 04/27/2022 Positive (A)  Final   • External Methadone Screen Urine 04/27/2022 Negative   Final    • External Methamphetamine Screen Ur* 04/27/2022 Negative   Final   • External Oxycodone Screen Urine 04/27/2022 Negative   Final   • External Buprenorphine Screen Urine 04/27/2022 Positive (A)  Final   • External MDMA 04/27/2022 Negative   Final   • External Opiates Screen Urine 04/27/2022 Negative   Final   Telemedicine on 04/20/2022   Component Date Value Ref Range Status   • External Amphetamine Screen Urine 04/20/2022 Negative   Final   • External Benzodiazepine Screen Uri* 04/20/2022 Negative   Final   • External Cocaine Screen Urine 04/20/2022 Negative   Final   • External THC Screen Urine 04/20/2022 Negative   Final   • External Methadone Screen Urine 04/20/2022 Negative   Final   • External Methamphetamine Screen Ur* 04/20/2022 Negative   Final   • External Oxycodone Screen Urine 04/20/2022 Negative   Final   • External Buprenorphine Screen Urine 04/20/2022 Positive   Final   • External MDMA 04/20/2022 Negative   Final   • External Opiates Screen Urine 04/20/2022 Negative   Final   Telemedicine on 03/24/2022   Component Date Value Ref Range Status   • External Amphetamine Screen Urine 03/24/2022 Negative   Final   • External Benzodiazepine Screen Uri* 03/24/2022 Negative   Final   • External Cocaine Screen Urine 03/24/2022 Negative   Final   • External THC Screen Urine 03/24/2022 Negative   Final   • External Methadone Screen Urine 03/24/2022 Negative   Final   • External Methamphetamine Screen Ur* 03/24/2022 Negative   Final   • External Oxycodone Screen Urine 03/24/2022 Negative   Final   • External Buprenorphine Screen Urine 03/24/2022 Positive   Final   • External MDMA 03/24/2022 Negative   Final   • External Opiates Screen Urine 03/24/2022 Negative   Final   Lab on 03/16/2022   Component Date Value Ref Range Status   • Glucose 03/16/2022 124 (A) 65 - 99 mg/dL Final   • BUN 03/16/2022 13  6 - 20 mg/dL Final   • Creatinine 03/16/2022 0.78  0.76 - 1.27 mg/dL Final   • Sodium 03/16/2022 136  136 - 145  mmol/L Final   • Potassium 03/16/2022 4.0  3.5 - 5.2 mmol/L Final   • Chloride 03/16/2022 102  98 - 107 mmol/L Final   • CO2 03/16/2022 23.1  22.0 - 29.0 mmol/L Final   • Calcium 03/16/2022 9.7  8.6 - 10.5 mg/dL Final   • Total Protein 03/16/2022 8.1  6.0 - 8.5 g/dL Final   • Albumin 03/16/2022 4.20  3.50 - 5.20 g/dL Final   • ALT (SGPT) 03/16/2022 22  1 - 41 U/L Final   • AST (SGOT) 03/16/2022 33  1 - 40 U/L Final   • Alkaline Phosphatase 03/16/2022 111  39 - 117 U/L Final   • Total Bilirubin 03/16/2022 0.4  0.0 - 1.2 mg/dL Final   • Globulin 03/16/2022 3.9  gm/dL Final   • A/G Ratio 03/16/2022 1.1  g/dL Final   • BUN/Creatinine Ratio 03/16/2022 16.7  7.0 - 25.0 Final   • Anion Gap 03/16/2022 10.9  5.0 - 15.0 mmol/L Final   • eGFR 03/16/2022 122.3  >60.0 mL/min/1.73 Final    National Kidney Foundation and American Society of Nephrology (ASN) Task Force recommended calculation based on the Chronic Kidney Disease Epidemiology Collaboration (CKD-EPI) equation refit without adjustment for race.   • Hepatitis C Quantitation 03/16/2022 HCV Not Detected  IU/mL Final   • HCV log10 03/16/2022    Final    Unable to calculate result since non-numeric result obtained for  component test.   • HCV Test Information 03/16/2022 Comment   Final    The quantitative range of this assay is 15 IU/mL to 100 million IU/mL.   • HCV Genotype 03/16/2022    Final    Not indicated   • WBC 03/16/2022 7.58  3.40 - 10.80 10*3/mm3 Final   • RBC 03/16/2022 4.56  4.14 - 5.80 10*6/mm3 Final   • Hemoglobin 03/16/2022 13.1  13.0 - 17.7 g/dL Final   • Hematocrit 03/16/2022 39.1  37.5 - 51.0 % Final   • MCV 03/16/2022 85.7  79.0 - 97.0 fL Final   • MCH 03/16/2022 28.7  26.6 - 33.0 pg Final   • MCHC 03/16/2022 33.5  31.5 - 35.7 g/dL Final   • RDW 03/16/2022 11.8 (A) 12.3 - 15.4 % Final   • RDW-SD 03/16/2022 36.5 (A) 37.0 - 54.0 fl Final   • MPV 03/16/2022 12.6 (A) 6.0 - 12.0 fL Final   • Platelets 03/16/2022 205  140 - 450 10*3/mm3 Final   • Neutrophil %  03/16/2022 53.6  42.7 - 76.0 % Final   • Lymphocyte % 03/16/2022 32.7  19.6 - 45.3 % Final   • Monocyte % 03/16/2022 8.2  5.0 - 12.0 % Final   • Eosinophil % 03/16/2022 4.6  0.3 - 6.2 % Final   • Basophil % 03/16/2022 0.4  0.0 - 1.5 % Final   • Immature Grans % 03/16/2022 0.5  0.0 - 0.5 % Final   • Neutrophils, Absolute 03/16/2022 4.06  1.70 - 7.00 10*3/mm3 Final   • Lymphocytes, Absolute 03/16/2022 2.48  0.70 - 3.10 10*3/mm3 Final   • Monocytes, Absolute 03/16/2022 0.62  0.10 - 0.90 10*3/mm3 Final   • Eosinophils, Absolute 03/16/2022 0.35  0.00 - 0.40 10*3/mm3 Final   • Basophils, Absolute 03/16/2022 0.03  0.00 - 0.20 10*3/mm3 Final   • Immature Grans, Absolute 03/16/2022 0.04  0.00 - 0.05 10*3/mm3 Final   • nRBC 03/16/2022 0.0  0.0 - 0.2 /100 WBC Final   There may be more visits with results that are not included.         Assessment & Plan   Diagnoses and all orders for this visit:    1. Opioid type dependence, continuous (HCC) (Primary)  -     buprenorphine-naloxone (SUBOXONE) 8-2 MG per SL tablet; Place 2 tablets under the tongue Daily.  Dispense: 42 tablet; Refill: 0    2. Medication management  -     [facility] Drug Screen        Visit Diagnoses:    ICD-10-CM ICD-9-CM   1. Opioid type dependence, continuous (HCC)  F11.20 304.01   2. Medication management  Z79.899 V58.69       PLAN:  1. Safety: No acute safety concerns  2. Risk Assessment: Risk of self-harm acutely is low. Risk of self-harm chronically is also low, but could be further elevated in the event of treatment noncompliance and/or AODA.    TREATMENT PLAN/GOALS: Continue supportive psychotherapy efforts and medications as indicated. Treatment and medication options discussed during today's visit. Patient acknowledged and verbally consented to continue with current treatment plan and was educated on the importance of compliance with treatment and follow-up appointments.    MEDICATION ISSUES:  ROMAN reviewed as expected.  Discussed medication options and  treatment plan of prescribed medication as well as the risks, benefits, and side effects including potential falls, possible impaired driving and metabolic adversities among others. Patient is agreeable to call the office with any worsening of symptoms or onset of side effects. Patient is agreeable to call 911 or go to the nearest ER should he/she begin having SI/HI. No medication side effects or related complaints today.     MEDS ORDERED DURING VISIT:  New Medications Ordered This Visit   Medications   • buprenorphine-naloxone (SUBOXONE) 8-2 MG per SL tablet     Sig: Place 2 tablets under the tongue Daily.     Dispense:  42 tablet     Refill:  0     NADEAN:[id number]       No follow-ups on file.           This document has been electronically signed by RENAN De Dios  August 1, 2022 10:35 EDT      Part of this note may be an electronic transcription/translation of spoken language to printed text using the Dragon Dictation System.

## 2022-08-22 ENCOUNTER — TELEMEDICINE (OUTPATIENT)
Dept: PSYCHIATRY | Facility: CLINIC | Age: 32
End: 2022-08-22

## 2022-08-22 VITALS
BODY MASS INDEX: 28.76 KG/M2 | HEART RATE: 68 BPM | HEIGHT: 78 IN | DIASTOLIC BLOOD PRESSURE: 112 MMHG | SYSTOLIC BLOOD PRESSURE: 148 MMHG | WEIGHT: 248.6 LBS

## 2022-08-22 DIAGNOSIS — Z79.899 MEDICATION MANAGEMENT: ICD-10-CM

## 2022-08-22 DIAGNOSIS — G47.00 INSOMNIA, UNSPECIFIED TYPE: ICD-10-CM

## 2022-08-22 DIAGNOSIS — M25.59 PAIN IN OTHER JOINT: ICD-10-CM

## 2022-08-22 DIAGNOSIS — F11.20 OPIOID TYPE DEPENDENCE, CONTINUOUS: Primary | ICD-10-CM

## 2022-08-22 DIAGNOSIS — F41.1 GENERALIZED ANXIETY DISORDER: ICD-10-CM

## 2022-08-22 PROCEDURE — 99214 OFFICE O/P EST MOD 30 MIN: CPT | Performed by: NURSE PRACTITIONER

## 2022-08-22 RX ORDER — IBUPROFEN 600 MG/1
600 TABLET ORAL EVERY 6 HOURS PRN
Qty: 30 TABLET | Refills: 2 | Status: SHIPPED | OUTPATIENT
Start: 2022-08-22 | End: 2022-12-19 | Stop reason: SDUPTHER

## 2022-08-22 RX ORDER — BUPRENORPHINE HYDROCHLORIDE AND NALOXONE HYDROCHLORIDE DIHYDRATE 8; 2 MG/1; MG/1
2 TABLET SUBLINGUAL DAILY
Qty: 56 TABLET | Refills: 0 | Status: SHIPPED | OUTPATIENT
Start: 2022-08-22 | End: 2022-09-19 | Stop reason: SDUPTHER

## 2022-08-22 RX ORDER — QUETIAPINE FUMARATE 25 MG/1
25 TABLET, FILM COATED ORAL NIGHTLY
Qty: 30 TABLET | Refills: 2 | Status: SHIPPED | OUTPATIENT
Start: 2022-08-22 | End: 2022-11-21 | Stop reason: SDUPTHER

## 2022-08-22 RX ORDER — CLONIDINE HYDROCHLORIDE 0.1 MG/1
0.1 TABLET ORAL 2 TIMES DAILY
Qty: 60 TABLET | Refills: 2 | Status: SHIPPED | OUTPATIENT
Start: 2022-08-22 | End: 2023-01-16 | Stop reason: SDUPTHER

## 2022-08-22 NOTE — PROGRESS NOTES
This provider is located at [facility]. The Patient is seen remotely located at the [facility] ([facility]) using Video. Patient is being seen via telehealth and confirm that they are in a secure environment for this session. The patient's condition being diagnosed/treated is appropriate for telemedicine. Provider identified as Daniel Olivas as well as credentials APRN MSN FNP-C ANTONIO-REYNOLD.   The client/patient gave consent to be seen remotely, and when consent is given they understand that the consent allows for patient identifiable information to be sent to a third party as needed.   They may refuse to be seen remotely at any time. The electronic data is encrypted and password protected, and the patient has been advised of the potential risks to privacy not withstanding such measures.    Patient has been positive for nonprescribed gabapentin since 1/27/2022    Chief Complaint/History of Present Illness: Follow Up buprenorphine/naloxone Medicated Assisted Treatment for Opiate Use Disorder     Patient/Client Concerns/Updates: Continuing Zoloft Vistaril, seroquel and clonidine for anxiety patient reports he is doing well on these medications and mood is stable with no concern for depression exacerbation or suicidal thoughts;   -Rediscussed patient's nonprescribed gabapentin use; reiterated patient has been accommodated 6 months to be evaluated by an orthopedist or primary care provider for chronic joint pain which patient feels warrants gabapentin use; patient has failed to be evaluated for such.  Patient advised last evaluation that buprenorphine will be tapered if gabapentin remains unchanged; patient reports she has not used gabapentin over a week and will use no further; patient understands if urine confirmation does not substantiate patient's report buprenorphine will be tapered or I will accommodate a further prescription to allow him to go to another clinic due to 6 months of consistent use  of nonprescribed gabapentin  -In general I continue to advise patient to follow-up with an orthopedist and will not accommodate ibuprofen long-term as NSAIDs also carry side effects long-term including cardiovascular pathology and/or gastrointestinal bleeding    Triggers (Persons/Places/Things/Events/Thought/Emotions): Chronic stable anxiety and joint pain    Cravings: Denies cravings    Relapse Prevention: Counseling    Urine Drug Screen (today's visit) discussed: Positive buprenorphine, otherwise negative for substances tested    UDS Confirmation (Most recent/Resulted): Positive nor-buprenorphine, positive phentermine and gabapentin    Most recent pertinent laboratory studies reviewed: 3/16/2022-LFTs within normal limits; hepatitis C virus not detected    ROMAN (PDMP) Reviewed for Current/Active Medications: buprenorphine/naloxone as reviewed today    Past Surgical History:  Past Surgical History:   Procedure Laterality Date   • NO PAST SURGERIES         Problem List:  Patient Active Problem List   Diagnosis   • Alcohol-induced acute pancreatitis   • Pancreatitis   • Chronic pain of right knee       Allergy:   No Known Allergies     Current Medications:   Current Outpatient Medications   Medication Sig Dispense Refill   • buprenorphine-naloxone (SUBOXONE) 8-2 MG per SL tablet Place 2 tablets under the tongue Daily. 42 tablet 0   • cloNIDine (Catapres) 0.1 MG tablet Take 1 tablet by mouth 2 (Two) Times a Day. Take as needed for anxiety.  Insomnia.  Chills or sweats 60 tablet 2   • hydrOXYzine pamoate (Vistaril) 50 MG capsule Take 1 capsule by mouth 4 (Four) Times a Day As Needed for Anxiety (and/or insomia). Take 1 to 2 tablets as needed for anxiety every 6 hours 90 capsule 2   • ibuprofen (ADVIL,MOTRIN) 600 MG tablet TAKE ONE TABLET BY MOUTH EVERY 6 HOURS AS NEEDED FOR MILD PAIN 30 tablet 2   • naloxone (NARCAN) 4 MG/0.1ML nasal spray 1 spray into the nostril(s) as directed by provider As Needed (Opiate  oversedation). Spray in 1 nostril every 2 to 3 minutes call 911 2 each 2   • orphenadrine (NORFLEX) 100 MG 12 hr tablet Take 1 tablet by mouth 2 (Two) Times a Day As Needed for Muscle Spasms or Mild Pain . 14 tablet 0   • QUEtiapine (SEROquel) 25 MG tablet Take 1 tablet by mouth Every Night. 30 tablet 2   • cephalexin (KEFLEX) 500 MG capsule Take 1 capsule by mouth 3 (Three) Times a Day. 30 capsule 0   • mupirocin (BACTROBAN) 2 % ointment Apply  topically to the appropriate area as directed 3 (Three) Times a Day. 15 g 0   • sertraline (Zoloft) 50 MG tablet Take 1 tablet by mouth Daily for 30 days. 30 tablet 3     No current facility-administered medications for this visit.       Past Medical History:  Past Medical History:   Diagnosis Date   • Alcohol-induced acute pancreatitis 6/27/2019   • Alcoholism (HCC)    • Drug use     Amphetamines and Suboxone   • Fatty liver    • Hepatitis C antibody test positive 2019   • Medical non-compliance    • Pancreatitis    • Smoker          Social History     Socioeconomic History   • Marital status:    Tobacco Use   • Smoking status: Current Every Day Smoker     Packs/day: 0.50     Types: Cigarettes   • Smokeless tobacco: Never Used   Vaping Use   • Vaping Use: Never used   Substance and Sexual Activity   • Alcohol use: Yes     Alcohol/week: 8.0 standard drinks     Types: 8 Shots of liquor per week     Comment: 5-6 double shots   • Drug use: Yes     Comment: suboxone as prescribed   • Sexual activity: Defer         Family History   Problem Relation Age of Onset   • Cancer Maternal Grandmother    • No Known Problems Mother    • No Known Problems Father          Mental Status Exam:   Hygiene:   good  Cooperation:  Cooperative  Eye Contact:  Good  Psychomotor Behavior:  Appropriate  Affect:  Appropriate  Mood: normal  Speech:  Normal  Thought Process:  Goal directed  Thought Content:  Normal  Suicidal:  None  Homicidal:  None  Hallucinations:  None  Delusion:  None  Memory:   "Intact  Orientation:  Grossly intact  Reliability:  good  Insight:  Fair  Judgement:  Fair  Impulse Control:  Good         Review of Systems:  Review of Systems   Constitutional: Negative for activity change, chills, diaphoresis and fatigue.   Respiratory: Negative for apnea, cough and shortness of breath.    Cardiovascular: Negative for chest pain, palpitations and leg swelling.   Gastrointestinal: Negative for abdominal pain, constipation, diarrhea, nausea and vomiting.   Genitourinary: Negative for difficulty urinating.   Musculoskeletal: Positive for arthralgias.   Skin: Negative for rash.   Neurological: Negative for dizziness, weakness and headaches.   Psychiatric/Behavioral: Negative for agitation, self-injury, sleep disturbance and suicidal ideas. The patient is nervous/anxious.          Physical Exam:  Physical Exam  Vitals reviewed.   Constitutional:       General: He is not in acute distress.     Appearance: Normal appearance. He is not ill-appearing or toxic-appearing.   Pulmonary:      Effort: Pulmonary effort is normal.   Musculoskeletal:         General: Normal range of motion.   Neurological:      General: No focal deficit present.      Mental Status: He is alert and oriented to person, place, and time.   Psychiatric:         Attention and Perception: Attention and perception normal.         Mood and Affect: Mood is anxious. Mood is not depressed.         Speech: Speech normal.         Behavior: Behavior normal. Behavior is cooperative.         Thought Content: Thought content normal.         Cognition and Memory: Cognition and memory normal.         Judgment: Judgment normal.         Vital Signs:   BP (!) 148/112 Comment: made provider aware.  Pulse 68   Ht 198.1 cm (77.99\")   Wt 113 kg (248 lb 9.6 oz)   BMI 28.73 kg/m²      Lab Results:   Telemedicine on 08/22/2022   Component Date Value Ref Range Status   • External Amphetamine Screen Urine 08/22/2022 Negative   Final   • External " Benzodiazepine Screen Uri* 08/22/2022 Negative   Final   • External Cocaine Screen Urine 08/22/2022 Negative   Final   • External THC Screen Urine 08/22/2022 Negative   Final   • External Methadone Screen Urine 08/22/2022 Negative   Final   • External Methamphetamine Screen Ur* 08/22/2022 Negative   Final   • External Oxycodone Screen Urine 08/22/2022 Negative   Final   • External Buprenorphine Screen Urine 08/22/2022 Positive (A)  Final   • External MDMA 08/22/2022 Negative   Final   • External Opiates Screen Urine 08/22/2022 Negative   Final   Telemedicine on 08/01/2022   Component Date Value Ref Range Status   • External Amphetamine Screen Urine 08/01/2022 Negative   Final-Edited   • External Benzodiazepine Screen Uri* 08/01/2022 Negative   Final-Edited   • External Cocaine Screen Urine 08/01/2022 Negative   Final-Edited   • External THC Screen Urine 08/01/2022 Negative   Corrected   • External Methadone Screen Urine 08/01/2022 Negative   Final-Edited   • External Methamphetamine Screen Ur* 08/01/2022 Negative   Final-Edited   • External Oxycodone Screen Urine 08/01/2022 Negative   Final-Edited   • External Buprenorphine Screen Urine 08/01/2022 Positive (A)  Final-Edited   • External MDMA 08/01/2022 Negative   Final-Edited   • External Opiates Screen Urine 08/01/2022 Negative   Final-Edited   Telemedicine on 07/18/2022   Component Date Value Ref Range Status   • External Amphetamine Screen Urine 07/18/2022 Negative   Final   • External Benzodiazepine Screen Uri* 07/18/2022 Negative   Final   • External Cocaine Screen Urine 07/18/2022 Negative   Final   • External THC Screen Urine 07/18/2022 Negative   Final   • External Methadone Screen Urine 07/18/2022 Negative   Final   • External Methamphetamine Screen Ur* 07/18/2022 Negative   Final   • External Oxycodone Screen Urine 07/18/2022 Negative   Final   • External Buprenorphine Screen Urine 07/18/2022 Positive (A)  Final   • External MDMA 07/18/2022 Negative    Final   • External Opiates Screen Urine 07/18/2022 Negative   Final   Telemedicine on 06/23/2022   Component Date Value Ref Range Status   • External Amphetamine Screen Urine 06/23/2022 Negative   Final   • External Benzodiazepine Screen Uri* 06/23/2022 Negative   Final   • External Cocaine Screen Urine 06/23/2022 Negative   Final   • External THC Screen Urine 06/23/2022 Negative   Final   • External Methadone Screen Urine 06/23/2022 Negative   Final   • External Methamphetamine Screen Ur* 06/23/2022 Negative   Final   • External Oxycodone Screen Urine 06/23/2022 Negative   Final   • External Buprenorphine Screen Urine 06/23/2022 Positive (A)  Final   • External MDMA 06/23/2022 Negative   Final   • External Opiates Screen Urine 06/23/2022 Negative   Final   Telemedicine on 06/09/2022   Component Date Value Ref Range Status   • External Amphetamine Screen Urine 06/09/2022 Positive (A)  Final   • External Benzodiazepine Screen Uri* 06/09/2022 Negative   Final   • External Cocaine Screen Urine 06/09/2022 Negative   Final   • External THC Screen Urine 06/09/2022 Positive (A)  Final   • External Methadone Screen Urine 06/09/2022 Negative   Final   • External Methamphetamine Screen Ur* 06/09/2022 Negative   Final   • External Oxycodone Screen Urine 06/09/2022 Negative   Final   • External Buprenorphine Screen Urine 06/09/2022 Positive (A)  Final   • External MDMA 06/09/2022 Negative   Final   • External Opiates Screen Urine 06/09/2022 Negative   Final   Office Visit on 06/02/2022   Component Date Value Ref Range Status   • External Amphetamine Screen Urine 06/02/2022 Positive (A)  Final   • External Benzodiazepine Screen Uri* 06/02/2022 Negative   Final   • External Cocaine Screen Urine 06/02/2022 Negative   Final   • External THC Screen Urine 06/02/2022 Positive (A)  Final   • External Methadone Screen Urine 06/02/2022 Negative   Final   • External Methamphetamine Screen Ur* 06/02/2022 Positive (A)  Final   • External  Oxycodone Screen Urine 06/02/2022 Negative   Final   • External Buprenorphine Screen Urine 06/02/2022 Positive (A)  Final   • External MDMA 06/02/2022 Negative   Final   • External Opiates Screen Urine 06/02/2022 Negative   Final   Telemedicine on 05/11/2022   Component Date Value Ref Range Status   • External Amphetamine Screen Urine 05/11/2022 Negative   Final   • External Benzodiazepine Screen Uri* 05/11/2022 Negative   Final   • External Cocaine Screen Urine 05/11/2022 Negative   Final   • External THC Screen Urine 05/11/2022 Positive (A)  Final   • External Methadone Screen Urine 05/11/2022 Negative   Final   • External Methamphetamine Screen Ur* 05/11/2022 Negative   Final   • External Oxycodone Screen Urine 05/11/2022 Negative   Final   • External Buprenorphine Screen Urine 05/11/2022 Positive (A)  Final   • External MDMA 05/11/2022 Negative   Final   • External Opiates Screen Urine 05/11/2022 Negative   Final   Telemedicine on 04/27/2022   Component Date Value Ref Range Status   • External Amphetamine Screen Urine 04/27/2022 Negative   Final   • External Benzodiazepine Screen Uri* 04/27/2022 Negative   Final   • External Cocaine Screen Urine 04/27/2022 Negative   Final   • External THC Screen Urine 04/27/2022 Positive (A)  Final   • External Methadone Screen Urine 04/27/2022 Negative   Final   • External Methamphetamine Screen Ur* 04/27/2022 Negative   Final   • External Oxycodone Screen Urine 04/27/2022 Negative   Final   • External Buprenorphine Screen Urine 04/27/2022 Positive (A)  Final   • External MDMA 04/27/2022 Negative   Final   • External Opiates Screen Urine 04/27/2022 Negative   Final   Telemedicine on 04/20/2022   Component Date Value Ref Range Status   • External Amphetamine Screen Urine 04/20/2022 Negative   Final   • External Benzodiazepine Screen Uri* 04/20/2022 Negative   Final   • External Cocaine Screen Urine 04/20/2022 Negative   Final   • External THC Screen Urine 04/20/2022 Negative    Final   • External Methadone Screen Urine 04/20/2022 Negative   Final   • External Methamphetamine Screen Ur* 04/20/2022 Negative   Final   • External Oxycodone Screen Urine 04/20/2022 Negative   Final   • External Buprenorphine Screen Urine 04/20/2022 Positive   Final   • External MDMA 04/20/2022 Negative   Final   • External Opiates Screen Urine 04/20/2022 Negative   Final   Telemedicine on 03/24/2022   Component Date Value Ref Range Status   • External Amphetamine Screen Urine 03/24/2022 Negative   Final   • External Benzodiazepine Screen Uri* 03/24/2022 Negative   Final   • External Cocaine Screen Urine 03/24/2022 Negative   Final   • External THC Screen Urine 03/24/2022 Negative   Final   • External Methadone Screen Urine 03/24/2022 Negative   Final   • External Methamphetamine Screen Ur* 03/24/2022 Negative   Final   • External Oxycodone Screen Urine 03/24/2022 Negative   Final   • External Buprenorphine Screen Urine 03/24/2022 Positive   Final   • External MDMA 03/24/2022 Negative   Final   • External Opiates Screen Urine 03/24/2022 Negative   Final   There may be more visits with results that are not included.         Assessment & Plan   Diagnoses and all orders for this visit:    1. Opioid type dependence, continuous (HCC) (Primary)  -     buprenorphine-naloxone (SUBOXONE) 8-2 MG per SL tablet; Place 2 tablets under the tongue Daily.  Dispense: 56 tablet; Refill: 0    2. Medication management  -     [facility] Drug Screen    3. Pain in other joint  -     ibuprofen (ADVIL,MOTRIN) 600 MG tablet; Take 1 tablet by mouth Every 6 (Six) Hours As Needed for Mild Pain .  Dispense: 30 tablet; Refill: 2    4. Generalized anxiety disorder  -     QUEtiapine (SEROquel) 25 MG tablet; Take 1 tablet by mouth Every Night.  Dispense: 30 tablet; Refill: 2  -     cloNIDine (Catapres) 0.1 MG tablet; Take 1 tablet by mouth 2 (Two) Times a Day. Take as needed for anxiety.  Insomnia.  Chills or sweats  Dispense: 60 tablet; Refill:  2    5. Insomnia, unspecified type  -     cloNIDine (Catapres) 0.1 MG tablet; Take 1 tablet by mouth 2 (Two) Times a Day. Take as needed for anxiety.  Insomnia.  Chills or sweats  Dispense: 60 tablet; Refill: 2    Other orders  -     SCANNED - LABS        Visit Diagnoses:    ICD-10-CM ICD-9-CM   1. Medication management  Z79.899 V58.69       PLAN:  1. Safety: No acute safety concerns  2. Risk Assessment: Risk of self-harm acutely is low. Risk of self-harm chronically is also low, but could be further elevated in the event of treatment noncompliance and/or AODA.    TREATMENT PLAN/GOALS: Continue supportive psychotherapy efforts and medications as indicated. Treatment and medication options discussed during today's visit. Patient acknowledged and verbally consented to continue with current treatment plan and was educated on the importance of compliance with treatment and follow-up appointments.    MEDICATION ISSUES:  ROMAN reviewed as expected.  Discussed medication options and treatment plan of prescribed medication as well as the risks, benefits, and side effects including potential falls, possible impaired driving and metabolic adversities among others. Patient is agreeable to call the office with any worsening of symptoms or onset of side effects. Patient is agreeable to call 911 or go to the nearest ER should he/she begin having SI/HI. No medication side effects or related complaints today.     MEDS ORDERED DURING VISIT:  No orders of the defined types were placed in this encounter.      No follow-ups on file.           This document has been electronically signed by RENAN De Dios  August 22, 2022 10:39 EDT      Part of this note may be an electronic transcription/translation of spoken language to printed text using the Dragon Dictation System.

## 2022-09-19 ENCOUNTER — TELEMEDICINE (OUTPATIENT)
Dept: PSYCHIATRY | Facility: CLINIC | Age: 32
End: 2022-09-19

## 2022-09-19 VITALS
WEIGHT: 249 LBS | DIASTOLIC BLOOD PRESSURE: 112 MMHG | HEART RATE: 64 BPM | BODY MASS INDEX: 28.81 KG/M2 | HEIGHT: 78 IN | SYSTOLIC BLOOD PRESSURE: 149 MMHG

## 2022-09-19 DIAGNOSIS — F11.20 OPIOID TYPE DEPENDENCE, CONTINUOUS: Primary | ICD-10-CM

## 2022-09-19 DIAGNOSIS — Z79.899 MEDICATION MANAGEMENT: ICD-10-CM

## 2022-09-19 PROCEDURE — 99213 OFFICE O/P EST LOW 20 MIN: CPT | Performed by: NURSE PRACTITIONER

## 2022-09-19 RX ORDER — BUPRENORPHINE HYDROCHLORIDE AND NALOXONE HYDROCHLORIDE DIHYDRATE 8; 2 MG/1; MG/1
2 TABLET SUBLINGUAL DAILY
Qty: 56 TABLET | Refills: 0 | Status: SHIPPED | OUTPATIENT
Start: 2022-09-19 | End: 2022-10-19 | Stop reason: SDUPTHER

## 2022-09-19 NOTE — PROGRESS NOTES
This provider is located at [facility]. The Patient is seen remotely located at the [facility] ([facility]) using Video. Patient is being seen via telehealth and confirm that they are in a secure environment for this session. The patient's condition being diagnosed/treated is appropriate for telemedicine. Provider identified as Daniel Olivas as well as credentials APRN MSN FNP-C ANTONIO-REYNOLD.   The client/patient gave consent to be seen remotely, and when consent is given they understand that the consent allows for patient identifiable information to be sent to a third party as needed.   They may refuse to be seen remotely at any time. The electronic data is encrypted and password protected, and the patient has been advised of the potential risks to privacy not withstanding such measures.    Chief Complaint/History of Present Illness: Follow Up buprenorphine/naloxone Medicated Assisted Treatment for Opiate Use Disorder     Patient/Client Concerns/Updates: Continuing Zoloft Vistaril, seroquel and clonidine for anxiety patient reports he is doing well with no concerns or side effects; no depressive episodes and no suicidal thoughts or ideations; continuing to monitor for decreasing gabapentin level; discussed patient's elevated blood pressure reading today patient denies any cardiopulmonary symptoms and states he has never been made aware of an elevated blood pressure reading; advised patient to take his blood pressure at home at morning and in the evening and keep a log of said recordings and follow up with primary care     Triggers (Persons/Places/Things/Events/Thought/Emotions): Chronic anxiety    Cravings: Denies cravings    Relapse Prevention: Counseling    Urine Drug Screen (today's visit) discussed: Positive buprenorphine and positive THC    UDS Confirmation (Most recent/Resulted): Positive buprenorphine/nor-buprenorphine, no concerns for urine tampering otherwise positive nonprescribed  gabapentin (downward trend)    Most recent pertinent laboratory studies reviewed: 3/16/2022-LFTs within normal limits; hepatitis C virus not detected    ROMAN (PDMP) Reviewed for Current/Active Medications: buprenorphine/naloxone as reviewed today    Past Surgical History:  Past Surgical History:   Procedure Laterality Date   • NO PAST SURGERIES         Problem List:  Patient Active Problem List   Diagnosis   • Alcohol-induced acute pancreatitis   • Pancreatitis   • Chronic pain of right knee       Allergy:   No Known Allergies     Current Medications:   Current Outpatient Medications   Medication Sig Dispense Refill   • buprenorphine-naloxone (SUBOXONE) 8-2 MG per SL tablet Place 2 tablets under the tongue Daily. 56 tablet 0   • cloNIDine (Catapres) 0.1 MG tablet Take 1 tablet by mouth 2 (Two) Times a Day. Take as needed for anxiety.  Insomnia.  Chills or sweats 60 tablet 2   • hydrOXYzine pamoate (Vistaril) 50 MG capsule Take 1 capsule by mouth 4 (Four) Times a Day As Needed for Anxiety (and/or insomia). Take 1 to 2 tablets as needed for anxiety every 6 hours 90 capsule 2   • ibuprofen (ADVIL,MOTRIN) 600 MG tablet Take 1 tablet by mouth Every 6 (Six) Hours As Needed for Mild Pain . 30 tablet 2   • QUEtiapine (SEROquel) 25 MG tablet Take 1 tablet by mouth Every Night. 30 tablet 2   • cephalexin (KEFLEX) 500 MG capsule Take 1 capsule by mouth 3 (Three) Times a Day. 30 capsule 0   • mupirocin (BACTROBAN) 2 % ointment Apply  topically to the appropriate area as directed 3 (Three) Times a Day. 15 g 0   • naloxone (NARCAN) 4 MG/0.1ML nasal spray 1 spray into the nostril(s) as directed by provider As Needed (Opiate oversedation). Spray in 1 nostril every 2 to 3 minutes call 911 2 each 2   • orphenadrine (NORFLEX) 100 MG 12 hr tablet Take 1 tablet by mouth 2 (Two) Times a Day As Needed for Muscle Spasms or Mild Pain . 14 tablet 0   • sertraline (Zoloft) 50 MG tablet Take 1 tablet by mouth Daily for 30 days. 30 tablet 3      No current facility-administered medications for this visit.       Past Medical History:  Past Medical History:   Diagnosis Date   • Alcohol-induced acute pancreatitis 6/27/2019   • Alcoholism (HCC)    • Drug use     Amphetamines and Suboxone   • Fatty liver    • Hepatitis C antibody test positive 2019   • Medical non-compliance    • Pancreatitis    • Smoker          Social History     Socioeconomic History   • Marital status:    Tobacco Use   • Smoking status: Current Every Day Smoker     Packs/day: 0.50     Types: Cigarettes   • Smokeless tobacco: Never Used   Vaping Use   • Vaping Use: Never used   Substance and Sexual Activity   • Alcohol use: Yes     Alcohol/week: 8.0 standard drinks     Types: 8 Shots of liquor per week     Comment: 5-6 double shots   • Drug use: Yes     Comment: suboxone as prescribed   • Sexual activity: Defer         Family History   Problem Relation Age of Onset   • Cancer Maternal Grandmother    • No Known Problems Mother    • No Known Problems Father          Mental Status Exam:   Hygiene:   good  Cooperation:  Cooperative  Eye Contact:  Good  Psychomotor Behavior:  Appropriate  Affect:  Appropriate  Mood: normal  Speech:  Normal  Thought Process:  Goal directed  Thought Content:  Normal  Suicidal:  None  Homicidal:  None  Hallucinations:  None  Delusion:  None  Memory:  Intact  Orientation:  Grossly intact  Reliability:  good  Insight:  Good  Judgement:  Good  Impulse Control:  Good         Review of Systems:  Review of Systems   Constitutional: Negative for activity change, chills, diaphoresis and fatigue.   Respiratory: Negative for apnea, cough and shortness of breath.    Cardiovascular: Negative for chest pain, palpitations and leg swelling.   Gastrointestinal: Negative for abdominal pain, constipation, diarrhea, nausea and vomiting.   Genitourinary: Negative for difficulty urinating.   Musculoskeletal: Negative for arthralgias.   Skin: Negative for rash.   Neurological:  "Negative for dizziness, weakness and headaches.   Psychiatric/Behavioral: Negative for agitation, self-injury, sleep disturbance and suicidal ideas. The patient is nervous/anxious.          Physical Exam:  Physical Exam  Vitals reviewed.   Constitutional:       General: He is not in acute distress.     Appearance: Normal appearance. He is not ill-appearing or toxic-appearing.   Pulmonary:      Effort: Pulmonary effort is normal.   Musculoskeletal:         General: Normal range of motion.   Neurological:      General: No focal deficit present.      Mental Status: He is alert and oriented to person, place, and time.   Psychiatric:         Attention and Perception: Attention and perception normal.         Mood and Affect: Mood normal. Mood is not anxious or depressed.         Speech: Speech normal.         Behavior: Behavior normal. Behavior is cooperative.         Thought Content: Thought content normal.         Cognition and Memory: Cognition and memory normal.         Judgment: Judgment normal.         Vital Signs:   BP (!) 149/112 Comment: made provider aware.  Pulse 64   Ht 198.1 cm (77.99\")   Wt 113 kg (249 lb)   BMI 28.78 kg/m²      Lab Results:   Telemedicine on 09/19/2022   Component Date Value Ref Range Status   • External Amphetamine Screen Urine 09/19/2022 Negative   Final   • External Benzodiazepine Screen Uri* 09/19/2022 Negative   Final   • External Cocaine Screen Urine 09/19/2022 Negative   Final   • External THC Screen Urine 09/19/2022 Positive (A)  Final   • External Methadone Screen Urine 09/19/2022 Negative   Final   • External Methamphetamine Screen Ur* 09/19/2022 Negative   Final   • External Oxycodone Screen Urine 09/19/2022 Negative   Final   • External Buprenorphine Screen Urine 09/19/2022 Positive (A)  Final   • External MDMA 09/19/2022 Negative   Final   • External Opiates Screen Urine 09/19/2022 Negative   Final   Telemedicine on 08/22/2022   Component Date Value Ref Range Status   • " External Amphetamine Screen Urine 08/22/2022 Negative   Final   • External Benzodiazepine Screen Uri* 08/22/2022 Negative   Final   • External Cocaine Screen Urine 08/22/2022 Negative   Final   • External THC Screen Urine 08/22/2022 Negative   Final   • External Methadone Screen Urine 08/22/2022 Negative   Final   • External Methamphetamine Screen Ur* 08/22/2022 Negative   Final   • External Oxycodone Screen Urine 08/22/2022 Negative   Final   • External Buprenorphine Screen Urine 08/22/2022 Positive (A)  Final   • External MDMA 08/22/2022 Negative   Final   • External Opiates Screen Urine 08/22/2022 Negative   Final   Telemedicine on 08/01/2022   Component Date Value Ref Range Status   • External Amphetamine Screen Urine 08/01/2022 Negative   Final-Edited   • External Benzodiazepine Screen Uri* 08/01/2022 Negative   Final-Edited   • External Cocaine Screen Urine 08/01/2022 Negative   Final-Edited   • External THC Screen Urine 08/01/2022 Negative   Corrected   • External Methadone Screen Urine 08/01/2022 Negative   Final-Edited   • External Methamphetamine Screen Ur* 08/01/2022 Negative   Final-Edited   • External Oxycodone Screen Urine 08/01/2022 Negative   Final-Edited   • External Buprenorphine Screen Urine 08/01/2022 Positive (A)  Final-Edited   • External MDMA 08/01/2022 Negative   Final-Edited   • External Opiates Screen Urine 08/01/2022 Negative   Final-Edited   Telemedicine on 07/18/2022   Component Date Value Ref Range Status   • External Amphetamine Screen Urine 07/18/2022 Negative   Final   • External Benzodiazepine Screen Uri* 07/18/2022 Negative   Final   • External Cocaine Screen Urine 07/18/2022 Negative   Final   • External THC Screen Urine 07/18/2022 Negative   Final   • External Methadone Screen Urine 07/18/2022 Negative   Final   • External Methamphetamine Screen Ur* 07/18/2022 Negative   Final   • External Oxycodone Screen Urine 07/18/2022 Negative   Final   • External Buprenorphine Screen Urine  07/18/2022 Positive (A)  Final   • External MDMA 07/18/2022 Negative   Final   • External Opiates Screen Urine 07/18/2022 Negative   Final   Telemedicine on 06/23/2022   Component Date Value Ref Range Status   • External Amphetamine Screen Urine 06/23/2022 Negative   Final   • External Benzodiazepine Screen Uri* 06/23/2022 Negative   Final   • External Cocaine Screen Urine 06/23/2022 Negative   Final   • External THC Screen Urine 06/23/2022 Negative   Final   • External Methadone Screen Urine 06/23/2022 Negative   Final   • External Methamphetamine Screen Ur* 06/23/2022 Negative   Final   • External Oxycodone Screen Urine 06/23/2022 Negative   Final   • External Buprenorphine Screen Urine 06/23/2022 Positive (A)  Final   • External MDMA 06/23/2022 Negative   Final   • External Opiates Screen Urine 06/23/2022 Negative   Final   Telemedicine on 06/09/2022   Component Date Value Ref Range Status   • External Amphetamine Screen Urine 06/09/2022 Positive (A)  Final   • External Benzodiazepine Screen Uri* 06/09/2022 Negative   Final   • External Cocaine Screen Urine 06/09/2022 Negative   Final   • External THC Screen Urine 06/09/2022 Positive (A)  Final   • External Methadone Screen Urine 06/09/2022 Negative   Final   • External Methamphetamine Screen Ur* 06/09/2022 Negative   Final   • External Oxycodone Screen Urine 06/09/2022 Negative   Final   • External Buprenorphine Screen Urine 06/09/2022 Positive (A)  Final   • External MDMA 06/09/2022 Negative   Final   • External Opiates Screen Urine 06/09/2022 Negative   Final   Office Visit on 06/02/2022   Component Date Value Ref Range Status   • External Amphetamine Screen Urine 06/02/2022 Positive (A)  Final   • External Benzodiazepine Screen Uri* 06/02/2022 Negative   Final   • External Cocaine Screen Urine 06/02/2022 Negative   Final   • External THC Screen Urine 06/02/2022 Positive (A)  Final   • External Methadone Screen Urine 06/02/2022 Negative   Final   • External  Methamphetamine Screen Ur* 06/02/2022 Positive (A)  Final   • External Oxycodone Screen Urine 06/02/2022 Negative   Final   • External Buprenorphine Screen Urine 06/02/2022 Positive (A)  Final   • External MDMA 06/02/2022 Negative   Final   • External Opiates Screen Urine 06/02/2022 Negative   Final   Telemedicine on 05/11/2022   Component Date Value Ref Range Status   • External Amphetamine Screen Urine 05/11/2022 Negative   Final   • External Benzodiazepine Screen Uri* 05/11/2022 Negative   Final   • External Cocaine Screen Urine 05/11/2022 Negative   Final   • External THC Screen Urine 05/11/2022 Positive (A)  Final   • External Methadone Screen Urine 05/11/2022 Negative   Final   • External Methamphetamine Screen Ur* 05/11/2022 Negative   Final   • External Oxycodone Screen Urine 05/11/2022 Negative   Final   • External Buprenorphine Screen Urine 05/11/2022 Positive (A)  Final   • External MDMA 05/11/2022 Negative   Final   • External Opiates Screen Urine 05/11/2022 Negative   Final   Telemedicine on 04/27/2022   Component Date Value Ref Range Status   • External Amphetamine Screen Urine 04/27/2022 Negative   Final   • External Benzodiazepine Screen Uri* 04/27/2022 Negative   Final   • External Cocaine Screen Urine 04/27/2022 Negative   Final   • External THC Screen Urine 04/27/2022 Positive (A)  Final   • External Methadone Screen Urine 04/27/2022 Negative   Final   • External Methamphetamine Screen Ur* 04/27/2022 Negative   Final   • External Oxycodone Screen Urine 04/27/2022 Negative   Final   • External Buprenorphine Screen Urine 04/27/2022 Positive (A)  Final   • External MDMA 04/27/2022 Negative   Final   • External Opiates Screen Urine 04/27/2022 Negative   Final   Telemedicine on 04/20/2022   Component Date Value Ref Range Status   • External Amphetamine Screen Urine 04/20/2022 Negative   Final   • External Benzodiazepine Screen Uri* 04/20/2022 Negative   Final   • External Cocaine Screen Urine  04/20/2022 Negative   Final   • External THC Screen Urine 04/20/2022 Negative   Final   • External Methadone Screen Urine 04/20/2022 Negative   Final   • External Methamphetamine Screen Ur* 04/20/2022 Negative   Final   • External Oxycodone Screen Urine 04/20/2022 Negative   Final   • External Buprenorphine Screen Urine 04/20/2022 Positive   Final   • External MDMA 04/20/2022 Negative   Final   • External Opiates Screen Urine 04/20/2022 Negative   Final   There may be more visits with results that are not included.         Assessment & Plan   Diagnoses and all orders for this visit:    1. Opioid type dependence, continuous (HCC) (Primary)  -     buprenorphine-naloxone (SUBOXONE) 8-2 MG per SL tablet; Place 2 tablets under the tongue Daily.  Dispense: 56 tablet; Refill: 0    2. Medication management  -     [facility] Drug Screen    Other orders  -     SCANNED - LABS        Visit Diagnoses:    ICD-10-CM ICD-9-CM   1. Opioid type dependence, continuous (HCC)  F11.20 304.01   2. Medication management  Z79.899 V58.69       PLAN:  1. Safety: No acute safety concerns  2. Risk Assessment: Risk of self-harm acutely is low. Risk of self-harm chronically is also low, but could be further elevated in the event of treatment noncompliance and/or AODA.    TREATMENT PLAN/GOALS: Continue supportive psychotherapy efforts and medications as indicated. Treatment and medication options discussed during today's visit. Patient acknowledged and verbally consented to continue with current treatment plan and was educated on the importance of compliance with treatment and follow-up appointments.    MEDICATION ISSUES:  RMOAN reviewed as expected.  Discussed medication options and treatment plan of prescribed medication as well as the risks, benefits, and side effects including potential falls, possible impaired driving and metabolic adversities among others. Patient is agreeable to call the office with any worsening of symptoms or onset of side  effects. Patient is agreeable to call 911 or go to the nearest ER should he/she begin having SI/HI. No medication side effects or related complaints today.     MEDS ORDERED DURING VISIT:  New Medications Ordered This Visit   Medications   • buprenorphine-naloxone (SUBOXONE) 8-2 MG per SL tablet     Sig: Place 2 tablets under the tongue Daily.     Dispense:  56 tablet     Refill:  0     NADEAN:[id number]       No follow-ups on file.           This document has been electronically signed by RENAN De Dios  September 19, 2022 10:09 EDT      Part of this note may be an electronic transcription/translation of spoken language to printed text using the Dragon Dictation System.

## 2022-10-19 ENCOUNTER — TELEMEDICINE (OUTPATIENT)
Dept: PSYCHIATRY | Facility: CLINIC | Age: 32
End: 2022-10-19

## 2022-10-19 VITALS
DIASTOLIC BLOOD PRESSURE: 114 MMHG | HEART RATE: 74 BPM | SYSTOLIC BLOOD PRESSURE: 152 MMHG | HEIGHT: 78 IN | BODY MASS INDEX: 28.42 KG/M2 | WEIGHT: 245.6 LBS

## 2022-10-19 DIAGNOSIS — F41.1 GENERALIZED ANXIETY DISORDER: ICD-10-CM

## 2022-10-19 DIAGNOSIS — Z79.899 MEDICATION MANAGEMENT: ICD-10-CM

## 2022-10-19 DIAGNOSIS — F11.20 OPIOID TYPE DEPENDENCE, CONTINUOUS: Primary | ICD-10-CM

## 2022-10-19 DIAGNOSIS — F32.A DEPRESSION, UNSPECIFIED DEPRESSION TYPE: ICD-10-CM

## 2022-10-19 PROCEDURE — 99214 OFFICE O/P EST MOD 30 MIN: CPT | Performed by: NURSE PRACTITIONER

## 2022-10-19 RX ORDER — BUPRENORPHINE HYDROCHLORIDE AND NALOXONE HYDROCHLORIDE DIHYDRATE 8; 2 MG/1; MG/1
2 TABLET SUBLINGUAL DAILY
Qty: 56 TABLET | Refills: 0 | Status: SHIPPED | OUTPATIENT
Start: 2022-10-19 | End: 2022-11-21 | Stop reason: SDUPTHER

## 2022-10-19 RX ORDER — AMOXICILLIN 500 MG/1
CAPSULE ORAL
COMMUNITY
Start: 2022-10-06 | End: 2023-02-13

## 2022-10-19 RX ORDER — LEVOCETIRIZINE DIHYDROCHLORIDE 5 MG/1
5 TABLET, FILM COATED ORAL DAILY
COMMUNITY
Start: 2022-09-17 | End: 2023-02-13

## 2022-10-19 RX ORDER — ONDANSETRON 4 MG/1
TABLET, FILM COATED ORAL
COMMUNITY
Start: 2022-10-07

## 2022-10-19 NOTE — PROGRESS NOTES
This provider is located at [facility]. The Patient is seen remotely located at the [facility] ([facility]) using Video. Patient is being seen via telehealth and confirm that they are in a secure environment for this session. The patient's condition being diagnosed/treated is appropriate for telemedicine. Provider identified as Daniel Olivas as well as credentials APRN MSN FNP-C ANTONIO-REYNOLD.   The client/patient gave consent to be seen remotely, and when consent is given they understand that the consent allows for patient identifiable information to be sent to a third party as needed.   They may refuse to be seen remotely at any time. The electronic data is encrypted and password protected, and the patient has been advised of the potential risks to privacy not withstanding such measures.    Chief Complaint/History of Present Illness: Follow Up buprenorphine/naloxone Medicated Assisted Treatment for Opiate Use Disorder     Patient/Client Concerns/Updates: Continuing Zoloft Vistaril, seroquel and clonidine for anxiety, no concerns or side effects with these medications, no depressive exacerbations and no suicidal thoughts  -Patient did not follow up with primary care for hypertensive evaluation as I strongly recommended last month, patient's blood pressure continues to be high as evidenced by today's vital signs; patient also reports last night while lying at rest he experienced acute onset chest pressure-I advised him this is highly concerning for cardiovascular pathology and I recommend he go to the emergency department now to rule out acute cardiac pathology, patient expresses understanding that he could be having symptoms related to an acute myocardial infarction patient agrees and states he will go to the emergency department    Triggers (Persons/Places/Things/Events/Thought/Emotions): Life stress and anxiety    Cravings: Denies cravings    Relapse Prevention: Counseling    Urine Drug Screen  (today's visit) discussed: Positive buprenorphine, otherwise positive for THC    UDS Confirmation (Most recent/Resulted): Positive buprenorphine/nor-buprenorphine, no concerns for urine tampering otherwise negative for substances tested    Most recent pertinent laboratory studies reviewed: 3/16/2022-LFTs within normal limits; hepatitis C virus not detected    ROMAN (PDMP) Reviewed for Current/Active Medications: buprenorphine/naloxone as reviewed today    Past Surgical History:  Past Surgical History:   Procedure Laterality Date   • NO PAST SURGERIES         Problem List:  Patient Active Problem List   Diagnosis   • Alcohol-induced acute pancreatitis   • Pancreatitis   • Chronic pain of right knee       Allergy:   No Known Allergies     Current Medications:   Current Outpatient Medications   Medication Sig Dispense Refill   • amoxicillin (AMOXIL) 500 MG capsule TAKE ONE CAPSULE BY MOUTH TWO TIMES PER DAY UNTIL ALL GONE FOR INFECTION     • buprenorphine-naloxone (SUBOXONE) 8-2 MG per SL tablet Place 2 tablets under the tongue Daily. 56 tablet 0   • cloNIDine (Catapres) 0.1 MG tablet Take 1 tablet by mouth 2 (Two) Times a Day. Take as needed for anxiety.  Insomnia.  Chills or sweats 60 tablet 2   • hydrOXYzine pamoate (Vistaril) 50 MG capsule Take 1 capsule by mouth 4 (Four) Times a Day As Needed for Anxiety (and/or insomia). Take 1 to 2 tablets as needed for anxiety every 6 hours 90 capsule 2   • ibuprofen (ADVIL,MOTRIN) 600 MG tablet Take 1 tablet by mouth Every 6 (Six) Hours As Needed for Mild Pain . 30 tablet 2   • levocetirizine (XYZAL) 5 MG tablet Take 1 tablet by mouth Daily. For allergies     • ondansetron (ZOFRAN) 4 MG tablet TAKE ONE TABLET BY MOUTH EVERY 8 HOURS AS NEEDED FOR NAUSEA & VOMITING     • QUEtiapine (SEROquel) 25 MG tablet Take 1 tablet by mouth Every Night. 30 tablet 2   • sertraline (Zoloft) 50 MG tablet Take 1 tablet by mouth Daily for 30 days. 30 tablet 3   • cephalexin (KEFLEX) 500 MG  capsule Take 1 capsule by mouth 3 (Three) Times a Day. 30 capsule 0   • mupirocin (BACTROBAN) 2 % ointment Apply  topically to the appropriate area as directed 3 (Three) Times a Day. 15 g 0   • naloxone (NARCAN) 4 MG/0.1ML nasal spray 1 spray into the nostril(s) as directed by provider As Needed (Opiate oversedation). Spray in 1 nostril every 2 to 3 minutes call 911 2 each 2   • orphenadrine (NORFLEX) 100 MG 12 hr tablet Take 1 tablet by mouth 2 (Two) Times a Day As Needed for Muscle Spasms or Mild Pain . 14 tablet 0     No current facility-administered medications for this visit.       Past Medical History:  Past Medical History:   Diagnosis Date   • Alcohol-induced acute pancreatitis 6/27/2019   • Alcoholism (HCC)    • Drug use     Amphetamines and Suboxone   • Fatty liver    • Hepatitis C antibody test positive 2019   • Medical non-compliance    • Pancreatitis    • Smoker          Social History     Socioeconomic History   • Marital status:    Tobacco Use   • Smoking status: Every Day     Packs/day: 0.50     Types: Cigarettes   • Smokeless tobacco: Never   Vaping Use   • Vaping Use: Never used   Substance and Sexual Activity   • Alcohol use: Yes     Alcohol/week: 8.0 standard drinks     Types: 8 Shots of liquor per week     Comment: 5-6 double shots   • Drug use: Yes     Comment: suboxone as prescribed   • Sexual activity: Defer         Family History   Problem Relation Age of Onset   • Cancer Maternal Grandmother    • No Known Problems Mother    • No Known Problems Father          Mental Status Exam:   Hygiene:   good  Cooperation:  Cooperative  Eye Contact:  Good  Psychomotor Behavior:  Appropriate  Affect:  Appropriate  Mood: anxious  Speech:  Normal  Thought Process:  Goal directed  Thought Content:  Normal  Suicidal:  None  Homicidal:  None  Hallucinations:  None  Delusion:  None  Memory:  Intact  Orientation:  Grossly intact  Reliability:  good  Insight:  Good  Judgement:  Good  Impulse Control:   "Good         Review of Systems:  Review of Systems   Constitutional: Negative for activity change, chills, diaphoresis and fatigue.   Respiratory: Negative for apnea, cough and shortness of breath.    Cardiovascular: Positive for chest pain. Negative for palpitations and leg swelling.   Gastrointestinal: Negative for abdominal pain, constipation, diarrhea, nausea and vomiting.   Genitourinary: Negative for difficulty urinating.   Musculoskeletal: Negative for arthralgias.   Skin: Negative for rash.   Neurological: Negative for dizziness, weakness and headaches.   Psychiatric/Behavioral: Negative for agitation, self-injury, sleep disturbance and suicidal ideas. The patient is nervous/anxious.          Physical Exam:  Physical Exam  Vitals reviewed.   Constitutional:       General: He is not in acute distress.     Appearance: Normal appearance. He is not ill-appearing or toxic-appearing.   Pulmonary:      Effort: Pulmonary effort is normal.   Musculoskeletal:         General: Normal range of motion.   Neurological:      General: No focal deficit present.      Mental Status: He is alert and oriented to person, place, and time.   Psychiatric:         Attention and Perception: Attention and perception normal.         Mood and Affect: Mood is anxious. Mood is not depressed.         Speech: Speech normal.         Behavior: Behavior normal. Behavior is cooperative.         Thought Content: Thought content normal.         Cognition and Memory: Cognition and memory normal.         Judgment: Judgment normal.         Vital Signs:   BP (!) 152/114 Comment: MADE PROVIDER AWARE.  Pulse 74   Ht 198.1 cm (77.99\")   Wt 111 kg (245 lb 9.6 oz)   BMI 28.39 kg/m²      Lab Results:   Telemedicine on 10/19/2022   Component Date Value Ref Range Status   • External Amphetamine Screen Urine 10/19/2022 Negative   Final   • External Benzodiazepine Screen Uri* 10/19/2022 Negative   Final   • External Cocaine Screen Urine 10/19/2022 Negative  "  Final   • External THC Screen Urine 10/19/2022 Positive (A)   Final   • External Methadone Screen Urine 10/19/2022 Negative   Final   • External Methamphetamine Screen Ur* 10/19/2022 Negative   Final   • External Oxycodone Screen Urine 10/19/2022 Negative   Final   • External Buprenorphine Screen Urine 10/19/2022 Positive (A)   Final   • External MDMA 10/19/2022 Negative   Final   • External Opiates Screen Urine 10/19/2022 Negative   Final   Telemedicine on 09/19/2022   Component Date Value Ref Range Status   • External Amphetamine Screen Urine 09/19/2022 Negative   Final   • External Benzodiazepine Screen Uri* 09/19/2022 Negative   Final   • External Cocaine Screen Urine 09/19/2022 Negative   Final   • External THC Screen Urine 09/19/2022 Positive (A)   Final   • External Methadone Screen Urine 09/19/2022 Negative   Final   • External Methamphetamine Screen Ur* 09/19/2022 Negative   Final   • External Oxycodone Screen Urine 09/19/2022 Negative   Final   • External Buprenorphine Screen Urine 09/19/2022 Positive (A)   Final   • External MDMA 09/19/2022 Negative   Final   • External Opiates Screen Urine 09/19/2022 Negative   Final   Telemedicine on 08/22/2022   Component Date Value Ref Range Status   • External Amphetamine Screen Urine 08/22/2022 Negative   Final   • External Benzodiazepine Screen Uri* 08/22/2022 Negative   Final   • External Cocaine Screen Urine 08/22/2022 Negative   Final   • External THC Screen Urine 08/22/2022 Negative   Final   • External Methadone Screen Urine 08/22/2022 Negative   Final   • External Methamphetamine Screen Ur* 08/22/2022 Negative   Final   • External Oxycodone Screen Urine 08/22/2022 Negative   Final   • External Buprenorphine Screen Urine 08/22/2022 Positive (A)   Final   • External MDMA 08/22/2022 Negative   Final   • External Opiates Screen Urine 08/22/2022 Negative   Final   Telemedicine on 08/01/2022   Component Date Value Ref Range Status   • External Amphetamine Screen  Urine 08/01/2022 Negative   Final-Edited   • External Benzodiazepine Screen Uri* 08/01/2022 Negative   Final-Edited   • External Cocaine Screen Urine 08/01/2022 Negative   Final-Edited   • External THC Screen Urine 08/01/2022 Negative   Corrected   • External Methadone Screen Urine 08/01/2022 Negative   Final-Edited   • External Methamphetamine Screen Ur* 08/01/2022 Negative   Final-Edited   • External Oxycodone Screen Urine 08/01/2022 Negative   Final-Edited   • External Buprenorphine Screen Urine 08/01/2022 Positive (A)   Final-Edited   • External MDMA 08/01/2022 Negative   Final-Edited   • External Opiates Screen Urine 08/01/2022 Negative   Final-Edited   Telemedicine on 07/18/2022   Component Date Value Ref Range Status   • External Amphetamine Screen Urine 07/18/2022 Negative   Final   • External Benzodiazepine Screen Uri* 07/18/2022 Negative   Final   • External Cocaine Screen Urine 07/18/2022 Negative   Final   • External THC Screen Urine 07/18/2022 Negative   Final   • External Methadone Screen Urine 07/18/2022 Negative   Final   • External Methamphetamine Screen Ur* 07/18/2022 Negative   Final   • External Oxycodone Screen Urine 07/18/2022 Negative   Final   • External Buprenorphine Screen Urine 07/18/2022 Positive (XH)   Final   • External MDMA 07/18/2022 Negative   Final   • External Opiates Screen Urine 07/18/2022 Negative   Final   Telemedicine on 06/23/2022   Component Date Value Ref Range Status   • External Amphetamine Screen Urine 06/23/2022 Negative   Final   • External Benzodiazepine Screen Uri* 06/23/2022 Negative   Final   • External Cocaine Screen Urine 06/23/2022 Negative   Final   • External THC Screen Urine 06/23/2022 Negative   Final   • External Methadone Screen Urine 06/23/2022 Negative   Final   • External Methamphetamine Screen Ur* 06/23/2022 Negative   Final   • External Oxycodone Screen Urine 06/23/2022 Negative   Final   • External Buprenorphine Screen Urine 06/23/2022 Positive (A)    Final   • External MDMA 06/23/2022 Negative   Final   • External Opiates Screen Urine 06/23/2022 Negative   Final   Telemedicine on 06/09/2022   Component Date Value Ref Range Status   • External Amphetamine Screen Urine 06/09/2022 Positive (A)   Final   • External Benzodiazepine Screen Uri* 06/09/2022 Negative   Final   • External Cocaine Screen Urine 06/09/2022 Negative   Final   • External THC Screen Urine 06/09/2022 Positive (A)   Final   • External Methadone Screen Urine 06/09/2022 Negative   Final   • External Methamphetamine Screen Ur* 06/09/2022 Negative   Final   • External Oxycodone Screen Urine 06/09/2022 Negative   Final   • External Buprenorphine Screen Urine 06/09/2022 Positive (A)   Final   • External MDMA 06/09/2022 Negative   Final   • External Opiates Screen Urine 06/09/2022 Negative   Final   Office Visit on 06/02/2022   Component Date Value Ref Range Status   • External Amphetamine Screen Urine 06/02/2022 Positive (A)   Final   • External Benzodiazepine Screen Uri* 06/02/2022 Negative   Final   • External Cocaine Screen Urine 06/02/2022 Negative   Final   • External THC Screen Urine 06/02/2022 Positive (A)   Final   • External Methadone Screen Urine 06/02/2022 Negative   Final   • External Methamphetamine Screen Ur* 06/02/2022 Positive (A)   Final   • External Oxycodone Screen Urine 06/02/2022 Negative   Final   • External Buprenorphine Screen Urine 06/02/2022 Positive (A)   Final   • External MDMA 06/02/2022 Negative   Final   • External Opiates Screen Urine 06/02/2022 Negative   Final   Telemedicine on 05/11/2022   Component Date Value Ref Range Status   • External Amphetamine Screen Urine 05/11/2022 Negative   Final   • External Benzodiazepine Screen Uri* 05/11/2022 Negative   Final   • External Cocaine Screen Urine 05/11/2022 Negative   Final   • External THC Screen Urine 05/11/2022 Positive (A)   Final   • External Methadone Screen Urine 05/11/2022 Negative   Final   • External  Methamphetamine Screen Ur* 05/11/2022 Negative   Final   • External Oxycodone Screen Urine 05/11/2022 Negative   Final   • External Buprenorphine Screen Urine 05/11/2022 Positive (A)   Final   • External MDMA 05/11/2022 Negative   Final   • External Opiates Screen Urine 05/11/2022 Negative   Final   Telemedicine on 04/27/2022   Component Date Value Ref Range Status   • External Amphetamine Screen Urine 04/27/2022 Negative   Final   • External Benzodiazepine Screen Uri* 04/27/2022 Negative   Final   • External Cocaine Screen Urine 04/27/2022 Negative   Final   • External THC Screen Urine 04/27/2022 Positive (A)   Final   • External Methadone Screen Urine 04/27/2022 Negative   Final   • External Methamphetamine Screen Ur* 04/27/2022 Negative   Final   • External Oxycodone Screen Urine 04/27/2022 Negative   Final   • External Buprenorphine Screen Urine 04/27/2022 Positive (A)   Final   • External MDMA 04/27/2022 Negative   Final   • External Opiates Screen Urine 04/27/2022 Negative   Final   There may be more visits with results that are not included.         Assessment & Plan   Diagnoses and all orders for this visit:    1. Opioid type dependence, continuous (HCC) (Primary)  -     buprenorphine-naloxone (SUBOXONE) 8-2 MG per SL tablet; Place 2 tablets under the tongue Daily.  Dispense: 56 tablet; Refill: 0    2. Medication management  -     [facility] Drug Screen    3. Generalized anxiety disorder  -     sertraline (Zoloft) 50 MG tablet; Take 1 tablet by mouth Daily for 30 days.  Dispense: 30 tablet; Refill: 3    4. Depression, unspecified depression type  -     sertraline (Zoloft) 50 MG tablet; Take 1 tablet by mouth Daily for 30 days.  Dispense: 30 tablet; Refill: 3        Visit Diagnoses:    ICD-10-CM ICD-9-CM   1. Opioid type dependence, continuous (HCC)  F11.20 304.01   2. Medication management  Z79.899 V58.69   3. Generalized anxiety disorder  F41.1 300.02   4. Depression, unspecified depression type  F32.A 311        PLAN:  1. Safety: No acute safety concerns  2. Risk Assessment: Risk of self-harm acutely is low. Risk of self-harm chronically is also low, but could be further elevated in the event of treatment noncompliance and/or AODA.    TREATMENT PLAN/GOALS: Continue supportive psychotherapy efforts and medications as indicated. Treatment and medication options discussed during today's visit. Patient acknowledged and verbally consented to continue with current treatment plan and was educated on the importance of compliance with treatment and follow-up appointments.    MEDICATION ISSUES:  ROMAN reviewed as expected.  Discussed medication options and treatment plan of prescribed medication as well as the risks, benefits, and side effects including potential falls, possible impaired driving and metabolic adversities among others. Patient is agreeable to call the office with any worsening of symptoms or onset of side effects. Patient is agreeable to call 911 or go to the nearest ER should he/she begin having SI/HI. No medication side effects or related complaints today.     MEDS ORDERED DURING VISIT:  New Medications Ordered This Visit   Medications   • buprenorphine-naloxone (SUBOXONE) 8-2 MG per SL tablet     Sig: Place 2 tablets under the tongue Daily.     Dispense:  56 tablet     Refill:  0     NADEAN:[id number]   • sertraline (Zoloft) 50 MG tablet     Sig: Take 1 tablet by mouth Daily for 30 days.     Dispense:  30 tablet     Refill:  3       No follow-ups on file.           This document has been electronically signed by RENAN De Dios  October 19, 2022 10:25 EDT      Part of this note may be an electronic transcription/translation of spoken language to printed text using the Dragon Dictation System.

## 2022-10-20 ENCOUNTER — HOSPITAL ENCOUNTER (OUTPATIENT)
Dept: RESPIRATORY THERAPY | Facility: HOSPITAL | Age: 32
Discharge: HOME | End: 2022-10-20

## 2022-10-20 ENCOUNTER — LAB (OUTPATIENT)
Dept: LAB | Facility: HOSPITAL | Age: 32
End: 2022-10-20

## 2022-10-20 ENCOUNTER — HOSPITAL ENCOUNTER (OUTPATIENT)
Dept: GENERAL RADIOLOGY | Facility: HOSPITAL | Age: 32
Discharge: HOME | End: 2022-10-20

## 2022-10-20 ENCOUNTER — TRANSCRIBE ORDERS (OUTPATIENT)
Dept: ADMINISTRATIVE | Facility: HOSPITAL | Age: 32
End: 2022-10-20

## 2022-10-20 ENCOUNTER — DOCUMENTATION (OUTPATIENT)
Dept: PSYCHIATRY | Facility: CLINIC | Age: 32
End: 2022-10-20

## 2022-10-20 DIAGNOSIS — R07.9 CHEST PAIN, UNSPECIFIED TYPE: ICD-10-CM

## 2022-10-20 DIAGNOSIS — R53.83 TIREDNESS: ICD-10-CM

## 2022-10-20 DIAGNOSIS — R53.83 TIREDNESS: Primary | ICD-10-CM

## 2022-10-20 LAB
ALBUMIN SERPL-MCNC: 4.38 G/DL (ref 3.5–5.2)
ALBUMIN/GLOB SERPL: 1.1 G/DL
ALP SERPL-CCNC: 319 U/L (ref 39–117)
ALT SERPL W P-5'-P-CCNC: 145 U/L (ref 1–41)
ANION GAP SERPL CALCULATED.3IONS-SCNC: 18.3 MMOL/L (ref 5–15)
AST SERPL-CCNC: 229 U/L (ref 1–40)
BILIRUB SERPL-MCNC: 0.8 MG/DL (ref 0–1.2)
BUN SERPL-MCNC: 8 MG/DL (ref 6–20)
BUN/CREAT SERPL: 9.8 (ref 7–25)
CALCIUM SPEC-SCNC: 9.1 MG/DL (ref 8.6–10.5)
CHLORIDE SERPL-SCNC: 100 MMOL/L (ref 98–107)
CK MB SERPL-CCNC: 1.43 NG/ML
CK SERPL-CCNC: 66 U/L (ref 20–200)
CO2 SERPL-SCNC: 19.7 MMOL/L (ref 22–29)
CREAT SERPL-MCNC: 0.82 MG/DL (ref 0.76–1.27)
DEPRECATED RDW RBC AUTO: 45.4 FL (ref 37–54)
EGFRCR SERPLBLD CKD-EPI 2021: 119.7 ML/MIN/1.73
ERYTHROCYTE [DISTWIDTH] IN BLOOD BY AUTOMATED COUNT: 14.2 % (ref 12.3–15.4)
GLOBULIN UR ELPH-MCNC: 3.9 GM/DL
GLUCOSE SERPL-MCNC: 100 MG/DL (ref 65–99)
HCT VFR BLD AUTO: 47.2 % (ref 37.5–51)
HGB BLD-MCNC: 15.7 G/DL (ref 13–17.7)
MCH RBC QN AUTO: 29.5 PG (ref 26.6–33)
MCHC RBC AUTO-ENTMCNC: 33.3 G/DL (ref 31.5–35.7)
MCV RBC AUTO: 88.6 FL (ref 79–97)
PLATELET # BLD AUTO: 146 10*3/MM3 (ref 140–450)
PMV BLD AUTO: 11.2 FL (ref 6–12)
POTASSIUM SERPL-SCNC: 3.5 MMOL/L (ref 3.5–5.2)
PROT SERPL-MCNC: 8.3 G/DL (ref 6–8.5)
RBC # BLD AUTO: 5.33 10*6/MM3 (ref 4.14–5.8)
SODIUM SERPL-SCNC: 138 MMOL/L (ref 136–145)
TROPONIN T SERPL-MCNC: <0.01 NG/ML (ref 0–0.03)
WBC NRBC COR # BLD: 6.95 10*3/MM3 (ref 3.4–10.8)

## 2022-10-20 PROCEDURE — 80053 COMPREHEN METABOLIC PANEL: CPT

## 2022-10-20 PROCEDURE — 71046 X-RAY EXAM CHEST 2 VIEWS: CPT

## 2022-10-20 PROCEDURE — 71046 X-RAY EXAM CHEST 2 VIEWS: CPT | Performed by: RADIOLOGY

## 2022-10-20 PROCEDURE — 36415 COLL VENOUS BLD VENIPUNCTURE: CPT

## 2022-10-20 PROCEDURE — 82553 CREATINE MB FRACTION: CPT

## 2022-10-20 PROCEDURE — 84484 ASSAY OF TROPONIN QUANT: CPT

## 2022-10-20 PROCEDURE — 85027 COMPLETE CBC AUTOMATED: CPT

## 2022-10-20 PROCEDURE — 82550 ASSAY OF CK (CPK): CPT

## 2022-10-20 PROCEDURE — 93005 ELECTROCARDIOGRAM TRACING: CPT | Performed by: NURSE PRACTITIONER

## 2022-10-20 PROCEDURE — 93010 ELECTROCARDIOGRAM REPORT: CPT | Performed by: INTERNAL MEDICINE

## 2022-10-20 NOTE — PROGRESS NOTES
Patient did not call or show up for his scheduled appointment on 10/17/22 . He did however call after said missed appointment to reschedule.. I did discuss with patient the importance of calling to let the office know if patient can not keep a scheduled appointment because after having 3 patient can potentially be discharged from clinic.    10/17/22 1st no show.

## 2022-10-21 LAB
QT INTERVAL: 438 MS
QTC INTERVAL: 451 MS

## 2022-10-25 ENCOUNTER — TRANSCRIBE ORDERS (OUTPATIENT)
Dept: ADMINISTRATIVE | Facility: HOSPITAL | Age: 32
End: 2022-10-25

## 2022-10-25 DIAGNOSIS — R10.9 ABDOMINAL PAIN, UNSPECIFIED ABDOMINAL LOCATION: Primary | ICD-10-CM

## 2022-11-21 ENCOUNTER — TELEMEDICINE (OUTPATIENT)
Dept: PSYCHIATRY | Facility: CLINIC | Age: 32
End: 2022-11-21

## 2022-11-21 VITALS
DIASTOLIC BLOOD PRESSURE: 113 MMHG | WEIGHT: 242.6 LBS | HEIGHT: 78 IN | HEART RATE: 75 BPM | SYSTOLIC BLOOD PRESSURE: 143 MMHG | BODY MASS INDEX: 28.07 KG/M2

## 2022-11-21 DIAGNOSIS — F11.20 OPIOID TYPE DEPENDENCE, CONTINUOUS: Primary | ICD-10-CM

## 2022-11-21 DIAGNOSIS — G47.00 INSOMNIA, UNSPECIFIED TYPE: ICD-10-CM

## 2022-11-21 DIAGNOSIS — F32.A DEPRESSION, UNSPECIFIED DEPRESSION TYPE: ICD-10-CM

## 2022-11-21 DIAGNOSIS — F41.1 GENERALIZED ANXIETY DISORDER: ICD-10-CM

## 2022-11-21 DIAGNOSIS — Z79.899 MEDICATION MANAGEMENT: ICD-10-CM

## 2022-11-21 PROCEDURE — 99214 OFFICE O/P EST MOD 30 MIN: CPT | Performed by: NURSE PRACTITIONER

## 2022-11-21 RX ORDER — HYDROXYZINE PAMOATE 50 MG/1
50 CAPSULE ORAL 4 TIMES DAILY PRN
Qty: 90 CAPSULE | Refills: 0 | Status: SHIPPED | OUTPATIENT
Start: 2022-11-21 | End: 2023-01-16 | Stop reason: SDUPTHER

## 2022-11-21 RX ORDER — QUETIAPINE FUMARATE 25 MG/1
25 TABLET, FILM COATED ORAL NIGHTLY
Qty: 30 TABLET | Refills: 0 | Status: SHIPPED | OUTPATIENT
Start: 2022-11-21 | End: 2022-12-19 | Stop reason: SDUPTHER

## 2022-11-21 RX ORDER — LISINOPRIL 20 MG/1
20 TABLET ORAL DAILY
COMMUNITY
Start: 2022-11-15 | End: 2022-12-19 | Stop reason: SDUPTHER

## 2022-11-21 RX ORDER — BUPRENORPHINE HYDROCHLORIDE AND NALOXONE HYDROCHLORIDE DIHYDRATE 8; 2 MG/1; MG/1
2 TABLET SUBLINGUAL DAILY
Qty: 56 TABLET | Refills: 0 | Status: SHIPPED | OUTPATIENT
Start: 2022-11-21 | End: 2022-12-19 | Stop reason: SDUPTHER

## 2022-11-21 NOTE — PROGRESS NOTES
This provider is located at [facility]. The Patient is seen remotely located at the [facility] ([facility]) using Video. Patient is being seen via telehealth and confirm that they are in a secure environment for this session. The patient's condition being diagnosed/treated is appropriate for telemedicine. Provider identified as Daniel Olivas as well as credentials APRN MSN FNP-C ANTONIO-AP.   The client/patient gave consent to be seen remotely, and when consent is given they understand that the consent allows for patient identifiable information to be sent to a third party as needed.   They may refuse to be seen remotely at any time. The electronic data is encrypted and password protected, and the patient has been advised of the potential risks to privacy not withstanding such measures.    Chief Complaint/History of Present Illness: Follow Up buprenorphine/naloxone Medicated Assisted Treatment for Opiate Use Disorder     Patient/Client Concerns/Updates: Continuing Zoloft Vistaril, seroquel and clonidine for anxiety  -Client reports he is doing well on these medications with no concerns or significant side effects  -Mood overall stable, patient reports he is doing well no anxious or depressive exacerbations and no suicidal or homicidal thoughts    Triggers (Persons/Places/Things/Events/Thought/Emotions): Chronic anxiety    Cravings: Denies cravings    Relapse Prevention: Counseling    Urine Drug Screen (today's visit) discussed: Positive buprenorphine and positive THC    UDS Confirmation (Most recent/Resulted): Positive buprenorphine/nor-buprenorphine, no concerns for urine tampering otherwise positive THC    Most recent pertinent laboratory studies reviewed:  3/16/2022-LFTs within normal limits; hepatitis C virus not detected    ROMAN (PDMP) Reviewed for Current/Active Medications: buprenorphine/naloxone as reviewed today    Past Surgical History:  Past Surgical History:   Procedure Laterality  Date   • NO PAST SURGERIES         Problem List:  Patient Active Problem List   Diagnosis   • Alcohol-induced acute pancreatitis   • Pancreatitis   • Chronic pain of right knee       Allergy:   No Known Allergies     Current Medications:   Current Outpatient Medications   Medication Sig Dispense Refill   • cloNIDine (Catapres) 0.1 MG tablet Take 1 tablet by mouth 2 (Two) Times a Day. Take as needed for anxiety.  Insomnia.  Chills or sweats 60 tablet 2   • ibuprofen (ADVIL,MOTRIN) 600 MG tablet Take 1 tablet by mouth Every 6 (Six) Hours As Needed for Mild Pain . 30 tablet 2   • levocetirizine (XYZAL) 5 MG tablet Take 1 tablet by mouth Daily. For allergies     • lisinopril (PRINIVIL,ZESTRIL) 20 MG tablet Take 20 mg by mouth Daily. for blood pressure     • ondansetron (ZOFRAN) 4 MG tablet TAKE ONE TABLET BY MOUTH EVERY 8 HOURS AS NEEDED FOR NAUSEA & VOMITING     • amoxicillin (AMOXIL) 500 MG capsule TAKE ONE CAPSULE BY MOUTH TWO TIMES PER DAY UNTIL ALL GONE FOR INFECTION     • buprenorphine-naloxone (SUBOXONE) 8-2 MG per SL tablet Place 2 tablets under the tongue Daily. 56 tablet 0   • cephalexin (KEFLEX) 500 MG capsule Take 1 capsule by mouth 3 (Three) Times a Day. 30 capsule 0   • hydrOXYzine pamoate (Vistaril) 50 MG capsule Take 1 capsule by mouth 4 (Four) Times a Day As Needed for Anxiety (and/or insomia). Take 1 to 2 tablets as needed for anxiety every 6 hours 90 capsule 0   • mupirocin (BACTROBAN) 2 % ointment Apply  topically to the appropriate area as directed 3 (Three) Times a Day. 15 g 0   • naloxone (NARCAN) 4 MG/0.1ML nasal spray 1 spray into the nostril(s) as directed by provider As Needed (Opiate oversedation). Spray in 1 nostril every 2 to 3 minutes call 911 2 each 2   • orphenadrine (NORFLEX) 100 MG 12 hr tablet Take 1 tablet by mouth 2 (Two) Times a Day As Needed for Muscle Spasms or Mild Pain . 14 tablet 0   • QUEtiapine (SEROquel) 25 MG tablet Take 1 tablet by mouth Every Night. 30 tablet 0   •  sertraline (Zoloft) 50 MG tablet Take 1 tablet by mouth Daily for 30 days. 30 tablet 0     No current facility-administered medications for this visit.       Past Medical History:  Past Medical History:   Diagnosis Date   • Alcohol-induced acute pancreatitis 6/27/2019   • Alcoholism (HCC)    • Drug use     Amphetamines and Suboxone   • Fatty liver    • Hepatitis C antibody test positive 2019   • Medical non-compliance    • Pancreatitis    • Smoker          Social History     Socioeconomic History   • Marital status:    Tobacco Use   • Smoking status: Every Day     Packs/day: 0.50     Types: Cigarettes   • Smokeless tobacco: Never   Vaping Use   • Vaping Use: Never used   Substance and Sexual Activity   • Alcohol use: Yes     Alcohol/week: 8.0 standard drinks     Types: 8 Shots of liquor per week     Comment: 5-6 double shots   • Drug use: Yes     Comment: suboxone as prescribed   • Sexual activity: Defer         Family History   Problem Relation Age of Onset   • Cancer Maternal Grandmother    • No Known Problems Mother    • No Known Problems Father          Mental Status Exam:   Hygiene:   good  Cooperation:  Cooperative  Eye Contact:  Good  Psychomotor Behavior:  Appropriate  Affect:  Appropriate  Mood: normal  Speech:  Normal  Thought Process:  Goal directed  Thought Content:  Normal  Suicidal:  None  Homicidal:  None  Hallucinations:  None  Delusion:  None  Memory:  Intact  Orientation:  Grossly intact  Reliability:  good  Insight:  Good  Judgement:  Good  Impulse Control:  Good         Review of Systems:  Review of Systems   Constitutional: Negative for activity change, chills, diaphoresis and fatigue.   Respiratory: Negative for apnea, cough and shortness of breath.    Cardiovascular: Negative for chest pain, palpitations and leg swelling.   Gastrointestinal: Negative for abdominal pain, constipation, diarrhea, nausea and vomiting.   Genitourinary: Negative for difficulty urinating.   Musculoskeletal:  "Negative for arthralgias.   Skin: Negative for rash.   Neurological: Negative for dizziness, weakness and headaches.   Psychiatric/Behavioral: Positive for sleep disturbance. Negative for agitation, self-injury and suicidal ideas. The patient is nervous/anxious.          Physical Exam:  Physical Exam  Vitals reviewed.   Constitutional:       General: He is not in acute distress.     Appearance: Normal appearance. He is not ill-appearing or toxic-appearing.   Pulmonary:      Effort: Pulmonary effort is normal.   Musculoskeletal:         General: Normal range of motion.   Neurological:      General: No focal deficit present.      Mental Status: He is alert and oriented to person, place, and time.   Psychiatric:         Attention and Perception: Attention and perception normal.         Mood and Affect: Mood normal. Mood is not anxious or depressed.         Speech: Speech normal.         Behavior: Behavior normal. Behavior is cooperative.         Thought Content: Thought content normal.         Cognition and Memory: Cognition and memory normal.         Judgment: Judgment normal.         Vital Signs:   BP (!) 143/113 Comment: noitified provider  Pulse 75   Ht 198.1 cm (77.99\")   Wt 110 kg (242 lb 9.6 oz)   BMI 28.04 kg/m²      Lab Results:   Telemedicine on 11/21/2022   Component Date Value Ref Range Status   • External Amphetamine Screen Urine 11/21/2022 Negative   Final   • External Benzodiazepine Screen Uri* 11/21/2022 Negative   Final   • External Cocaine Screen Urine 11/21/2022 Negative   Final   • External THC Screen Urine 11/21/2022 Positive (A)   Final   • External Methadone Screen Urine 11/21/2022 Negative   Final   • External Methamphetamine Screen Ur* 11/21/2022 Negative   Final   • External Oxycodone Screen Urine 11/21/2022 Negative   Final   • External Buprenorphine Screen Urine 11/21/2022 Positive (A)   Final   • External MDMA 11/21/2022 Negative   Final   • External Opiates Screen Urine 11/21/2022 " Negative   Final   Hospital Outpatient Visit on 10/20/2022   Component Date Value Ref Range Status   • QT Interval 10/20/2022 438  ms Final   • QTC Interval 10/20/2022 451  ms Final   Lab on 10/20/2022   Component Date Value Ref Range Status   • Glucose 10/20/2022 100 (H)  65 - 99 mg/dL Final   • BUN 10/20/2022 8  6 - 20 mg/dL Final   • Creatinine 10/20/2022 0.82  0.76 - 1.27 mg/dL Final   • Sodium 10/20/2022 138  136 - 145 mmol/L Final   • Potassium 10/20/2022 3.5  3.5 - 5.2 mmol/L Final    Slight hemolysis detected by analyzer. Results may be affected.   • Chloride 10/20/2022 100  98 - 107 mmol/L Final   • CO2 10/20/2022 19.7 (L)  22.0 - 29.0 mmol/L Final   • Calcium 10/20/2022 9.1  8.6 - 10.5 mg/dL Final   • Total Protein 10/20/2022 8.3  6.0 - 8.5 g/dL Final   • Albumin 10/20/2022 4.38  3.50 - 5.20 g/dL Final   • ALT (SGPT) 10/20/2022 145 (H)  1 - 41 U/L Final   • AST (SGOT) 10/20/2022 229 (H)  1 - 40 U/L Final   • Alkaline Phosphatase 10/20/2022 319 (H)  39 - 117 U/L Final   • Total Bilirubin 10/20/2022 0.8  0.0 - 1.2 mg/dL Final   • Globulin 10/20/2022 3.9  gm/dL Final   • A/G Ratio 10/20/2022 1.1  g/dL Final   • BUN/Creatinine Ratio 10/20/2022 9.8  7.0 - 25.0 Final   • Anion Gap 10/20/2022 18.3 (H)  5.0 - 15.0 mmol/L Final   • eGFR 10/20/2022 119.7  >60.0 mL/min/1.73 Final    National Kidney Foundation and American Society of Nephrology (ASN) Task Force recommended calculation based on the Chronic Kidney Disease Epidemiology Collaboration (CKD-EPI) equation refit without adjustment for race.   • WBC 10/20/2022 6.95  3.40 - 10.80 10*3/mm3 Final   • RBC 10/20/2022 5.33  4.14 - 5.80 10*6/mm3 Final   • Hemoglobin 10/20/2022 15.7  13.0 - 17.7 g/dL Final   • Hematocrit 10/20/2022 47.2  37.5 - 51.0 % Final   • MCV 10/20/2022 88.6  79.0 - 97.0 fL Final   • MCH 10/20/2022 29.5  26.6 - 33.0 pg Final   • MCHC 10/20/2022 33.3  31.5 - 35.7 g/dL Final   • RDW 10/20/2022 14.2  12.3 - 15.4 % Final   • RDW-SD 10/20/2022 45.4   37.0 - 54.0 fl Final   • MPV 10/20/2022 11.2  6.0 - 12.0 fL Final   • Platelets 10/20/2022 146  140 - 450 10*3/mm3 Final   • Troponin T 10/20/2022 <0.010  0.000 - 0.030 ng/mL Final   • Creatine Kinase 10/20/2022 66  20 - 200 U/L Final   • CKMB 10/20/2022 1.43  <=10.40 ng/mL Final   Telemedicine on 10/19/2022   Component Date Value Ref Range Status   • External Amphetamine Screen Urine 10/19/2022 Negative   Final   • External Benzodiazepine Screen Uri* 10/19/2022 Negative   Final   • External Cocaine Screen Urine 10/19/2022 Negative   Final   • External THC Screen Urine 10/19/2022 Positive (A)   Final   • External Methadone Screen Urine 10/19/2022 Negative   Final   • External Methamphetamine Screen Ur* 10/19/2022 Negative   Final   • External Oxycodone Screen Urine 10/19/2022 Negative   Final   • External Buprenorphine Screen Urine 10/19/2022 Positive (A)   Final   • External MDMA 10/19/2022 Negative   Final   • External Opiates Screen Urine 10/19/2022 Negative   Final   Telemedicine on 09/19/2022   Component Date Value Ref Range Status   • External Amphetamine Screen Urine 09/19/2022 Negative   Final   • External Benzodiazepine Screen Uri* 09/19/2022 Negative   Final   • External Cocaine Screen Urine 09/19/2022 Negative   Final   • External THC Screen Urine 09/19/2022 Positive (A)   Final   • External Methadone Screen Urine 09/19/2022 Negative   Final   • External Methamphetamine Screen Ur* 09/19/2022 Negative   Final   • External Oxycodone Screen Urine 09/19/2022 Negative   Final   • External Buprenorphine Screen Urine 09/19/2022 Positive (A)   Final   • External MDMA 09/19/2022 Negative   Final   • External Opiates Screen Urine 09/19/2022 Negative   Final   Telemedicine on 08/22/2022   Component Date Value Ref Range Status   • External Amphetamine Screen Urine 08/22/2022 Negative   Final   • External Benzodiazepine Screen Uri* 08/22/2022 Negative   Final   • External Cocaine Screen Urine 08/22/2022 Negative    Final   • External THC Screen Urine 08/22/2022 Negative   Final   • External Methadone Screen Urine 08/22/2022 Negative   Final   • External Methamphetamine Screen Ur* 08/22/2022 Negative   Final   • External Oxycodone Screen Urine 08/22/2022 Negative   Final   • External Buprenorphine Screen Urine 08/22/2022 Positive (A)   Final   • External MDMA 08/22/2022 Negative   Final   • External Opiates Screen Urine 08/22/2022 Negative   Final   Telemedicine on 08/01/2022   Component Date Value Ref Range Status   • External Amphetamine Screen Urine 08/01/2022 Negative   Final-Edited   • External Benzodiazepine Screen Uri* 08/01/2022 Negative   Final-Edited   • External Cocaine Screen Urine 08/01/2022 Negative   Final-Edited   • External THC Screen Urine 08/01/2022 Negative   Corrected   • External Methadone Screen Urine 08/01/2022 Negative   Final-Edited   • External Methamphetamine Screen Ur* 08/01/2022 Negative   Final-Edited   • External Oxycodone Screen Urine 08/01/2022 Negative   Final-Edited   • External Buprenorphine Screen Urine 08/01/2022 Positive (A)   Final-Edited   • External MDMA 08/01/2022 Negative   Final-Edited   • External Opiates Screen Urine 08/01/2022 Negative   Final-Edited   Telemedicine on 07/18/2022   Component Date Value Ref Range Status   • External Amphetamine Screen Urine 07/18/2022 Negative   Final   • External Benzodiazepine Screen Uri* 07/18/2022 Negative   Final   • External Cocaine Screen Urine 07/18/2022 Negative   Final   • External THC Screen Urine 07/18/2022 Negative   Final   • External Methadone Screen Urine 07/18/2022 Negative   Final   • External Methamphetamine Screen Ur* 07/18/2022 Negative   Final   • External Oxycodone Screen Urine 07/18/2022 Negative   Final   • External Buprenorphine Screen Urine 07/18/2022 Positive (XH)   Final   • External MDMA 07/18/2022 Negative   Final   • External Opiates Screen Urine 07/18/2022 Negative   Final   Telemedicine on 06/23/2022   Component  Date Value Ref Range Status   • External Amphetamine Screen Urine 06/23/2022 Negative   Final   • External Benzodiazepine Screen Uri* 06/23/2022 Negative   Final   • External Cocaine Screen Urine 06/23/2022 Negative   Final   • External THC Screen Urine 06/23/2022 Negative   Final   • External Methadone Screen Urine 06/23/2022 Negative   Final   • External Methamphetamine Screen Ur* 06/23/2022 Negative   Final   • External Oxycodone Screen Urine 06/23/2022 Negative   Final   • External Buprenorphine Screen Urine 06/23/2022 Positive (A)   Final   • External MDMA 06/23/2022 Negative   Final   • External Opiates Screen Urine 06/23/2022 Negative   Final   Telemedicine on 06/09/2022   Component Date Value Ref Range Status   • External Amphetamine Screen Urine 06/09/2022 Positive (A)   Final   • External Benzodiazepine Screen Uri* 06/09/2022 Negative   Final   • External Cocaine Screen Urine 06/09/2022 Negative   Final   • External THC Screen Urine 06/09/2022 Positive (A)   Final   • External Methadone Screen Urine 06/09/2022 Negative   Final   • External Methamphetamine Screen Ur* 06/09/2022 Negative   Final   • External Oxycodone Screen Urine 06/09/2022 Negative   Final   • External Buprenorphine Screen Urine 06/09/2022 Positive (A)   Final   • External MDMA 06/09/2022 Negative   Final   • External Opiates Screen Urine 06/09/2022 Negative   Final   There may be more visits with results that are not included.         Assessment & Plan   Diagnoses and all orders for this visit:    1. Opioid type dependence, continuous (HCC) (Primary)  -     buprenorphine-naloxone (SUBOXONE) 8-2 MG per SL tablet; Place 2 tablets under the tongue Daily.  Dispense: 56 tablet; Refill: 0    2. Medication management  -     [facility] Drug Screen    3. Generalized anxiety disorder  -     hydrOXYzine pamoate (Vistaril) 50 MG capsule; Take 1 capsule by mouth 4 (Four) Times a Day As Needed for Anxiety (and/or insomia). Take 1 to 2 tablets as needed  for anxiety every 6 hours  Dispense: 90 capsule; Refill: 0  -     sertraline (Zoloft) 50 MG tablet; Take 1 tablet by mouth Daily for 30 days.  Dispense: 30 tablet; Refill: 0  -     QUEtiapine (SEROquel) 25 MG tablet; Take 1 tablet by mouth Every Night.  Dispense: 30 tablet; Refill: 0    4. Insomnia, unspecified type  -     hydrOXYzine pamoate (Vistaril) 50 MG capsule; Take 1 capsule by mouth 4 (Four) Times a Day As Needed for Anxiety (and/or insomia). Take 1 to 2 tablets as needed for anxiety every 6 hours  Dispense: 90 capsule; Refill: 0    5. Depression, unspecified depression type  -     sertraline (Zoloft) 50 MG tablet; Take 1 tablet by mouth Daily for 30 days.  Dispense: 30 tablet; Refill: 0        Visit Diagnoses:    ICD-10-CM ICD-9-CM   1. Opioid type dependence, continuous (HCC)  F11.20 304.01   2. Medication management  Z79.899 V58.69   3. Generalized anxiety disorder  F41.1 300.02   4. Insomnia, unspecified type  G47.00 780.52   5. Depression, unspecified depression type  F32.A 311       PLAN:  1. Safety: No acute safety concerns  2. Risk Assessment: Risk of self-harm acutely is low. Risk of self-harm chronically is also low, but could be further elevated in the event of treatment noncompliance and/or AODA.    TREATMENT PLAN/GOALS: Continue supportive psychotherapy efforts and medications as indicated. Treatment and medication options discussed during today's visit. Patient acknowledged and verbally consented to continue with current treatment plan and was educated on the importance of compliance with treatment and follow-up appointments.    MEDICATION ISSUES:  ROMAN reviewed as expected.  Discussed medication options and treatment plan of prescribed medication as well as the risks, benefits, and side effects including potential falls, possible impaired driving and metabolic adversities among others. Patient is agreeable to call the office with any worsening of symptoms or onset of side effects. Patient  is agreeable to call 911 or go to the nearest ER should he/she begin having SI/HI. No medication side effects or related complaints today.     MEDS ORDERED DURING VISIT:  New Medications Ordered This Visit   Medications   • buprenorphine-naloxone (SUBOXONE) 8-2 MG per SL tablet     Sig: Place 2 tablets under the tongue Daily.     Dispense:  56 tablet     Refill:  0     NADEAN:[id number]   • hydrOXYzine pamoate (Vistaril) 50 MG capsule     Sig: Take 1 capsule by mouth 4 (Four) Times a Day As Needed for Anxiety (and/or insomia). Take 1 to 2 tablets as needed for anxiety every 6 hours     Dispense:  90 capsule     Refill:  0   • sertraline (Zoloft) 50 MG tablet     Sig: Take 1 tablet by mouth Daily for 30 days.     Dispense:  30 tablet     Refill:  0   • QUEtiapine (SEROquel) 25 MG tablet     Sig: Take 1 tablet by mouth Every Night.     Dispense:  30 tablet     Refill:  0       No follow-ups on file.           This document has been electronically signed by RENAN De Dios  November 21, 2022 10:28 EST      Part of this note may be an electronic transcription/translation of spoken language to printed text using the Dragon Dictation System.

## 2022-12-19 ENCOUNTER — TELEMEDICINE (OUTPATIENT)
Dept: PSYCHIATRY | Facility: CLINIC | Age: 32
End: 2022-12-19

## 2022-12-19 VITALS
HEART RATE: 87 BPM | DIASTOLIC BLOOD PRESSURE: 104 MMHG | BODY MASS INDEX: 28.51 KG/M2 | HEIGHT: 78 IN | WEIGHT: 246.4 LBS | SYSTOLIC BLOOD PRESSURE: 143 MMHG

## 2022-12-19 DIAGNOSIS — M25.59 PAIN IN OTHER JOINT: ICD-10-CM

## 2022-12-19 DIAGNOSIS — Z79.899 MEDICATION MANAGEMENT: ICD-10-CM

## 2022-12-19 DIAGNOSIS — F32.A DEPRESSION, UNSPECIFIED DEPRESSION TYPE: ICD-10-CM

## 2022-12-19 DIAGNOSIS — F11.20 OPIOID TYPE DEPENDENCE, CONTINUOUS: Primary | ICD-10-CM

## 2022-12-19 DIAGNOSIS — F41.1 GENERALIZED ANXIETY DISORDER: ICD-10-CM

## 2022-12-19 PROCEDURE — 99214 OFFICE O/P EST MOD 30 MIN: CPT | Performed by: NURSE PRACTITIONER

## 2022-12-19 RX ORDER — IBUPROFEN 600 MG/1
600 TABLET ORAL EVERY 6 HOURS PRN
Qty: 30 TABLET | Refills: 2 | Status: SHIPPED | OUTPATIENT
Start: 2022-12-19 | End: 2023-03-07 | Stop reason: SDUPTHER

## 2022-12-19 RX ORDER — LISINOPRIL 20 MG/1
20 TABLET ORAL DAILY
Qty: 30 TABLET | Refills: 2 | Status: SHIPPED | OUTPATIENT
Start: 2022-12-19 | End: 2023-02-13 | Stop reason: SDUPTHER

## 2022-12-19 RX ORDER — QUETIAPINE FUMARATE 25 MG/1
25 TABLET, FILM COATED ORAL NIGHTLY
Qty: 30 TABLET | Refills: 0 | Status: SHIPPED | OUTPATIENT
Start: 2022-12-19 | End: 2023-01-16 | Stop reason: SDUPTHER

## 2022-12-19 RX ORDER — BUPRENORPHINE HYDROCHLORIDE AND NALOXONE HYDROCHLORIDE DIHYDRATE 8; 2 MG/1; MG/1
2 TABLET SUBLINGUAL DAILY
Qty: 56 TABLET | Refills: 0 | Status: SHIPPED | OUTPATIENT
Start: 2022-12-19 | End: 2023-01-16 | Stop reason: SDUPTHER

## 2022-12-19 NOTE — PROGRESS NOTES
This provider is located at [facility]. The Patient is seen remotely located at the [facility] ([facility]) using Video. Patient is being seen via telehealth and confirm that they are in a secure environment for this session. The patient's condition being diagnosed/treated is appropriate for telemedicine. Provider identified as Daniel Olivas as well as credentials APRN MSN FNP-C ANTONIO-REYNOLD.   The client/patient gave consent to be seen remotely, and when consent is given they understand that the consent allows for patient identifiable information to be sent to a third party as needed.   They may refuse to be seen remotely at any time. The electronic data is encrypted and password protected, and the patient has been advised of the potential risks to privacy not withstanding such measures.    Chief Complaint/History of Present Illness: Follow Up buprenorphine/naloxone Medicated Assisted Treatment for Opiate Use Disorder     Patient/Client Concerns/Updates: Continuing Zoloft Vistaril, seroquel and clonidine for anxiety  -Patient reports he is doing well with no adverse side effects of any prescribed medications  -Reports mood is stable, no depressive or anxious episodes or exacerbations and no suicidal or homicidal thoughts  -Overall no concerns today    Triggers (Persons/Places/Things/Events/Thought/Emotions): Anxiety and life stress    Cravings: Denies cravings    Relapse Prevention: Counseling    Urine Drug Screen (today's visit) discussed: Positive buprenorphine and positive THC    UDS Confirmation (Most recent/Resulted): Positive buprenorphine/nor-buprenorphine, no concerns for urine tampering otherwise positive THC    Most recent pertinent laboratory studies reviewed: 3/16/2022-LFTs within normal limits; hepatitis C virus not detected    ROMAN (PDMP) Reviewed for Current/Active Medications: buprenorphine/naloxone as reviewed today    Past Surgical History:  Past Surgical History:   Procedure  Laterality Date   • NO PAST SURGERIES         Problem List:  Patient Active Problem List   Diagnosis   • Alcohol-induced acute pancreatitis   • Pancreatitis   • Chronic pain of right knee       Allergy:   No Known Allergies     Current Medications:   Current Outpatient Medications   Medication Sig Dispense Refill   • buprenorphine-naloxone (SUBOXONE) 8-2 MG per SL tablet Place 2 tablets under the tongue Daily. 56 tablet 0   • cloNIDine (Catapres) 0.1 MG tablet Take 1 tablet by mouth 2 (Two) Times a Day. Take as needed for anxiety.  Insomnia.  Chills or sweats 60 tablet 2   • hydrOXYzine pamoate (Vistaril) 50 MG capsule Take 1 capsule by mouth 4 (Four) Times a Day As Needed for Anxiety (and/or insomia). Take 1 to 2 tablets as needed for anxiety every 6 hours 90 capsule 0   • ibuprofen (ADVIL,MOTRIN) 600 MG tablet Take 1 tablet by mouth Every 6 (Six) Hours As Needed for Mild Pain. 30 tablet 2   • levocetirizine (XYZAL) 5 MG tablet Take 1 tablet by mouth Daily. For allergies     • lisinopril (PRINIVIL,ZESTRIL) 20 MG tablet Take 1 tablet by mouth Daily. for blood pressure 30 tablet 2   • naloxone (NARCAN) 4 MG/0.1ML nasal spray 1 spray into the nostril(s) as directed by provider As Needed (Opiate oversedation). Spray in 1 nostril every 2 to 3 minutes call 911 2 each 2   • ondansetron (ZOFRAN) 4 MG tablet TAKE ONE TABLET BY MOUTH EVERY 8 HOURS AS NEEDED FOR NAUSEA & VOMITING     • orphenadrine (NORFLEX) 100 MG 12 hr tablet Take 1 tablet by mouth 2 (Two) Times a Day As Needed for Muscle Spasms or Mild Pain . 14 tablet 0   • QUEtiapine (SEROquel) 25 MG tablet Take 1 tablet by mouth Every Night. 30 tablet 0   • sertraline (Zoloft) 50 MG tablet Take 1 tablet by mouth Daily for 30 days. 30 tablet 0   • amoxicillin (AMOXIL) 500 MG capsule TAKE ONE CAPSULE BY MOUTH TWO TIMES PER DAY UNTIL ALL GONE FOR INFECTION     • cephalexin (KEFLEX) 500 MG capsule Take 1 capsule by mouth 3 (Three) Times a Day. 30 capsule 0   • mupirocin  (BACTROBAN) 2 % ointment Apply  topically to the appropriate area as directed 3 (Three) Times a Day. 15 g 0     No current facility-administered medications for this visit.       Past Medical History:  Past Medical History:   Diagnosis Date   • Alcohol-induced acute pancreatitis 6/27/2019   • Alcoholism (HCC)    • Drug use     Amphetamines and Suboxone   • Fatty liver    • Hepatitis C antibody test positive 2019   • Medical non-compliance    • Pancreatitis    • Smoker          Social History     Socioeconomic History   • Marital status:    Tobacco Use   • Smoking status: Every Day     Packs/day: 0.50     Types: Cigarettes   • Smokeless tobacco: Never   Vaping Use   • Vaping Use: Never used   Substance and Sexual Activity   • Alcohol use: Yes     Alcohol/week: 8.0 standard drinks     Types: 8 Shots of liquor per week     Comment: 5-6 double shots   • Drug use: Yes     Comment: suboxone as prescribed   • Sexual activity: Defer         Family History   Problem Relation Age of Onset   • Cancer Maternal Grandmother    • No Known Problems Mother    • No Known Problems Father          Mental Status Exam:   Hygiene:   good  Cooperation:  Cooperative  Eye Contact:  Good  Psychomotor Behavior:  Appropriate  Affect:  Appropriate  Mood: anxious  Speech:  Normal  Thought Process:  Goal directed  Thought Content:  Normal  Suicidal:  None  Homicidal:  None  Hallucinations:  None  Delusion:  None  Memory:  Intact  Orientation:  Grossly intact  Reliability:  good  Insight:  Good  Judgement:  Good  Impulse Control:  Good         Review of Systems:  Review of Systems   Constitutional: Negative for activity change, chills, diaphoresis and fatigue.   Respiratory: Negative for apnea, cough and shortness of breath.    Cardiovascular: Negative for chest pain, palpitations and leg swelling.   Gastrointestinal: Negative for abdominal pain, constipation, diarrhea, nausea and vomiting.   Genitourinary: Negative for difficulty urinating.  "  Musculoskeletal: Positive for arthralgias.   Skin: Negative for rash.   Neurological: Negative for dizziness, weakness and headaches.   Psychiatric/Behavioral: Negative for agitation, self-injury, sleep disturbance and suicidal ideas. The patient is nervous/anxious.          Physical Exam:  Physical Exam  Vitals reviewed.   Constitutional:       General: He is not in acute distress.     Appearance: Normal appearance. He is not ill-appearing or toxic-appearing.   Pulmonary:      Effort: Pulmonary effort is normal.   Musculoskeletal:         General: Normal range of motion.   Neurological:      General: No focal deficit present.      Mental Status: He is alert and oriented to person, place, and time.   Psychiatric:         Attention and Perception: Attention and perception normal.         Mood and Affect: Mood is anxious. Mood is not depressed.         Speech: Speech normal.         Behavior: Behavior normal. Behavior is cooperative.         Thought Content: Thought content normal.         Cognition and Memory: Cognition and memory normal.         Judgment: Judgment normal.         Vital Signs:   BP (!) 143/104 Comment: made provider aware.  Pulse 87   Ht 198.1 cm (77.99\")   Wt 112 kg (246 lb 6.4 oz)   BMI 28.48 kg/m²      Lab Results:   Telemedicine on 12/19/2022   Component Date Value Ref Range Status   • External Amphetamine Screen Urine 12/19/2022 Negative   Final   • External Benzodiazepine Screen Uri* 12/19/2022 Negative   Final   • External Cocaine Screen Urine 12/19/2022 Negative   Final   • External THC Screen Urine 12/19/2022 Positive (A)   Final   • External Methadone Screen Urine 12/19/2022 Negative   Final   • External Methamphetamine Screen Ur* 12/19/2022 Negative   Final   • External Oxycodone Screen Urine 12/19/2022 Negative   Final   • External Buprenorphine Screen Urine 12/19/2022 Positive (A)   Final   • External MDMA 12/19/2022 Negative   Final   • External Opiates Screen Urine 12/19/2022 " Negative   Final   Telemedicine on 11/21/2022   Component Date Value Ref Range Status   • External Amphetamine Screen Urine 11/21/2022 Negative   Final   • External Benzodiazepine Screen Uri* 11/21/2022 Negative   Final   • External Cocaine Screen Urine 11/21/2022 Negative   Final   • External THC Screen Urine 11/21/2022 Positive (A)   Final   • External Methadone Screen Urine 11/21/2022 Negative   Final   • External Methamphetamine Screen Ur* 11/21/2022 Negative   Final   • External Oxycodone Screen Urine 11/21/2022 Negative   Final   • External Buprenorphine Screen Urine 11/21/2022 Positive (A)   Final   • External MDMA 11/21/2022 Negative   Final   • External Opiates Screen Urine 11/21/2022 Negative   Final   Hospital Outpatient Visit on 10/20/2022   Component Date Value Ref Range Status   • QT Interval 10/20/2022 438  ms Final   • QTC Interval 10/20/2022 451  ms Final   Lab on 10/20/2022   Component Date Value Ref Range Status   • Glucose 10/20/2022 100 (H)  65 - 99 mg/dL Final   • BUN 10/20/2022 8  6 - 20 mg/dL Final   • Creatinine 10/20/2022 0.82  0.76 - 1.27 mg/dL Final   • Sodium 10/20/2022 138  136 - 145 mmol/L Final   • Potassium 10/20/2022 3.5  3.5 - 5.2 mmol/L Final    Slight hemolysis detected by analyzer. Results may be affected.   • Chloride 10/20/2022 100  98 - 107 mmol/L Final   • CO2 10/20/2022 19.7 (L)  22.0 - 29.0 mmol/L Final   • Calcium 10/20/2022 9.1  8.6 - 10.5 mg/dL Final   • Total Protein 10/20/2022 8.3  6.0 - 8.5 g/dL Final   • Albumin 10/20/2022 4.38  3.50 - 5.20 g/dL Final   • ALT (SGPT) 10/20/2022 145 (H)  1 - 41 U/L Final   • AST (SGOT) 10/20/2022 229 (H)  1 - 40 U/L Final   • Alkaline Phosphatase 10/20/2022 319 (H)  39 - 117 U/L Final   • Total Bilirubin 10/20/2022 0.8  0.0 - 1.2 mg/dL Final   • Globulin 10/20/2022 3.9  gm/dL Final   • A/G Ratio 10/20/2022 1.1  g/dL Final   • BUN/Creatinine Ratio 10/20/2022 9.8  7.0 - 25.0 Final   • Anion Gap 10/20/2022 18.3 (H)  5.0 - 15.0 mmol/L  Final   • eGFR 10/20/2022 119.7  >60.0 mL/min/1.73 Final    National Kidney Foundation and American Society of Nephrology (ASN) Task Force recommended calculation based on the Chronic Kidney Disease Epidemiology Collaboration (CKD-EPI) equation refit without adjustment for race.   • WBC 10/20/2022 6.95  3.40 - 10.80 10*3/mm3 Final   • RBC 10/20/2022 5.33  4.14 - 5.80 10*6/mm3 Final   • Hemoglobin 10/20/2022 15.7  13.0 - 17.7 g/dL Final   • Hematocrit 10/20/2022 47.2  37.5 - 51.0 % Final   • MCV 10/20/2022 88.6  79.0 - 97.0 fL Final   • MCH 10/20/2022 29.5  26.6 - 33.0 pg Final   • MCHC 10/20/2022 33.3  31.5 - 35.7 g/dL Final   • RDW 10/20/2022 14.2  12.3 - 15.4 % Final   • RDW-SD 10/20/2022 45.4  37.0 - 54.0 fl Final   • MPV 10/20/2022 11.2  6.0 - 12.0 fL Final   • Platelets 10/20/2022 146  140 - 450 10*3/mm3 Final   • Troponin T 10/20/2022 <0.010  0.000 - 0.030 ng/mL Final   • Creatine Kinase 10/20/2022 66  20 - 200 U/L Final   • CKMB 10/20/2022 1.43  <=10.40 ng/mL Final   Telemedicine on 10/19/2022   Component Date Value Ref Range Status   • External Amphetamine Screen Urine 10/19/2022 Negative   Final   • External Benzodiazepine Screen Uri* 10/19/2022 Negative   Final   • External Cocaine Screen Urine 10/19/2022 Negative   Final   • External THC Screen Urine 10/19/2022 Positive (A)   Final   • External Methadone Screen Urine 10/19/2022 Negative   Final   • External Methamphetamine Screen Ur* 10/19/2022 Negative   Final   • External Oxycodone Screen Urine 10/19/2022 Negative   Final   • External Buprenorphine Screen Urine 10/19/2022 Positive (A)   Final   • External MDMA 10/19/2022 Negative   Final   • External Opiates Screen Urine 10/19/2022 Negative   Final   Telemedicine on 09/19/2022   Component Date Value Ref Range Status   • External Amphetamine Screen Urine 09/19/2022 Negative   Final   • External Benzodiazepine Screen Uri* 09/19/2022 Negative   Final   • External Cocaine Screen Urine 09/19/2022 Negative    Final   • External THC Screen Urine 09/19/2022 Positive (A)   Final   • External Methadone Screen Urine 09/19/2022 Negative   Final   • External Methamphetamine Screen Ur* 09/19/2022 Negative   Final   • External Oxycodone Screen Urine 09/19/2022 Negative   Final   • External Buprenorphine Screen Urine 09/19/2022 Positive (A)   Final   • External MDMA 09/19/2022 Negative   Final   • External Opiates Screen Urine 09/19/2022 Negative   Final   Telemedicine on 08/22/2022   Component Date Value Ref Range Status   • External Amphetamine Screen Urine 08/22/2022 Negative   Final   • External Benzodiazepine Screen Uri* 08/22/2022 Negative   Final   • External Cocaine Screen Urine 08/22/2022 Negative   Final   • External THC Screen Urine 08/22/2022 Negative   Final   • External Methadone Screen Urine 08/22/2022 Negative   Final   • External Methamphetamine Screen Ur* 08/22/2022 Negative   Final   • External Oxycodone Screen Urine 08/22/2022 Negative   Final   • External Buprenorphine Screen Urine 08/22/2022 Positive (A)   Final   • External MDMA 08/22/2022 Negative   Final   • External Opiates Screen Urine 08/22/2022 Negative   Final   Telemedicine on 08/01/2022   Component Date Value Ref Range Status   • External Amphetamine Screen Urine 08/01/2022 Negative   Final-Edited   • External Benzodiazepine Screen Uri* 08/01/2022 Negative   Final-Edited   • External Cocaine Screen Urine 08/01/2022 Negative   Final-Edited   • External THC Screen Urine 08/01/2022 Negative   Corrected   • External Methadone Screen Urine 08/01/2022 Negative   Final-Edited   • External Methamphetamine Screen Ur* 08/01/2022 Negative   Final-Edited   • External Oxycodone Screen Urine 08/01/2022 Negative   Final-Edited   • External Buprenorphine Screen Urine 08/01/2022 Positive (A)   Final-Edited   • External MDMA 08/01/2022 Negative   Final-Edited   • External Opiates Screen Urine 08/01/2022 Negative   Final-Edited   Telemedicine on 07/18/2022   Component  Date Value Ref Range Status   • External Amphetamine Screen Urine 07/18/2022 Negative   Final   • External Benzodiazepine Screen Uri* 07/18/2022 Negative   Final   • External Cocaine Screen Urine 07/18/2022 Negative   Final   • External THC Screen Urine 07/18/2022 Negative   Final   • External Methadone Screen Urine 07/18/2022 Negative   Final   • External Methamphetamine Screen Ur* 07/18/2022 Negative   Final   • External Oxycodone Screen Urine 07/18/2022 Negative   Final   • External Buprenorphine Screen Urine 07/18/2022 Positive (XH)   Final   • External MDMA 07/18/2022 Negative   Final   • External Opiates Screen Urine 07/18/2022 Negative   Final   Telemedicine on 06/23/2022   Component Date Value Ref Range Status   • External Amphetamine Screen Urine 06/23/2022 Negative   Final   • External Benzodiazepine Screen Uri* 06/23/2022 Negative   Final   • External Cocaine Screen Urine 06/23/2022 Negative   Final   • External THC Screen Urine 06/23/2022 Negative   Final   • External Methadone Screen Urine 06/23/2022 Negative   Final   • External Methamphetamine Screen Ur* 06/23/2022 Negative   Final   • External Oxycodone Screen Urine 06/23/2022 Negative   Final   • External Buprenorphine Screen Urine 06/23/2022 Positive (A)   Final   • External MDMA 06/23/2022 Negative   Final   • External Opiates Screen Urine 06/23/2022 Negative   Final   There may be more visits with results that are not included.         Assessment & Plan   Diagnoses and all orders for this visit:    1. Opioid type dependence, continuous (HCC) (Primary)  -     buprenorphine-naloxone (SUBOXONE) 8-2 MG per SL tablet; Place 2 tablets under the tongue Daily.  Dispense: 56 tablet; Refill: 0    2. Medication management  -     [facility] Drug Screen  -     lisinopril (PRINIVIL,ZESTRIL) 20 MG tablet; Take 1 tablet by mouth Daily. for blood pressure  Dispense: 30 tablet; Refill: 2    3. Generalized anxiety disorder  -     QUEtiapine (SEROquel) 25 MG tablet;  Take 1 tablet by mouth Every Night.  Dispense: 30 tablet; Refill: 0  -     sertraline (Zoloft) 50 MG tablet; Take 1 tablet by mouth Daily for 30 days.  Dispense: 30 tablet; Refill: 0    4. Depression, unspecified depression type  -     sertraline (Zoloft) 50 MG tablet; Take 1 tablet by mouth Daily for 30 days.  Dispense: 30 tablet; Refill: 0    5. Pain in other joint  -     ibuprofen (ADVIL,MOTRIN) 600 MG tablet; Take 1 tablet by mouth Every 6 (Six) Hours As Needed for Mild Pain.  Dispense: 30 tablet; Refill: 2        Visit Diagnoses:    ICD-10-CM ICD-9-CM   1. Opioid type dependence, continuous (HCC)  F11.20 304.01   2. Medication management  Z79.899 V58.69   3. Generalized anxiety disorder  F41.1 300.02   4. Depression, unspecified depression type  F32.A 311   5. Pain in other joint  M25.59 719.48       PLAN:  1. Safety: No acute safety concerns  2. Risk Assessment: Risk of self-harm acutely is low. Risk of self-harm chronically is also low, but could be further elevated in the event of treatment noncompliance and/or AODA.    TREATMENT PLAN/GOALS: Continue supportive psychotherapy efforts and medications as indicated. Treatment and medication options discussed during today's visit. Patient acknowledged and verbally consented to continue with current treatment plan and was educated on the importance of compliance with treatment and follow-up appointments.    MEDICATION ISSUES:  ROMAN reviewed as expected.  Discussed medication options and treatment plan of prescribed medication as well as the risks, benefits, and side effects including potential falls, possible impaired driving and metabolic adversities among others. Patient is agreeable to call the office with any worsening of symptoms or onset of side effects. Patient is agreeable to call 911 or go to the nearest ER should he/she begin having SI/HI. No medication side effects or related complaints today.     MEDS ORDERED DURING VISIT:  New Medications Ordered  This Visit   Medications   • buprenorphine-naloxone (SUBOXONE) 8-2 MG per SL tablet     Sig: Place 2 tablets under the tongue Daily.     Dispense:  56 tablet     Refill:  0     PATSYN:[id number]   • QUEtiapine (SEROquel) 25 MG tablet     Sig: Take 1 tablet by mouth Every Night.     Dispense:  30 tablet     Refill:  0   • sertraline (Zoloft) 50 MG tablet     Sig: Take 1 tablet by mouth Daily for 30 days.     Dispense:  30 tablet     Refill:  0   • lisinopril (PRINIVIL,ZESTRIL) 20 MG tablet     Sig: Take 1 tablet by mouth Daily. for blood pressure     Dispense:  30 tablet     Refill:  2   • ibuprofen (ADVIL,MOTRIN) 600 MG tablet     Sig: Take 1 tablet by mouth Every 6 (Six) Hours As Needed for Mild Pain.     Dispense:  30 tablet     Refill:  2       No follow-ups on file.           This document has been electronically signed by RENAN De Dios  December 19, 2022 09:35 EST      Part of this note may be an electronic transcription/translation of spoken language to printed text using the Dragon Dictation System.

## 2023-01-16 ENCOUNTER — TELEMEDICINE (OUTPATIENT)
Dept: PSYCHIATRY | Facility: CLINIC | Age: 33
End: 2023-01-16
Payer: COMMERCIAL

## 2023-01-16 VITALS
SYSTOLIC BLOOD PRESSURE: 135 MMHG | BODY MASS INDEX: 27.31 KG/M2 | HEIGHT: 78 IN | DIASTOLIC BLOOD PRESSURE: 97 MMHG | WEIGHT: 236 LBS | HEART RATE: 80 BPM

## 2023-01-16 DIAGNOSIS — Z79.899 MEDICATION MANAGEMENT: ICD-10-CM

## 2023-01-16 DIAGNOSIS — F32.A DEPRESSION, UNSPECIFIED DEPRESSION TYPE: ICD-10-CM

## 2023-01-16 DIAGNOSIS — F41.1 GENERALIZED ANXIETY DISORDER: ICD-10-CM

## 2023-01-16 DIAGNOSIS — G47.00 INSOMNIA, UNSPECIFIED TYPE: ICD-10-CM

## 2023-01-16 DIAGNOSIS — F11.20 OPIOID TYPE DEPENDENCE, CONTINUOUS: Primary | ICD-10-CM

## 2023-01-16 PROCEDURE — 99214 OFFICE O/P EST MOD 30 MIN: CPT | Performed by: NURSE PRACTITIONER

## 2023-01-16 RX ORDER — HYDROXYZINE PAMOATE 50 MG/1
50 CAPSULE ORAL 4 TIMES DAILY PRN
Qty: 90 CAPSULE | Refills: 0 | Status: SHIPPED | OUTPATIENT
Start: 2023-01-16 | End: 2023-02-13 | Stop reason: SDUPTHER

## 2023-01-16 RX ORDER — CLONIDINE HYDROCHLORIDE 0.1 MG/1
0.1 TABLET ORAL 2 TIMES DAILY
Qty: 60 TABLET | Refills: 0 | Status: SHIPPED | OUTPATIENT
Start: 2023-01-16 | End: 2023-02-13 | Stop reason: SDUPTHER

## 2023-01-16 RX ORDER — QUETIAPINE FUMARATE 25 MG/1
25 TABLET, FILM COATED ORAL NIGHTLY
Qty: 30 TABLET | Refills: 0 | Status: SHIPPED | OUTPATIENT
Start: 2023-01-16 | End: 2023-02-13 | Stop reason: SDUPTHER

## 2023-01-16 RX ORDER — BUPRENORPHINE HYDROCHLORIDE AND NALOXONE HYDROCHLORIDE DIHYDRATE 8; 2 MG/1; MG/1
2 TABLET SUBLINGUAL DAILY
Qty: 28 TABLET | Refills: 0 | Status: SHIPPED | OUTPATIENT
Start: 2023-01-16 | End: 2023-01-30 | Stop reason: SDUPTHER

## 2023-01-16 NOTE — PROGRESS NOTES
This provider is located at [facility]. The Patient is seen remotely located at the [facility] using Video. Patient is being seen via telehealth and confirm that they are in a secure environment for this session. The patient's condition being diagnosed/treated is appropriate for telemedicine. Provider identified as Daniel Olivas as well as credentials APRN MSN FNP-C ANTONIO-REYNOLD.   The client/patient gave consent to be seen remotely, and when consent is given they understand that the consent allows for patient identifiable information to be sent to a third party as needed.   They may refuse to be seen remotely at any time. The electronic data is encrypted and password protected, and the patient has been advised of the potential risks to privacy not withstanding such measures.    Chief Complaint/History of Present Illness: Follow Up buprenorphine/naloxone Medicated Assisted Treatment for Opiate Use Disorder     Patient/Client Concerns/Updates:  Continuing Zoloft Vistaril, seroquel and clonidine for anxiety  -Patient reports he is doing well and denies depressive or anxious episodes, no suicidal or homicidal thoughts and sleeping adequately  -Positive methamphetamine on most recent urine confirmation unexpected and unintentional, patient denies desire or cravings to use methamphetamine; patient reports he acquired marijuana from a different source and based on patient's history suspect cross-contamination and THC-we will see patient back in 2 weeks for further information and accountability, patient needs to acquire THC from a different trustworthy source    Triggers (Persons/Places/Things/Events/Thought/Emotions): Anxiety    Cravings: Denies cravings    Relapse Prevention: Counseling    Urine Drug Screen (today's visit) discussed: Positive buprenorphine and positive THC    UDS Confirmation (Most recent/Resulted): Positive buprenorphine/nor-buprenorphine, no concerns for urine tampering  otherwise positive methamphetamine and THC    Most recent pertinent laboratory studies reviewed: 3/16/2022-LFTs within normal limits; hepatitis C virus not detected     ROMAN (PDMP) Reviewed for Current/Active Medications: buprenorphine/naloxone as reviewed today    Past Surgical History:  Past Surgical History:   Procedure Laterality Date   • NO PAST SURGERIES         Problem List:  Patient Active Problem List   Diagnosis   • Alcohol-induced acute pancreatitis   • Pancreatitis   • Chronic pain of right knee       Allergy:   No Known Allergies     Current Medications:   Current Outpatient Medications   Medication Sig Dispense Refill   • buprenorphine-naloxone (SUBOXONE) 8-2 MG per SL tablet Place 2 tablets under the tongue Daily. 28 tablet 0   • cloNIDine (Catapres) 0.1 MG tablet Take 1 tablet by mouth 2 (Two) Times a Day. Take as needed for anxiety.  Insomnia.  Chills or sweats 60 tablet 0   • hydrOXYzine pamoate (Vistaril) 50 MG capsule Take 1 capsule by mouth 4 (Four) Times a Day As Needed for Anxiety (and/or insomia). Take 1 to 2 tablets as needed for anxiety every 6 hours 90 capsule 0   • ibuprofen (ADVIL,MOTRIN) 600 MG tablet Take 1 tablet by mouth Every 6 (Six) Hours As Needed for Mild Pain. 30 tablet 2   • levocetirizine (XYZAL) 5 MG tablet Take 1 tablet by mouth Daily. For allergies     • lisinopril (PRINIVIL,ZESTRIL) 20 MG tablet Take 1 tablet by mouth Daily. for blood pressure 30 tablet 2   • naloxone (NARCAN) 4 MG/0.1ML nasal spray 1 spray into the nostril(s) as directed by provider As Needed (Opiate oversedation). Spray in 1 nostril every 2 to 3 minutes call 911 2 each 2   • ondansetron (ZOFRAN) 4 MG tablet TAKE ONE TABLET BY MOUTH EVERY 8 HOURS AS NEEDED FOR NAUSEA & VOMITING     • QUEtiapine (SEROquel) 25 MG tablet Take 1 tablet by mouth Every Night. 30 tablet 0   • sertraline (Zoloft) 50 MG tablet Take 1 tablet by mouth Daily for 30 days. 30 tablet 0   • amoxicillin (AMOXIL) 500 MG capsule TAKE ONE  CAPSULE BY MOUTH TWO TIMES PER DAY UNTIL ALL GONE FOR INFECTION     • cephalexin (KEFLEX) 500 MG capsule Take 1 capsule by mouth 3 (Three) Times a Day. 30 capsule 0   • mupirocin (BACTROBAN) 2 % ointment Apply  topically to the appropriate area as directed 3 (Three) Times a Day. 15 g 0   • orphenadrine (NORFLEX) 100 MG 12 hr tablet Take 1 tablet by mouth 2 (Two) Times a Day As Needed for Muscle Spasms or Mild Pain . 14 tablet 0     No current facility-administered medications for this visit.       Past Medical History:  Past Medical History:   Diagnosis Date   • Alcohol-induced acute pancreatitis 6/27/2019   • Alcoholism (HCC)    • Drug use     Amphetamines and Suboxone   • Fatty liver    • Hepatitis C antibody test positive 2019   • Medical non-compliance    • Pancreatitis    • Smoker          Social History     Socioeconomic History   • Marital status:    Tobacco Use   • Smoking status: Every Day     Packs/day: 0.50     Types: Cigarettes   • Smokeless tobacco: Never   Vaping Use   • Vaping Use: Never used   Substance and Sexual Activity   • Alcohol use: Yes     Alcohol/week: 8.0 standard drinks     Types: 8 Shots of liquor per week     Comment: 5-6 double shots   • Drug use: Yes     Comment: suboxone as prescribed   • Sexual activity: Defer         Family History   Problem Relation Age of Onset   • Cancer Maternal Grandmother    • No Known Problems Mother    • No Known Problems Father          Mental Status Exam:   Hygiene:   good  Cooperation:  Cooperative  Eye Contact:  Good  Psychomotor Behavior:  Appropriate  Affect:  Appropriate  Mood: normal  Speech:  Normal  Thought Process:  Goal directed  Thought Content:  Normal  Suicidal:  None  Homicidal:  None  Hallucinations:  None  Delusion:  None  Memory:  Intact  Orientation:  Grossly intact  Reliability:  good  Insight:  Good  Judgement:  Good  Impulse Control:  Good         Review of Systems:  Review of Systems   Constitutional: Negative for activity  "change, chills, diaphoresis and fatigue.   Respiratory: Negative for apnea, cough and shortness of breath.    Cardiovascular: Negative for chest pain, palpitations and leg swelling.   Gastrointestinal: Negative for abdominal pain, constipation, diarrhea, nausea and vomiting.   Genitourinary: Negative for difficulty urinating.   Musculoskeletal: Negative for arthralgias.   Skin: Negative for rash.   Neurological: Negative for dizziness, weakness and headaches.   Psychiatric/Behavioral: Positive for dysphoric mood. Negative for agitation, self-injury, sleep disturbance and suicidal ideas. The patient is nervous/anxious.          Physical Exam:  Physical Exam  Vitals reviewed.   Constitutional:       General: He is not in acute distress.     Appearance: Normal appearance. He is not ill-appearing or toxic-appearing.   Pulmonary:      Effort: Pulmonary effort is normal.   Musculoskeletal:         General: Normal range of motion.   Neurological:      General: No focal deficit present.      Mental Status: He is alert and oriented to person, place, and time.   Psychiatric:         Attention and Perception: Attention and perception normal.         Mood and Affect: Mood is anxious. Mood is not depressed.         Speech: Speech normal.         Behavior: Behavior normal. Behavior is cooperative.         Thought Content: Thought content normal.         Cognition and Memory: Cognition and memory normal.         Judgment: Judgment normal.         Vital Signs:   /97   Pulse 80   Ht 198.1 cm (77.99\")   Wt 107 kg (236 lb)   BMI 27.28 kg/m²      Lab Results:   Telemedicine on 01/16/2023   Component Date Value Ref Range Status   • External Amphetamine Screen Urine 01/16/2023 Negative   Final   • External Benzodiazepine Screen Uri* 01/16/2023 Negative   Final   • External Cocaine Screen Urine 01/16/2023 Negative   Final   • External THC Screen Urine 01/16/2023 Positive   Final   • External Methadone Screen Urine 01/16/2023 " Negative   Final   • External Methamphetamine Screen Ur* 01/16/2023 Negative   Final   • External Oxycodone Screen Urine 01/16/2023 Negative   Final   • External Buprenorphine Screen Urine 01/16/2023 Positive   Final   • External MDMA 01/16/2023 Negative   Final   • External Opiates Screen Urine 01/16/2023 Negative   Final   Telemedicine on 12/19/2022   Component Date Value Ref Range Status   • External Amphetamine Screen Urine 12/19/2022 Negative   Final   • External Benzodiazepine Screen Uri* 12/19/2022 Negative   Final   • External Cocaine Screen Urine 12/19/2022 Negative   Final   • External THC Screen Urine 12/19/2022 Positive (A)   Final   • External Methadone Screen Urine 12/19/2022 Negative   Final   • External Methamphetamine Screen Ur* 12/19/2022 Negative   Final   • External Oxycodone Screen Urine 12/19/2022 Negative   Final   • External Buprenorphine Screen Urine 12/19/2022 Positive (A)   Final   • External MDMA 12/19/2022 Negative   Final   • External Opiates Screen Urine 12/19/2022 Negative   Final   Telemedicine on 11/21/2022   Component Date Value Ref Range Status   • External Amphetamine Screen Urine 11/21/2022 Negative   Final   • External Benzodiazepine Screen Uri* 11/21/2022 Negative   Final   • External Cocaine Screen Urine 11/21/2022 Negative   Final   • External THC Screen Urine 11/21/2022 Positive (A)   Final   • External Methadone Screen Urine 11/21/2022 Negative   Final   • External Methamphetamine Screen Ur* 11/21/2022 Negative   Final   • External Oxycodone Screen Urine 11/21/2022 Negative   Final   • External Buprenorphine Screen Urine 11/21/2022 Positive (A)   Final   • External MDMA 11/21/2022 Negative   Final   • External Opiates Screen Urine 11/21/2022 Negative   Final   Hospital Outpatient Visit on 10/20/2022   Component Date Value Ref Range Status   • QT Interval 10/20/2022 438  ms Final   • QTC Interval 10/20/2022 451  ms Final   Lab on 10/20/2022   Component Date Value Ref Range  Status   • Glucose 10/20/2022 100 (H)  65 - 99 mg/dL Final   • BUN 10/20/2022 8  6 - 20 mg/dL Final   • Creatinine 10/20/2022 0.82  0.76 - 1.27 mg/dL Final   • Sodium 10/20/2022 138  136 - 145 mmol/L Final   • Potassium 10/20/2022 3.5  3.5 - 5.2 mmol/L Final    Slight hemolysis detected by analyzer. Results may be affected.   • Chloride 10/20/2022 100  98 - 107 mmol/L Final   • CO2 10/20/2022 19.7 (L)  22.0 - 29.0 mmol/L Final   • Calcium 10/20/2022 9.1  8.6 - 10.5 mg/dL Final   • Total Protein 10/20/2022 8.3  6.0 - 8.5 g/dL Final   • Albumin 10/20/2022 4.38  3.50 - 5.20 g/dL Final   • ALT (SGPT) 10/20/2022 145 (H)  1 - 41 U/L Final   • AST (SGOT) 10/20/2022 229 (H)  1 - 40 U/L Final   • Alkaline Phosphatase 10/20/2022 319 (H)  39 - 117 U/L Final   • Total Bilirubin 10/20/2022 0.8  0.0 - 1.2 mg/dL Final   • Globulin 10/20/2022 3.9  gm/dL Final   • A/G Ratio 10/20/2022 1.1  g/dL Final   • BUN/Creatinine Ratio 10/20/2022 9.8  7.0 - 25.0 Final   • Anion Gap 10/20/2022 18.3 (H)  5.0 - 15.0 mmol/L Final   • eGFR 10/20/2022 119.7  >60.0 mL/min/1.73 Final    National Kidney Foundation and American Society of Nephrology (ASN) Task Force recommended calculation based on the Chronic Kidney Disease Epidemiology Collaboration (CKD-EPI) equation refit without adjustment for race.   • WBC 10/20/2022 6.95  3.40 - 10.80 10*3/mm3 Final   • RBC 10/20/2022 5.33  4.14 - 5.80 10*6/mm3 Final   • Hemoglobin 10/20/2022 15.7  13.0 - 17.7 g/dL Final   • Hematocrit 10/20/2022 47.2  37.5 - 51.0 % Final   • MCV 10/20/2022 88.6  79.0 - 97.0 fL Final   • MCH 10/20/2022 29.5  26.6 - 33.0 pg Final   • MCHC 10/20/2022 33.3  31.5 - 35.7 g/dL Final   • RDW 10/20/2022 14.2  12.3 - 15.4 % Final   • RDW-SD 10/20/2022 45.4  37.0 - 54.0 fl Final   • MPV 10/20/2022 11.2  6.0 - 12.0 fL Final   • Platelets 10/20/2022 146  140 - 450 10*3/mm3 Final   • Troponin T 10/20/2022 <0.010  0.000 - 0.030 ng/mL Final   • Creatine Kinase 10/20/2022 66  20 - 200 U/L Final    • CKMB 10/20/2022 1.43  <=10.40 ng/mL Final   Telemedicine on 10/19/2022   Component Date Value Ref Range Status   • External Amphetamine Screen Urine 10/19/2022 Negative   Final   • External Benzodiazepine Screen Uri* 10/19/2022 Negative   Final   • External Cocaine Screen Urine 10/19/2022 Negative   Final   • External THC Screen Urine 10/19/2022 Positive (A)   Final   • External Methadone Screen Urine 10/19/2022 Negative   Final   • External Methamphetamine Screen Ur* 10/19/2022 Negative   Final   • External Oxycodone Screen Urine 10/19/2022 Negative   Final   • External Buprenorphine Screen Urine 10/19/2022 Positive (A)   Final   • External MDMA 10/19/2022 Negative   Final   • External Opiates Screen Urine 10/19/2022 Negative   Final   Telemedicine on 09/19/2022   Component Date Value Ref Range Status   • External Amphetamine Screen Urine 09/19/2022 Negative   Final   • External Benzodiazepine Screen Uri* 09/19/2022 Negative   Final   • External Cocaine Screen Urine 09/19/2022 Negative   Final   • External THC Screen Urine 09/19/2022 Positive (A)   Final   • External Methadone Screen Urine 09/19/2022 Negative   Final   • External Methamphetamine Screen Ur* 09/19/2022 Negative   Final   • External Oxycodone Screen Urine 09/19/2022 Negative   Final   • External Buprenorphine Screen Urine 09/19/2022 Positive (A)   Final   • External MDMA 09/19/2022 Negative   Final   • External Opiates Screen Urine 09/19/2022 Negative   Final   Telemedicine on 08/22/2022   Component Date Value Ref Range Status   • External Amphetamine Screen Urine 08/22/2022 Negative   Final   • External Benzodiazepine Screen Uri* 08/22/2022 Negative   Final   • External Cocaine Screen Urine 08/22/2022 Negative   Final   • External THC Screen Urine 08/22/2022 Negative   Final   • External Methadone Screen Urine 08/22/2022 Negative   Final   • External Methamphetamine Screen Ur* 08/22/2022 Negative   Final   • External Oxycodone Screen Urine  08/22/2022 Negative   Final   • External Buprenorphine Screen Urine 08/22/2022 Positive (A)   Final   • External MDMA 08/22/2022 Negative   Final   • External Opiates Screen Urine 08/22/2022 Negative   Final   Telemedicine on 08/01/2022   Component Date Value Ref Range Status   • External Amphetamine Screen Urine 08/01/2022 Negative   Final-Edited   • External Benzodiazepine Screen Uri* 08/01/2022 Negative   Final-Edited   • External Cocaine Screen Urine 08/01/2022 Negative   Final-Edited   • External THC Screen Urine 08/01/2022 Negative   Corrected   • External Methadone Screen Urine 08/01/2022 Negative   Final-Edited   • External Methamphetamine Screen Ur* 08/01/2022 Negative   Final-Edited   • External Oxycodone Screen Urine 08/01/2022 Negative   Final-Edited   • External Buprenorphine Screen Urine 08/01/2022 Positive (A)   Final-Edited   • External MDMA 08/01/2022 Negative   Final-Edited   • External Opiates Screen Urine 08/01/2022 Negative   Final-Edited   Telemedicine on 07/18/2022   Component Date Value Ref Range Status   • External Amphetamine Screen Urine 07/18/2022 Negative   Final   • External Benzodiazepine Screen Uri* 07/18/2022 Negative   Final   • External Cocaine Screen Urine 07/18/2022 Negative   Final   • External THC Screen Urine 07/18/2022 Negative   Final   • External Methadone Screen Urine 07/18/2022 Negative   Final   • External Methamphetamine Screen Ur* 07/18/2022 Negative   Final   • External Oxycodone Screen Urine 07/18/2022 Negative   Final   • External Buprenorphine Screen Urine 07/18/2022 Positive (XH)   Final   • External MDMA 07/18/2022 Negative   Final   • External Opiates Screen Urine 07/18/2022 Negative   Final   There may be more visits with results that are not included.         Assessment & Plan   Diagnoses and all orders for this visit:    1. Opioid type dependence, continuous (HCC) (Primary)  -     buprenorphine-naloxone (SUBOXONE) 8-2 MG per SL tablet; Place 2 tablets under  the tongue Daily.  Dispense: 28 tablet; Refill: 0    2. Medication management  -     [facility] Drug Screen    3. Generalized anxiety disorder  -     cloNIDine (Catapres) 0.1 MG tablet; Take 1 tablet by mouth 2 (Two) Times a Day. Take as needed for anxiety.  Insomnia.  Chills or sweats  Dispense: 60 tablet; Refill: 0  -     sertraline (Zoloft) 50 MG tablet; Take 1 tablet by mouth Daily for 30 days.  Dispense: 30 tablet; Refill: 0  -     QUEtiapine (SEROquel) 25 MG tablet; Take 1 tablet by mouth Every Night.  Dispense: 30 tablet; Refill: 0  -     hydrOXYzine pamoate (Vistaril) 50 MG capsule; Take 1 capsule by mouth 4 (Four) Times a Day As Needed for Anxiety (and/or insomia). Take 1 to 2 tablets as needed for anxiety every 6 hours  Dispense: 90 capsule; Refill: 0    4. Insomnia, unspecified type  -     cloNIDine (Catapres) 0.1 MG tablet; Take 1 tablet by mouth 2 (Two) Times a Day. Take as needed for anxiety.  Insomnia.  Chills or sweats  Dispense: 60 tablet; Refill: 0  -     hydrOXYzine pamoate (Vistaril) 50 MG capsule; Take 1 capsule by mouth 4 (Four) Times a Day As Needed for Anxiety (and/or insomia). Take 1 to 2 tablets as needed for anxiety every 6 hours  Dispense: 90 capsule; Refill: 0    5. Depression, unspecified depression type  -     sertraline (Zoloft) 50 MG tablet; Take 1 tablet by mouth Daily for 30 days.  Dispense: 30 tablet; Refill: 0        Visit Diagnoses:    ICD-10-CM ICD-9-CM   1. Opioid type dependence, continuous ([facility])  F11.20 304.01   2. Medication management  Z79.899 V58.69   3. Generalized anxiety disorder  F41.1 300.02   4. Insomnia, unspecified type  G47.00 780.52   5. Depression, unspecified depression type  F32.A 311       PLAN:  1. Safety: No acute safety concerns  2. Risk Assessment: Risk of self-harm acutely is low. Risk of self-harm chronically is also low, but could be further elevated in the event of treatment noncompliance and/or AODA.    TREATMENT PLAN/GOALS: Continue supportive  psychotherapy efforts and medications as indicated. Treatment and medication options discussed during today's visit. Patient acknowledged and verbally consented to continue with current treatment plan and was educated on the importance of compliance with treatment and follow-up appointments.    MEDICATION ISSUES:  ROMAN reviewed as expected.  Discussed medication options and treatment plan of prescribed medication as well as the risks, benefits, and side effects including potential falls, possible impaired driving and metabolic adversities among others. Patient is agreeable to call the office with any worsening of symptoms or onset of side effects. Patient is agreeable to call 911 or go to the nearest ER should he/she begin having SI/HI. No medication side effects or related complaints today.     MEDS ORDERED DURING VISIT:  New Medications Ordered This Visit   Medications   • buprenorphine-naloxone (SUBOXONE) 8-2 MG per SL tablet     Sig: Place 2 tablets under the tongue Daily.     Dispense:  28 tablet     Refill:  0     NADEAN:[id number]   • cloNIDine (Catapres) 0.1 MG tablet     Sig: Take 1 tablet by mouth 2 (Two) Times a Day. Take as needed for anxiety.  Insomnia.  Chills or sweats     Dispense:  60 tablet     Refill:  0   • sertraline (Zoloft) 50 MG tablet     Sig: Take 1 tablet by mouth Daily for 30 days.     Dispense:  30 tablet     Refill:  0   • QUEtiapine (SEROquel) 25 MG tablet     Sig: Take 1 tablet by mouth Every Night.     Dispense:  30 tablet     Refill:  0   • hydrOXYzine pamoate (Vistaril) 50 MG capsule     Sig: Take 1 capsule by mouth 4 (Four) Times a Day As Needed for Anxiety (and/or insomia). Take 1 to 2 tablets as needed for anxiety every 6 hours     Dispense:  90 capsule     Refill:  0       No follow-ups on file.           This document has been electronically signed by RENAN De Dios  January 16, 2023 11:31 EST      Part of this note may be an electronic transcription/translation of  spoken language to printed text using the Dragon Dictation System.

## 2023-01-30 ENCOUNTER — TELEMEDICINE (OUTPATIENT)
Dept: PSYCHIATRY | Facility: CLINIC | Age: 33
End: 2023-01-30
Payer: COMMERCIAL

## 2023-01-30 VITALS
HEIGHT: 78 IN | BODY MASS INDEX: 27.05 KG/M2 | SYSTOLIC BLOOD PRESSURE: 136 MMHG | DIASTOLIC BLOOD PRESSURE: 82 MMHG | WEIGHT: 233.8 LBS

## 2023-01-30 DIAGNOSIS — Z79.899 MEDICATION MANAGEMENT: ICD-10-CM

## 2023-01-30 DIAGNOSIS — F11.20 OPIOID TYPE DEPENDENCE, CONTINUOUS: Primary | ICD-10-CM

## 2023-01-30 PROCEDURE — 99213 OFFICE O/P EST LOW 20 MIN: CPT | Performed by: NURSE PRACTITIONER

## 2023-01-30 RX ORDER — BUPRENORPHINE HYDROCHLORIDE AND NALOXONE HYDROCHLORIDE DIHYDRATE 8; 2 MG/1; MG/1
2 TABLET SUBLINGUAL DAILY
Qty: 28 TABLET | Refills: 0 | Status: SHIPPED | OUTPATIENT
Start: 2023-01-30 | End: 2023-02-13 | Stop reason: SDUPTHER

## 2023-01-30 NOTE — PROGRESS NOTES
This provider is located at [facility]. The Patient is seen remotely located at the [facility] ([facility]) using Video. Patient is being seen via telehealth and confirm that they are in a secure environment for this session. The patient's condition being diagnosed/treated is appropriate for telemedicine. Provider identified as Daniel Olivas as well as credentials APRN MSN FNP-C ANTONIO-REYNOLD.   The client/patient gave consent to be seen remotely, and when consent is given they understand that the consent allows for patient identifiable information to be sent to a third party as needed.   They may refuse to be seen remotely at any time. The electronic data is encrypted and password protected, and the patient has been advised of the potential risks to privacy not withstanding such measures.    Concern for illicit stimulant use    Chief Complaint/History of Present Illness: Follow Up buprenorphine/naloxone Medicated Assisted Treatment for Opiate Use Disorder     Patient/Client Concerns/Updates: Continuing Zoloft Vistaril, seroquel and clonidine for anxiety  -Patient doing well on his medications with no concerns or adverse side effects, mood is overall stable with no depressive or anxious episodes and no suicidal or homicidal thoughts  -Patient and family completing a move which has kept him busy  -Patient denies intentional use of phentermine as evidenced on urine confirmation from urine sample 2 weeks ago-patient reports his wife takes his medication and he may have inadvertently taken such, I advised patient this is a controlled substance and he must not take any medications that he has not acquired himself  -We will continue 2-week evaluations for further vigilance and accountability    Triggers (Persons/Places/Things/Events/Thought/Emotions): Life stress    Cravings: Denies cravings    Relapse Prevention: Counseling    Urine Drug Screen (today's visit) discussed: Positive buprenorphine and positive  THC    UDS Confirmation (Most recent/Resulted): Positive buprenorphine/nor-buprenorphine, no concerns for urine tampering otherwise positive THC and phentermine    Most recent pertinent laboratory studies reviewed: 3/16/2022-LFTs within normal limits; hepatitis C virus not detected     ROMAN (PDMP) Reviewed for Current/Active Medications: buprenorphine/naloxone as reviewed today    Past Surgical History:  Past Surgical History:   Procedure Laterality Date   • NO PAST SURGERIES         Problem List:  Patient Active Problem List   Diagnosis   • Alcohol-induced acute pancreatitis   • Pancreatitis   • Chronic pain of right knee       Allergy:   No Known Allergies     Current Medications:   Current Outpatient Medications   Medication Sig Dispense Refill   • buprenorphine-naloxone (SUBOXONE) 8-2 MG per SL tablet Place 2 tablets under the tongue Daily. 28 tablet 0   • cephalexin (KEFLEX) 500 MG capsule Take 1 capsule by mouth 3 (Three) Times a Day. 30 capsule 0   • cloNIDine (Catapres) 0.1 MG tablet Take 1 tablet by mouth 2 (Two) Times a Day. Take as needed for anxiety.  Insomnia.  Chills or sweats 60 tablet 0   • hydrOXYzine pamoate (Vistaril) 50 MG capsule Take 1 capsule by mouth 4 (Four) Times a Day As Needed for Anxiety (and/or insomia). Take 1 to 2 tablets as needed for anxiety every 6 hours 90 capsule 0   • ibuprofen (ADVIL,MOTRIN) 600 MG tablet Take 1 tablet by mouth Every 6 (Six) Hours As Needed for Mild Pain. 30 tablet 2   • levocetirizine (XYZAL) 5 MG tablet Take 1 tablet by mouth Daily. For allergies     • lisinopril (PRINIVIL,ZESTRIL) 20 MG tablet Take 1 tablet by mouth Daily. for blood pressure 30 tablet 2   • mupirocin (BACTROBAN) 2 % ointment Apply  topically to the appropriate area as directed 3 (Three) Times a Day. 15 g 0   • naloxone (NARCAN) 4 MG/0.1ML nasal spray 1 spray into the nostril(s) as directed by provider As Needed (Opiate oversedation). Spray in 1 nostril every 2 to 3 minutes call 911 2 each  2   • ondansetron (ZOFRAN) 4 MG tablet TAKE ONE TABLET BY MOUTH EVERY 8 HOURS AS NEEDED FOR NAUSEA & VOMITING     • orphenadrine (NORFLEX) 100 MG 12 hr tablet Take 1 tablet by mouth 2 (Two) Times a Day As Needed for Muscle Spasms or Mild Pain . 14 tablet 0   • QUEtiapine (SEROquel) 25 MG tablet Take 1 tablet by mouth Every Night. 30 tablet 0   • sertraline (Zoloft) 50 MG tablet Take 1 tablet by mouth Daily for 30 days. 30 tablet 0   • amoxicillin (AMOXIL) 500 MG capsule TAKE ONE CAPSULE BY MOUTH TWO TIMES PER DAY UNTIL ALL GONE FOR INFECTION       No current facility-administered medications for this visit.       Past Medical History:  Past Medical History:   Diagnosis Date   • Alcohol-induced acute pancreatitis 6/27/2019   • Alcoholism (HCC)    • Drug use     Amphetamines and Suboxone   • Fatty liver    • Hepatitis C antibody test positive 2019   • Medical non-compliance    • Pancreatitis    • Smoker          Social History     Socioeconomic History   • Marital status:    Tobacco Use   • Smoking status: Every Day     Packs/day: 0.50     Types: Cigarettes   • Smokeless tobacco: Never   Vaping Use   • Vaping Use: Never used   Substance and Sexual Activity   • Alcohol use: Yes     Alcohol/week: 8.0 standard drinks     Types: 8 Shots of liquor per week     Comment: 5-6 double shots   • Drug use: Yes     Comment: suboxone as prescribed   • Sexual activity: Defer         Family History   Problem Relation Age of Onset   • Cancer Maternal Grandmother    • No Known Problems Mother    • No Known Problems Father          Mental Status Exam:   Hygiene:   good  Cooperation:  Cooperative  Eye Contact:  Good  Psychomotor Behavior:  Appropriate  Affect:  Appropriate  Mood: normal  Speech:  Normal  Thought Process:  Goal directed  Thought Content:  Normal  Suicidal:  None  Homicidal:  None  Hallucinations:  None  Delusion:  None  Memory:  Intact  Orientation:  Grossly intact  Reliability:  good  Insight:  Good  Judgement:   "Good  Impulse Control:  Good         Review of Systems:  Review of Systems   Constitutional: Negative for activity change, chills, diaphoresis and fatigue.   Respiratory: Negative for apnea, cough and shortness of breath.    Cardiovascular: Negative for chest pain, palpitations and leg swelling.   Gastrointestinal: Negative for abdominal pain, constipation, diarrhea, nausea and vomiting.   Genitourinary: Negative for difficulty urinating.   Musculoskeletal: Negative for arthralgias.   Skin: Negative for rash.   Neurological: Negative for dizziness, weakness and headaches.   Psychiatric/Behavioral: Negative for agitation, self-injury, sleep disturbance and suicidal ideas. The patient is not nervous/anxious.          Physical Exam:  Physical Exam  Vitals reviewed.   Constitutional:       General: He is not in acute distress.     Appearance: Normal appearance. He is not ill-appearing or toxic-appearing.   Pulmonary:      Effort: Pulmonary effort is normal.   Musculoskeletal:         General: Normal range of motion.   Neurological:      General: No focal deficit present.      Mental Status: He is alert and oriented to person, place, and time.   Psychiatric:         Attention and Perception: Attention and perception normal.         Mood and Affect: Mood normal. Mood is not anxious or depressed.         Speech: Speech normal.         Behavior: Behavior normal. Behavior is cooperative.         Thought Content: Thought content normal.         Cognition and Memory: Cognition and memory normal.         Judgment: Judgment normal.         Vital Signs:   /82   Ht 198.1 cm (77.99\")   Wt 106 kg (233 lb 12.8 oz)   BMI 27.02 kg/m²      Lab Results:   Telemedicine on 01/16/2023   Component Date Value Ref Range Status   • External Amphetamine Screen Urine 01/16/2023 Negative   Final   • External Benzodiazepine Screen Uri* 01/16/2023 Negative   Final   • External Cocaine Screen Urine 01/16/2023 Negative   Final   • External " THC Screen Urine 01/16/2023 Positive   Final   • External Methadone Screen Urine 01/16/2023 Negative   Final   • External Methamphetamine Screen Ur* 01/16/2023 Negative   Final   • External Oxycodone Screen Urine 01/16/2023 Negative   Final   • External Buprenorphine Screen Urine 01/16/2023 Positive   Final   • External MDMA 01/16/2023 Negative   Final   • External Opiates Screen Urine 01/16/2023 Negative   Final   Telemedicine on 12/19/2022   Component Date Value Ref Range Status   • External Amphetamine Screen Urine 12/19/2022 Negative   Final   • External Benzodiazepine Screen Uri* 12/19/2022 Negative   Final   • External Cocaine Screen Urine 12/19/2022 Negative   Final   • External THC Screen Urine 12/19/2022 Positive (A)   Final   • External Methadone Screen Urine 12/19/2022 Negative   Final   • External Methamphetamine Screen Ur* 12/19/2022 Negative   Final   • External Oxycodone Screen Urine 12/19/2022 Negative   Final   • External Buprenorphine Screen Urine 12/19/2022 Positive (A)   Final   • External MDMA 12/19/2022 Negative   Final   • External Opiates Screen Urine 12/19/2022 Negative   Final   Telemedicine on 11/21/2022   Component Date Value Ref Range Status   • External Amphetamine Screen Urine 11/21/2022 Negative   Final   • External Benzodiazepine Screen Uri* 11/21/2022 Negative   Final   • External Cocaine Screen Urine 11/21/2022 Negative   Final   • External THC Screen Urine 11/21/2022 Positive (A)   Final   • External Methadone Screen Urine 11/21/2022 Negative   Final   • External Methamphetamine Screen Ur* 11/21/2022 Negative   Final   • External Oxycodone Screen Urine 11/21/2022 Negative   Final   • External Buprenorphine Screen Urine 11/21/2022 Positive (A)   Final   • External MDMA 11/21/2022 Negative   Final   • External Opiates Screen Urine 11/21/2022 Negative   Final   Hospital Outpatient Visit on 10/20/2022   Component Date Value Ref Range Status   • QT Interval 10/20/2022 438  ms Final   •  QTC Interval 10/20/2022 451  ms Final   Lab on 10/20/2022   Component Date Value Ref Range Status   • Glucose 10/20/2022 100 (H)  65 - 99 mg/dL Final   • BUN 10/20/2022 8  6 - 20 mg/dL Final   • Creatinine 10/20/2022 0.82  0.76 - 1.27 mg/dL Final   • Sodium 10/20/2022 138  136 - 145 mmol/L Final   • Potassium 10/20/2022 3.5  3.5 - 5.2 mmol/L Final    Slight hemolysis detected by analyzer. Results may be affected.   • Chloride 10/20/2022 100  98 - 107 mmol/L Final   • CO2 10/20/2022 19.7 (L)  22.0 - 29.0 mmol/L Final   • Calcium 10/20/2022 9.1  8.6 - 10.5 mg/dL Final   • Total Protein 10/20/2022 8.3  6.0 - 8.5 g/dL Final   • Albumin 10/20/2022 4.38  3.50 - 5.20 g/dL Final   • ALT (SGPT) 10/20/2022 145 (H)  1 - 41 U/L Final   • AST (SGOT) 10/20/2022 229 (H)  1 - 40 U/L Final   • Alkaline Phosphatase 10/20/2022 319 (H)  39 - 117 U/L Final   • Total Bilirubin 10/20/2022 0.8  0.0 - 1.2 mg/dL Final   • Globulin 10/20/2022 3.9  gm/dL Final   • A/G Ratio 10/20/2022 1.1  g/dL Final   • BUN/Creatinine Ratio 10/20/2022 9.8  7.0 - 25.0 Final   • Anion Gap 10/20/2022 18.3 (H)  5.0 - 15.0 mmol/L Final   • eGFR 10/20/2022 119.7  >60.0 mL/min/1.73 Final    National Kidney Foundation and American Society of Nephrology (ASN) Task Force recommended calculation based on the Chronic Kidney Disease Epidemiology Collaboration (CKD-EPI) equation refit without adjustment for race.   • WBC 10/20/2022 6.95  3.40 - 10.80 10*3/mm3 Final   • RBC 10/20/2022 5.33  4.14 - 5.80 10*6/mm3 Final   • Hemoglobin 10/20/2022 15.7  13.0 - 17.7 g/dL Final   • Hematocrit 10/20/2022 47.2  37.5 - 51.0 % Final   • MCV 10/20/2022 88.6  79.0 - 97.0 fL Final   • MCH 10/20/2022 29.5  26.6 - 33.0 pg Final   • MCHC 10/20/2022 33.3  31.5 - 35.7 g/dL Final   • RDW 10/20/2022 14.2  12.3 - 15.4 % Final   • RDW-SD 10/20/2022 45.4  37.0 - 54.0 fl Final   • MPV 10/20/2022 11.2  6.0 - 12.0 fL Final   • Platelets 10/20/2022 146  140 - 450 10*3/mm3 Final   • Troponin T  10/20/2022 <0.010  0.000 - 0.030 ng/mL Final   • Creatine Kinase 10/20/2022 66  20 - 200 U/L Final   • CKMB 10/20/2022 1.43  <=10.40 ng/mL Final   Telemedicine on 10/19/2022   Component Date Value Ref Range Status   • External Amphetamine Screen Urine 10/19/2022 Negative   Final   • External Benzodiazepine Screen Uri* 10/19/2022 Negative   Final   • External Cocaine Screen Urine 10/19/2022 Negative   Final   • External THC Screen Urine 10/19/2022 Positive (A)   Final   • External Methadone Screen Urine 10/19/2022 Negative   Final   • External Methamphetamine Screen Ur* 10/19/2022 Negative   Final   • External Oxycodone Screen Urine 10/19/2022 Negative   Final   • External Buprenorphine Screen Urine 10/19/2022 Positive (A)   Final   • External MDMA 10/19/2022 Negative   Final   • External Opiates Screen Urine 10/19/2022 Negative   Final   Telemedicine on 09/19/2022   Component Date Value Ref Range Status   • External Amphetamine Screen Urine 09/19/2022 Negative   Final   • External Benzodiazepine Screen Uri* 09/19/2022 Negative   Final   • External Cocaine Screen Urine 09/19/2022 Negative   Final   • External THC Screen Urine 09/19/2022 Positive (A)   Final   • External Methadone Screen Urine 09/19/2022 Negative   Final   • External Methamphetamine Screen Ur* 09/19/2022 Negative   Final   • External Oxycodone Screen Urine 09/19/2022 Negative   Final   • External Buprenorphine Screen Urine 09/19/2022 Positive (A)   Final   • External MDMA 09/19/2022 Negative   Final   • External Opiates Screen Urine 09/19/2022 Negative   Final   Telemedicine on 08/22/2022   Component Date Value Ref Range Status   • External Amphetamine Screen Urine 08/22/2022 Negative   Final   • External Benzodiazepine Screen Uri* 08/22/2022 Negative   Final   • External Cocaine Screen Urine 08/22/2022 Negative   Final   • External THC Screen Urine 08/22/2022 Negative   Final   • External Methadone Screen Urine 08/22/2022 Negative   Final   •  External Methamphetamine Screen Ur* 08/22/2022 Negative   Final   • External Oxycodone Screen Urine 08/22/2022 Negative   Final   • External Buprenorphine Screen Urine 08/22/2022 Positive (A)   Final   • External MDMA 08/22/2022 Negative   Final   • External Opiates Screen Urine 08/22/2022 Negative   Final   Telemedicine on 08/01/2022   Component Date Value Ref Range Status   • External Amphetamine Screen Urine 08/01/2022 Negative   Final-Edited   • External Benzodiazepine Screen Uri* 08/01/2022 Negative   Final-Edited   • External Cocaine Screen Urine 08/01/2022 Negative   Final-Edited   • External THC Screen Urine 08/01/2022 Negative   Corrected   • External Methadone Screen Urine 08/01/2022 Negative   Final-Edited   • External Methamphetamine Screen Ur* 08/01/2022 Negative   Final-Edited   • External Oxycodone Screen Urine 08/01/2022 Negative   Final-Edited   • External Buprenorphine Screen Urine 08/01/2022 Positive (A)   Final-Edited   • External MDMA 08/01/2022 Negative   Final-Edited   • External Opiates Screen Urine 08/01/2022 Negative   Final-Edited         Assessment & Plan   Diagnoses and all orders for this visit:    1. Opioid type dependence, continuous (HCC) (Primary)  -     buprenorphine-naloxone (SUBOXONE) 8-2 MG per SL tablet; Place 2 tablets under the tongue Daily.  Dispense: 28 tablet; Refill: 0    2. Medication management  -     [facility] Drug Screen        Visit Diagnoses:  No diagnosis found.    PLAN:  1. Safety: No acute safety concerns  2. Risk Assessment: Risk of self-harm acutely is low. Risk of self-harm chronically is also low, but could be further elevated in the event of treatment noncompliance and/or AODA.    TREATMENT PLAN/GOALS: Continue supportive psychotherapy efforts and medications as indicated. Treatment and medication options discussed during today's visit. Patient acknowledged and verbally consented to continue with current treatment plan and was educated on the importance of  compliance with treatment and follow-up appointments.    MEDICATION ISSUES:  ROMAN reviewed as expected.  Discussed medication options and treatment plan of prescribed medication as well as the risks, benefits, and side effects including potential falls, possible impaired driving and metabolic adversities among others. Patient is agreeable to call the office with any worsening of symptoms or onset of side effects. Patient is agreeable to call 911 or go to the nearest ER should he/she begin having SI/HI. No medication side effects or related complaints today.     MEDS ORDERED DURING VISIT:  No orders of the defined types were placed in this encounter.      No follow-ups on file.           This document has been electronically signed by RENAN De Dios  January 30, 2023 09:04 EST      Part of this note may be an electronic transcription/translation of spoken language to printed text using the Dragon Dictation System.

## 2023-02-13 ENCOUNTER — TELEMEDICINE (OUTPATIENT)
Dept: PSYCHIATRY | Facility: CLINIC | Age: 33
End: 2023-02-13
Payer: COMMERCIAL

## 2023-02-13 VITALS
WEIGHT: 235 LBS | DIASTOLIC BLOOD PRESSURE: 103 MMHG | HEIGHT: 78 IN | SYSTOLIC BLOOD PRESSURE: 146 MMHG | HEART RATE: 77 BPM | BODY MASS INDEX: 27.19 KG/M2

## 2023-02-13 DIAGNOSIS — Z79.899 MEDICATION MANAGEMENT: ICD-10-CM

## 2023-02-13 DIAGNOSIS — G47.00 INSOMNIA, UNSPECIFIED TYPE: ICD-10-CM

## 2023-02-13 DIAGNOSIS — F11.20 OPIOID TYPE DEPENDENCE, CONTINUOUS: Primary | ICD-10-CM

## 2023-02-13 DIAGNOSIS — F41.1 GENERALIZED ANXIETY DISORDER: ICD-10-CM

## 2023-02-13 DIAGNOSIS — F32.A DEPRESSION, UNSPECIFIED DEPRESSION TYPE: ICD-10-CM

## 2023-02-13 PROCEDURE — 99214 OFFICE O/P EST MOD 30 MIN: CPT | Performed by: NURSE PRACTITIONER

## 2023-02-13 RX ORDER — QUETIAPINE FUMARATE 25 MG/1
25 TABLET, FILM COATED ORAL NIGHTLY
Qty: 30 TABLET | Refills: 0 | Status: SHIPPED | OUTPATIENT
Start: 2023-02-13 | End: 2023-03-07 | Stop reason: SDUPTHER

## 2023-02-13 RX ORDER — HYDROXYZINE PAMOATE 50 MG/1
50 CAPSULE ORAL 4 TIMES DAILY PRN
Qty: 90 CAPSULE | Refills: 0 | Status: SHIPPED | OUTPATIENT
Start: 2023-02-13 | End: 2023-03-07 | Stop reason: SDUPTHER

## 2023-02-13 RX ORDER — BUPRENORPHINE HYDROCHLORIDE AND NALOXONE HYDROCHLORIDE DIHYDRATE 8; 2 MG/1; MG/1
2 TABLET SUBLINGUAL DAILY
Qty: 28 TABLET | Refills: 0 | Status: SHIPPED | OUTPATIENT
Start: 2023-02-13 | End: 2023-03-07 | Stop reason: SDUPTHER

## 2023-02-13 RX ORDER — CLONIDINE HYDROCHLORIDE 0.1 MG/1
0.1 TABLET ORAL 2 TIMES DAILY
Qty: 60 TABLET | Refills: 0 | Status: SHIPPED | OUTPATIENT
Start: 2023-02-13 | End: 2023-04-04 | Stop reason: SDUPTHER

## 2023-02-13 RX ORDER — LISINOPRIL 20 MG/1
20 TABLET ORAL DAILY
Qty: 30 TABLET | Refills: 2 | Status: SHIPPED | OUTPATIENT
Start: 2023-02-13 | End: 2023-03-07 | Stop reason: SDUPTHER

## 2023-02-13 NOTE — PROGRESS NOTES
This provider is located at [facility]. The Patient is seen remotely located at the [facility] ([facility]) using Video. Patient is being seen via telehealth and confirm that they are in a secure environment for this session. The patient's condition being diagnosed/treated is appropriate for telemedicine. Provider identified as Daniel Olivas as well as credentials APRN MSN FNP-C ANTONIO-REYNOLD.   The client/patient gave consent to be seen remotely, and when consent is given they understand that the consent allows for patient identifiable information to be sent to a third party as needed.   They may refuse to be seen remotely at any time. The electronic data is encrypted and password protected, and the patient has been advised of the potential risks to privacy not withstanding such measures.    Concern for illicit stimulant use    Chief Complaint/History of Present Illness: Follow Up buprenorphine/naloxone Medicated Assisted Treatment for Opiate Use Disorder     Patient/Client Concerns/Updates: Continuing Zoloft Vistaril, seroquel and clonidine for anxiety  -Patient reports he is competent no unintentional phentermine will be in today's urinalysis, will continue every 2 week evaluations to discuss results with client  -Otherwise patient reports he is doing well with no concerns today  -Mood is stable with no depressive or anxious episodes, no suicidal or homicidal thoughts    Triggers (Persons/Places/Things/Events/Thought/Emotions): Anxiety    Cravings: Denies cravings    Relapse Prevention: Counseling    Urine Drug Screen (today's visit) discussed: Positive buprenorphine and positive THC    UDS Confirmation (Most recent/Resulted): Positive buprenorphine/nor-buprenorphine, no concerns for urine pampering otherwise positive THC and phentermine    Most recent pertinent laboratory studies reviewed: 3/16/2022-LFTs within normal limits; hepatitis C virus not detected     ROMAN (PDMP) Reviewed for  Current/Active Medications: buprenorphine/naloxone as reviewed today    Past Surgical History:  Past Surgical History:   Procedure Laterality Date   • NO PAST SURGERIES         Problem List:  Patient Active Problem List   Diagnosis   • Alcohol-induced acute pancreatitis   • Pancreatitis   • Chronic pain of right knee       Allergy:   No Known Allergies     Current Medications:   Current Outpatient Medications   Medication Sig Dispense Refill   • buprenorphine-naloxone (SUBOXONE) 8-2 MG per SL tablet Place 2 tablets under the tongue Daily. 28 tablet 0   • cloNIDine (Catapres) 0.1 MG tablet Take 1 tablet by mouth 2 (Two) Times a Day. Take as needed for anxiety.  Insomnia.  Chills or sweats 60 tablet 0   • hydrOXYzine pamoate (Vistaril) 50 MG capsule Take 1 capsule by mouth 4 (Four) Times a Day As Needed for Anxiety (and/or insomia). Take 1 to 2 tablets as needed for anxiety every 6 hours 90 capsule 0   • ibuprofen (ADVIL,MOTRIN) 600 MG tablet Take 1 tablet by mouth Every 6 (Six) Hours As Needed for Mild Pain. 30 tablet 2   • lisinopril (PRINIVIL,ZESTRIL) 20 MG tablet Take 1 tablet by mouth Daily. for blood pressure 30 tablet 2   • naloxone (NARCAN) 4 MG/0.1ML nasal spray 1 spray into the nostril(s) as directed by provider As Needed (Opiate oversedation). Spray in 1 nostril every 2 to 3 minutes call 911 2 each 2   • ondansetron (ZOFRAN) 4 MG tablet TAKE ONE TABLET BY MOUTH EVERY 8 HOURS AS NEEDED FOR NAUSEA & VOMITING     • QUEtiapine (SEROquel) 25 MG tablet Take 1 tablet by mouth Every Night. 30 tablet 0   • sertraline (Zoloft) 50 MG tablet Take 1 tablet by mouth Daily for 30 days. 30 tablet 0     No current facility-administered medications for this visit.       Past Medical History:  Past Medical History:   Diagnosis Date   • Alcohol-induced acute pancreatitis 6/27/2019   • Alcoholism (HCC)    • Drug use     Amphetamines and Suboxone   • Fatty liver    • Hepatitis C antibody test positive 2019   • Medical  non-compliance    • Pancreatitis    • Smoker          Social History     Socioeconomic History   • Marital status:    Tobacco Use   • Smoking status: Every Day     Packs/day: 0.50     Types: Cigarettes   • Smokeless tobacco: Never   Vaping Use   • Vaping Use: Never used   Substance and Sexual Activity   • Alcohol use: Yes     Alcohol/week: 8.0 standard drinks     Types: 8 Shots of liquor per week     Comment: 5-6 double shots   • Drug use: Yes     Comment: suboxone as prescribed   • Sexual activity: Defer         Family History   Problem Relation Age of Onset   • Cancer Maternal Grandmother    • No Known Problems Mother    • No Known Problems Father          Mental Status Exam:   Hygiene:   good  Cooperation:  Cooperative  Eye Contact:  Good  Psychomotor Behavior:  Appropriate  Affect:  Appropriate  Mood: normal  Speech:  Normal  Thought Process:  Goal directed  Thought Content:  Normal  Suicidal:  None  Homicidal:  None  Hallucinations:  None  Delusion:  None  Memory:  Intact  Orientation:  Grossly intact  Reliability:  good  Insight:  Good  Judgement:  Good  Impulse Control:  Good         Review of Systems:  Review of Systems   Constitutional: Negative for activity change, chills, diaphoresis and fatigue.   Respiratory: Negative for apnea, cough and shortness of breath.    Cardiovascular: Negative for chest pain, palpitations and leg swelling.   Gastrointestinal: Negative for abdominal pain, constipation, diarrhea, nausea and vomiting.   Genitourinary: Negative for difficulty urinating.   Musculoskeletal: Negative for arthralgias.   Skin: Negative for rash.   Neurological: Negative for dizziness, weakness and headaches.   Psychiatric/Behavioral: Positive for dysphoric mood and sleep disturbance. Negative for agitation, self-injury and suicidal ideas. The patient is nervous/anxious.          Physical Exam:  Physical Exam  Vitals reviewed.   Constitutional:       General: He is not in acute distress.      "Appearance: Normal appearance. He is not ill-appearing or toxic-appearing.   Pulmonary:      Effort: Pulmonary effort is normal.   Musculoskeletal:         General: Normal range of motion.   Neurological:      General: No focal deficit present.      Mental Status: He is alert and oriented to person, place, and time.   Psychiatric:         Attention and Perception: Attention and perception normal.         Mood and Affect: Mood normal. Mood is not anxious or depressed.         Speech: Speech normal.         Behavior: Behavior normal. Behavior is cooperative.         Thought Content: Thought content normal.         Cognition and Memory: Cognition and memory normal.         Judgment: Judgment normal.         Vital Signs:   BP (!) 146/103   Pulse 77   Ht 198.1 cm (77.99\")   Wt 107 kg (235 lb)   BMI 27.16 kg/m²      Lab Results:   Telemedicine on 02/13/2023   Component Date Value Ref Range Status   • External Amphetamine Screen Urine 02/13/2023 Negative   Final-Edited   • External Benzodiazepine Screen Uri* 02/13/2023 Negative   Final-Edited   • External Cocaine Screen Urine 02/13/2023 Negative   Final-Edited   • External THC Screen Urine 02/13/2023 Positive (A)   Final-Edited   • External Methadone Screen Urine 02/13/2023 Negative   Final-Edited   • External Methamphetamine Screen Ur* 02/13/2023 Negative   Final-Edited   • External Oxycodone Screen Urine 02/13/2023 Negative   Final-Edited   • External Buprenorphine Screen Urine 02/13/2023 Positive (A)   Final-Edited   • External MDMA 02/13/2023 Negative   Final-Edited   • External Opiates Screen Urine 02/13/2023 Negative   Final-Edited   Telemedicine on 01/30/2023   Component Date Value Ref Range Status   • External Amphetamine Screen Urine 01/30/2023 Negative   Final   • External Benzodiazepine Screen Uri* 01/30/2023 Negative   Final   • External Cocaine Screen Urine 01/30/2023 Negative   Final   • External THC Screen Urine 01/30/2023 Positive (A)   Final   • " External Methadone Screen Urine 01/30/2023 Negative   Final   • External Methamphetamine Screen Ur* 01/30/2023 Negative   Final   • External Oxycodone Screen Urine 01/30/2023 Negative   Final   • External Buprenorphine Screen Urine 01/30/2023 Positive (A)   Final   • External MDMA 01/30/2023 Negative   Final   • External Opiates Screen Urine 01/30/2023 Negative   Final   Telemedicine on 01/16/2023   Component Date Value Ref Range Status   • External Amphetamine Screen Urine 01/16/2023 Negative   Final   • External Benzodiazepine Screen Uri* 01/16/2023 Negative   Final   • External Cocaine Screen Urine 01/16/2023 Negative   Final   • External THC Screen Urine 01/16/2023 Positive   Final   • External Methadone Screen Urine 01/16/2023 Negative   Final   • External Methamphetamine Screen Ur* 01/16/2023 Negative   Final   • External Oxycodone Screen Urine 01/16/2023 Negative   Final   • External Buprenorphine Screen Urine 01/16/2023 Positive   Final   • External MDMA 01/16/2023 Negative   Final   • External Opiates Screen Urine 01/16/2023 Negative   Final   Telemedicine on 12/19/2022   Component Date Value Ref Range Status   • External Amphetamine Screen Urine 12/19/2022 Negative   Final   • External Benzodiazepine Screen Uri* 12/19/2022 Negative   Final   • External Cocaine Screen Urine 12/19/2022 Negative   Final   • External THC Screen Urine 12/19/2022 Positive (A)   Final   • External Methadone Screen Urine 12/19/2022 Negative   Final   • External Methamphetamine Screen Ur* 12/19/2022 Negative   Final   • External Oxycodone Screen Urine 12/19/2022 Negative   Final   • External Buprenorphine Screen Urine 12/19/2022 Positive (A)   Final   • External MDMA 12/19/2022 Negative   Final   • External Opiates Screen Urine 12/19/2022 Negative   Final   Telemedicine on 11/21/2022   Component Date Value Ref Range Status   • External Amphetamine Screen Urine 11/21/2022 Negative   Final   • External Benzodiazepine Screen Uri*  11/21/2022 Negative   Final   • External Cocaine Screen Urine 11/21/2022 Negative   Final   • External THC Screen Urine 11/21/2022 Positive (A)   Final   • External Methadone Screen Urine 11/21/2022 Negative   Final   • External Methamphetamine Screen Ur* 11/21/2022 Negative   Final   • External Oxycodone Screen Urine 11/21/2022 Negative   Final   • External Buprenorphine Screen Urine 11/21/2022 Positive (A)   Final   • External MDMA 11/21/2022 Negative   Final   • External Opiates Screen Urine 11/21/2022 Negative   Final   Hospital Outpatient Visit on 10/20/2022   Component Date Value Ref Range Status   • QT Interval 10/20/2022 438  ms Final   • QTC Interval 10/20/2022 451  ms Final   Lab on 10/20/2022   Component Date Value Ref Range Status   • Glucose 10/20/2022 100 (H)  65 - 99 mg/dL Final   • BUN 10/20/2022 8  6 - 20 mg/dL Final   • Creatinine 10/20/2022 0.82  0.76 - 1.27 mg/dL Final   • Sodium 10/20/2022 138  136 - 145 mmol/L Final   • Potassium 10/20/2022 3.5  3.5 - 5.2 mmol/L Final    Slight hemolysis detected by analyzer. Results may be affected.   • Chloride 10/20/2022 100  98 - 107 mmol/L Final   • CO2 10/20/2022 19.7 (L)  22.0 - 29.0 mmol/L Final   • Calcium 10/20/2022 9.1  8.6 - 10.5 mg/dL Final   • Total Protein 10/20/2022 8.3  6.0 - 8.5 g/dL Final   • Albumin 10/20/2022 4.38  3.50 - 5.20 g/dL Final   • ALT (SGPT) 10/20/2022 145 (H)  1 - 41 U/L Final   • AST (SGOT) 10/20/2022 229 (H)  1 - 40 U/L Final   • Alkaline Phosphatase 10/20/2022 319 (H)  39 - 117 U/L Final   • Total Bilirubin 10/20/2022 0.8  0.0 - 1.2 mg/dL Final   • Globulin 10/20/2022 3.9  gm/dL Final   • A/G Ratio 10/20/2022 1.1  g/dL Final   • BUN/Creatinine Ratio 10/20/2022 9.8  7.0 - 25.0 Final   • Anion Gap 10/20/2022 18.3 (H)  5.0 - 15.0 mmol/L Final   • eGFR 10/20/2022 119.7  >60.0 mL/min/1.73 Final    National Kidney Foundation and American Society of Nephrology (ASN) Task Force recommended calculation based on the Chronic Kidney  Disease Epidemiology Collaboration (CKD-EPI) equation refit without adjustment for race.   • WBC 10/20/2022 6.95  3.40 - 10.80 10*3/mm3 Final   • RBC 10/20/2022 5.33  4.14 - 5.80 10*6/mm3 Final   • Hemoglobin 10/20/2022 15.7  13.0 - 17.7 g/dL Final   • Hematocrit 10/20/2022 47.2  37.5 - 51.0 % Final   • MCV 10/20/2022 88.6  79.0 - 97.0 fL Final   • MCH 10/20/2022 29.5  26.6 - 33.0 pg Final   • MCHC 10/20/2022 33.3  31.5 - 35.7 g/dL Final   • RDW 10/20/2022 14.2  12.3 - 15.4 % Final   • RDW-SD 10/20/2022 45.4  37.0 - 54.0 fl Final   • MPV 10/20/2022 11.2  6.0 - 12.0 fL Final   • Platelets 10/20/2022 146  140 - 450 10*3/mm3 Final   • Troponin T 10/20/2022 <0.010  0.000 - 0.030 ng/mL Final   • Creatine Kinase 10/20/2022 66  20 - 200 U/L Final   • CKMB 10/20/2022 1.43  <=10.40 ng/mL Final   Telemedicine on 10/19/2022   Component Date Value Ref Range Status   • External Amphetamine Screen Urine 10/19/2022 Negative   Final   • External Benzodiazepine Screen Uri* 10/19/2022 Negative   Final   • External Cocaine Screen Urine 10/19/2022 Negative   Final   • External THC Screen Urine 10/19/2022 Positive (A)   Final   • External Methadone Screen Urine 10/19/2022 Negative   Final   • External Methamphetamine Screen Ur* 10/19/2022 Negative   Final   • External Oxycodone Screen Urine 10/19/2022 Negative   Final   • External Buprenorphine Screen Urine 10/19/2022 Positive (A)   Final   • External MDMA 10/19/2022 Negative   Final   • External Opiates Screen Urine 10/19/2022 Negative   Final   Telemedicine on 09/19/2022   Component Date Value Ref Range Status   • External Amphetamine Screen Urine 09/19/2022 Negative   Final   • External Benzodiazepine Screen Uri* 09/19/2022 Negative   Final   • External Cocaine Screen Urine 09/19/2022 Negative   Final   • External THC Screen Urine 09/19/2022 Positive (A)   Final   • External Methadone Screen Urine 09/19/2022 Negative   Final   • External Methamphetamine Screen Ur* 09/19/2022 Negative    Final   • External Oxycodone Screen Urine 09/19/2022 Negative   Final   • External Buprenorphine Screen Urine 09/19/2022 Positive (A)   Final   • External MDMA 09/19/2022 Negative   Final   • External Opiates Screen Urine 09/19/2022 Negative   Final   Telemedicine on 08/22/2022   Component Date Value Ref Range Status   • External Amphetamine Screen Urine 08/22/2022 Negative   Final   • External Benzodiazepine Screen Uri* 08/22/2022 Negative   Final   • External Cocaine Screen Urine 08/22/2022 Negative   Final   • External THC Screen Urine 08/22/2022 Negative   Final   • External Methadone Screen Urine 08/22/2022 Negative   Final   • External Methamphetamine Screen Ur* 08/22/2022 Negative   Final   • External Oxycodone Screen Urine 08/22/2022 Negative   Final   • External Buprenorphine Screen Urine 08/22/2022 Positive (A)   Final   • External MDMA 08/22/2022 Negative   Final   • External Opiates Screen Urine 08/22/2022 Negative   Final   There may be more visits with results that are not included.         Assessment & Plan   Diagnoses and all orders for this visit:    1. Opioid type dependence, continuous (HCC) (Primary)  -     buprenorphine-naloxone (SUBOXONE) 8-2 MG per SL tablet; Place 2 tablets under the tongue Daily.  Dispense: 28 tablet; Refill: 0    2. Medication management  -     [facility] Drug Screen  -     lisinopril (PRINIVIL,ZESTRIL) 20 MG tablet; Take 1 tablet by mouth Daily. for blood pressure  Dispense: 30 tablet; Refill: 2    3. Generalized anxiety disorder  -     cloNIDine (Catapres) 0.1 MG tablet; Take 1 tablet by mouth 2 (Two) Times a Day. Take as needed for anxiety.  Insomnia.  Chills or sweats  Dispense: 60 tablet; Refill: 0  -     hydrOXYzine pamoate (Vistaril) 50 MG capsule; Take 1 capsule by mouth 4 (Four) Times a Day As Needed for Anxiety (and/or insomia). Take 1 to 2 tablets as needed for anxiety every 6 hours  Dispense: 90 capsule; Refill: 0  -     sertraline (Zoloft) 50 MG tablet; Take 1  tablet by mouth Daily for 30 days.  Dispense: 30 tablet; Refill: 0  -     QUEtiapine (SEROquel) 25 MG tablet; Take 1 tablet by mouth Every Night.  Dispense: 30 tablet; Refill: 0    4. Insomnia, unspecified type  -     cloNIDine (Catapres) 0.1 MG tablet; Take 1 tablet by mouth 2 (Two) Times a Day. Take as needed for anxiety.  Insomnia.  Chills or sweats  Dispense: 60 tablet; Refill: 0  -     hydrOXYzine pamoate (Vistaril) 50 MG capsule; Take 1 capsule by mouth 4 (Four) Times a Day As Needed for Anxiety (and/or insomia). Take 1 to 2 tablets as needed for anxiety every 6 hours  Dispense: 90 capsule; Refill: 0  -     QUEtiapine (SEROquel) 25 MG tablet; Take 1 tablet by mouth Every Night.  Dispense: 30 tablet; Refill: 0    5. Depression, unspecified depression type  -     sertraline (Zoloft) 50 MG tablet; Take 1 tablet by mouth Daily for 30 days.  Dispense: 30 tablet; Refill: 0        Visit Diagnoses:    ICD-10-CM ICD-9-CM   1. Opioid type dependence, continuous (HCC)  F11.20 304.01   2. Medication management  Z79.899 V58.69   3. Generalized anxiety disorder  F41.1 300.02   4. Insomnia, unspecified type  G47.00 780.52   5. Depression, unspecified depression type  F32.A 311       PLAN:  1. Safety: No acute safety concerns  2. Risk Assessment: Risk of self-harm acutely is low. Risk of self-harm chronically is also low, but could be further elevated in the event of treatment noncompliance and/or AODA.    TREATMENT PLAN/GOALS: Continue supportive psychotherapy efforts and medications as indicated. Treatment and medication options discussed during today's visit. Patient acknowledged and verbally consented to continue with current treatment plan and was educated on the importance of compliance with treatment and follow-up appointments.    MEDICATION ISSUES:  ROMAN reviewed as expected.  Discussed medication options and treatment plan of prescribed medication as well as the risks, benefits, and side effects including potential  falls, possible impaired driving and metabolic adversities among others. Patient is agreeable to call the office with any worsening of symptoms or onset of side effects. Patient is agreeable to call 911 or go to the nearest ER should he/she begin having SI/HI. No medication side effects or related complaints today.     MEDS ORDERED DURING VISIT:  New Medications Ordered This Visit   Medications   • buprenorphine-naloxone (SUBOXONE) 8-2 MG per SL tablet     Sig: Place 2 tablets under the tongue Daily.     Dispense:  28 tablet     Refill:  0     PATSYN:[id number]   • cloNIDine (Catapres) 0.1 MG tablet     Sig: Take 1 tablet by mouth 2 (Two) Times a Day. Take as needed for anxiety.  Insomnia.  Chills or sweats     Dispense:  60 tablet     Refill:  0   • hydrOXYzine pamoate (Vistaril) 50 MG capsule     Sig: Take 1 capsule by mouth 4 (Four) Times a Day As Needed for Anxiety (and/or insomia). Take 1 to 2 tablets as needed for anxiety every 6 hours     Dispense:  90 capsule     Refill:  0   • sertraline (Zoloft) 50 MG tablet     Sig: Take 1 tablet by mouth Daily for 30 days.     Dispense:  30 tablet     Refill:  0   • QUEtiapine (SEROquel) 25 MG tablet     Sig: Take 1 tablet by mouth Every Night.     Dispense:  30 tablet     Refill:  0   • lisinopril (PRINIVIL,ZESTRIL) 20 MG tablet     Sig: Take 1 tablet by mouth Daily. for blood pressure     Dispense:  30 tablet     Refill:  2       No follow-ups on file.           This document has been electronically signed by RENAN De Dios  February 13, 2023 09:53 EST      Part of this note may be an electronic transcription/translation of spoken language to printed text using the Dragon Dictation System.

## 2023-03-07 ENCOUNTER — TELEMEDICINE (OUTPATIENT)
Dept: PSYCHIATRY | Facility: CLINIC | Age: 33
End: 2023-03-07
Payer: COMMERCIAL

## 2023-03-07 VITALS
WEIGHT: 235 LBS | HEART RATE: 80 BPM | SYSTOLIC BLOOD PRESSURE: 144 MMHG | BODY MASS INDEX: 27.19 KG/M2 | HEIGHT: 78 IN | DIASTOLIC BLOOD PRESSURE: 103 MMHG

## 2023-03-07 DIAGNOSIS — G47.00 INSOMNIA, UNSPECIFIED TYPE: ICD-10-CM

## 2023-03-07 DIAGNOSIS — F41.1 GENERALIZED ANXIETY DISORDER: ICD-10-CM

## 2023-03-07 DIAGNOSIS — F11.20 OPIOID TYPE DEPENDENCE, CONTINUOUS: Primary | ICD-10-CM

## 2023-03-07 DIAGNOSIS — M25.59 PAIN IN OTHER JOINT: ICD-10-CM

## 2023-03-07 DIAGNOSIS — Z79.899 MEDICATION MANAGEMENT: ICD-10-CM

## 2023-03-07 DIAGNOSIS — F32.A DEPRESSION, UNSPECIFIED DEPRESSION TYPE: ICD-10-CM

## 2023-03-07 PROCEDURE — 99214 OFFICE O/P EST MOD 30 MIN: CPT | Performed by: NURSE PRACTITIONER

## 2023-03-07 RX ORDER — BUPRENORPHINE HYDROCHLORIDE AND NALOXONE HYDROCHLORIDE DIHYDRATE 8; 2 MG/1; MG/1
2 TABLET SUBLINGUAL DAILY
Qty: 56 TABLET | Refills: 0 | Status: SHIPPED | OUTPATIENT
Start: 2023-03-07 | End: 2023-04-04 | Stop reason: SDUPTHER

## 2023-03-07 RX ORDER — QUETIAPINE FUMARATE 25 MG/1
25 TABLET, FILM COATED ORAL NIGHTLY
Qty: 30 TABLET | Refills: 0 | Status: SHIPPED | OUTPATIENT
Start: 2023-03-07 | End: 2023-04-04 | Stop reason: SDUPTHER

## 2023-03-07 RX ORDER — IBUPROFEN 600 MG/1
600 TABLET ORAL EVERY 6 HOURS PRN
Qty: 30 TABLET | Refills: 2 | Status: SHIPPED | OUTPATIENT
Start: 2023-03-07 | End: 2023-04-04 | Stop reason: SDUPTHER

## 2023-03-07 RX ORDER — HYDROXYZINE PAMOATE 50 MG/1
50 CAPSULE ORAL 4 TIMES DAILY PRN
Qty: 90 CAPSULE | Refills: 0 | Status: SHIPPED | OUTPATIENT
Start: 2023-03-07

## 2023-03-07 RX ORDER — LISINOPRIL 20 MG/1
20 TABLET ORAL DAILY
Qty: 30 TABLET | Refills: 2 | Status: SHIPPED | OUTPATIENT
Start: 2023-03-07

## 2023-03-07 NOTE — PROGRESS NOTES
This provider is located at [facility]. The Patient is seen remotely located at the [facility] ([facility]) using Video. Patient is being seen via telehealth and confirm that they are in a secure environment for this session. The patient's condition being diagnosed/treated is appropriate for telemedicine. Provider identified as Daniel Olivas as well as credentials APRN MSN FNP-C ANTONIO-AP.   The client/patient gave consent to be seen remotely, and when consent is given they understand that the consent allows for patient identifiable information to be sent to a third party as needed.   They may refuse to be seen remotely at any time. The electronic data is encrypted and password protected, and the patient has been advised of the potential risks to privacy not withstanding such measures.      Chief Complaint/History of Present Illness: Follow Up buprenorphine/naloxone Medicated Assisted Treatment for Opiate Use Disorder     Patient/Client Concerns/Updates: Continuing Zoloft Vistaril, seroquel and clonidine for anxiety  -Patient reports overall he is doing well and has no concerns for mood, no depressive or anxious concerns, no suicidal or homicidal thoughts  -Mourning the recent death of aunt  -Continued use of THC only, denies any further illicit stimulants    Triggers (Persons/Places/Things/Events/Thought/Emotions): Anxiety and recent death of aunt    Cravings: Denies cravings or use of illicit substances    Relapse Prevention: Counseling    Urine Drug Screen (today's visit) discussed: Positive buprenorphine and positive THC    UDS Confirmation (Most recent/Resulted): Positive buprenorphine/nor-buprenorphine, no concerns for urine tampering otherwise positive THC    Most recent pertinent laboratory studies reviewed: 3/16/2022-LFTs within normal limits; hepatitis C virus not detected     ROMAN (PDMP) Reviewed for Current/Active Medications: buprenorphine/naloxone as reviewed today    Past Surgical  History:  Past Surgical History:   Procedure Laterality Date   • NO PAST SURGERIES         Problem List:  Patient Active Problem List   Diagnosis   • Alcohol-induced acute pancreatitis   • Pancreatitis   • Chronic pain of right knee       Allergy:   No Known Allergies     Current Medications:   Current Outpatient Medications   Medication Sig Dispense Refill   • buprenorphine-naloxone (SUBOXONE) 8-2 MG per SL tablet Place 2 tablets under the tongue Daily. 56 tablet 0   • cloNIDine (Catapres) 0.1 MG tablet Take 1 tablet by mouth 2 (Two) Times a Day. Take as needed for anxiety.  Insomnia.  Chills or sweats 60 tablet 0   • hydrOXYzine pamoate (Vistaril) 50 MG capsule Take 1 capsule by mouth 4 (Four) Times a Day As Needed for Anxiety (and/or insomia). Take 1 to 2 tablets as needed for anxiety every 6 hours 90 capsule 0   • ibuprofen (ADVIL,MOTRIN) 600 MG tablet Take 1 tablet by mouth Every 6 (Six) Hours As Needed for Mild Pain. 30 tablet 2   • lisinopril (PRINIVIL,ZESTRIL) 20 MG tablet Take 1 tablet by mouth Daily. for blood pressure 30 tablet 2   • naloxone (NARCAN) 4 MG/0.1ML nasal spray 1 spray into the nostril(s) as directed by provider As Needed (Opiate oversedation). Spray in 1 nostril every 2 to 3 minutes call 911 2 each 2   • ondansetron (ZOFRAN) 4 MG tablet TAKE ONE TABLET BY MOUTH EVERY 8 HOURS AS NEEDED FOR NAUSEA & VOMITING     • QUEtiapine (SEROquel) 25 MG tablet Take 1 tablet by mouth Every Night. 30 tablet 0   • sertraline (Zoloft) 50 MG tablet Take 1 tablet by mouth Daily for 30 days. 30 tablet 0     No current facility-administered medications for this visit.       Past Medical History:  Past Medical History:   Diagnosis Date   • Alcohol-induced acute pancreatitis 6/27/2019   • Alcoholism (HCC)    • Drug use     Amphetamines and Suboxone   • Fatty liver    • Hepatitis C antibody test positive 2019   • Medical non-compliance    • Pancreatitis    • Smoker          Social History     Socioeconomic History    • Marital status:    Tobacco Use   • Smoking status: Every Day     Packs/day: 0.50     Types: Cigarettes   • Smokeless tobacco: Never   Vaping Use   • Vaping Use: Never used   Substance and Sexual Activity   • Alcohol use: Yes     Alcohol/week: 8.0 standard drinks     Types: 8 Shots of liquor per week     Comment: 5-6 double shots   • Drug use: Yes     Comment: suboxone as prescribed   • Sexual activity: Defer         Family History   Problem Relation Age of Onset   • Cancer Maternal Grandmother    • No Known Problems Mother    • No Known Problems Father          Mental Status Exam:   Hygiene:   good  Cooperation:  Cooperative  Eye Contact:  Good  Psychomotor Behavior:  Appropriate  Affect:  Appropriate  Mood: normal  Speech:  Normal  Thought Process:  Goal directed  Thought Content:  Normal  Suicidal:  None  Homicidal:  None  Hallucinations:  None  Delusion:  None  Memory:  Intact  Orientation:  Grossly intact  Reliability:  good  Insight:  Good  Judgement:  Good  Impulse Control:  Good         Review of Systems:  Review of Systems   Constitutional: Negative for activity change, chills, diaphoresis and fatigue.   Respiratory: Negative for apnea, cough and shortness of breath.    Cardiovascular: Negative for chest pain, palpitations and leg swelling.   Gastrointestinal: Negative for abdominal pain, constipation, diarrhea, nausea and vomiting.   Genitourinary: Negative for difficulty urinating.   Musculoskeletal: Negative for arthralgias.   Skin: Negative for rash.   Neurological: Negative for dizziness, weakness and headaches.   Psychiatric/Behavioral: Negative for agitation, self-injury, sleep disturbance and suicidal ideas. The patient is not nervous/anxious.          Physical Exam:  Physical Exam  Vitals reviewed.   Constitutional:       General: He is not in acute distress.     Appearance: Normal appearance. He is not ill-appearing or toxic-appearing.   Pulmonary:      Effort: Pulmonary effort is  "normal.   Musculoskeletal:         General: Normal range of motion.   Neurological:      General: No focal deficit present.      Mental Status: He is alert and oriented to person, place, and time.   Psychiatric:         Attention and Perception: Attention and perception normal.         Mood and Affect: Mood normal. Mood is not anxious or depressed.         Speech: Speech normal.         Behavior: Behavior normal. Behavior is cooperative.         Thought Content: Thought content normal.         Cognition and Memory: Cognition and memory normal.         Judgment: Judgment normal.         Vital Signs:   BP (!) 144/103 Comment: PATIENT SAID HIS BP IS HIGH DUE TO NOT TAKING HIS MEDICINE THIS MORNING. PATIENT DENIED ANY CHEST PAINS, SHORTNESS OF BREATH, LIGHT HEADEDNESS.  Pulse 80   Ht 198.1 cm (77.99\")   Wt 107 kg (235 lb)   BMI 27.16 kg/m²      Lab Results:   Telemedicine on 03/07/2023   Component Date Value Ref Range Status   • External Amphetamine Screen Urine 03/07/2023 Negative   Final   • External Benzodiazepine Screen Uri* 03/07/2023 Negative   Final   • External Cocaine Screen Urine 03/07/2023 Negative   Final   • External THC Screen Urine 03/07/2023 Positive (A)   Final   • External Methadone Screen Urine 03/07/2023 Negative   Final   • External Methamphetamine Screen Ur* 03/07/2023 Negative   Final   • External Oxycodone Screen Urine 03/07/2023 Negative   Final   • External Buprenorphine Screen Urine 03/07/2023 Positive (A)   Final   • External MDMA 03/07/2023 Negative   Final   • External Opiates Screen Urine 03/07/2023 Negative   Final   Telemedicine on 02/13/2023   Component Date Value Ref Range Status   • External Amphetamine Screen Urine 02/13/2023 Negative   Final-Edited   • External Benzodiazepine Screen Uri* 02/13/2023 Negative   Final-Edited   • External Cocaine Screen Urine 02/13/2023 Negative   Final-Edited   • External THC Screen Urine 02/13/2023 Positive (A)   Final-Edited   • External " Methadone Screen Urine 02/13/2023 Negative   Final-Edited   • External Methamphetamine Screen Ur* 02/13/2023 Negative   Final-Edited   • External Oxycodone Screen Urine 02/13/2023 Negative   Final-Edited   • External Buprenorphine Screen Urine 02/13/2023 Positive (A)   Final-Edited   • External MDMA 02/13/2023 Negative   Final-Edited   • External Opiates Screen Urine 02/13/2023 Negative   Final-Edited   Telemedicine on 01/30/2023   Component Date Value Ref Range Status   • External Amphetamine Screen Urine 01/30/2023 Negative   Final   • External Benzodiazepine Screen Uri* 01/30/2023 Negative   Final   • External Cocaine Screen Urine 01/30/2023 Negative   Final   • External THC Screen Urine 01/30/2023 Positive (A)   Final   • External Methadone Screen Urine 01/30/2023 Negative   Final   • External Methamphetamine Screen Ur* 01/30/2023 Negative   Final   • External Oxycodone Screen Urine 01/30/2023 Negative   Final   • External Buprenorphine Screen Urine 01/30/2023 Positive (A)   Final   • External MDMA 01/30/2023 Negative   Final   • External Opiates Screen Urine 01/30/2023 Negative   Final   Telemedicine on 01/16/2023   Component Date Value Ref Range Status   • External Amphetamine Screen Urine 01/16/2023 Negative   Final   • External Benzodiazepine Screen Uri* 01/16/2023 Negative   Final   • External Cocaine Screen Urine 01/16/2023 Negative   Final   • External THC Screen Urine 01/16/2023 Positive   Final   • External Methadone Screen Urine 01/16/2023 Negative   Final   • External Methamphetamine Screen Ur* 01/16/2023 Negative   Final   • External Oxycodone Screen Urine 01/16/2023 Negative   Final   • External Buprenorphine Screen Urine 01/16/2023 Positive   Final   • External MDMA 01/16/2023 Negative   Final   • External Opiates Screen Urine 01/16/2023 Negative   Final   Telemedicine on 12/19/2022   Component Date Value Ref Range Status   • External Amphetamine Screen Urine 12/19/2022 Negative   Final   •  External Benzodiazepine Screen Uri* 12/19/2022 Negative   Final   • External Cocaine Screen Urine 12/19/2022 Negative   Final   • External THC Screen Urine 12/19/2022 Positive (A)   Final   • External Methadone Screen Urine 12/19/2022 Negative   Final   • External Methamphetamine Screen Ur* 12/19/2022 Negative   Final   • External Oxycodone Screen Urine 12/19/2022 Negative   Final   • External Buprenorphine Screen Urine 12/19/2022 Positive (A)   Final   • External MDMA 12/19/2022 Negative   Final   • External Opiates Screen Urine 12/19/2022 Negative   Final   Telemedicine on 11/21/2022   Component Date Value Ref Range Status   • External Amphetamine Screen Urine 11/21/2022 Negative   Final   • External Benzodiazepine Screen Uri* 11/21/2022 Negative   Final   • External Cocaine Screen Urine 11/21/2022 Negative   Final   • External THC Screen Urine 11/21/2022 Positive (A)   Final   • External Methadone Screen Urine 11/21/2022 Negative   Final   • External Methamphetamine Screen Ur* 11/21/2022 Negative   Final   • External Oxycodone Screen Urine 11/21/2022 Negative   Final   • External Buprenorphine Screen Urine 11/21/2022 Positive (A)   Final   • External MDMA 11/21/2022 Negative   Final   • External Opiates Screen Urine 11/21/2022 Negative   Final   Hospital Outpatient Visit on 10/20/2022   Component Date Value Ref Range Status   • QT Interval 10/20/2022 438  ms Final   • QTC Interval 10/20/2022 451  ms Final   Lab on 10/20/2022   Component Date Value Ref Range Status   • Glucose 10/20/2022 100 (H)  65 - 99 mg/dL Final   • BUN 10/20/2022 8  6 - 20 mg/dL Final   • Creatinine 10/20/2022 0.82  0.76 - 1.27 mg/dL Final   • Sodium 10/20/2022 138  136 - 145 mmol/L Final   • Potassium 10/20/2022 3.5  3.5 - 5.2 mmol/L Final    Slight hemolysis detected by analyzer. Results may be affected.   • Chloride 10/20/2022 100  98 - 107 mmol/L Final   • CO2 10/20/2022 19.7 (L)  22.0 - 29.0 mmol/L Final   • Calcium 10/20/2022 9.1  8.6 -  10.5 mg/dL Final   • Total Protein 10/20/2022 8.3  6.0 - 8.5 g/dL Final   • Albumin 10/20/2022 4.38  3.50 - 5.20 g/dL Final   • ALT (SGPT) 10/20/2022 145 (H)  1 - 41 U/L Final   • AST (SGOT) 10/20/2022 229 (H)  1 - 40 U/L Final   • Alkaline Phosphatase 10/20/2022 319 (H)  39 - 117 U/L Final   • Total Bilirubin 10/20/2022 0.8  0.0 - 1.2 mg/dL Final   • Globulin 10/20/2022 3.9  gm/dL Final   • A/G Ratio 10/20/2022 1.1  g/dL Final   • BUN/Creatinine Ratio 10/20/2022 9.8  7.0 - 25.0 Final   • Anion Gap 10/20/2022 18.3 (H)  5.0 - 15.0 mmol/L Final   • eGFR 10/20/2022 119.7  >60.0 mL/min/1.73 Final    National Kidney Foundation and American Society of Nephrology (ASN) Task Force recommended calculation based on the Chronic Kidney Disease Epidemiology Collaboration (CKD-EPI) equation refit without adjustment for race.   • WBC 10/20/2022 6.95  3.40 - 10.80 10*3/mm3 Final   • RBC 10/20/2022 5.33  4.14 - 5.80 10*6/mm3 Final   • Hemoglobin 10/20/2022 15.7  13.0 - 17.7 g/dL Final   • Hematocrit 10/20/2022 47.2  37.5 - 51.0 % Final   • MCV 10/20/2022 88.6  79.0 - 97.0 fL Final   • MCH 10/20/2022 29.5  26.6 - 33.0 pg Final   • MCHC 10/20/2022 33.3  31.5 - 35.7 g/dL Final   • RDW 10/20/2022 14.2  12.3 - 15.4 % Final   • RDW-SD 10/20/2022 45.4  37.0 - 54.0 fl Final   • MPV 10/20/2022 11.2  6.0 - 12.0 fL Final   • Platelets 10/20/2022 146  140 - 450 10*3/mm3 Final   • Troponin T 10/20/2022 <0.010  0.000 - 0.030 ng/mL Final   • Creatine Kinase 10/20/2022 66  20 - 200 U/L Final   • CKMB 10/20/2022 1.43  <=10.40 ng/mL Final   Telemedicine on 10/19/2022   Component Date Value Ref Range Status   • External Amphetamine Screen Urine 10/19/2022 Negative   Final   • External Benzodiazepine Screen Uri* 10/19/2022 Negative   Final   • External Cocaine Screen Urine 10/19/2022 Negative   Final   • External THC Screen Urine 10/19/2022 Positive (A)   Final   • External Methadone Screen Urine 10/19/2022 Negative   Final   • External Methamphetamine  Screen Ur* 10/19/2022 Negative   Final   • External Oxycodone Screen Urine 10/19/2022 Negative   Final   • External Buprenorphine Screen Urine 10/19/2022 Positive (A)   Final   • External MDMA 10/19/2022 Negative   Final   • External Opiates Screen Urine 10/19/2022 Negative   Final   Telemedicine on 09/19/2022   Component Date Value Ref Range Status   • External Amphetamine Screen Urine 09/19/2022 Negative   Final   • External Benzodiazepine Screen Uri* 09/19/2022 Negative   Final   • External Cocaine Screen Urine 09/19/2022 Negative   Final   • External THC Screen Urine 09/19/2022 Positive (A)   Final   • External Methadone Screen Urine 09/19/2022 Negative   Final   • External Methamphetamine Screen Ur* 09/19/2022 Negative   Final   • External Oxycodone Screen Urine 09/19/2022 Negative   Final   • External Buprenorphine Screen Urine 09/19/2022 Positive (A)   Final   • External MDMA 09/19/2022 Negative   Final   • External Opiates Screen Urine 09/19/2022 Negative   Final   There may be more visits with results that are not included.         Assessment & Plan   Diagnoses and all orders for this visit:    1. Opioid type dependence, continuous (HCC) (Primary)  -     buprenorphine-naloxone (SUBOXONE) 8-2 MG per SL tablet; Place 2 tablets under the tongue Daily.  Dispense: 56 tablet; Refill: 0    2. Medication management  -     [facility] Drug Screen  -     lisinopril (PRINIVIL,ZESTRIL) 20 MG tablet; Take 1 tablet by mouth Daily. for blood pressure  Dispense: 30 tablet; Refill: 2    3. Generalized anxiety disorder  -     sertraline (Zoloft) 50 MG tablet; Take 1 tablet by mouth Daily for 30 days.  Dispense: 30 tablet; Refill: 0  -     QUEtiapine (SEROquel) 25 MG tablet; Take 1 tablet by mouth Every Night.  Dispense: 30 tablet; Refill: 0  -     hydrOXYzine pamoate (Vistaril) 50 MG capsule; Take 1 capsule by mouth 4 (Four) Times a Day As Needed for Anxiety (and/or insomia). Take 1 to 2 tablets as needed for anxiety every 6  hours  Dispense: 90 capsule; Refill: 0    4. Depression, unspecified depression type  -     sertraline (Zoloft) 50 MG tablet; Take 1 tablet by mouth Daily for 30 days.  Dispense: 30 tablet; Refill: 0    5. Insomnia, unspecified type  -     QUEtiapine (SEROquel) 25 MG tablet; Take 1 tablet by mouth Every Night.  Dispense: 30 tablet; Refill: 0  -     hydrOXYzine pamoate (Vistaril) 50 MG capsule; Take 1 capsule by mouth 4 (Four) Times a Day As Needed for Anxiety (and/or insomia). Take 1 to 2 tablets as needed for anxiety every 6 hours  Dispense: 90 capsule; Refill: 0    6. Pain in other joint  -     ibuprofen (ADVIL,MOTRIN) 600 MG tablet; Take 1 tablet by mouth Every 6 (Six) Hours As Needed for Mild Pain.  Dispense: 30 tablet; Refill: 2        Visit Diagnoses:    ICD-10-CM ICD-9-CM   1. Opioid type dependence, continuous (HCC)  F11.20 304.01   2. Medication management  Z79.899 V58.69   3. Generalized anxiety disorder  F41.1 300.02   4. Depression, unspecified depression type  F32.A 311   5. Insomnia, unspecified type  G47.00 780.52   6. Pain in other joint  M25.59 719.48       PLAN:  1. Safety: No acute safety concerns  2. Risk Assessment: Risk of self-harm acutely is low. Risk of self-harm chronically is also low, but could be further elevated in the event of treatment noncompliance and/or AODA.    TREATMENT PLAN/GOALS: Continue supportive psychotherapy efforts and medications as indicated. Treatment and medication options discussed during today's visit. Patient acknowledged and verbally consented to continue with current treatment plan and was educated on the importance of compliance with treatment and follow-up appointments.    MEDICATION ISSUES:  ROMAN reviewed as expected.  Discussed medication options and treatment plan of prescribed medication as well as the risks, benefits, and side effects including potential falls, possible impaired driving and metabolic adversities among others. Patient is agreeable to call  the office with any worsening of symptoms or onset of side effects. Patient is agreeable to call 911 or go to the nearest ER should he/she begin having SI/HI. No medication side effects or related complaints today.     MEDS ORDERED DURING VISIT:  New Medications Ordered This Visit   Medications   • sertraline (Zoloft) 50 MG tablet     Sig: Take 1 tablet by mouth Daily for 30 days.     Dispense:  30 tablet     Refill:  0   • QUEtiapine (SEROquel) 25 MG tablet     Sig: Take 1 tablet by mouth Every Night.     Dispense:  30 tablet     Refill:  0   • buprenorphine-naloxone (SUBOXONE) 8-2 MG per SL tablet     Sig: Place 2 tablets under the tongue Daily.     Dispense:  56 tablet     Refill:  0     NADEAN:[id number]   • lisinopril (PRINIVIL,ZESTRIL) 20 MG tablet     Sig: Take 1 tablet by mouth Daily. for blood pressure     Dispense:  30 tablet     Refill:  2   • ibuprofen (ADVIL,MOTRIN) 600 MG tablet     Sig: Take 1 tablet by mouth Every 6 (Six) Hours As Needed for Mild Pain.     Dispense:  30 tablet     Refill:  2   • hydrOXYzine pamoate (Vistaril) 50 MG capsule     Sig: Take 1 capsule by mouth 4 (Four) Times a Day As Needed for Anxiety (and/or insomia). Take 1 to 2 tablets as needed for anxiety every 6 hours     Dispense:  90 capsule     Refill:  0       No follow-ups on file.           This document has been electronically signed by RENAN De Dios  March 7, 2023 09:02 EST      Part of this note may be an electronic transcription/translation of spoken language to printed text using the Dragon Dictation System.

## 2023-04-04 ENCOUNTER — TELEMEDICINE (OUTPATIENT)
Dept: PSYCHIATRY | Facility: CLINIC | Age: 33
End: 2023-04-04
Payer: COMMERCIAL

## 2023-04-04 VITALS
HEIGHT: 78 IN | BODY MASS INDEX: 27.56 KG/M2 | WEIGHT: 238.2 LBS | HEART RATE: 79 BPM | DIASTOLIC BLOOD PRESSURE: 85 MMHG | SYSTOLIC BLOOD PRESSURE: 125 MMHG

## 2023-04-04 DIAGNOSIS — F41.1 GENERALIZED ANXIETY DISORDER: ICD-10-CM

## 2023-04-04 DIAGNOSIS — F11.20 OPIOID TYPE DEPENDENCE, CONTINUOUS: Primary | ICD-10-CM

## 2023-04-04 DIAGNOSIS — Z79.899 MEDICATION MANAGEMENT: ICD-10-CM

## 2023-04-04 DIAGNOSIS — F32.A DEPRESSION, UNSPECIFIED DEPRESSION TYPE: ICD-10-CM

## 2023-04-04 DIAGNOSIS — G47.00 INSOMNIA, UNSPECIFIED TYPE: ICD-10-CM

## 2023-04-04 DIAGNOSIS — M25.59 PAIN IN OTHER JOINT: ICD-10-CM

## 2023-04-04 PROCEDURE — 1159F MED LIST DOCD IN RCRD: CPT | Performed by: NURSE PRACTITIONER

## 2023-04-04 PROCEDURE — 99214 OFFICE O/P EST MOD 30 MIN: CPT | Performed by: NURSE PRACTITIONER

## 2023-04-04 PROCEDURE — 1160F RVW MEDS BY RX/DR IN RCRD: CPT | Performed by: NURSE PRACTITIONER

## 2023-04-04 RX ORDER — IBUPROFEN 600 MG/1
600 TABLET ORAL EVERY 6 HOURS PRN
Qty: 30 TABLET | Refills: 0 | Status: SHIPPED | OUTPATIENT
Start: 2023-04-04

## 2023-04-04 RX ORDER — QUETIAPINE FUMARATE 25 MG/1
25 TABLET, FILM COATED ORAL NIGHTLY
Qty: 30 TABLET | Refills: 0 | Status: SHIPPED | OUTPATIENT
Start: 2023-04-04

## 2023-04-04 RX ORDER — BUPRENORPHINE HYDROCHLORIDE AND NALOXONE HYDROCHLORIDE DIHYDRATE 8; 2 MG/1; MG/1
2 TABLET SUBLINGUAL DAILY
Qty: 56 TABLET | Refills: 0 | Status: SHIPPED | OUTPATIENT
Start: 2023-04-04

## 2023-04-04 RX ORDER — CLONIDINE HYDROCHLORIDE 0.1 MG/1
0.1 TABLET ORAL 2 TIMES DAILY
Qty: 60 TABLET | Refills: 0 | Status: SHIPPED | OUTPATIENT
Start: 2023-04-04

## 2023-04-04 NOTE — PROGRESS NOTES
This provider is located at [facility]. The Patient is seen remotely located at the [facility] ([facility]) using Video. Patient is being seen via telehealth and confirm that they are in a secure environment for this session. The patient's condition being diagnosed/treated is appropriate for telemedicine. Provider identified as Daniel Olivas as well as credentials APRN MSN FNP-C ANTONIO-AP.   The client/patient gave consent to be seen remotely, and when consent is given they understand that the consent allows for patient identifiable information to be sent to a third party as needed.   They may refuse to be seen remotely at any time. The electronic data is encrypted and password protected, and the patient has been advised of the potential risks to privacy not withstanding such measures.    Chief Complaint/History of Present Illness: Follow Up buprenorphine/naloxone Medicated Assisted Treatment for Opiate Use Disorder     Patient/Client Concerns/Updates: Continuing Zoloft Vistaril, seroquel and clonidine for anxiety  -Patient reports he is doing well and having a good month  -Mood is stable and denies exacerbation of anxiety or depression, no suicidal or homicidal thoughts  -Overall no life changes or needs today    Triggers (Persons/Places/Things/Events/Thought/Emotions): Chronic anxiety    Cravings: Denies cravings    Relapse Prevention: Counseling    Urine Drug Screen (today's visit) discussed: Positive buprenorphine and positive THC    UDS Confirmation (Most recent/Resulted): Positive buprenorphine/nor-buprenorphine, no concerns for urine tampering, otherwise positive THC    Most recent pertinent laboratory studies reviewed: 3/16/2022-LFTs within normal limits; hepatitis C virus not detected     ROMAN (PDMP) Reviewed for Current/Active Medications: buprenorphine/naloxone as reviewed today    Past Surgical History:  Past Surgical History:   Procedure Laterality Date   • NO PAST SURGERIES          Problem List:  Patient Active Problem List   Diagnosis   • Alcohol-induced acute pancreatitis   • Pancreatitis   • Chronic pain of right knee       Allergy:   No Known Allergies     Current Medications:   Current Outpatient Medications   Medication Sig Dispense Refill   • buprenorphine-naloxone (SUBOXONE) 8-2 MG per SL tablet Place 2 tablets under the tongue Daily. 56 tablet 0   • cloNIDine (Catapres) 0.1 MG tablet Take 1 tablet by mouth 2 (Two) Times a Day. Take as needed for anxiety.  Insomnia.  Chills or sweats 60 tablet 0   • hydrOXYzine pamoate (Vistaril) 50 MG capsule Take 1 capsule by mouth 4 (Four) Times a Day As Needed for Anxiety (and/or insomia). Take 1 to 2 tablets as needed for anxiety every 6 hours 90 capsule 0   • ibuprofen (ADVIL,MOTRIN) 600 MG tablet Take 1 tablet by mouth Every 6 (Six) Hours As Needed for Mild Pain. 30 tablet 0   • lisinopril (PRINIVIL,ZESTRIL) 20 MG tablet Take 1 tablet by mouth Daily. for blood pressure 30 tablet 2   • naloxone (NARCAN) 4 MG/0.1ML nasal spray 1 spray into the nostril(s) as directed by provider As Needed (Opiate oversedation). Spray in 1 nostril every 2 to 3 minutes call 911 2 each 2   • ondansetron (ZOFRAN) 4 MG tablet TAKE ONE TABLET BY MOUTH EVERY 8 HOURS AS NEEDED FOR NAUSEA & VOMITING     • QUEtiapine (SEROquel) 25 MG tablet Take 1 tablet by mouth Every Night. 30 tablet 0   • sertraline (Zoloft) 50 MG tablet Take 1 tablet by mouth Daily for 30 days. 30 tablet 0     No current facility-administered medications for this visit.       Past Medical History:  Past Medical History:   Diagnosis Date   • Alcohol-induced acute pancreatitis 6/27/2019   • Alcoholism    • Drug use     Amphetamines and Suboxone   • Fatty liver    • Hepatitis C antibody test positive 2019   • Medical non-compliance    • Pancreatitis    • Smoker          Social History     Socioeconomic History   • Marital status:    Tobacco Use   • Smoking status: Every Day     Packs/day: 0.50      Types: Cigarettes   • Smokeless tobacco: Never   Vaping Use   • Vaping Use: Never used   Substance and Sexual Activity   • Alcohol use: Yes     Alcohol/week: 8.0 standard drinks     Types: 8 Shots of liquor per week     Comment: 5-6 double shots   • Drug use: Yes     Comment: suboxone as prescribed   • Sexual activity: Defer         Family History   Problem Relation Age of Onset   • Cancer Maternal Grandmother    • No Known Problems Mother    • No Known Problems Father          Mental Status Exam:   Hygiene:   good  Cooperation:  Cooperative  Eye Contact:  Good  Psychomotor Behavior:  Appropriate  Affect:  Appropriate  Mood: normal  Speech:  Normal  Thought Process:  Goal directed  Thought Content:  Normal  Suicidal:  None  Homicidal:  None  Hallucinations:  None  Delusion:  None  Memory:  Intact  Orientation:  Grossly intact  Reliability:  good  Insight:  Good  Judgement:  Good  Impulse Control:  Good         Review of Systems:  Review of Systems   Constitutional: Negative for activity change, chills, diaphoresis and fatigue.   Respiratory: Negative for apnea, cough and shortness of breath.    Cardiovascular: Negative for chest pain, palpitations and leg swelling.   Gastrointestinal: Negative for abdominal pain, constipation, diarrhea, nausea and vomiting.   Genitourinary: Negative for difficulty urinating.   Musculoskeletal: Negative for arthralgias.   Skin: Negative for rash.   Neurological: Negative for dizziness, weakness and headaches.   Psychiatric/Behavioral: Negative for agitation, self-injury, sleep disturbance and suicidal ideas. The patient is nervous/anxious.          Physical Exam:  Physical Exam  Vitals reviewed.   Constitutional:       General: He is not in acute distress.     Appearance: Normal appearance. He is not ill-appearing or toxic-appearing.   Pulmonary:      Effort: Pulmonary effort is normal.   Musculoskeletal:         General: Normal range of motion.   Neurological:      General: No  "focal deficit present.      Mental Status: He is alert and oriented to person, place, and time.   Psychiatric:         Attention and Perception: Attention and perception normal.         Mood and Affect: Mood normal. Mood is not anxious or depressed.         Speech: Speech normal.         Behavior: Behavior normal. Behavior is cooperative.         Thought Content: Thought content normal.         Cognition and Memory: Cognition and memory normal.         Judgment: Judgment normal.         Vital Signs:   /85   Pulse 79   Ht 198.1 cm (77.99\")   Wt 108 kg (238 lb 3.2 oz)   BMI 27.53 kg/m²      Lab Results:   Telemedicine on 04/04/2023   Component Date Value Ref Range Status   • External Amphetamine Screen Urine 04/04/2023 Negative   Final   • External Benzodiazepine Screen Uri* 04/04/2023 Negative   Final   • External Cocaine Screen Urine 04/04/2023 Negative   Final   • External THC Screen Urine 04/04/2023 Positive (A)   Final   • External Methadone Screen Urine 04/04/2023 Negative   Final   • External Methamphetamine Screen Ur* 04/04/2023 Negative   Final   • External Oxycodone Screen Urine 04/04/2023 Negative   Final   • External Buprenorphine Screen Urine 04/04/2023 Positive (A)   Final   • External MDMA 04/04/2023 Negative   Final   • External Opiates Screen Urine 04/04/2023 Negative   Final   Telemedicine on 03/07/2023   Component Date Value Ref Range Status   • External Amphetamine Screen Urine 03/07/2023 Negative   Final   • External Benzodiazepine Screen Uri* 03/07/2023 Negative   Final   • External Cocaine Screen Urine 03/07/2023 Negative   Final   • External THC Screen Urine 03/07/2023 Positive (A)   Final   • External Methadone Screen Urine 03/07/2023 Negative   Final   • External Methamphetamine Screen Ur* 03/07/2023 Negative   Final   • External Oxycodone Screen Urine 03/07/2023 Negative   Final   • External Buprenorphine Screen Urine 03/07/2023 Positive (A)   Final   • External MDMA 03/07/2023 " Negative   Final   • External Opiates Screen Urine 03/07/2023 Negative   Final   Telemedicine on 02/13/2023   Component Date Value Ref Range Status   • External Amphetamine Screen Urine 02/13/2023 Negative   Final-Edited   • External Benzodiazepine Screen Uri* 02/13/2023 Negative   Final-Edited   • External Cocaine Screen Urine 02/13/2023 Negative   Final-Edited   • External THC Screen Urine 02/13/2023 Positive (A)   Final-Edited   • External Methadone Screen Urine 02/13/2023 Negative   Final-Edited   • External Methamphetamine Screen Ur* 02/13/2023 Negative   Final-Edited   • External Oxycodone Screen Urine 02/13/2023 Negative   Final-Edited   • External Buprenorphine Screen Urine 02/13/2023 Positive (A)   Final-Edited   • External MDMA 02/13/2023 Negative   Final-Edited   • External Opiates Screen Urine 02/13/2023 Negative   Final-Edited   Telemedicine on 01/30/2023   Component Date Value Ref Range Status   • External Amphetamine Screen Urine 01/30/2023 Negative   Final   • External Benzodiazepine Screen Uri* 01/30/2023 Negative   Final   • External Cocaine Screen Urine 01/30/2023 Negative   Final   • External THC Screen Urine 01/30/2023 Positive (A)   Final   • External Methadone Screen Urine 01/30/2023 Negative   Final   • External Methamphetamine Screen Ur* 01/30/2023 Negative   Final   • External Oxycodone Screen Urine 01/30/2023 Negative   Final   • External Buprenorphine Screen Urine 01/30/2023 Positive (A)   Final   • External MDMA 01/30/2023 Negative   Final   • External Opiates Screen Urine 01/30/2023 Negative   Final   Telemedicine on 01/16/2023   Component Date Value Ref Range Status   • External Amphetamine Screen Urine 01/16/2023 Negative   Final   • External Benzodiazepine Screen Uri* 01/16/2023 Negative   Final   • External Cocaine Screen Urine 01/16/2023 Negative   Final   • External THC Screen Urine 01/16/2023 Positive   Final   • External Methadone Screen Urine 01/16/2023 Negative   Final   •  External Methamphetamine Screen Ur* 01/16/2023 Negative   Final   • External Oxycodone Screen Urine 01/16/2023 Negative   Final   • External Buprenorphine Screen Urine 01/16/2023 Positive   Final   • External MDMA 01/16/2023 Negative   Final   • External Opiates Screen Urine 01/16/2023 Negative   Final   Telemedicine on 12/19/2022   Component Date Value Ref Range Status   • External Amphetamine Screen Urine 12/19/2022 Negative   Final   • External Benzodiazepine Screen Uri* 12/19/2022 Negative   Final   • External Cocaine Screen Urine 12/19/2022 Negative   Final   • External THC Screen Urine 12/19/2022 Positive (A)   Final   • External Methadone Screen Urine 12/19/2022 Negative   Final   • External Methamphetamine Screen Ur* 12/19/2022 Negative   Final   • External Oxycodone Screen Urine 12/19/2022 Negative   Final   • External Buprenorphine Screen Urine 12/19/2022 Positive (A)   Final   • External MDMA 12/19/2022 Negative   Final   • External Opiates Screen Urine 12/19/2022 Negative   Final   Telemedicine on 11/21/2022   Component Date Value Ref Range Status   • External Amphetamine Screen Urine 11/21/2022 Negative   Final   • External Benzodiazepine Screen Uri* 11/21/2022 Negative   Final   • External Cocaine Screen Urine 11/21/2022 Negative   Final   • External THC Screen Urine 11/21/2022 Positive (A)   Final   • External Methadone Screen Urine 11/21/2022 Negative   Final   • External Methamphetamine Screen Ur* 11/21/2022 Negative   Final   • External Oxycodone Screen Urine 11/21/2022 Negative   Final   • External Buprenorphine Screen Urine 11/21/2022 Positive (A)   Final   • External MDMA 11/21/2022 Negative   Final   • External Opiates Screen Urine 11/21/2022 Negative   Final   Hospital Outpatient Visit on 10/20/2022   Component Date Value Ref Range Status   • QT Interval 10/20/2022 438  ms Final   • QTC Interval 10/20/2022 451  ms Final   Lab on 10/20/2022   Component Date Value Ref Range Status   • Glucose  10/20/2022 100 (H)  65 - 99 mg/dL Final   • BUN 10/20/2022 8  6 - 20 mg/dL Final   • Creatinine 10/20/2022 0.82  0.76 - 1.27 mg/dL Final   • Sodium 10/20/2022 138  136 - 145 mmol/L Final   • Potassium 10/20/2022 3.5  3.5 - 5.2 mmol/L Final    Slight hemolysis detected by analyzer. Results may be affected.   • Chloride 10/20/2022 100  98 - 107 mmol/L Final   • CO2 10/20/2022 19.7 (L)  22.0 - 29.0 mmol/L Final   • Calcium 10/20/2022 9.1  8.6 - 10.5 mg/dL Final   • Total Protein 10/20/2022 8.3  6.0 - 8.5 g/dL Final   • Albumin 10/20/2022 4.38  3.50 - 5.20 g/dL Final   • ALT (SGPT) 10/20/2022 145 (H)  1 - 41 U/L Final   • AST (SGOT) 10/20/2022 229 (H)  1 - 40 U/L Final   • Alkaline Phosphatase 10/20/2022 319 (H)  39 - 117 U/L Final   • Total Bilirubin 10/20/2022 0.8  0.0 - 1.2 mg/dL Final   • Globulin 10/20/2022 3.9  gm/dL Final   • A/G Ratio 10/20/2022 1.1  g/dL Final   • BUN/Creatinine Ratio 10/20/2022 9.8  7.0 - 25.0 Final   • Anion Gap 10/20/2022 18.3 (H)  5.0 - 15.0 mmol/L Final   • eGFR 10/20/2022 119.7  >60.0 mL/min/1.73 Final    National Kidney Foundation and American Society of Nephrology (ASN) Task Force recommended calculation based on the Chronic Kidney Disease Epidemiology Collaboration (CKD-EPI) equation refit without adjustment for race.   • WBC 10/20/2022 6.95  3.40 - 10.80 10*3/mm3 Final   • RBC 10/20/2022 5.33  4.14 - 5.80 10*6/mm3 Final   • Hemoglobin 10/20/2022 15.7  13.0 - 17.7 g/dL Final   • Hematocrit 10/20/2022 47.2  37.5 - 51.0 % Final   • MCV 10/20/2022 88.6  79.0 - 97.0 fL Final   • MCH 10/20/2022 29.5  26.6 - 33.0 pg Final   • MCHC 10/20/2022 33.3  31.5 - 35.7 g/dL Final   • RDW 10/20/2022 14.2  12.3 - 15.4 % Final   • RDW-SD 10/20/2022 45.4  37.0 - 54.0 fl Final   • MPV 10/20/2022 11.2  6.0 - 12.0 fL Final   • Platelets 10/20/2022 146  140 - 450 10*3/mm3 Final   • Troponin T 10/20/2022 <0.010  0.000 - 0.030 ng/mL Final   • Creatine Kinase 10/20/2022 66  20 - 200 U/L Final   • CKMB 10/20/2022  1.43  <=10.40 ng/mL Final   Telemedicine on 10/19/2022   Component Date Value Ref Range Status   • External Amphetamine Screen Urine 10/19/2022 Negative   Final   • External Benzodiazepine Screen Uri* 10/19/2022 Negative   Final   • External Cocaine Screen Urine 10/19/2022 Negative   Final   • External THC Screen Urine 10/19/2022 Positive (A)   Final   • External Methadone Screen Urine 10/19/2022 Negative   Final   • External Methamphetamine Screen Ur* 10/19/2022 Negative   Final   • External Oxycodone Screen Urine 10/19/2022 Negative   Final   • External Buprenorphine Screen Urine 10/19/2022 Positive (A)   Final   • External MDMA 10/19/2022 Negative   Final   • External Opiates Screen Urine 10/19/2022 Negative   Final   There may be more visits with results that are not included.         Assessment & Plan   Diagnoses and all orders for this visit:    1. Opioid type dependence, continuous (Primary)  -     buprenorphine-naloxone (SUBOXONE) 8-2 MG per SL tablet; Place 2 tablets under the tongue Daily.  Dispense: 56 tablet; Refill: 0    2. Medication management  -     [facility] Drug Screen    3. Generalized anxiety disorder  -     sertraline (Zoloft) 50 MG tablet; Take 1 tablet by mouth Daily for 30 days.  Dispense: 30 tablet; Refill: 0  -     QUEtiapine (SEROquel) 25 MG tablet; Take 1 tablet by mouth Every Night.  Dispense: 30 tablet; Refill: 0  -     cloNIDine (Catapres) 0.1 MG tablet; Take 1 tablet by mouth 2 (Two) Times a Day. Take as needed for anxiety.  Insomnia.  Chills or sweats  Dispense: 60 tablet; Refill: 0    4. Depression, unspecified depression type  -     sertraline (Zoloft) 50 MG tablet; Take 1 tablet by mouth Daily for 30 days.  Dispense: 30 tablet; Refill: 0    5. Insomnia, unspecified type  -     QUEtiapine (SEROquel) 25 MG tablet; Take 1 tablet by mouth Every Night.  Dispense: 30 tablet; Refill: 0  -     cloNIDine (Catapres) 0.1 MG tablet; Take 1 tablet by mouth 2 (Two) Times a Day. Take as needed for  anxiety.  Insomnia.  Chills or sweats  Dispense: 60 tablet; Refill: 0    6. Pain in other joint  -     ibuprofen (ADVIL,MOTRIN) 600 MG tablet; Take 1 tablet by mouth Every 6 (Six) Hours As Needed for Mild Pain.  Dispense: 30 tablet; Refill: 0        Visit Diagnoses:    ICD-10-CM ICD-9-CM   1. Opioid type dependence, continuous  F11.20 304.01   2. Medication management  Z79.899 V58.69   3. Generalized anxiety disorder  F41.1 300.02   4. Depression, unspecified depression type  F32.A 311   5. Insomnia, unspecified type  G47.00 780.52   6. Pain in other joint  M25.59 719.48       PLAN:  1. Safety: No acute safety concerns  2. Risk Assessment: Risk of self-harm acutely is low. Risk of self-harm chronically is also low, but could be further elevated in the event of treatment noncompliance and/or AODA.    TREATMENT PLAN/GOALS: Continue supportive psychotherapy efforts and medications as indicated. Treatment and medication options discussed during today's visit. Patient acknowledged and verbally consented to continue with current treatment plan and was educated on the importance of compliance with treatment and follow-up appointments.    MEDICATION ISSUES:  ROMAN reviewed as expected.  Discussed medication options and treatment plan of prescribed medication as well as the risks, benefits, and side effects including potential falls, possible impaired driving and metabolic adversities among others. Patient is agreeable to call the office with any worsening of symptoms or onset of side effects. Patient is agreeable to call 911 or go to the nearest ER should he/she begin having SI/HI. No medication side effects or related complaints today.     MEDS ORDERED DURING VISIT:  New Medications Ordered This Visit   Medications   • buprenorphine-naloxone (SUBOXONE) 8-2 MG per SL tablet     Sig: Place 2 tablets under the tongue Daily.     Dispense:  56 tablet     Refill:  0     NADEAN:[id number]   • sertraline (Zoloft) 50 MG tablet      Sig: Take 1 tablet by mouth Daily for 30 days.     Dispense:  30 tablet     Refill:  0   • QUEtiapine (SEROquel) 25 MG tablet     Sig: Take 1 tablet by mouth Every Night.     Dispense:  30 tablet     Refill:  0   • cloNIDine (Catapres) 0.1 MG tablet     Sig: Take 1 tablet by mouth 2 (Two) Times a Day. Take as needed for anxiety.  Insomnia.  Chills or sweats     Dispense:  60 tablet     Refill:  0   • ibuprofen (ADVIL,MOTRIN) 600 MG tablet     Sig: Take 1 tablet by mouth Every 6 (Six) Hours As Needed for Mild Pain.     Dispense:  30 tablet     Refill:  0       No follow-ups on file.           This document has been electronically signed by RENAN De Dios  April 4, 2023 11:30 EDT      Part of this note may be an electronic transcription/translation of spoken language to printed text using the Dragon Dictation System.

## 2023-05-02 ENCOUNTER — TELEMEDICINE (OUTPATIENT)
Dept: PSYCHIATRY | Facility: CLINIC | Age: 33
End: 2023-05-02
Payer: COMMERCIAL

## 2023-05-02 VITALS
SYSTOLIC BLOOD PRESSURE: 149 MMHG | HEART RATE: 85 BPM | DIASTOLIC BLOOD PRESSURE: 101 MMHG | WEIGHT: 229 LBS | BODY MASS INDEX: 26.47 KG/M2

## 2023-05-02 DIAGNOSIS — Z79.899 MEDICATION MANAGEMENT: ICD-10-CM

## 2023-05-02 DIAGNOSIS — R11.14 BILIOUS VOMITING WITH NAUSEA: ICD-10-CM

## 2023-05-02 DIAGNOSIS — F32.A DEPRESSION, UNSPECIFIED DEPRESSION TYPE: ICD-10-CM

## 2023-05-02 DIAGNOSIS — F11.20 OPIOID TYPE DEPENDENCE, CONTINUOUS: Primary | ICD-10-CM

## 2023-05-02 DIAGNOSIS — G47.00 INSOMNIA, UNSPECIFIED TYPE: ICD-10-CM

## 2023-05-02 DIAGNOSIS — F41.1 GENERALIZED ANXIETY DISORDER: ICD-10-CM

## 2023-05-02 RX ORDER — ONDANSETRON HYDROCHLORIDE 8 MG/1
8 TABLET, FILM COATED ORAL EVERY 8 HOURS PRN
Qty: 45 TABLET | Refills: 0 | Status: SHIPPED | OUTPATIENT
Start: 2023-05-02

## 2023-05-02 RX ORDER — HYDROXYZINE PAMOATE 50 MG/1
50 CAPSULE ORAL 4 TIMES DAILY PRN
Qty: 90 CAPSULE | Refills: 0 | Status: SHIPPED | OUTPATIENT
Start: 2023-05-02

## 2023-05-02 RX ORDER — QUETIAPINE FUMARATE 25 MG/1
25 TABLET, FILM COATED ORAL NIGHTLY
Qty: 30 TABLET | Refills: 0 | Status: SHIPPED | OUTPATIENT
Start: 2023-05-02

## 2023-05-02 RX ORDER — BUPRENORPHINE HYDROCHLORIDE AND NALOXONE HYDROCHLORIDE DIHYDRATE 8; 2 MG/1; MG/1
2 TABLET SUBLINGUAL DAILY
Qty: 56 TABLET | Refills: 0 | Status: SHIPPED | OUTPATIENT
Start: 2023-05-02

## 2023-05-02 RX ORDER — CLONIDINE HYDROCHLORIDE 0.1 MG/1
0.1 TABLET ORAL 2 TIMES DAILY
Qty: 60 TABLET | Refills: 0 | Status: SHIPPED | OUTPATIENT
Start: 2023-05-02

## 2023-05-02 NOTE — PROGRESS NOTES
This provider is located at [facility]. The Patient is seen remotely located at the [facility] ([facility]) using Video. Patient is being seen via telehealth and confirm that they are in a secure environment for this session. The patient's condition being diagnosed/treated is appropriate for telemedicine. Provider identified as Daniel Olivas as well as credentials APRN MSN FNP-C ANTONIO-REYNOLD.   The client/patient gave consent to be seen remotely, and when consent is given they understand that the consent allows for patient identifiable information to be sent to a third party as needed.   They may refuse to be seen remotely at any time. The electronic data is encrypted and password protected, and the patient has been advised of the potential risks to privacy not withstanding such measures.    Chief Complaint/History of Present Illness: Follow Up buprenorphine/naloxone Medicated Assisted Treatment for Opiate Use Disorder     Patient/Client Concerns/Updates: Continuing Zoloft Vistaril, seroquel and clonidine for anxiety  -Patient reports he is doing well and has no concerns with current medication regimen  -Mood is overall stable with no concern for depression or anxiety, no suicidal or homicidal thoughts    -[facility] staff reports concern for patient's ill appearance, report patient appears jaundiced swollen and very weak  -I discussed side concerns with patient and patient reports his son was ill with gastrointestinal illness last week and patient feels he has the same symptoms.  Patient reports for the past day he has had nausea vomiting diarrhea.  Denies abdominal pain.  I advised patient due to the general impression from myself and [facility] staff and patient's symptoms I recommend he seek emergent evaluation.  Patient declines and states he has no desire to go to the emergency department today however agrees to go tomorrow if he does not feel better  -Zofran for nausea  -I discussed  with patient my concern as to the etiology of jaundice as it relates to hepatic pathology and patient understands my concern and again refuses to go to the emergency department  -I advised patient if he is experiencing acute hepatic pathology this potentially could be life-threatening and patient needs to go to the emergency department to rule this out-patient again refuses    Triggers (Persons/Places/Things/Events/Thought/Emotions): Recent gastrointestinal illness    Cravings: Denies cravings    Relapse Prevention: Counseling    Urine Drug Screen (today's visit) discussed: Positive buprenorphine and positive THC    UDS Confirmation (Most recent/Resulted): Positive buprenorphine/nor-buprenorphine, no concerns for urine tampering otherwise positive THC    Most recent pertinent laboratory studies reviewed:  3/16/2022-LFTs within normal limits; hepatitis C virus not detected     ROMAN (PDMP) Reviewed for Current/Active Medications: buprenorphine/naloxone as reviewed today    Past Surgical History:  Past Surgical History:   Procedure Laterality Date   • NO PAST SURGERIES         Problem List:  Patient Active Problem List   Diagnosis   • Alcohol-induced acute pancreatitis   • Pancreatitis   • Chronic pain of right knee       Allergy:   No Known Allergies     Current Medications:   Current Outpatient Medications   Medication Sig Dispense Refill   • buprenorphine-naloxone (SUBOXONE) 8-2 MG per SL tablet Place 2 tablets under the tongue Daily. 56 tablet 0   • cloNIDine (Catapres) 0.1 MG tablet Take 1 tablet by mouth 2 (Two) Times a Day. Take as needed for anxiety.  Insomnia.  Chills or sweats 60 tablet 0   • hydrOXYzine pamoate (Vistaril) 50 MG capsule Take 1 capsule by mouth 4 (Four) Times a Day As Needed for Anxiety (and/or insomia). Take 1 to 2 tablets as needed for anxiety every 6 hours 90 capsule 0   • ibuprofen (ADVIL,MOTRIN) 600 MG tablet Take 1 tablet by mouth Every 6 (Six) Hours As Needed for Mild Pain. 30 tablet  0   • lisinopril (PRINIVIL,ZESTRIL) 20 MG tablet Take 1 tablet by mouth Daily. for blood pressure 30 tablet 2   • naloxone (NARCAN) 4 MG/0.1ML nasal spray 1 spray into the nostril(s) as directed by provider As Needed (Opiate oversedation). Spray in 1 nostril every 2 to 3 minutes call 911 2 each 2   • ondansetron (ZOFRAN) 4 MG tablet TAKE ONE TABLET BY MOUTH EVERY 8 HOURS AS NEEDED FOR NAUSEA & VOMITING     • QUEtiapine (SEROquel) 25 MG tablet Take 1 tablet by mouth Every Night. 30 tablet 0   • sertraline (Zoloft) 50 MG tablet Take 1 tablet by mouth Daily for 30 days. 30 tablet 0     No current facility-administered medications for this visit.       Past Medical History:  Past Medical History:   Diagnosis Date   • Alcohol-induced acute pancreatitis 6/27/2019   • Alcoholism    • Drug use     Amphetamines and Suboxone   • Fatty liver    • Hepatitis C antibody test positive 2019   • Medical non-compliance    • Pancreatitis    • Smoker          Social History     Socioeconomic History   • Marital status:    Tobacco Use   • Smoking status: Every Day     Packs/day: 0.50     Types: Cigarettes   • Smokeless tobacco: Never   Vaping Use   • Vaping Use: Never used   Substance and Sexual Activity   • Alcohol use: Yes     Alcohol/week: 8.0 standard drinks     Types: 8 Shots of liquor per week     Comment: 5-6 double shots   • Drug use: Yes     Comment: suboxone as prescribed   • Sexual activity: Defer         Family History   Problem Relation Age of Onset   • Cancer Maternal Grandmother    • No Known Problems Mother    • No Known Problems Father          Mental Status Exam:   Hygiene:   good  Cooperation:  Cooperative  Eye Contact:  Good  Psychomotor Behavior:  Appropriate  Affect:  Appropriate  Mood: anxious  Speech:  Normal  Thought Process:  Goal directed  Thought Content:  Normal  Suicidal:  None  Homicidal:  None  Hallucinations:  None  Delusion:  None  Memory:  Intact  Orientation:  Grossly intact  Reliability:   fair  Insight:  Fair  Judgement:  Fair  Impulse Control:  Fair         Review of Systems:  Review of Systems   Constitutional: Negative for activity change, chills, diaphoresis and fatigue.   Respiratory: Negative for apnea, cough and shortness of breath.    Cardiovascular: Negative for chest pain, palpitations and leg swelling.   Gastrointestinal: Positive for diarrhea, nausea and vomiting. Negative for abdominal pain and constipation.   Genitourinary: Negative for difficulty urinating.   Musculoskeletal: Negative for arthralgias.   Skin: Positive for color change. Negative for rash.   Neurological: Negative for dizziness, weakness and headaches.   Psychiatric/Behavioral: Negative for agitation, self-injury, sleep disturbance and suicidal ideas. The patient is nervous/anxious.          Physical Exam:  Physical Exam  Vitals reviewed.   Constitutional:       General: He is not in acute distress.     Appearance: Normal appearance. He is not ill-appearing or toxic-appearing.   Pulmonary:      Effort: Pulmonary effort is normal.   Musculoskeletal:         General: Normal range of motion.   Neurological:      General: No focal deficit present.      Mental Status: He is alert and oriented to person, place, and time.   Psychiatric:         Attention and Perception: Attention and perception normal.         Mood and Affect: Mood normal. Mood is not anxious or depressed.         Speech: Speech normal.         Behavior: Behavior normal. Behavior is cooperative.         Thought Content: Thought content normal.         Cognition and Memory: Cognition and memory normal.         Judgment: Judgment normal.         Vital Signs:   BP (!) 149/101 Comment: notified provider.  Pulse 85   Wt 104 kg (229 lb)   BMI 26.47 kg/m²      Lab Results:   Telemedicine on 05/02/2023   Component Date Value Ref Range Status   • External Amphetamine Screen Urine 05/02/2023 Negative   Final   • External Benzodiazepine Screen Uri* 05/02/2023 Negative    Final   • External Cocaine Screen Urine 05/02/2023 Negative   Final   • External THC Screen Urine 05/02/2023 Positive (A)   Final   • External Methadone Screen Urine 05/02/2023 Negative   Final   • External Methamphetamine Screen Ur* 05/02/2023 Negative   Final   • External Oxycodone Screen Urine 05/02/2023 Negative   Final   • External Buprenorphine Screen Urine 05/02/2023 Positive (A)   Final   • External MDMA 05/02/2023 Negative   Final   • External Opiates Screen Urine 05/02/2023 Negative   Final   Telemedicine on 04/04/2023   Component Date Value Ref Range Status   • External Amphetamine Screen Urine 04/04/2023 Negative   Final   • External Benzodiazepine Screen Uri* 04/04/2023 Negative   Final   • External Cocaine Screen Urine 04/04/2023 Negative   Final   • External THC Screen Urine 04/04/2023 Positive (A)   Final   • External Methadone Screen Urine 04/04/2023 Negative   Final   • External Methamphetamine Screen Ur* 04/04/2023 Negative   Final   • External Oxycodone Screen Urine 04/04/2023 Negative   Final   • External Buprenorphine Screen Urine 04/04/2023 Positive (A)   Final   • External MDMA 04/04/2023 Negative   Final   • External Opiates Screen Urine 04/04/2023 Negative   Final   Telemedicine on 03/07/2023   Component Date Value Ref Range Status   • External Amphetamine Screen Urine 03/07/2023 Negative   Final   • External Benzodiazepine Screen Uri* 03/07/2023 Negative   Final   • External Cocaine Screen Urine 03/07/2023 Negative   Final   • External THC Screen Urine 03/07/2023 Positive (A)   Final   • External Methadone Screen Urine 03/07/2023 Negative   Final   • External Methamphetamine Screen Ur* 03/07/2023 Negative   Final   • External Oxycodone Screen Urine 03/07/2023 Negative   Final   • External Buprenorphine Screen Urine 03/07/2023 Positive (A)   Final   • External MDMA 03/07/2023 Negative   Final   • External Opiates Screen Urine 03/07/2023 Negative   Final   Telemedicine on 02/13/2023    Component Date Value Ref Range Status   • External Amphetamine Screen Urine 02/13/2023 Negative   Final-Edited   • External Benzodiazepine Screen Uri* 02/13/2023 Negative   Final-Edited   • External Cocaine Screen Urine 02/13/2023 Negative   Final-Edited   • External THC Screen Urine 02/13/2023 Positive (A)   Final-Edited   • External Methadone Screen Urine 02/13/2023 Negative   Final-Edited   • External Methamphetamine Screen Ur* 02/13/2023 Negative   Final-Edited   • External Oxycodone Screen Urine 02/13/2023 Negative   Final-Edited   • External Buprenorphine Screen Urine 02/13/2023 Positive (A)   Final-Edited   • External MDMA 02/13/2023 Negative   Final-Edited   • External Opiates Screen Urine 02/13/2023 Negative   Final-Edited   Telemedicine on 01/30/2023   Component Date Value Ref Range Status   • External Amphetamine Screen Urine 01/30/2023 Negative   Final   • External Benzodiazepine Screen Uri* 01/30/2023 Negative   Final   • External Cocaine Screen Urine 01/30/2023 Negative   Final   • External THC Screen Urine 01/30/2023 Positive (A)   Final   • External Methadone Screen Urine 01/30/2023 Negative   Final   • External Methamphetamine Screen Ur* 01/30/2023 Negative   Final   • External Oxycodone Screen Urine 01/30/2023 Negative   Final   • External Buprenorphine Screen Urine 01/30/2023 Positive (A)   Final   • External MDMA 01/30/2023 Negative   Final   • External Opiates Screen Urine 01/30/2023 Negative   Final   Telemedicine on 01/16/2023   Component Date Value Ref Range Status   • External Amphetamine Screen Urine 01/16/2023 Negative   Final   • External Benzodiazepine Screen Uri* 01/16/2023 Negative   Final   • External Cocaine Screen Urine 01/16/2023 Negative   Final   • External THC Screen Urine 01/16/2023 Positive   Final   • External Methadone Screen Urine 01/16/2023 Negative   Final   • External Methamphetamine Screen Ur* 01/16/2023 Negative   Final   • External Oxycodone Screen Urine 01/16/2023  Negative   Final   • External Buprenorphine Screen Urine 01/16/2023 Positive   Final   • External MDMA 01/16/2023 Negative   Final   • External Opiates Screen Urine 01/16/2023 Negative   Final   Telemedicine on 12/19/2022   Component Date Value Ref Range Status   • External Amphetamine Screen Urine 12/19/2022 Negative   Final   • External Benzodiazepine Screen Uri* 12/19/2022 Negative   Final   • External Cocaine Screen Urine 12/19/2022 Negative   Final   • External THC Screen Urine 12/19/2022 Positive (A)   Final   • External Methadone Screen Urine 12/19/2022 Negative   Final   • External Methamphetamine Screen Ur* 12/19/2022 Negative   Final   • External Oxycodone Screen Urine 12/19/2022 Negative   Final   • External Buprenorphine Screen Urine 12/19/2022 Positive (A)   Final   • External MDMA 12/19/2022 Negative   Final   • External Opiates Screen Urine 12/19/2022 Negative   Final   Telemedicine on 11/21/2022   Component Date Value Ref Range Status   • External Amphetamine Screen Urine 11/21/2022 Negative   Final   • External Benzodiazepine Screen Uri* 11/21/2022 Negative   Final   • External Cocaine Screen Urine 11/21/2022 Negative   Final   • External THC Screen Urine 11/21/2022 Positive (A)   Final   • External Methadone Screen Urine 11/21/2022 Negative   Final   • External Methamphetamine Screen Ur* 11/21/2022 Negative   Final   • External Oxycodone Screen Urine 11/21/2022 Negative   Final   • External Buprenorphine Screen Urine 11/21/2022 Positive (A)   Final   • External MDMA 11/21/2022 Negative   Final   • External Opiates Screen Urine 11/21/2022 Negative   Final         Assessment & Plan   Diagnoses and all orders for this visit:    1. Opioid type dependence, continuous (Primary)  -     buprenorphine-naloxone (SUBOXONE) 8-2 MG per SL tablet; Place 2 tablets under the tongue Daily.  Dispense: 56 tablet; Refill: 0    2. Medication management  -     [facility] Drug Screen    3. Generalized anxiety disorder  -      hydrOXYzine pamoate (Vistaril) 50 MG capsule; Take 1 capsule by mouth 4 (Four) Times a Day As Needed for Anxiety (and/or insomia). Take 1 to 2 tablets as needed for anxiety every 6 hours  Dispense: 90 capsule; Refill: 0  -     sertraline (Zoloft) 50 MG tablet; Take 1 tablet by mouth Daily for 30 days.  Dispense: 30 tablet; Refill: 0  -     QUEtiapine (SEROquel) 25 MG tablet; Take 1 tablet by mouth Every Night.  Dispense: 30 tablet; Refill: 0  -     cloNIDine (Catapres) 0.1 MG tablet; Take 1 tablet by mouth 2 (Two) Times a Day. Take as needed for anxiety.  Insomnia.  Chills or sweats  Dispense: 60 tablet; Refill: 0    4. Insomnia, unspecified type  -     hydrOXYzine pamoate (Vistaril) 50 MG capsule; Take 1 capsule by mouth 4 (Four) Times a Day As Needed for Anxiety (and/or insomia). Take 1 to 2 tablets as needed for anxiety every 6 hours  Dispense: 90 capsule; Refill: 0  -     QUEtiapine (SEROquel) 25 MG tablet; Take 1 tablet by mouth Every Night.  Dispense: 30 tablet; Refill: 0  -     cloNIDine (Catapres) 0.1 MG tablet; Take 1 tablet by mouth 2 (Two) Times a Day. Take as needed for anxiety.  Insomnia.  Chills or sweats  Dispense: 60 tablet; Refill: 0    5. Depression, unspecified depression type  -     sertraline (Zoloft) 50 MG tablet; Take 1 tablet by mouth Daily for 30 days.  Dispense: 30 tablet; Refill: 0    6. Bilious vomiting with nausea  -     ondansetron (ZOFRAN) 8 MG tablet; Take 1 tablet by mouth Every 8 (Eight) Hours As Needed for Nausea or Vomiting.  Dispense: 45 tablet; Refill: 0        Visit Diagnoses:    ICD-10-CM ICD-9-CM   1. Medication management  Z79.899 V58.69       PLAN:  1. Safety: No acute safety concerns  2. Risk Assessment: Risk of self-harm acutely is low. Risk of self-harm chronically is also low, but could be further elevated in the event of treatment noncompliance and/or AODA.    TREATMENT PLAN/GOALS: Continue supportive psychotherapy efforts and medications as indicated. Treatment and  medication options discussed during today's visit. Patient acknowledged and verbally consented to continue with current treatment plan and was educated on the importance of compliance with treatment and follow-up appointments.    MEDICATION ISSUES:  ROMAN reviewed as expected.  Discussed medication options and treatment plan of prescribed medication as well as the risks, benefits, and side effects including potential falls, possible impaired driving and metabolic adversities among others. Patient is agreeable to call the office with any worsening of symptoms or onset of side effects. Patient is agreeable to call 911 or go to the nearest ER should he/she begin having SI/HI. No medication side effects or related complaints today.     MEDS ORDERED DURING VISIT:  No orders of the defined types were placed in this encounter.      No follow-ups on file.           This document has been electronically signed by RENAN De Dios  May 2, 2023 10:52 EDT      Part of this note may be an electronic transcription/translation of spoken language to printed text using the Dragon Dictation System.

## 2023-05-17 ENCOUNTER — APPOINTMENT (OUTPATIENT)
Dept: ULTRASOUND IMAGING | Facility: HOSPITAL | Age: 33
End: 2023-05-17
Payer: COMMERCIAL

## 2023-05-17 ENCOUNTER — APPOINTMENT (OUTPATIENT)
Dept: GENERAL RADIOLOGY | Facility: HOSPITAL | Age: 33
End: 2023-05-17
Payer: COMMERCIAL

## 2023-05-17 ENCOUNTER — HOSPITAL ENCOUNTER (EMERGENCY)
Facility: HOSPITAL | Age: 33
Discharge: OTHER INSTITUTION | End: 2023-05-18
Attending: STUDENT IN AN ORGANIZED HEALTH CARE EDUCATION/TRAINING PROGRAM
Payer: COMMERCIAL

## 2023-05-17 ENCOUNTER — APPOINTMENT (OUTPATIENT)
Dept: CT IMAGING | Facility: HOSPITAL | Age: 33
End: 2023-05-17
Payer: COMMERCIAL

## 2023-05-17 DIAGNOSIS — E80.6 HYPERBILIRUBINEMIA: ICD-10-CM

## 2023-05-17 DIAGNOSIS — D64.9 ANEMIA, UNSPECIFIED TYPE: ICD-10-CM

## 2023-05-17 DIAGNOSIS — N39.0 ACUTE UTI: ICD-10-CM

## 2023-05-17 DIAGNOSIS — E72.20 HYPERAMMONEMIA: ICD-10-CM

## 2023-05-17 DIAGNOSIS — K76.7 HEPATORENAL SYNDROME: Primary | ICD-10-CM

## 2023-05-17 DIAGNOSIS — F10.20 ALCOHOL USE DISORDER, SEVERE, DEPENDENCE: ICD-10-CM

## 2023-05-17 LAB
ALBUMIN SERPL-MCNC: 2 G/DL (ref 3.5–5.2)
ALBUMIN/GLOB SERPL: 0.5 G/DL
ALP SERPL-CCNC: 277 U/L (ref 39–117)
ALT SERPL W P-5'-P-CCNC: 24 U/L (ref 1–41)
AMMONIA BLD-SCNC: 142 UMOL/L (ref 16–60)
AMPHET+METHAMPHET UR QL: NEGATIVE
AMPHETAMINES UR QL: NEGATIVE
AMYLASE SERPL-CCNC: 18 U/L (ref 28–100)
ANION GAP SERPL CALCULATED.3IONS-SCNC: 9.5 MMOL/L (ref 5–15)
ANISOCYTOSIS BLD QL: ABNORMAL
APTT PPP: 44.2 SECONDS (ref 26.5–34.5)
AST SERPL-CCNC: 120 U/L (ref 1–40)
BACTERIA UR QL AUTO: ABNORMAL /HPF
BARBITURATES UR QL SCN: NEGATIVE
BENZODIAZ UR QL SCN: NEGATIVE
BILIRUB CONJ SERPL-MCNC: >10 MG/DL (ref 0–0.3)
BILIRUB INDIRECT SERPL-MCNC: ABNORMAL MG/DL
BILIRUB SERPL-MCNC: 14 MG/DL (ref 0–1.2)
BILIRUB SERPL-MCNC: 14.9 MG/DL (ref 0–1.2)
BILIRUB UR QL STRIP: ABNORMAL
BUN SERPL-MCNC: 20 MG/DL (ref 6–20)
BUN/CREAT SERPL: 7.7 (ref 7–25)
BUPRENORPHINE SERPL-MCNC: POSITIVE NG/ML
CALCIUM SPEC-SCNC: 8.3 MG/DL (ref 8.6–10.5)
CANNABINOIDS SERPL QL: POSITIVE
CHLORIDE SERPL-SCNC: 103 MMOL/L (ref 98–107)
CLARITY UR: ABNORMAL
CO2 SERPL-SCNC: 18.5 MMOL/L (ref 22–29)
COCAINE UR QL: NEGATIVE
COLOR UR: ABNORMAL
CREAT SERPL-MCNC: 2.59 MG/DL (ref 0.76–1.27)
CRP SERPL-MCNC: 5.55 MG/DL (ref 0–0.5)
D-LACTATE SERPL-SCNC: 2.9 MMOL/L (ref 0.5–2)
DEPRECATED RDW RBC AUTO: 75.8 FL (ref 37–54)
EGFRCR SERPLBLD CKD-EPI 2021: 32.7 ML/MIN/1.73
EOSINOPHIL # BLD MANUAL: 0.06 10*3/MM3 (ref 0–0.4)
EOSINOPHIL NFR BLD MANUAL: 1 % (ref 0.3–6.2)
ERYTHROCYTE [DISTWIDTH] IN BLOOD BY AUTOMATED COUNT: 19.1 % (ref 12.3–15.4)
ETHANOL BLD-MCNC: 16 MG/DL (ref 0–10)
ETHANOL UR QL: 0.02 %
FLUAV RNA RESP QL NAA+PROBE: NOT DETECTED
FLUBV RNA RESP QL NAA+PROBE: NOT DETECTED
GLOBULIN UR ELPH-MCNC: 4.3 GM/DL
GLUCOSE SERPL-MCNC: 111 MG/DL (ref 65–99)
GLUCOSE UR STRIP-MCNC: NEGATIVE MG/DL
GRAN CASTS URNS QL MICRO: ABNORMAL /LPF
HAV IGM SERPL QL IA: ABNORMAL
HBV CORE IGM SERPL QL IA: ABNORMAL
HBV SURFACE AG SERPL QL IA: ABNORMAL
HCT VFR BLD AUTO: 21.9 % (ref 37.5–51)
HCV AB SER DONR QL: REACTIVE
HGB BLD-MCNC: 7.3 G/DL (ref 13–17.7)
HGB UR QL STRIP.AUTO: ABNORMAL
HYALINE CASTS UR QL AUTO: ABNORMAL /LPF
INR PPP: 1.55 (ref 0.9–1.1)
KETONES UR QL STRIP: NEGATIVE
LEUKOCYTE ESTERASE UR QL STRIP.AUTO: ABNORMAL
LIPASE SERPL-CCNC: 28 U/L (ref 13–60)
LYMPHOCYTES # BLD MANUAL: 1.69 10*3/MM3 (ref 0.7–3.1)
LYMPHOCYTES NFR BLD MANUAL: 6 % (ref 5–12)
MACROCYTES BLD QL SMEAR: ABNORMAL
MAGNESIUM SERPL-MCNC: 1.7 MG/DL (ref 1.6–2.6)
MCH RBC QN AUTO: 36.7 PG (ref 26.6–33)
MCHC RBC AUTO-ENTMCNC: 33.3 G/DL (ref 31.5–35.7)
MCV RBC AUTO: 110.1 FL (ref 79–97)
METAMYELOCYTES NFR BLD MANUAL: 1 % (ref 0–0)
METHADONE UR QL SCN: NEGATIVE
MONOCYTES # BLD: 0.36 10*3/MM3 (ref 0.1–0.9)
MYELOCYTES NFR BLD MANUAL: 3 % (ref 0–0)
NEUTROPHILS # BLD AUTO: 3.67 10*3/MM3 (ref 1.7–7)
NEUTROPHILS NFR BLD MANUAL: 51 % (ref 42.7–76)
NEUTS BAND NFR BLD MANUAL: 10 % (ref 0–5)
NITRITE UR QL STRIP: POSITIVE
NT-PROBNP SERPL-MCNC: 337.6 PG/ML (ref 0–450)
OPIATES UR QL: NEGATIVE
OXYCODONE UR QL SCN: NEGATIVE
PCP UR QL SCN: NEGATIVE
PH UR STRIP.AUTO: 5.5 [PH] (ref 5–8)
PLAT MORPH BLD: NORMAL
PLATELET # BLD AUTO: 104 10*3/MM3 (ref 140–450)
PMV BLD AUTO: 10.5 FL (ref 6–12)
POTASSIUM SERPL-SCNC: 3.9 MMOL/L (ref 3.5–5.2)
PROCALCITONIN SERPL-MCNC: 0.64 NG/ML (ref 0–0.25)
PROPOXYPH UR QL: NEGATIVE
PROT SERPL-MCNC: 6.3 G/DL (ref 6–8.5)
PROT UR QL STRIP: ABNORMAL
PROTHROMBIN TIME: 19.2 SECONDS (ref 12.1–14.7)
RBC # BLD AUTO: 1.99 10*6/MM3 (ref 4.14–5.8)
RBC # UR STRIP: ABNORMAL /HPF
REF LAB TEST METHOD: ABNORMAL
SARS-COV-2 RNA RESP QL NAA+PROBE: NOT DETECTED
SODIUM SERPL-SCNC: 131 MMOL/L (ref 136–145)
SP GR UR STRIP: 1.02 (ref 1–1.03)
SQUAMOUS #/AREA URNS HPF: ABNORMAL /HPF
TARGETS BLD QL SMEAR: ABNORMAL
TRICYCLICS UR QL SCN: POSITIVE
UROBILINOGEN UR QL STRIP: ABNORMAL
VARIANT LYMPHS NFR BLD MANUAL: 28 % (ref 19.6–45.3)
WBC # UR STRIP: ABNORMAL /HPF
WBC NRBC COR # BLD: 6.02 10*3/MM3 (ref 3.4–10.8)

## 2023-05-17 PROCEDURE — 36415 COLL VENOUS BLD VENIPUNCTURE: CPT

## 2023-05-17 PROCEDURE — 85007 BL SMEAR W/DIFF WBC COUNT: CPT | Performed by: PHYSICIAN ASSISTANT

## 2023-05-17 PROCEDURE — 85610 PROTHROMBIN TIME: CPT | Performed by: EMERGENCY MEDICINE

## 2023-05-17 PROCEDURE — 85045 AUTOMATED RETICULOCYTE COUNT: CPT | Performed by: INTERNAL MEDICINE

## 2023-05-17 PROCEDURE — 83605 ASSAY OF LACTIC ACID: CPT | Performed by: EMERGENCY MEDICINE

## 2023-05-17 PROCEDURE — 87040 BLOOD CULTURE FOR BACTERIA: CPT | Performed by: EMERGENCY MEDICINE

## 2023-05-17 PROCEDURE — 80053 COMPREHEN METABOLIC PANEL: CPT | Performed by: PHYSICIAN ASSISTANT

## 2023-05-17 PROCEDURE — 74176 CT ABD & PELVIS W/O CONTRAST: CPT

## 2023-05-17 PROCEDURE — 96361 HYDRATE IV INFUSION ADD-ON: CPT

## 2023-05-17 PROCEDURE — 85025 COMPLETE CBC W/AUTO DIFF WBC: CPT | Performed by: PHYSICIAN ASSISTANT

## 2023-05-17 PROCEDURE — 86140 C-REACTIVE PROTEIN: CPT | Performed by: PHYSICIAN ASSISTANT

## 2023-05-17 PROCEDURE — 82248 BILIRUBIN DIRECT: CPT | Performed by: PHYSICIAN ASSISTANT

## 2023-05-17 PROCEDURE — 96365 THER/PROPH/DIAG IV INF INIT: CPT

## 2023-05-17 PROCEDURE — 83735 ASSAY OF MAGNESIUM: CPT | Performed by: PHYSICIAN ASSISTANT

## 2023-05-17 PROCEDURE — 71045 X-RAY EXAM CHEST 1 VIEW: CPT | Performed by: RADIOLOGY

## 2023-05-17 PROCEDURE — 85730 THROMBOPLASTIN TIME PARTIAL: CPT | Performed by: EMERGENCY MEDICINE

## 2023-05-17 PROCEDURE — 99284 EMERGENCY DEPT VISIT MOD MDM: CPT

## 2023-05-17 PROCEDURE — 82150 ASSAY OF AMYLASE: CPT | Performed by: EMERGENCY MEDICINE

## 2023-05-17 PROCEDURE — 76700 US EXAM ABDOM COMPLETE: CPT

## 2023-05-17 PROCEDURE — 84145 PROCALCITONIN (PCT): CPT | Performed by: EMERGENCY MEDICINE

## 2023-05-17 PROCEDURE — 82247 BILIRUBIN TOTAL: CPT | Performed by: PHYSICIAN ASSISTANT

## 2023-05-17 PROCEDURE — 25010000002 CEFTRIAXONE PER 250 MG: Performed by: EMERGENCY MEDICINE

## 2023-05-17 PROCEDURE — 83880 ASSAY OF NATRIURETIC PEPTIDE: CPT | Performed by: EMERGENCY MEDICINE

## 2023-05-17 PROCEDURE — 82077 ASSAY SPEC XCP UR&BREATH IA: CPT | Performed by: PHYSICIAN ASSISTANT

## 2023-05-17 PROCEDURE — 83690 ASSAY OF LIPASE: CPT | Performed by: PHYSICIAN ASSISTANT

## 2023-05-17 PROCEDURE — 71045 X-RAY EXAM CHEST 1 VIEW: CPT

## 2023-05-17 PROCEDURE — 87186 SC STD MICRODIL/AGAR DIL: CPT | Performed by: PHYSICIAN ASSISTANT

## 2023-05-17 PROCEDURE — 82140 ASSAY OF AMMONIA: CPT | Performed by: EMERGENCY MEDICINE

## 2023-05-17 PROCEDURE — 80074 ACUTE HEPATITIS PANEL: CPT | Performed by: PHYSICIAN ASSISTANT

## 2023-05-17 PROCEDURE — 87086 URINE CULTURE/COLONY COUNT: CPT | Performed by: PHYSICIAN ASSISTANT

## 2023-05-17 PROCEDURE — 80306 DRUG TEST PRSMV INSTRMNT: CPT | Performed by: PHYSICIAN ASSISTANT

## 2023-05-17 PROCEDURE — 76700 US EXAM ABDOM COMPLETE: CPT | Performed by: RADIOLOGY

## 2023-05-17 PROCEDURE — 87077 CULTURE AEROBIC IDENTIFY: CPT | Performed by: PHYSICIAN ASSISTANT

## 2023-05-17 PROCEDURE — 81001 URINALYSIS AUTO W/SCOPE: CPT | Performed by: PHYSICIAN ASSISTANT

## 2023-05-17 PROCEDURE — 74176 CT ABD & PELVIS W/O CONTRAST: CPT | Performed by: RADIOLOGY

## 2023-05-17 PROCEDURE — 87636 SARSCOV2 & INF A&B AMP PRB: CPT | Performed by: EMERGENCY MEDICINE

## 2023-05-17 PROCEDURE — 80143 DRUG ASSAY ACETAMINOPHEN: CPT | Performed by: INTERNAL MEDICINE

## 2023-05-17 RX ORDER — LORAZEPAM 2 MG/ML
1 INJECTION INTRAMUSCULAR ONCE
Status: DISCONTINUED | OUTPATIENT
Start: 2023-05-17 | End: 2023-05-18

## 2023-05-17 RX ORDER — SODIUM CHLORIDE 9 MG/ML
125 INJECTION, SOLUTION INTRAVENOUS CONTINUOUS
Status: DISCONTINUED | OUTPATIENT
Start: 2023-05-17 | End: 2023-05-18 | Stop reason: HOSPADM

## 2023-05-17 RX ORDER — SODIUM CHLORIDE 0.9 % (FLUSH) 0.9 %
10 SYRINGE (ML) INJECTION AS NEEDED
Status: DISCONTINUED | OUTPATIENT
Start: 2023-05-17 | End: 2023-05-18 | Stop reason: HOSPADM

## 2023-05-17 RX ADMIN — SODIUM CHLORIDE 1000 ML: 9 INJECTION, SOLUTION INTRAVENOUS at 21:03

## 2023-05-17 RX ADMIN — CEFTRIAXONE 1 G: 1 INJECTION, POWDER, FOR SOLUTION INTRAMUSCULAR; INTRAVENOUS at 21:57

## 2023-05-17 RX ADMIN — SODIUM CHLORIDE 125 ML/HR: 9 INJECTION, SOLUTION INTRAVENOUS at 23:22

## 2023-05-17 NOTE — ED NOTES
Attempted IV X2, unsuccessful. Pt gone to radiology and will have another RN attempt when he returns.

## 2023-05-18 ENCOUNTER — HOSPITAL ENCOUNTER (INPATIENT)
Facility: HOSPITAL | Age: 33
LOS: 5 days | Discharge: HOME | End: 2023-05-23
Attending: FAMILY MEDICINE | Admitting: INTERNAL MEDICINE
Payer: COMMERCIAL

## 2023-05-18 ENCOUNTER — APPOINTMENT (OUTPATIENT)
Dept: GENERAL RADIOLOGY | Facility: HOSPITAL | Age: 33
End: 2023-05-18
Payer: COMMERCIAL

## 2023-05-18 VITALS
HEART RATE: 71 BPM | DIASTOLIC BLOOD PRESSURE: 58 MMHG | HEIGHT: 77 IN | WEIGHT: 245 LBS | TEMPERATURE: 97.7 F | BODY MASS INDEX: 28.93 KG/M2 | SYSTOLIC BLOOD PRESSURE: 103 MMHG | RESPIRATION RATE: 18 BRPM | OXYGEN SATURATION: 98 %

## 2023-05-18 DIAGNOSIS — D64.9 ANEMIA, UNSPECIFIED TYPE: ICD-10-CM

## 2023-05-18 DIAGNOSIS — K74.60 CIRRHOSIS OF LIVER WITHOUT ASCITES, UNSPECIFIED HEPATIC CIRRHOSIS TYPE: Primary | ICD-10-CM

## 2023-05-18 PROBLEM — K76.7 HEPATORENAL FAILURE: Status: ACTIVE | Noted: 2023-05-18

## 2023-05-18 LAB
ABO GROUP BLD: NORMAL
ABO GROUP BLD: NORMAL
ALBUMIN SERPL-MCNC: 2.7 G/DL (ref 3.5–5.2)
ALBUMIN/GLOB SERPL: 0.8 G/DL
ALP SERPL-CCNC: 221 U/L (ref 39–117)
ALT SERPL W P-5'-P-CCNC: 18 U/L (ref 1–41)
ANION GAP SERPL CALCULATED.3IONS-SCNC: 9 MMOL/L (ref 5–15)
APAP SERPL-MCNC: <5 MCG/ML (ref 0–30)
APAP SERPL-MCNC: <5 MCG/ML (ref 0–30)
APTT PPP: 49.3 SECONDS (ref 22–39)
AST SERPL-CCNC: 106 U/L (ref 1–40)
BACTERIA UR QL AUTO: ABNORMAL /HPF
BASOPHILS # BLD MANUAL: 0 10*3/MM3 (ref 0–0.2)
BASOPHILS NFR BLD MANUAL: 0 % (ref 0–1.5)
BILIRUB SERPL-MCNC: 12.2 MG/DL (ref 0–1.2)
BILIRUB UR QL STRIP: ABNORMAL
BLD GP AB SCN SERPL QL: NEGATIVE
BUN SERPL-MCNC: 19 MG/DL (ref 6–20)
BUN/CREAT SERPL: 12.9 (ref 7–25)
CALCIUM SPEC-SCNC: 7.7 MG/DL (ref 8.6–10.5)
CHLORIDE SERPL-SCNC: 108 MMOL/L (ref 98–107)
CK SERPL-CCNC: 18 U/L (ref 20–200)
CLARITY UR: ABNORMAL
CO2 SERPL-SCNC: 17 MMOL/L (ref 22–29)
COLOR UR: ABNORMAL
CREAT SERPL-MCNC: 1.47 MG/DL (ref 0.76–1.27)
D DIMER PPP FEU-MCNC: 2.74 MCGFEU/ML (ref 0–0.5)
D-LACTATE SERPL-SCNC: 0.8 MMOL/L (ref 0.5–2)
D-LACTATE SERPL-SCNC: 2.7 MMOL/L (ref 0.5–2)
DEPRECATED RDW RBC AUTO: 73.4 FL (ref 37–54)
EGFRCR SERPLBLD CKD-EPI 2021: 64.6 ML/MIN/1.73
EOSINOPHIL # BLD MANUAL: 0.1 10*3/MM3 (ref 0–0.4)
EOSINOPHIL NFR BLD MANUAL: 2 % (ref 0.3–6.2)
ERYTHROCYTE [DISTWIDTH] IN BLOOD BY AUTOMATED COUNT: 18.8 % (ref 12.3–15.4)
FIBRINOGEN PPP-MCNC: 162 MG/DL (ref 203–470)
FSP PPP LA-ACNC: NORMAL
GLOBULIN UR ELPH-MCNC: 3.4 GM/DL
GLUCOSE SERPL-MCNC: 110 MG/DL (ref 65–99)
GLUCOSE UR STRIP-MCNC: NEGATIVE MG/DL
HCT VFR BLD AUTO: 20.4 % (ref 37.5–51)
HGB BLD-MCNC: 6.9 G/DL (ref 13–17.7)
HGB UR QL STRIP.AUTO: NEGATIVE
HYALINE CASTS UR QL AUTO: ABNORMAL /LPF
INR PPP: 1.85 (ref 0.89–1.12)
IRON 24H UR-MRATE: 59 MCG/DL (ref 59–158)
IRON SATN MFR SERPL: 79 % (ref 20–50)
KETONES UR QL STRIP: NEGATIVE
LEUKOCYTE ESTERASE UR QL STRIP.AUTO: ABNORMAL
LYMPHOCYTES # BLD MANUAL: 1.33 10*3/MM3 (ref 0.7–3.1)
LYMPHOCYTES NFR BLD MANUAL: 8 % (ref 5–12)
MCH RBC QN AUTO: 36.1 PG (ref 26.6–33)
MCHC RBC AUTO-ENTMCNC: 33.8 G/DL (ref 31.5–35.7)
MCV RBC AUTO: 106.8 FL (ref 79–97)
METAMYELOCYTES NFR BLD MANUAL: 1 % (ref 0–0)
MONOCYTES # BLD: 0.41 10*3/MM3 (ref 0.1–0.9)
MYELOCYTES NFR BLD MANUAL: 1 % (ref 0–0)
NEUTROPHILS # BLD AUTO: 3.18 10*3/MM3 (ref 1.7–7)
NEUTROPHILS NFR BLD MANUAL: 54 % (ref 42.7–76)
NEUTS BAND NFR BLD MANUAL: 8 % (ref 0–5)
NITRITE UR QL STRIP: NEGATIVE
NRBC SPEC MANUAL: 0 /100 WBC (ref 0–0.2)
PH UR STRIP.AUTO: 6.5 [PH] (ref 5–8)
PLAT MORPH BLD: NORMAL
PLATELET # BLD AUTO: 106 10*3/MM3 (ref 140–450)
PMV BLD AUTO: 10 FL (ref 6–12)
POTASSIUM SERPL-SCNC: 3.9 MMOL/L (ref 3.5–5.2)
PROT SERPL-MCNC: 6.1 G/DL (ref 6–8.5)
PROT UR QL STRIP: ABNORMAL
PROTHROMBIN TIME: 21.5 SECONDS (ref 12.2–14.5)
RBC # BLD AUTO: 1.91 10*6/MM3 (ref 4.14–5.8)
RBC # UR STRIP: ABNORMAL /HPF
REF LAB TEST METHOD: ABNORMAL
RETICS # AUTO: 0.07 10*6/MM3 (ref 0.02–0.13)
RETICS/RBC NFR AUTO: 3.38 % (ref 0.7–1.9)
RH BLD: POSITIVE
RH BLD: POSITIVE
SODIUM SERPL-SCNC: 134 MMOL/L (ref 136–145)
SODIUM UR-SCNC: 48 MMOL/L
SP GR UR STRIP: 1.02 (ref 1–1.03)
SQUAMOUS #/AREA URNS HPF: ABNORMAL /HPF
T&S EXPIRATION DATE: NORMAL
TARGETS BLD QL SMEAR: ABNORMAL
TIBC SERPL-MCNC: 75 MCG/DL (ref 298–536)
TRANSFERRIN SERPL-MCNC: 50 MG/DL (ref 200–360)
UROBILINOGEN UR QL STRIP: ABNORMAL
VARIANT LYMPHS NFR BLD MANUAL: 26 % (ref 19.6–45.3)
WBC # UR STRIP: ABNORMAL /HPF
WBC MORPH BLD: NORMAL
WBC NRBC COR # BLD: 5.13 10*3/MM3 (ref 3.4–10.8)

## 2023-05-18 PROCEDURE — 74018 RADEX ABDOMEN 1 VIEW: CPT

## 2023-05-18 PROCEDURE — 82570 ASSAY OF URINE CREATININE: CPT | Performed by: INTERNAL MEDICINE

## 2023-05-18 PROCEDURE — 86900 BLOOD TYPING SEROLOGIC ABO: CPT

## 2023-05-18 PROCEDURE — 0 PHYTONADIONE 10 MG/ML SOLUTION 1 ML AMPULE: Performed by: INTERNAL MEDICINE

## 2023-05-18 PROCEDURE — 85025 COMPLETE CBC W/AUTO DIFF WBC: CPT | Performed by: INTERNAL MEDICINE

## 2023-05-18 PROCEDURE — 80143 DRUG ASSAY ACETAMINOPHEN: CPT | Performed by: INTERNAL MEDICINE

## 2023-05-18 PROCEDURE — 83605 ASSAY OF LACTIC ACID: CPT | Performed by: INTERNAL MEDICINE

## 2023-05-18 PROCEDURE — 85007 BL SMEAR W/DIFF WBC COUNT: CPT | Performed by: INTERNAL MEDICINE

## 2023-05-18 PROCEDURE — 87040 BLOOD CULTURE FOR BACTERIA: CPT | Performed by: INTERNAL MEDICINE

## 2023-05-18 PROCEDURE — 86708 HEPATITIS A ANTIBODY: CPT | Performed by: PHYSICIAN ASSISTANT

## 2023-05-18 PROCEDURE — 81001 URINALYSIS AUTO W/SCOPE: CPT | Performed by: INTERNAL MEDICINE

## 2023-05-18 PROCEDURE — 25010000002 OCTREOTIDE PER 25 MCG: Performed by: INTERNAL MEDICINE

## 2023-05-18 PROCEDURE — 86900 BLOOD TYPING SEROLOGIC ABO: CPT | Performed by: INTERNAL MEDICINE

## 2023-05-18 PROCEDURE — 85060 BLOOD SMEAR INTERPRETATION: CPT | Performed by: INTERNAL MEDICINE

## 2023-05-18 PROCEDURE — 84466 ASSAY OF TRANSFERRIN: CPT | Performed by: INTERNAL MEDICINE

## 2023-05-18 PROCEDURE — 85362 FIBRIN DEGRADATION PRODUCTS: CPT | Performed by: INTERNAL MEDICINE

## 2023-05-18 PROCEDURE — 83605 ASSAY OF LACTIC ACID: CPT | Performed by: EMERGENCY MEDICINE

## 2023-05-18 PROCEDURE — 87522 HEPATITIS C REVRS TRNSCRPJ: CPT | Performed by: PHYSICIAN ASSISTANT

## 2023-05-18 PROCEDURE — 86923 COMPATIBILITY TEST ELECTRIC: CPT

## 2023-05-18 PROCEDURE — 86022 PLATELET ANTIBODIES: CPT | Performed by: INTERNAL MEDICINE

## 2023-05-18 PROCEDURE — 99291 CRITICAL CARE FIRST HOUR: CPT | Performed by: INTERNAL MEDICINE

## 2023-05-18 PROCEDURE — 82550 ASSAY OF CK (CPK): CPT | Performed by: INTERNAL MEDICINE

## 2023-05-18 PROCEDURE — 82306 VITAMIN D 25 HYDROXY: CPT | Performed by: PHYSICIAN ASSISTANT

## 2023-05-18 PROCEDURE — P9047 ALBUMIN (HUMAN), 25%, 50ML: HCPCS | Performed by: STUDENT IN AN ORGANIZED HEALTH CARE EDUCATION/TRAINING PROGRAM

## 2023-05-18 PROCEDURE — 25010000002 MEROPENEM PER 100 MG: Performed by: INTERNAL MEDICINE

## 2023-05-18 PROCEDURE — 99223 1ST HOSP IP/OBS HIGH 75: CPT | Performed by: INTERNAL MEDICINE

## 2023-05-18 PROCEDURE — 82607 VITAMIN B-12: CPT | Performed by: INTERNAL MEDICINE

## 2023-05-18 PROCEDURE — 82105 ALPHA-FETOPROTEIN SERUM: CPT | Performed by: PHYSICIAN ASSISTANT

## 2023-05-18 PROCEDURE — 86901 BLOOD TYPING SEROLOGIC RH(D): CPT | Performed by: INTERNAL MEDICINE

## 2023-05-18 PROCEDURE — 80053 COMPREHEN METABOLIC PANEL: CPT | Performed by: INTERNAL MEDICINE

## 2023-05-18 PROCEDURE — C1751 CATH, INF, PER/CENT/MIDLINE: HCPCS

## 2023-05-18 PROCEDURE — 25010000002 ALBUMIN HUMAN 25% PER 50 ML: Performed by: INTERNAL MEDICINE

## 2023-05-18 PROCEDURE — 36430 TRANSFUSION BLD/BLD COMPNT: CPT

## 2023-05-18 PROCEDURE — 96367 TX/PROPH/DG ADDL SEQ IV INF: CPT

## 2023-05-18 PROCEDURE — 02HV33Z INSERTION OF INFUSION DEVICE INTO SUPERIOR VENA CAVA, PERCUTANEOUS APPROACH: ICD-10-PCS | Performed by: INTERNAL MEDICINE

## 2023-05-18 PROCEDURE — 87086 URINE CULTURE/COLONY COUNT: CPT | Performed by: INTERNAL MEDICINE

## 2023-05-18 PROCEDURE — C1894 INTRO/SHEATH, NON-LASER: HCPCS

## 2023-05-18 PROCEDURE — 96361 HYDRATE IV INFUSION ADD-ON: CPT

## 2023-05-18 PROCEDURE — 0 METHYLPREDNISOLONE PER 125 MG: Performed by: INTERNAL MEDICINE

## 2023-05-18 PROCEDURE — 86850 RBC ANTIBODY SCREEN: CPT | Performed by: INTERNAL MEDICINE

## 2023-05-18 PROCEDURE — 86901 BLOOD TYPING SEROLOGIC RH(D): CPT

## 2023-05-18 PROCEDURE — 85384 FIBRINOGEN ACTIVITY: CPT | Performed by: INTERNAL MEDICINE

## 2023-05-18 PROCEDURE — 96375 TX/PRO/DX INJ NEW DRUG ADDON: CPT

## 2023-05-18 PROCEDURE — 85610 PROTHROMBIN TIME: CPT | Performed by: INTERNAL MEDICINE

## 2023-05-18 PROCEDURE — 83540 ASSAY OF IRON: CPT | Performed by: INTERNAL MEDICINE

## 2023-05-18 PROCEDURE — P9047 ALBUMIN (HUMAN), 25%, 50ML: HCPCS | Performed by: INTERNAL MEDICINE

## 2023-05-18 PROCEDURE — 85730 THROMBOPLASTIN TIME PARTIAL: CPT | Performed by: INTERNAL MEDICINE

## 2023-05-18 PROCEDURE — 86706 HEP B SURFACE ANTIBODY: CPT | Performed by: PHYSICIAN ASSISTANT

## 2023-05-18 PROCEDURE — 25010000002 LORAZEPAM PER 2 MG: Performed by: STUDENT IN AN ORGANIZED HEALTH CARE EDUCATION/TRAINING PROGRAM

## 2023-05-18 PROCEDURE — P9016 RBC LEUKOCYTES REDUCED: HCPCS

## 2023-05-18 PROCEDURE — 84300 ASSAY OF URINE SODIUM: CPT | Performed by: INTERNAL MEDICINE

## 2023-05-18 PROCEDURE — 25010000002 ALBUMIN HUMAN 25% PER 50 ML: Performed by: STUDENT IN AN ORGANIZED HEALTH CARE EDUCATION/TRAINING PROGRAM

## 2023-05-18 PROCEDURE — 85379 FIBRIN DEGRADATION QUANT: CPT | Performed by: INTERNAL MEDICINE

## 2023-05-18 RX ORDER — SODIUM CHLORIDE 0.9 % (FLUSH) 0.9 %
10 SYRINGE (ML) INJECTION AS NEEDED
Status: DISCONTINUED | OUTPATIENT
Start: 2023-05-18 | End: 2023-05-23 | Stop reason: HOSPADM

## 2023-05-18 RX ORDER — SODIUM CHLORIDE 9 MG/ML
40 INJECTION, SOLUTION INTRAVENOUS AS NEEDED
Status: DISCONTINUED | OUTPATIENT
Start: 2023-05-18 | End: 2023-05-23 | Stop reason: HOSPADM

## 2023-05-18 RX ORDER — ALBUMIN (HUMAN) 12.5 G/50ML
100 SOLUTION INTRAVENOUS ONCE
Status: COMPLETED | OUTPATIENT
Start: 2023-05-18 | End: 2023-05-18

## 2023-05-18 RX ORDER — SODIUM CHLORIDE 0.9 % (FLUSH) 0.9 %
10 SYRINGE (ML) INJECTION EVERY 12 HOURS SCHEDULED
Status: DISCONTINUED | OUTPATIENT
Start: 2023-05-18 | End: 2023-05-18

## 2023-05-18 RX ORDER — LACTULOSE 10 G/15ML
20 SOLUTION ORAL 3 TIMES DAILY
Status: DISCONTINUED | OUTPATIENT
Start: 2023-05-18 | End: 2023-05-20

## 2023-05-18 RX ORDER — SODIUM CHLORIDE 0.9 % (FLUSH) 0.9 %
10 SYRINGE (ML) INJECTION EVERY 12 HOURS SCHEDULED
Status: DISCONTINUED | OUTPATIENT
Start: 2023-05-18 | End: 2023-05-23 | Stop reason: HOSPADM

## 2023-05-18 RX ORDER — METHYLPREDNISOLONE SODIUM SUCCINATE 40 MG/ML
60 INJECTION, POWDER, LYOPHILIZED, FOR SOLUTION INTRAMUSCULAR; INTRAVENOUS DAILY
Status: DISCONTINUED | OUTPATIENT
Start: 2023-05-18 | End: 2023-05-22

## 2023-05-18 RX ORDER — LACTULOSE 10 G/15ML
30 SOLUTION ORAL ONCE
Status: COMPLETED | OUTPATIENT
Start: 2023-05-18 | End: 2023-05-18

## 2023-05-18 RX ORDER — LORAZEPAM 2 MG/ML
1 INJECTION INTRAMUSCULAR ONCE
Status: COMPLETED | OUTPATIENT
Start: 2023-05-18 | End: 2023-05-18

## 2023-05-18 RX ORDER — PANTOPRAZOLE SODIUM 40 MG/10ML
40 INJECTION, POWDER, LYOPHILIZED, FOR SOLUTION INTRAVENOUS EVERY 12 HOURS SCHEDULED
Status: DISCONTINUED | OUTPATIENT
Start: 2023-05-18 | End: 2023-05-23

## 2023-05-18 RX ORDER — SODIUM CHLORIDE 0.9 % (FLUSH) 0.9 %
10 SYRINGE (ML) INJECTION AS NEEDED
Status: DISCONTINUED | OUTPATIENT
Start: 2023-05-18 | End: 2023-05-18

## 2023-05-18 RX ORDER — ONDANSETRON 2 MG/ML
4 INJECTION INTRAMUSCULAR; INTRAVENOUS EVERY 6 HOURS PRN
Status: DISCONTINUED | OUTPATIENT
Start: 2023-05-18 | End: 2023-05-23 | Stop reason: HOSPADM

## 2023-05-18 RX ORDER — POLYETHYLENE GLYCOL 3350 17 G/17G
17 POWDER, FOR SOLUTION ORAL DAILY PRN
Status: DISCONTINUED | OUTPATIENT
Start: 2023-05-18 | End: 2023-05-23 | Stop reason: HOSPADM

## 2023-05-18 RX ORDER — DOCUSATE SODIUM 50 MG/5 ML
50 LIQUID (ML) ORAL 2 TIMES DAILY
Status: DISCONTINUED | OUTPATIENT
Start: 2023-05-18 | End: 2023-05-22

## 2023-05-18 RX ORDER — BISACODYL 5 MG/1
5 TABLET, DELAYED RELEASE ORAL DAILY PRN
Status: DISCONTINUED | OUTPATIENT
Start: 2023-05-18 | End: 2023-05-23 | Stop reason: HOSPADM

## 2023-05-18 RX ORDER — AMOXICILLIN 250 MG
2 CAPSULE ORAL 2 TIMES DAILY
Status: DISCONTINUED | OUTPATIENT
Start: 2023-05-18 | End: 2023-05-18

## 2023-05-18 RX ORDER — ALBUMIN (HUMAN) 12.5 G/50ML
25 SOLUTION INTRAVENOUS EVERY 6 HOURS
Status: COMPLETED | OUTPATIENT
Start: 2023-05-18 | End: 2023-05-18

## 2023-05-18 RX ORDER — BISACODYL 10 MG
10 SUPPOSITORY, RECTAL RECTAL DAILY PRN
Status: DISCONTINUED | OUTPATIENT
Start: 2023-05-18 | End: 2023-05-23 | Stop reason: HOSPADM

## 2023-05-18 RX ORDER — LACTULOSE 10 G/15ML
300 SOLUTION ORAL ONCE
Status: COMPLETED | OUTPATIENT
Start: 2023-05-18 | End: 2023-05-18

## 2023-05-18 RX ADMIN — METHYLPREDNISOLONE SODIUM SUCCINATE 60 MG: 40 INJECTION INTRAMUSCULAR; INTRAVENOUS at 12:19

## 2023-05-18 RX ADMIN — LACTULOSE 20 G: 20 SOLUTION ORAL at 14:43

## 2023-05-18 RX ADMIN — LACTULOSE 30 G: 20 SOLUTION ORAL at 05:34

## 2023-05-18 RX ADMIN — MEROPENEM 500 MG: 500 INJECTION, POWDER, FOR SOLUTION INTRAVENOUS at 17:14

## 2023-05-18 RX ADMIN — OCTREOTIDE ACETATE 50 MCG/HR: 500 INJECTION, SOLUTION INTRAVENOUS; SUBCUTANEOUS at 22:55

## 2023-05-18 RX ADMIN — PHYTONADIONE 10 MG: 10 INJECTION, EMULSION INTRAMUSCULAR; INTRAVENOUS; SUBCUTANEOUS at 14:37

## 2023-05-18 RX ADMIN — SENNOSIDES 5 ML: 8.8 LIQUID ORAL at 22:13

## 2023-05-18 RX ADMIN — Medication 10 ML: at 22:14

## 2023-05-18 RX ADMIN — MEROPENEM 1 G: 1 INJECTION, POWDER, FOR SOLUTION INTRAVENOUS at 12:21

## 2023-05-18 RX ADMIN — ALBUMIN HUMAN 25 G: 0.25 SOLUTION INTRAVENOUS at 05:35

## 2023-05-18 RX ADMIN — Medication 10 ML: at 12:24

## 2023-05-18 RX ADMIN — DOCUSATE SODIUM 50 MG: 50 LIQUID ORAL at 14:42

## 2023-05-18 RX ADMIN — OCTREOTIDE ACETATE 50 MCG/HR: 500 INJECTION, SOLUTION INTRAVENOUS; SUBCUTANEOUS at 12:21

## 2023-05-18 RX ADMIN — LACTULOSE 20 G: 20 SOLUTION ORAL at 22:14

## 2023-05-18 RX ADMIN — DOCUSATE SODIUM 50 MG: 50 LIQUID ORAL at 22:14

## 2023-05-18 RX ADMIN — SENNOSIDES 5 ML: 8.8 LIQUID ORAL at 15:23

## 2023-05-18 RX ADMIN — ALBUMIN (HUMAN) 25 G: 0.25 INJECTION, SOLUTION INTRAVENOUS at 16:01

## 2023-05-18 RX ADMIN — SODIUM CHLORIDE 1000 ML: 9 INJECTION, SOLUTION INTRAVENOUS at 11:04

## 2023-05-18 RX ADMIN — ALBUMIN (HUMAN) 25 G: 0.25 INJECTION, SOLUTION INTRAVENOUS at 11:05

## 2023-05-18 RX ADMIN — Medication 10 ML: at 11:38

## 2023-05-18 RX ADMIN — PANTOPRAZOLE SODIUM 40 MG: 40 INJECTION, POWDER, LYOPHILIZED, FOR SOLUTION INTRAVENOUS at 22:15

## 2023-05-18 RX ADMIN — LACTULOSE 300 ML: 10 SOLUTION ORAL at 15:23

## 2023-05-18 RX ADMIN — PANTOPRAZOLE SODIUM 40 MG: 40 INJECTION, POWDER, LYOPHILIZED, FOR SOLUTION INTRAVENOUS at 11:04

## 2023-05-18 RX ADMIN — LORAZEPAM 1 MG: 2 INJECTION INTRAMUSCULAR; INTRAVENOUS at 07:23

## 2023-05-18 RX ADMIN — MEROPENEM 500 MG: 500 INJECTION, POWDER, FOR SOLUTION INTRAVENOUS at 23:44

## 2023-05-18 RX ADMIN — RIFAXIMIN 550 MG: 550 TABLET ORAL at 22:14

## 2023-05-18 RX ADMIN — RIFAXIMIN 550 MG: 550 TABLET ORAL at 15:23

## 2023-05-18 NOTE — ED NOTES
Called [facility]'s spoke with Wili for transfer. Dr. Calle is currently on the line with Dr. Sheriff.

## 2023-05-18 NOTE — ED NOTES
Called house supervisor Corey for transport to [facility]. She will let the crew know at shift change.

## 2023-05-18 NOTE — NURSING NOTE
"  ACC REVIEW REPORT: [facility]        PATIENT NAME: Franck Toure    PATIENT ID: [id number]        COVID-19 ACC SCREENING       DOES THE PATIENT HAVE A FEVER GREATER THAN OR EQUAL .4: N    IS THE PATIENT EXPERIENCING SHORTNESS OF BREATH: N    DOES THE PATIENT HAVE A COUGH: N  DOES THE PATIENT HAVE ANY OF THE FOLLOWING RISK FACTORS:    EXPOSURE TO SUSPECTED OR KNOWN COVID-19: N    RECENT TRAVEL HISTORY TO ENDEMIC AREA (DOMESTIC/LOCAL): N    IS THE PATIENT A HEALTHCARE WORKER: N    HAS THE PATIENT EXPERIENCED A LOSS OF SENSE OF TASTE OR SMELL: N    HAS THE PATIENT BEEN TESTED FOR COVID-19: Y    DATE TESTED: 5/17/23, NEGATIVE    LAB TESTING SENT TO: [facility]          BED: N618    BED TYPE: TELE    BED GIVEN TO: RED MCCONNELL BED GIVEN: 0640    TODAY'S DATE: 5/18/2023    TRANSFER DATE: 5/18/23    ETA: [facility] WILL CALL WHEN PT IS LEAVING THERE    TRANSFERRING FACILITY: [facility]    TRANSFERRING FACILITY PHONE # : [phone]    TRANSFERRING MD: DR TORRES    DATE/TIME REQUEST RECEIVED: 5/17/23 2155    [facility] RN: PATT DODGE RN    REPORT FROM: RED MCCONNELL REPORT TAKEN: 0640    DIAGNOSIS: HEPATIC RENAL SYNDROME, HYPERAMMONIA, HIGH BILIRUBIN, ACUTE UTI, ANEMIA    REASON FOR TRANSFER TO [facility]: GI SERVICES UNAVAILABLE AT [facility]    TRANSPORTATION: AMBULANCE    CLINICAL REASON FOR TRANSFER TO [facility]: HIGHER LEVEL OF CARE      CLINICAL INFORMATION    HEIGHT: 77\"    WEIGHT: 111 KG    ALLERGIES: NKA    INFECTIOUS DISEASE: HEPATITIS C    ISOLATION:     VITAL SIGNS:   TIME: 0640  TEMP: 97.9  PULSE: 75  B/P: 105/65  RESP: 16        LAB INFORMATION: AMMONIA 142, PT RECEIVED LACTULOSE    CULTURE INFORMATION:     MEDS/IV FLUIDS: USG 18 LEFT UPPER ARM, PT HAS RECEIVED 50 GM OF ALBUMIN AND EXPECTED TO RECEIVE 50 GM MORE. ALSO RECEIVED ROCEPHIN, 1 LITER NORMAL SALINE, AND CONTINUOUS INFUSION OF NORMAL SALINE  ML/HR      CARDIAC SYSTEM:    CHEST PAIN: N    RATE: 75    SCALE:     RHYTHM: " NORMAL SINUS    Is patient taking or has patient been given any drugs that could increase bleeding? N    RESPIRATORY SYSTEM:    LUNG SOUNDS: CLEAR    OXYGEN: ROOM AIR    O2 SAT: 99%    CNS/MUSCULOSKELETAL    ALERT AND ORIENTED:    PERSON: Y  PLACE: Y  TIME: Y    INJURY:  WHERE: NONE REPORTED    GENARO COMA SCALE:    E: 4  M: 6  V: 5    CNS/MUSCULOSKELETAL NOTES: PT IS AMBULATORY      GI//GY      ABDOMINAL PAIN: N    VOMITING: N    DIARRHEA: N    NAUSEA: N    BOWEL SOUNDS: Y    OCCULT STOOL: N    HEMATURIA: N    GI//GY NOTES: PT IS UP TO BATHROOM    ESPINO: N    PAST MEDICAL HISTORY: HEPATITIS C POSITIVE, PANCREATITIS, FATTY LIVER, ETOH AND DRUG ABUSE    OTHER SYMPTOM NOTES: BILATERAL LOWER EXTREMITY SWELLING, JAUNDICED SKIN AND EYES, PT REPORTS DRINKING 8 SHOTS PER DAY ON AVERAGE AND HAS DRANK DAILY FOR 5 YEARS    ADDITIONAL NOTES:           Ramandeep Schaffer RN  5/18/2023  06:42 EDT

## 2023-05-18 NOTE — CONSULTS
[facility] [place] [facility] Consult    Referring Provider: Ponce Cosby MD    PCP: Provider, No Known    Reason for Consultation: Decompensated cirrhosis     History of present illness:    Franck Toure is a 32 y.o. male, PMH includes EtOH abuse, h/o IVDU, chronic HCV, pancreatitis, is admitted via transfer from [facility] for evaluation of decompensated cirrhosis, hepatorenal syndrome. History is difficult, as pt is lethargic and no family is available at bedside.    Per pt, he has noticed jaundice and worsened LE edema for about two weeks. He drinks about 8-10oz Fireball whiskey per day. He reports h/o chronic HCV s/p treatment in 2013, data deficient at time of exam. He smokes 0.5ppd.     Labs at time of admission significant for EtOH 16, HCV Ab (+), Hb 7.3, Hct 21.9, plt 104, , MCH 36.7, BUN 20, Cr 2.59, Na 131, albumin 2.0, total bili 14.9, , ALT 24, alk phos 277, NH3 142, PT 19.2, INR 1.55, lactate 2.9. UDS (+) THC, TCA, buprenorphine.     CT A/P wo contrast 5/17: Moderate hepatomegaly with diffuse fatty infiltration. Heterogenous parenchymal echotexture of liver. Marked splenomegaly. Cholelithiasis. Mild ascites. Mild circumferential large bowel wall thickening, may be due to portal colopathy.     Allergies:  Patient has no known allergies.    Scheduled Meds:  albumin human, 25 g, Intravenous, Q6H  meropenem, 1 g, Intravenous, Once  meropenem, 500 mg, Intravenous, Q8H  octreotide, 50 mcg, Intravenous, Once  pantoprazole, 40 mg, Intravenous, Q12H  senna-docusate sodium, 2 tablet, Oral, BID  sodium chloride, 10 mL, Intravenous, Q12H         Infusions:  octreotide (SandoSTATIN) infusion, 50 mcg/hr        PRN Meds:  •  senna-docusate sodium **AND** polyethylene glycol **AND** bisacodyl **AND** bisacodyl  •  Calcium Replacement - Follow Nurse / BPA Driven Protocol  •  Magnesium Standard Dose Replacement - Follow Nurse / BPA Driven Protocol  •  ondansetron  •  Phosphorus Replacement - Follow  Nurse / BPA Driven Protocol  •  Potassium Replacement - Follow Nurse / BPA Driven Protocol  •  sodium chloride  •  sodium chloride    Home Meds:  Medications Prior to Admission   Medication Sig Dispense Refill Last Dose   • buprenorphine-naloxone (SUBOXONE) 8-2 MG per SL tablet Place 2 tablets under the tongue Daily. 56 tablet 0    • cloNIDine (Catapres) 0.1 MG tablet Take 1 tablet by mouth 2 (Two) Times a Day. Take as needed for anxiety.  Insomnia.  Chills or sweats 60 tablet 0    • hydrOXYzine pamoate (Vistaril) 50 MG capsule Take 1 capsule by mouth 4 (Four) Times a Day As Needed for Anxiety (and/or insomia). Take 1 to 2 tablets as needed for anxiety every 6 hours 90 capsule 0    • ibuprofen (ADVIL,MOTRIN) 600 MG tablet Take 1 tablet by mouth Every 6 (Six) Hours As Needed for Mild Pain. 30 tablet 0    • lisinopril (PRINIVIL,ZESTRIL) 20 MG tablet Take 1 tablet by mouth Daily. for blood pressure 30 tablet 2    • naloxone (NARCAN) 4 MG/0.1ML nasal spray 1 spray into the nostril(s) as directed by provider As Needed (Opiate oversedation). Spray in 1 nostril every 2 to 3 minutes call 911 2 each 2    • ondansetron (ZOFRAN) 8 MG tablet Take 1 tablet by mouth Every 8 (Eight) Hours As Needed for Nausea or Vomiting. 45 tablet 0    • QUEtiapine (SEROquel) 25 MG tablet Take 1 tablet by mouth Every Night. 30 tablet 0    • sertraline (Zoloft) 50 MG tablet Take 1 tablet by mouth Daily for 30 days. 30 tablet 0        ROS: Review of Systems   Unable to perform ROS: Mental status change   Cardiovascular: Positive for leg swelling.   Gastrointestinal: Positive for abdominal distention.   Skin: Positive for color change.       PAST MED HX:  Past Medical History:   Diagnosis Date   • Alcohol-induced acute pancreatitis 6/27/2019   • Alcoholism    • Drug use     Amphetamines and Suboxone   • Fatty liver    • Hepatitis C antibody test positive 2019   • Medical non-compliance    • Pancreatitis    • Smoker        PAST SURG HX:  Past  "Surgical History:   Procedure Laterality Date   • NO PAST SURGERIES         FAM HX:  Family History   Problem Relation Age of Onset   • Cancer Maternal Grandmother    • No Known Problems Mother    • No Known Problems Father        SOC HX:  Social History     Socioeconomic History   • Marital status:    Tobacco Use   • Smoking status: Every Day     Packs/day: 0.50     Types: Cigarettes   • Smokeless tobacco: Never   Vaping Use   • Vaping Use: Never used   Substance and Sexual Activity   • Alcohol use: Yes     Alcohol/week: 8.0 standard drinks     Types: 8 Shots of liquor per week     Comment: 5-6 double shots   • Drug use: Yes     Comment: suboxone as prescribed   • Sexual activity: Defer       PHYSICAL EXAM  /86   Pulse 71   Temp 97.6 °F (36.4 °C) (Oral)   Resp 16   Ht 195.6 cm (77\")   Wt 122 kg (268 lb 11.2 oz)   SpO2 98%   BMI 31.86 kg/m²   Wt Readings from Last 3 Encounters:   05/18/23 122 kg (268 lb 11.2 oz)   05/17/23 111 kg (245 lb)   05/02/23 104 kg (229 lb)   ,body mass index is 31.86 kg/m².  Physical Exam  Vitals and nursing note reviewed.   Constitutional:       Appearance: He is ill-appearing. He is not diaphoretic.      Comments: Awakens briefly to voice / sternal rub, mumbles response to questions   HENT:      Head: Normocephalic and atraumatic.      Right Ear: External ear normal.      Left Ear: External ear normal.      Nose: Nose normal.      Mouth/Throat:      Mouth: Mucous membranes are moist.      Pharynx: Oropharynx is clear.   Eyes:      General: Scleral icterus present.         Right eye: No discharge.         Left eye: No discharge.      Conjunctiva/sclera: Conjunctivae normal.      Pupils: Pupils are equal, round, and reactive to light.   Cardiovascular:      Rate and Rhythm: Normal rate and regular rhythm.      Pulses: Normal pulses.      Heart sounds: Normal heart sounds.   Pulmonary:      Effort: Pulmonary effort is normal.      Breath sounds: Normal breath sounds. "   Abdominal:      General: Abdomen is flat. There is distension.      Palpations: There is hepatomegaly and splenomegaly.      Tenderness: There is no abdominal tenderness. There is no guarding or rebound.   Musculoskeletal:      Cervical back: Normal range of motion.      Right lower leg: Edema present.      Left lower leg: Edema present.   Skin:     General: Skin is warm and dry.      Capillary Refill: Capillary refill takes less than 2 seconds.      Coloration: Skin is jaundiced.      Findings: Bruising (lower abdomen) present.   Neurological:      Mental Status: He is lethargic.      Motor: Tremor (bilateral hands) present.         Results Review:   I reviewed the patient's new clinical results.  I reviewed the patient's new imaging results and agree with the interpretation.  I reviewed the patient's other test results and agree with the interpretation    Lab Results   Component Value Date    WBC 6.02 05/17/2023    HGB 7.3 (L) 05/17/2023    HGB 15.7 10/20/2022    HGB 13.1 03/16/2022    HCT 21.9 (L) 05/17/2023    .1 (H) 05/17/2023     (L) 05/17/2023       Lab Results   Component Value Date    INR 1.55 (H) 05/17/2023    INR 0.98 06/18/2021    INR 0.99 06/27/2019       Lab Results   Component Value Date    GLUCOSE 111 (H) 05/17/2023    BUN 20 05/17/2023    CREATININE 2.59 (H) 05/17/2023    EGFRIFNONA >150 01/01/2022    BCR 7.7 05/17/2023     (L) 05/17/2023    K 3.9 05/17/2023    CO2 18.5 (L) 05/17/2023    CALCIUM 8.3 (L) 05/17/2023    ALBUMIN 2.0 (L) 05/17/2023    ALKPHOS 277 (H) 05/17/2023    BILITOT 14.0 (H) 05/17/2023    BILIDIR >10.0 (H) 05/17/2023    ALT 24 05/17/2023     (H) 05/17/2023       ASSESSMENTS/PLANS    Acute EtOH hepatitis  Decompensated cirrhosis (likely EtOH / HCV) with jaundice, hepatosplenomegaly, HE, ascites  Macrocytoic anemia  Hepatorenal syndrome  Thrombocytopenia  Chronic HCV s/p treatment, unclear if SVR  H/o EtOH abuse  THC use   - continue trending H/H and  transfuse per hospitalist protocol   - Mildred's Discriminant Function 44.4, indicating poor prognosis; will initiate prednisolone 40mg daily   - MELD-Na 27, consistent with 19.6% 90 day mortality   - Child Chapa Score 13, Class C   - Last Para: N/A   - Ascites Management: will defer to nephrology given EDMOND vs HRS   - HCC Surveillance: CT A/P 5/18   - EV Surveillance: plan for EGD tomorrow with Dr. Blanco   - HE Management: lactulose 20g TID, will add xifaxan 550mg BID and lactulose enema x 1   - continue BID PPI, daily MV, thiamine, folic acid   - obtain HCV quant, Hep A/B serologies, AFP, vitamin D    Medically complex and at high risk of further decline including death. Will continue to closely monitor patient status.     I discussed the patient's findings and my recommendations with patient, nursing staff and consulting provider. Thank you very kindly for this consultation. Will continue to follow during this hospitalization.      Leesa Toure PA-C  05/18/23  10:33 EDT

## 2023-05-18 NOTE — ED NOTES
Called [facility] spoke with Ramandeep for transfer. She will call us back shortly with the hospitalist.

## 2023-05-18 NOTE — ED NOTES
Patient received two bags of four of albumin d/t not being able to be transferred in BLS truck with medicine running.

## 2023-05-18 NOTE — ED PROVIDER NOTES
Subjective   History of Present Illness  Pt is jaundice and leg swelling for one week.   Pt states that he came to ER for bilateral leg edema. Pt visibly jaundice on face and sclera. Pt states that he has drank every day for 5 years. Pt denies drinking alcohol today.   Pt last drink was yesterday   Pt has pmx of Hep C, pancreatitis, alcoholism, has hx of IVDA     History provided by:  Patient   used: No    Jaundice  Location:  All over   Severity:  Moderate  Onset quality:  Sudden  Duration:  1 week  Timing:  Constant  Progression:  Worsening  Chronicity:  New  Relieved by:  Nothing   Worsened by:  Etoh   Associated symptoms: abdominal pain and rash        Review of Systems   Gastrointestinal: Positive for abdominal pain and jaundice.   Skin: Positive for rash.       Past Medical History:   Diagnosis Date   • Alcohol-induced acute pancreatitis 6/27/2019   • Alcoholism    • Drug use     Amphetamines and Suboxone   • Fatty liver    • Hepatitis C antibody test positive 2019   • Medical non-compliance    • Pancreatitis    • Smoker        No Known Allergies    Past Surgical History:   Procedure Laterality Date   • NO PAST SURGERIES         Family History   Problem Relation Age of Onset   • Cancer Maternal Grandmother    • No Known Problems Mother    • No Known Problems Father        Social History     Socioeconomic History   • Marital status:    Tobacco Use   • Smoking status: Every Day     Packs/day: 0.50     Types: Cigarettes   • Smokeless tobacco: Never   Vaping Use   • Vaping Use: Never used   Substance and Sexual Activity   • Alcohol use: Yes     Alcohol/week: 8.0 standard drinks     Types: 8 Shots of liquor per week     Comment: 5-6 double shots   • Drug use: Yes     Comment: suboxone as prescribed   • Sexual activity: Defer           Objective   Physical Exam  Vitals and nursing note reviewed.   Constitutional:       Appearance: He is well-developed.   HENT:      Head: Normocephalic.    Cardiovascular:      Rate and Rhythm: Normal rate and regular rhythm.   Pulmonary:      Effort: Pulmonary effort is normal.      Breath sounds: Normal breath sounds.   Abdominal:      General: Bowel sounds are normal.      Palpations: Abdomen is soft.      Tenderness: There is no abdominal tenderness.   Musculoskeletal:         General: Normal range of motion.      Cervical back: Neck supple.   Skin:     General: Skin is warm and dry.      Coloration: Skin is jaundiced.   Neurological:      Mental Status: He is alert and oriented to person, place, and time.   Psychiatric:         Behavior: Behavior normal.         Thought Content: Thought content normal.         Judgment: Judgment normal.         Procedures           ED Course  ED Course as of 05/18/23 1024   Wed May 17, 2023   2057 US Abdomen Complete  IMPRESSION:  Impression:  1. Hepatosplenomegaly.   2. Echogenic liver parenchyma may represent diffuse fatty infiltration  or other chronic process.  Liver enlargement may be acute or chronic,  recommend clinical correlation.  3. Cholelithiasis without cholecystitis.  4. Pancreas obscured by overlying bowel gas.  No intrahepatic biliary  tree dilatation. [ES]   2114 Pt was discussed with Dr Pérez, pt will need to be transferred to [facility] for liver specialist and gastroenterologist  [LC]   u May 18, 2023   0512 I have just discovered that this patient was left in the emergency department with out handoff.  Appears to have liver failure with hepatorenal syndrome, UTI, hyperammonemia.  Patient received single dose of ceftriaxone.  Will administer albumin 100 g IV and lactulose p.o.    Review of notes indicate Dr. Calle had discussed case with Dr. Sheriff at  transfer center as well as Dr. Morse that [facility] for transfer.  Patient waitlisted at both hospitals.    Recommend no further doses of ceftriaxone due to severe hyperbilirubinemia. [KP]   0516 Ammonia(!): 142 [KP]   0516 WBC, UA(!): Too  Numerous to Count [KP]   0516 Bacteria, UA(!): 4+ [KP]   0516 Nitrite, UA(!): Positive [KP]   0713 Case discussed with and patient signed out to Dr. Oshea.  Electronically signed by Ponce Cosby MD, 05/18/23, 7:13 AM EDT.   [KP]      ED Course User Index  [ES] Gage Calle MD  [KP] Ponce Cosby MD  [LC] Janelle Schuster PA                                           MDM    Final diagnoses:   Hyperammonemia   Hyperbilirubinemia   Acute UTI   Anemia, unspecified type   Hepatorenal syndrome   Alcohol use disorder, severe, dependence       ED Disposition  ED Disposition     ED Disposition   Transfer to Another Facility     Condition   --    Comment   --             No follow-up provider specified.       Medication List      No changes were made to your prescriptions during this visit.         Janelle Schuster PA  05/18/23 1024

## 2023-05-18 NOTE — ED NOTES
Attempted to hang NS fluids at this time, IV cath noted to not allow a flush to be administered. Contact Dr Cosby at this time, stated he will come and assess IV to determine next steps.

## 2023-05-18 NOTE — H&P
[facility] Medicine Services  HISTORY AND PHYSICAL    Patient Name: Franck Toure  : 1990  MRN: [id number]  Primary Care Physician: Provider, No Known  Date of admission: 2023      Subjective   Subjective     Chief Complaint:  Direct admit from [facility] for acute encephalopathy and liver failure    HPI:  The patient is encephalopathic at the time of the encounter and cannot contribute to HPI.  Most of the history is obtained from his mother who is an RN as well as reviewing his old records    Franck Toure is a 32 y.o. male with past medical history of chronic hep C, alcohol use disorder, alcohol induced pancreatitis, fatty liver disease who presented to the hospital as a direct admit from [facility] for liver failure and higher level of care    The patient is known to be drinking alcohol, 10-15 double shots of hard liquor daily for at least the past 5 years with last drink a day prior to this hospitalization and known to have liver disease secondary to his alcohol use.  He also has history of IV drug use, last use was 10 days ago, currently on Suboxone who presented to the hospital from [facility] after he presented there with worsening lower extremity weakness and generalized fatigue    Per his wife, he has been getting worse over the last few months but particularly over the last few weeks with worsening weakness, fatigue and low energy.  He went to [facility] yesterday with worsening lower extremity swelling and found to have elevated bilirubin at 14, elevated lactic acid, and elevated creatinine at 2.5.  Because of lack of GI service at outside hospital, he was transferred to our facility    Review of Systems   Unable to obtain because the patient altered mental state    Personal History     Past Medical History:   Diagnosis Date   • Alcohol-induced acute pancreatitis 2019   • Alcoholism    • Drug use      Amphetamines and Suboxone   • Fatty liver    • Hepatitis C antibody test positive 2019   • Medical non-compliance    • Pancreatitis    • Smoker              Past Surgical History:   Procedure Laterality Date   • NO PAST SURGERIES         Family History: family history includes Cancer in his maternal grandmother; No Known Problems in his father and mother.     Social History:  reports that he has been smoking cigarettes. He has been smoking an average of .5 packs per day. He has never used smokeless tobacco. He reports current alcohol use of about 8.0 standard drinks per week. He reports current drug use.  Social History     Social History Narrative   • Not on file       Medications:  Available home medication information reviewed.  Medications Prior to Admission   Medication Sig Dispense Refill Last Dose   • buprenorphine-naloxone (SUBOXONE) 8-2 MG per SL tablet Place 2 tablets under the tongue Daily. 56 tablet 0    • cloNIDine (Catapres) 0.1 MG tablet Take 1 tablet by mouth 2 (Two) Times a Day. Take as needed for anxiety.  Insomnia.  Chills or sweats 60 tablet 0    • hydrOXYzine pamoate (Vistaril) 50 MG capsule Take 1 capsule by mouth 4 (Four) Times a Day As Needed for Anxiety (and/or insomia). Take 1 to 2 tablets as needed for anxiety every 6 hours 90 capsule 0    • ibuprofen (ADVIL,MOTRIN) 600 MG tablet Take 1 tablet by mouth Every 6 (Six) Hours As Needed for Mild Pain. 30 tablet 0    • lisinopril (PRINIVIL,ZESTRIL) 20 MG tablet Take 1 tablet by mouth Daily. for blood pressure 30 tablet 2    • naloxone (NARCAN) 4 MG/0.1ML nasal spray 1 spray into the nostril(s) as directed by provider As Needed (Opiate oversedation). Spray in 1 nostril every 2 to 3 minutes call 911 2 each 2    • ondansetron (ZOFRAN) 8 MG tablet Take 1 tablet by mouth Every 8 (Eight) Hours As Needed for Nausea or Vomiting. 45 tablet 0    • QUEtiapine (SEROquel) 25 MG tablet Take 1 tablet by mouth Every Night. 30 tablet 0    • sertraline (Zoloft)  50 MG tablet Take 1 tablet by mouth Daily for 30 days. 30 tablet 0        No Known Allergies    Objective   Objective     Vital Signs:   Temp:  [97.5 °F (36.4 °C)-97.9 °F (36.6 °C)] 97.5 °F (36.4 °C)  Heart Rate:  [66-86] 72  Resp:  [16-18] 16  BP: ()/(50-86) 103/84       Physical Exam   General: Encephalopathic, minimally conversant.  Head: Atraumatic and normocephalic  Eyes: Severe icterus.  Severe pallor  Ears:  Ears appear intact with no abnormalities noted  Throat: No oral lesions, no thrush  Neck: Supple, trachea midline  Lungs: Clear to auscultation bilaterally, equal air entry, no wheezing or crackles  Heart:  Normal S1 and S2, no murmur, no gallop, No JVD, no lower extremity swelling  Abdomen:  Soft, no tenderness, no organomegaly, normal bowel sounds, no organomegaly  Extremities: pulses equal bilaterally  Skin: No bleeding, bruising or rash, normal skin turgor and elasticity  Neurologic: Cranial nerves appear intact with no evidence of facial asymmetry, normal motor and sensory functions in all 4 extremities  Psych: Alert and oriented x 1    Result Review:  I have personally reviewed the results from the time of this admission to 5/18/2023 15:45 EDT and agree with these findings:  [x]  Laboratory list / accordion  []  Microbiology  [x]  Radiology  []  EKG/Telemetry   [x]  Cardiology/Vascular   []  Pathology  [x]  Old records        LAB RESULTS:      Lab 05/18/23  1215 05/18/23  1214 05/18/23  0126 05/17/23 2229 05/17/23 2011   WBC  --  5.13  --   --  6.02   HEMOGLOBIN  --  6.9*  --   --  7.3*   HEMATOCRIT  --  20.4*  --   --  21.9*   PLATELETS  --  106*  --   --  104*   NEUTROS ABS  --  3.18  --   --  3.67   EOS ABS  --  0.10  --   --  0.06   MCV  --  106.8*  --   --  110.1*   CRP  --   --   --   --  5.55*   PROCALCITONIN  --   --   --  0.64*  --    LACTATE 0.8  --  2.7* 2.9*  --    PROTIME 21.5*  --   --  19.2*  --    INR 1.85*  --   --  1.55*  --    APTT 49.3*  --   --  44.2*  --    D DIMER  QUANT 2.74*  --   --   --   --          Lab 05/18/23  1215 05/17/23 2011   SODIUM 134* 131*   POTASSIUM 3.9 3.9   CHLORIDE 108* 103   CO2 17.0* 18.5*   ANION GAP 9.0 9.5   BUN 19 20   CREATININE 1.47* 2.59*   EGFR 64.6 32.7*   GLUCOSE 110* 111*   CALCIUM 7.7* 8.3*   MAGNESIUM  --  1.7         Lab 05/18/23  1215 05/17/23 2012 05/17/23 2011   TOTAL PROTEIN 6.1  --  6.3   ALBUMIN 2.7*  --  2.0*   GLOBULIN 3.4  --  4.3   ALT (SGPT) 18  --  24   AST (SGOT) 106*  --  120*   BILIRUBIN 12.2* 14.0* 14.9*   BILIRUBIN DIRECT  --  >10.0*  --    ALK PHOS 221*  --  277*   AMYLASE  --   --  18*   LIPASE  --   --  28         Lab 05/17/23 2012   PROBNP 337.6             Lab 05/18/23  1448 05/18/23  1215   ABO TYPING A A   RH TYPING Positive Positive   ANTIBODY SCREEN  --  Negative         UA        5/17/2023    20:57   Urinalysis   Squamous Epithelial Cells, UA 0-2     Specific Gravity, UA 1.023     Ketones, UA Negative     Blood, UA Trace     Leukocytes, UA Moderate (2+)     Nitrite, UA Positive     RBC, UA 6-12     WBC, UA Too Numerous to Count     Bacteria, UA 4+         Microbiology Results (last 10 days)     Procedure Component Value - Date/Time    COVID-19 and FLU A/B PCR - Swab, Nasopharynx [[id number]]  (Normal) Collected: 05/17/23 2231    Lab Status: Final result Specimen: Swab from Nasopharynx Updated: 05/17/23 2256     COVID19 Not Detected     Influenza A PCR Not Detected     Influenza B PCR Not Detected    Narrative:      Fact sheet for providers: https://www.fda.gov/media/837951/download    Fact sheet for patients: https://www.fda.gov/media/163645/download    Test performed by PCR.          CT Abdomen Pelvis Without Contrast    Result Date: 5/17/2023  CLINICAL HISTORY: Abdominal pain, jaundice.  COMPARISON: None available.  TECHNIQUE: Sequential trans-axial images were obtained with a multi-detector helical CT without administration of iodinated contrast. Coronal and sagittal reconstructions were obtained. No oral  contrast given.  Limited exposure control, adjustment of the MA and/or KV according to patient's size or use of iterative reconstruction technique was utilized.  FINDINGS:  The visualized lung bases are unremarkable.  There is moderate hepatomegaly measuring up to 16.9 cm in length with diffuse fatty infiltration.  There is heterogeneous parenchymal echotexture of the liver.  The noncontrast images of the kidneys, adrenals are normal.  There is marked splenomegaly measuring up to 18.8 cm in length.  Cholelithiasis.  Mild gallbladder wall thickening and pericholecystic fluid.  Tiny 2 mm sized right renal calculus.  No obstructive uropathy.  There is diffuse fatty atrophy of the pancreas.  Decompressed stomach. No dilation or obstruction. The appendix is unremarkable.  There is mild circumferential large bowel wall thickening.  There is no abdominal lymphadenopathy. There is no pneumoperitoneum. The retroperitoneal region appears unremarkable.The abdominal aorta shows normal unenhanced appearance.  Mild ascites.  There is no pelvic lymphadenopathy. The inguinal regions are unremarkable.  The bladder appears unremarkable.  Mild thoracolumbar degenerative changes.  There is no acute osseous abnormality.      Impression:  1.  Moderate hepatomegaly with diffuse fatty infiltration. Heterogeneous parenchymal echotexture of the liver. 2.  Marked splenomegaly.  Cholelithiasis.  Mild gallbladder wall thickening and pericholecystic fluid.  Differential possibilities to be considered are hepatocellular disease, congestive heart failure or a calculus cholecystitis. 3.  Mild ascites. 4.  Mild circumferential large bowel wall thickening.  This may be related to portal colopathy or infectious/inflammatory colitis. 5.  Right-sided nephrolithiasis.  No obstructive uropathy.  This report was finalized on 5/17/2023 10:01 PM by Janes Hunt MD.      US Abdomen Complete    Result Date: 5/17/2023  Complete abdominal ultrasound  HISTORY:  Jaundice for 2 to 3 days, no pain.  COMPARISON: CT abdomen pelvis report only dated 6/17/2021  Technique: Grayscale, color and spectral Doppler imaging of the abdomen was performed by sonographer.  Radiologist was not present.  A total of 92 images are sent for interpretation.  Findings: Pancreas: Obscured by overlying bowel gas.  Liver: Enlarged and diffusely echogenic, 25 cm length.  No discernible mass.  Probable focal fat sparing at the gallbladder fossa.  Normal hepatopetal flow in the main portal vein.  Left hepatic vein demonstrates normal direction of flow.  Bile ducts: No intrahepatic or extrahepatic biliary ductal dilatation. Common bile duct measures 2 mm diameter.  Gallbladder: Partially distended.  It contains a shadowing 2 cm stone. No wall thickening, hyperemia or pericholecystic fluid.  Negative sonographic Paiz's sign.  Spleen: Homogeneous, mildly enlarged at 15 cm length.  No focal mass.  Right kidney: Normal cortical thickness and parenchymal echogenicity. Right kidney length 11.3 cm.  No hydronephrosis, mass or stone.  Left kidney: Normal cortical thickness and parenchymal echogenicity. Right kidney length 12.1 cm.  No hydronephrosis, mass or stone.  Ascites: None.  Aorta and IVC: Both vessels obscured by overlying bowel gas along their course.      Impression: Impression: 1. Hepatosplenomegaly. 2. Echogenic liver parenchyma may represent diffuse fatty infiltration or other chronic process.  Liver enlargement may be acute or chronic, recommend clinical correlation. 3. Cholelithiasis without cholecystitis. 4. Pancreas obscured by overlying bowel gas.  No intrahepatic biliary tree dilatation.   To TALK to On Call [phone]  This report was finalized on 5/17/2023 7:35 PM by Dayne Nguyen MD.      XR Chest AP    Result Date: 5/17/2023  INDICATION: Cough.  TECHNIQUE: Frontal radiograph of the chest.  COMPARISON: None.  FINDINGS: The cardiomediastinal silhouette and pulmonary  vasculature appear within normal limits. No infiltrate, pleural effusion or pneumothorax. No acute osseous abnormality evident.      Impression: No acute cardiopulmonary process.  This report was finalized on 5/17/2023 11:15 PM by Alex Pallas, DO.            Assessment & Plan   Assessment & Plan     Active Hospital Problems    Diagnosis  POA   • **Hepatorenal failure [K76.7]  Yes       Summary:  Franck Toure is a 32 y.o. male with past medical history of chronic hep C, alcohol use disorder, alcohol induced pancreatitis, fatty liver disease who presented to the hospital as a direct admit from [facility] for liver failure and higher level of care    Assessment and plan:  Acute liver failure  Acute metabolic/hepatic encephalopathy  Hepatocellular jaundice  Alcohol induced acute hepatitis  · Known to drink alcohol for years: 10-15 double shots of hard liquor  · Has advanced liver disease with huge splenomegaly and portal hypertension  · NG tube placement and start lactulose and rifaximin, given his markedly elevated ammonia level in the context of acute hepatic encephalopathy  · Neuro acetaminophen per patient's wife.  Acetaminophen level less than 5  · Maddrey discrimination score is 56 --> start IV steroids  · INR is elevated at 1.85 --> we will transfuse units of plasma and give him 10 milligram of IV vitamin K given his possible GI bleed  · Bilirubin is elevated at 14.9, trending down to 12.2 with no evidence of dilated common bile duct and CT scan abdomen from outside facility.  Likely hepatocellular jaundice.  Continue to monitor  · Aspiration precautions  · At this moment, patient is able to protect his airway and will wake up to verbal commands but will fall asleep quickly.  Low threshold to move him to ICU if his mental state continue to get worse and if he is unable to protect his airway    Possible sepsis secondary to UTI, POA  Cannot rule out SBP  · Has elevated procalcitonin, positive UA and  elevated lactic acid  · Lactic acid improved with IV fluids  · Lactic acid could be also related to his liver failure  · Received 1 dose of IV Rocephin yesterday at [facility], will initiate broad-spectrum antibiotic therapy with IV Merrem  · CT scan abdomen with trace ascites, bedside ultrasound with no adequate pocket of fluid to safely perform paracentesis  · IV albumin for possible SBP  · Follow blood and urine culture    Severe symptomatic anemia  · Hemoglobin 6 months ago was 16.  Today 6.9  · Suspect blood loss anemia secondary to chronic GI bleed versus acute GI bleed  · Start IV Protonix 40 mg twice daily  · Start IV octreotide drip  · We will transfuse units of blood  · Check anemia work-up including iron studies, folic acid and B12 and reticulocyte count  · Discussed with GI team.  Appreciate the help    EDMOND, likely secondary to volume depletion  Less likely hepatorenal syndrome  · Creatinine at outside hospital 2.5  · Improved after IV fluids and albumin to 1.4.  This will argue against hepatorenal syndrome  · Continue gentle IV fluids and IV albumin  · Nephrology team consulted.  Appreciate the help    Alcohol dependence  Drug use disorder  · Avoid benzodiazepine given his acute hepatic encephalopathy.  Will monitor for any withdrawal symptoms  · Addiction team consult        DVT prophylaxis:  SCD      CODE STATUS:    Code Status and Medical Interventions:   Ordered at: 05/18/23 1030     Level Of Support Discussed With:    Patient     Code Status (Patient has no pulse and is not breathing):    CPR (Attempt to Resuscitate)     Medical Interventions (Patient has pulse or is breathing):    Full Support       Expected Discharge   Expected Discharge Date: 5/24/2023; Expected Discharge Time:      Sigrid Grady MD  05/18/23       Critical Care Note  Authorized and Performed by: Dr Grady  Total critical care time: Approximately 56 minutes  Due to a high probability of clinically significant,  life threatening deterioration, the patient required my highest level of preparedness to intervene emergently and I personally spent this critical care time directly and personally managing the patient. This critical care time included obtaining a history; examining the patient; pulse oximetry; ordering and review of studies; arranging urgent treatment with development of a management plan; evaluation of patient's response to treatment; frequent reassessment; and, discussions with other providers.  This critical care time was performed to assess and manage the high probability of imminent, life-threatening deterioration that could result in multi-organ failure. It was exclusive of separately billable procedures and treating other patients and teaching time.

## 2023-05-18 NOTE — CONSULTS
Patient Care Team:  Provider, No Known as PCP - Pee Gillette MD as Consulting Physician (Nephrology)    Chief complaint: Acute liver failure  Alcoholic hepatitis   Acute renal failure     History of Present Illness: Mr Toure is a 31 yo gentleman hx of alcohol use disorder he is transferred from [facility] for higher level of care. Patient is admitted in hospital w concern for alcoholic hepatitis, possible GI bleed, hepatic encephalopathy and acute renal failure. Labs on this admission remarkable for Cr 14<2.5 Na 134<131, K 3.9, Bicarb 17 total bili 12.2. Patient has dependent edema. Appears severely jaundiced. He also has mild confusion.     Review of Systems   Constitutional: Negative.    HENT: Negative.    Cardiovascular: Positive for leg swelling.   Gastrointestinal: Positive for abdominal distention.   Endocrine: Negative.    Genitourinary: Negative.    Musculoskeletal: Negative.    Skin: Positive for color change.   Neurological: Negative.    Psychiatric/Behavioral: Positive for confusion.        Past Medical History:   Diagnosis Date   • Alcohol-induced acute pancreatitis 6/27/2019   • Alcoholism    • Drug use     Amphetamines and Suboxone   • Fatty liver    • Hepatitis C antibody test positive 2019   • Medical non-compliance    • Pancreatitis    • Smoker    ,   Past Surgical History:   Procedure Laterality Date   • NO PAST SURGERIES     ,   Family History   Problem Relation Age of Onset   • Cancer Maternal Grandmother    • No Known Problems Mother    • No Known Problems Father    ,   Social History     Socioeconomic History   • Marital status:    Tobacco Use   • Smoking status: Every Day     Packs/day: 0.50     Types: Cigarettes   • Smokeless tobacco: Never   Vaping Use   • Vaping Use: Never used   Substance and Sexual Activity   • Alcohol use: Yes     Alcohol/week: 8.0 standard drinks     Types: 8 Shots of liquor per week     Comment: 5-6 double shots   • Drug use: Yes     Comment: suboxone as  prescribed   • Sexual activity: Defer     E-cigarette/Vaping   • E-cigarette/Vaping Use Never User      E-cigarette/Vaping Substances     E-cigarette/Vaping Devices       ,   Medications Prior to Admission   Medication Sig Dispense Refill Last Dose   • buprenorphine-naloxone (SUBOXONE) 8-2 MG per SL tablet Place 2 tablets under the tongue Daily. 56 tablet 0    • cloNIDine (Catapres) 0.1 MG tablet Take 1 tablet by mouth 2 (Two) Times a Day. Take as needed for anxiety.  Insomnia.  Chills or sweats 60 tablet 0    • hydrOXYzine pamoate (Vistaril) 50 MG capsule Take 1 capsule by mouth 4 (Four) Times a Day As Needed for Anxiety (and/or insomia). Take 1 to 2 tablets as needed for anxiety every 6 hours 90 capsule 0    • ibuprofen (ADVIL,MOTRIN) 600 MG tablet Take 1 tablet by mouth Every 6 (Six) Hours As Needed for Mild Pain. 30 tablet 0    • lisinopril (PRINIVIL,ZESTRIL) 20 MG tablet Take 1 tablet by mouth Daily. for blood pressure 30 tablet 2    • naloxone (NARCAN) 4 MG/0.1ML nasal spray 1 spray into the nostril(s) as directed by provider As Needed (Opiate oversedation). Spray in 1 nostril every 2 to 3 minutes call 911 2 each 2    • ondansetron (ZOFRAN) 8 MG tablet Take 1 tablet by mouth Every 8 (Eight) Hours As Needed for Nausea or Vomiting. 45 tablet 0    • QUEtiapine (SEROquel) 25 MG tablet Take 1 tablet by mouth Every Night. 30 tablet 0    • sertraline (Zoloft) 50 MG tablet Take 1 tablet by mouth Daily for 30 days. 30 tablet 0     and Scheduled Meds:  albumin human, 25 g, Intravenous, Q6H  docusate sodium, 50 mg, Nasogastric, BID  lactulose, 20 g, Oral, TID  meropenem, 500 mg, Intravenous, Q8H  methylPREDNISolone sodium succinate, 60 mg, Intravenous, Daily  pantoprazole, 40 mg, Intravenous, Q12H  phytonadione (VITAMIN K) IVPB, 10 mg, Intravenous, Once  sennosides, 5 mL, Nasogastric, BID  sodium chloride, 10 mL, Intravenous, Q12H  sodium chloride, 10 mL, Intravenous, Q12H        Objective     Vital Signs  Temp:  [97.6  °F (36.4 °C)-97.9 °F (36.6 °C)] 97.6 °F (36.4 °C)  Heart Rate:  [66-86] 66  Resp:  [16-18] 16  BP: ()/(50-86) 109/73    I/O this shift:  In: 1000 [IV Piggyback:1000]  Out: -   No intake/output data recorded.    Physical Exam  Constitutional:       Appearance: He is ill-appearing.   HENT:      Head: Normocephalic and atraumatic.      Nose: Nose normal.   Eyes:      General: Scleral icterus present.      Pupils: Pupils are equal, round, and reactive to light.   Cardiovascular:      Rate and Rhythm: Normal rate.      Pulses: Normal pulses.   Pulmonary:      Effort: Pulmonary effort is normal. No respiratory distress.      Breath sounds: Normal breath sounds. No stridor.   Abdominal:      General: There is distension.   Musculoskeletal:      Right lower leg: Edema present.      Left lower leg: Edema present.   Skin:     General: Skin is warm.      Coloration: Skin is jaundiced.   Neurological:      General: No focal deficit present.      Comments: Confusion.         Results Review:    I reviewed the patient's new clinical results.    WBC WBC   Date Value Ref Range Status   05/18/2023 5.13 3.40 - 10.80 10*3/mm3 Preliminary   05/17/2023 6.02 3.40 - 10.80 10*3/mm3 Final      HGB Hemoglobin   Date Value Ref Range Status   05/18/2023 6.9 (C) 13.0 - 17.7 g/dL Preliminary   05/17/2023 7.3 (L) 13.0 - 17.7 g/dL Final      HCT Hematocrit   Date Value Ref Range Status   05/18/2023 20.4 (C) 37.5 - 51.0 % Preliminary   05/17/2023 21.9 (L) 37.5 - 51.0 % Final      Platlets No results found for: LABPLAT   MCV MCV   Date Value Ref Range Status   05/18/2023 106.8 (H) 79.0 - 97.0 fL Preliminary   05/17/2023 110.1 (H) 79.0 - 97.0 fL Final          Sodium Sodium   Date Value Ref Range Status   05/18/2023 134 (L) 136 - 145 mmol/L Final   05/17/2023 131 (L) 136 - 145 mmol/L Final      Potassium Potassium   Date Value Ref Range Status   05/18/2023 3.9 3.5 - 5.2 mmol/L Final   05/17/2023 3.9 3.5 - 5.2 mmol/L Final      Chloride Chloride    Date Value Ref Range Status   05/18/2023 108 (H) 98 - 107 mmol/L Final   05/17/2023 103 98 - 107 mmol/L Final      CO2 CO2   Date Value Ref Range Status   05/18/2023 17.0 (L) 22.0 - 29.0 mmol/L Final   05/17/2023 18.5 (L) 22.0 - 29.0 mmol/L Final      BUN BUN   Date Value Ref Range Status   05/18/2023 19 6 - 20 mg/dL Final   05/17/2023 20 6 - 20 mg/dL Final      Creatinine Creatinine   Date Value Ref Range Status   05/18/2023 1.47 (H) 0.76 - 1.27 mg/dL Final   05/17/2023 2.59 (H) 0.76 - 1.27 mg/dL Final      Calcium Calcium   Date Value Ref Range Status   05/18/2023 7.7 (L) 8.6 - 10.5 mg/dL Final   05/17/2023 8.3 (L) 8.6 - 10.5 mg/dL Final      PO4 No results found for: CAPO4   Albumin Albumin   Date Value Ref Range Status   05/18/2023 2.7 (L) 3.5 - 5.2 g/dL Final   05/17/2023 2.0 (L) 3.5 - 5.2 g/dL Final      Magnesium Magnesium   Date Value Ref Range Status   05/17/2023 1.7 1.6 - 2.6 mg/dL Final      Uric Acid No results found for: URICACID         Assessment & Plan       Hepatorenal failure        Assessment & Plan    EDMOND: Baseline cr 0.9mg/dl. Cr 1.4-2.57 in last 2 days. Ua positive for nitrite, lue esterase and bilirubin. WBC+. EDMOND likely hemodynamic variation in the setting of liver dysfunction, possible GI bleed and low intra-vascular volume status    Met acidosis: Due to EDMOND    Hyponatremia: In the setting of EDMOND and liver failure    Alcoholic hepatitis: Hx of alcohol use disorder. Started on steroids    Anemia: Concern for GI bleed. Starting octreotide     Jaundice: Elevated Tbili.     Dependent edema: Stable.     AMS: Likley hepatic encephalopathy    Recs   Acute kidney injury likely hemodynamic variation in renal function. Additional possibility ATN 2/2 bile cast nephropathy. Less likely to be HRS. As there is no ascites. Renal function already improving. Will recommend starting 25% albumin 1g/kg for 48hr. Agree with PRBC transfusion for low HH. Hold off on diuretics. Check urine Na, cr and chloride.  Dose meds to eGFR. Need ram cath to rule out urinary retention. Strict I/O. No indication of dialysis      Will continue to follow the patient       I discussed the patients findings and my recommendations with patient    Pee Wen MD  05/18/23  13:26 EDT

## 2023-05-18 NOTE — PLAN OF CARE
Goal Outcome Evaluation:  Plan of Care Reviewed With: patient        Progress: no change  Outcome Evaluation: pt received from [facility].Pt responsive to voice,very lethargic. Triple PICC line inserted,ivab started,albumin,octreotide,vit K, and solumedrol. NGT placed for meds, Lactulose given via ngt. Lactulose enema given with good results (2 lg bm's).pt easier to awaken this evening. wife and mother at bedside.PRBC's and plasma ordered,1st unit of prbc's started at 1600.

## 2023-05-18 NOTE — ED NOTES
Spoke with ACC Coordinator at [facility]. States still no bed available and patient continues to be waitlisted.

## 2023-05-19 ENCOUNTER — APPOINTMENT (OUTPATIENT)
Dept: GENERAL RADIOLOGY | Facility: HOSPITAL | Age: 33
End: 2023-05-19
Payer: COMMERCIAL

## 2023-05-19 ENCOUNTER — ANESTHESIA EVENT (OUTPATIENT)
Dept: GASTROENTEROLOGY | Facility: HOSPITAL | Age: 33
End: 2023-05-19
Payer: COMMERCIAL

## 2023-05-19 ENCOUNTER — ANESTHESIA (OUTPATIENT)
Dept: GASTROENTEROLOGY | Facility: HOSPITAL | Age: 33
End: 2023-05-19
Payer: COMMERCIAL

## 2023-05-19 LAB
25(OH)D3 SERPL-MCNC: 6.1 NG/ML (ref 30–100)
ALBUMIN SERPL-MCNC: 3 G/DL (ref 3.5–5.2)
ALBUMIN/GLOB SERPL: 0.9 G/DL
ALP SERPL-CCNC: 209 U/L (ref 39–117)
ALPHA-FETOPROTEIN: 4.74 NG/ML (ref 0–8.3)
ALT SERPL W P-5'-P-CCNC: 20 U/L (ref 1–41)
ANION GAP SERPL CALCULATED.3IONS-SCNC: 9 MMOL/L (ref 5–15)
ANISOCYTOSIS BLD QL: ABNORMAL
AST SERPL-CCNC: 94 U/L (ref 1–40)
BACTERIA SPEC AEROBE CULT: NO GROWTH
BASOPHILS # BLD MANUAL: 0 10*3/MM3 (ref 0–0.2)
BASOPHILS NFR BLD MANUAL: 0 % (ref 0–1.5)
BH BB BLOOD EXPIRATION DATE: NORMAL
BH BB BLOOD EXPIRATION DATE: NORMAL
BH BB BLOOD TYPE BARCODE: 6200
BH BB BLOOD TYPE BARCODE: 6200
BH BB DISPENSE STATUS: NORMAL
BH BB DISPENSE STATUS: NORMAL
BH BB PRODUCT CODE: NORMAL
BH BB PRODUCT CODE: NORMAL
BH BB UNIT NUMBER: NORMAL
BH BB UNIT NUMBER: NORMAL
BILIRUB SERPL-MCNC: 16 MG/DL (ref 0–1.2)
BUN SERPL-MCNC: 20 MG/DL (ref 6–20)
BUN/CREAT SERPL: 16.7 (ref 7–25)
BURR CELLS BLD QL SMEAR: ABNORMAL
CALCIUM SPEC-SCNC: 8 MG/DL (ref 8.6–10.5)
CHLORIDE SERPL-SCNC: 107 MMOL/L (ref 98–107)
CO2 SERPL-SCNC: 19 MMOL/L (ref 22–29)
CREAT SERPL-MCNC: 1.2 MG/DL (ref 0.76–1.27)
CREAT UR-MCNC: 134.3 MG/DL
CROSSMATCH INTERPRETATION: NORMAL
CROSSMATCH INTERPRETATION: NORMAL
CYTOLOGIST CVX/VAG CYTO: NORMAL
DEPRECATED RDW RBC AUTO: 73.5 FL (ref 37–54)
EGFRCR SERPLBLD CKD-EPI 2021: 82.4 ML/MIN/1.73
EOSINOPHIL # BLD MANUAL: 0 10*3/MM3 (ref 0–0.4)
EOSINOPHIL NFR BLD MANUAL: 0 % (ref 0.3–6.2)
ERYTHROCYTE [DISTWIDTH] IN BLOOD BY AUTOMATED COUNT: 20.3 % (ref 12.3–15.4)
FOLATE SERPL-MCNC: 3.35 NG/ML (ref 4.78–24.2)
GLOBULIN UR ELPH-MCNC: 3.2 GM/DL
GLUCOSE SERPL-MCNC: 184 MG/DL (ref 65–99)
HAV AB SER QL IA: NEGATIVE
HBA1C MFR BLD: 4.7 % (ref 4.8–5.6)
HBV SURFACE AB SER RIA-ACNC: REACTIVE
HCT VFR BLD AUTO: 25.4 % (ref 37.5–51)
HGB BLD-MCNC: 8.9 G/DL (ref 13–17.7)
LYMPHOCYTES # BLD MANUAL: 0.36 10*3/MM3 (ref 0.7–3.1)
LYMPHOCYTES NFR BLD MANUAL: 2 % (ref 5–12)
MCH RBC QN AUTO: 35.5 PG (ref 26.6–33)
MCHC RBC AUTO-ENTMCNC: 35 G/DL (ref 31.5–35.7)
MCV RBC AUTO: 101.2 FL (ref 79–97)
METAMYELOCYTES NFR BLD MANUAL: 5 % (ref 0–0)
MONOCYTES # BLD: 0.12 10*3/MM3 (ref 0.1–0.9)
NEUTROPHILS # BLD AUTO: 5.19 10*3/MM3 (ref 1.7–7)
NEUTROPHILS NFR BLD MANUAL: 73 % (ref 42.7–76)
NEUTS BAND NFR BLD MANUAL: 14 % (ref 0–5)
NRBC SPEC MANUAL: 1 /100 WBC (ref 0–0.2)
PATH INTERP BLD-IMP: NORMAL
PLAT MORPH BLD: NORMAL
PLATELET # BLD AUTO: 113 10*3/MM3 (ref 140–450)
PMV BLD AUTO: 10.7 FL (ref 6–12)
POTASSIUM SERPL-SCNC: 4.8 MMOL/L (ref 3.5–5.2)
PROT SERPL-MCNC: 6.2 G/DL (ref 6–8.5)
RBC # BLD AUTO: 2.51 10*6/MM3 (ref 4.14–5.8)
SODIUM SERPL-SCNC: 135 MMOL/L (ref 136–145)
TARGETS BLD QL SMEAR: ABNORMAL
UNIT  ABO: NORMAL
UNIT  ABO: NORMAL
UNIT  RH: NORMAL
UNIT  RH: NORMAL
VARIANT LYMPHS NFR BLD MANUAL: 6 % (ref 19.6–45.3)
VIT B12 BLD-MCNC: >2000 PG/ML (ref 211–946)
WBC MORPH BLD: NORMAL
WBC NRBC COR # BLD: 5.96 10*3/MM3 (ref 3.4–10.8)

## 2023-05-19 PROCEDURE — 0DHA8UZ INSERTION OF FEEDING DEVICE INTO JEJUNUM, VIA NATURAL OR ARTIFICIAL OPENING ENDOSCOPIC: ICD-10-PCS | Performed by: INTERNAL MEDICINE

## 2023-05-19 PROCEDURE — 0 METHYLPREDNISOLONE PER 125 MG: Performed by: INTERNAL MEDICINE

## 2023-05-19 PROCEDURE — P9059 PLASMA, FRZ BETWEEN 8-24HOUR: HCPCS

## 2023-05-19 PROCEDURE — 36430 TRANSFUSION BLD/BLD COMPNT: CPT

## 2023-05-19 PROCEDURE — 06L38CZ OCCLUSION OF ESOPHAGEAL VEIN WITH EXTRALUMINAL DEVICE, VIA NATURAL OR ARTIFICIAL OPENING ENDOSCOPIC: ICD-10-PCS | Performed by: INTERNAL MEDICINE

## 2023-05-19 PROCEDURE — 82746 ASSAY OF FOLIC ACID SERUM: CPT | Performed by: INTERNAL MEDICINE

## 2023-05-19 PROCEDURE — 0 ACETYLCYSTEINE PER 100 MG: Performed by: INTERNAL MEDICINE

## 2023-05-19 PROCEDURE — 25010000002 PROPOFOL 10 MG/ML EMULSION: Performed by: NURSE ANESTHETIST, CERTIFIED REGISTERED

## 2023-05-19 PROCEDURE — 25010000002 OCTREOTIDE PER 25 MCG: Performed by: INTERNAL MEDICINE

## 2023-05-19 PROCEDURE — 86927 PLASMA FRESH FROZEN: CPT

## 2023-05-19 PROCEDURE — 85007 BL SMEAR W/DIFF WBC COUNT: CPT | Performed by: INTERNAL MEDICINE

## 2023-05-19 PROCEDURE — 80053 COMPREHEN METABOLIC PANEL: CPT | Performed by: INTERNAL MEDICINE

## 2023-05-19 PROCEDURE — 25010000002 MEROPENEM PER 100 MG: Performed by: INTERNAL MEDICINE

## 2023-05-19 PROCEDURE — 25010000002 THIAMINE PER 100 MG: Performed by: NURSE PRACTITIONER

## 2023-05-19 PROCEDURE — 85025 COMPLETE CBC W/AUTO DIFF WBC: CPT | Performed by: INTERNAL MEDICINE

## 2023-05-19 PROCEDURE — 43241 EGD TUBE/CATH INSERTION: CPT | Performed by: INTERNAL MEDICINE

## 2023-05-19 PROCEDURE — 83036 HEMOGLOBIN GLYCOSYLATED A1C: CPT | Performed by: INTERNAL MEDICINE

## 2023-05-19 PROCEDURE — 74018 RADEX ABDOMEN 1 VIEW: CPT

## 2023-05-19 PROCEDURE — 99233 SBSQ HOSP IP/OBS HIGH 50: CPT | Performed by: INTERNAL MEDICINE

## 2023-05-19 RX ORDER — PROPOFOL 10 MG/ML
VIAL (ML) INTRAVENOUS AS NEEDED
Status: DISCONTINUED | OUTPATIENT
Start: 2023-05-19 | End: 2023-05-19 | Stop reason: SURG

## 2023-05-19 RX ORDER — SODIUM CHLORIDE 9 MG/ML
INJECTION, SOLUTION INTRAVENOUS CONTINUOUS PRN
Status: DISCONTINUED | OUTPATIENT
Start: 2023-05-19 | End: 2023-05-19 | Stop reason: SURG

## 2023-05-19 RX ORDER — TORSEMIDE 20 MG/1
40 TABLET ORAL ONCE
Status: COMPLETED | OUTPATIENT
Start: 2023-05-19 | End: 2023-05-19

## 2023-05-19 RX ORDER — MELATONIN
1000 DAILY
Status: DISCONTINUED | OUTPATIENT
Start: 2023-05-19 | End: 2023-05-23 | Stop reason: HOSPADM

## 2023-05-19 RX ORDER — LIDOCAINE HYDROCHLORIDE 10 MG/ML
INJECTION, SOLUTION EPIDURAL; INFILTRATION; INTRACAUDAL; PERINEURAL AS NEEDED
Status: DISCONTINUED | OUTPATIENT
Start: 2023-05-19 | End: 2023-05-19 | Stop reason: SURG

## 2023-05-19 RX ORDER — ONDANSETRON 2 MG/ML
4 INJECTION INTRAMUSCULAR; INTRAVENOUS ONCE AS NEEDED
Status: DISCONTINUED | OUTPATIENT
Start: 2023-05-19 | End: 2023-05-19 | Stop reason: HOSPADM

## 2023-05-19 RX ORDER — FOLIC ACID 5 MG/ML
1 INJECTION, SOLUTION INTRAMUSCULAR; INTRAVENOUS; SUBCUTANEOUS DAILY
Status: DISCONTINUED | OUTPATIENT
Start: 2023-05-19 | End: 2023-05-19

## 2023-05-19 RX ORDER — MULTIVIT AND MINERALS-FERROUS GLUCONATE 9 MG IRON/15 ML ORAL LIQUID 9 MG/15 ML
15 LIQUID (ML) ORAL DAILY
Status: DISCONTINUED | OUTPATIENT
Start: 2023-05-19 | End: 2023-05-23 | Stop reason: HOSPADM

## 2023-05-19 RX ORDER — IPRATROPIUM BROMIDE AND ALBUTEROL SULFATE 2.5; .5 MG/3ML; MG/3ML
3 SOLUTION RESPIRATORY (INHALATION) ONCE AS NEEDED
Status: DISCONTINUED | OUTPATIENT
Start: 2023-05-19 | End: 2023-05-19 | Stop reason: HOSPADM

## 2023-05-19 RX ORDER — LACTULOSE 10 G/15ML
300 SOLUTION ORAL EVERY 8 HOURS SCHEDULED
Status: DISCONTINUED | OUTPATIENT
Start: 2023-05-19 | End: 2023-05-20

## 2023-05-19 RX ORDER — NICOTINE 21 MG/24HR
1 PATCH, TRANSDERMAL 24 HOURS TRANSDERMAL
Status: DISCONTINUED | OUTPATIENT
Start: 2023-05-19 | End: 2023-05-23 | Stop reason: HOSPADM

## 2023-05-19 RX ADMIN — MEROPENEM 500 MG: 500 INJECTION, POWDER, FOR SOLUTION INTRAVENOUS at 23:42

## 2023-05-19 RX ADMIN — ACETYLCYSTEINE 5000 MG: 200 INJECTION INTRAVENOUS at 13:22

## 2023-05-19 RX ADMIN — PROPOFOL 100 MCG/KG/MIN: 10 INJECTION, EMULSION INTRAVENOUS at 08:58

## 2023-05-19 RX ADMIN — TORSEMIDE 40 MG: 20 TABLET ORAL at 16:06

## 2023-05-19 RX ADMIN — Medication 10 ML: at 21:41

## 2023-05-19 RX ADMIN — SODIUM CHLORIDE: 9 INJECTION, SOLUTION INTRAVENOUS at 08:52

## 2023-05-19 RX ADMIN — OCTREOTIDE ACETATE 50 MCG/HR: 500 INJECTION, SOLUTION INTRAVENOUS; SUBCUTANEOUS at 08:36

## 2023-05-19 RX ADMIN — ACETYLCYSTEINE 5.12 MG/KG/HR: 200 INJECTION INTRAVENOUS at 17:43

## 2023-05-19 RX ADMIN — METHYLPREDNISOLONE SODIUM SUCCINATE 60 MG: 40 INJECTION INTRAMUSCULAR; INTRAVENOUS at 10:52

## 2023-05-19 RX ADMIN — LIDOCAINE HYDROCHLORIDE 100 MG: 10 INJECTION, SOLUTION EPIDURAL; INFILTRATION; INTRACAUDAL; PERINEURAL at 08:56

## 2023-05-19 RX ADMIN — SENNOSIDES 5 ML: 8.8 LIQUID ORAL at 21:40

## 2023-05-19 RX ADMIN — ACETYLCYSTEINE 15000 MG: 200 INJECTION INTRAVENOUS at 11:07

## 2023-05-19 RX ADMIN — OCTREOTIDE ACETATE 50 MCG/HR: 500 INJECTION, SOLUTION INTRAVENOUS; SUBCUTANEOUS at 18:39

## 2023-05-19 RX ADMIN — MEROPENEM 500 MG: 500 INJECTION, POWDER, FOR SOLUTION INTRAVENOUS at 13:36

## 2023-05-19 RX ADMIN — DOCUSATE SODIUM 50 MG: 50 LIQUID ORAL at 21:40

## 2023-05-19 RX ADMIN — THIAMINE HYDROCHLORIDE 500 MG: 100 INJECTION, SOLUTION INTRAMUSCULAR; INTRAVENOUS at 10:58

## 2023-05-19 RX ADMIN — PROPOFOL 50 MG: 10 INJECTION, EMULSION INTRAVENOUS at 08:57

## 2023-05-19 RX ADMIN — LACTULOSE 20 G: 20 SOLUTION ORAL at 21:40

## 2023-05-19 RX ADMIN — LACTULOSE 300 ML: 20 SOLUTION ORAL at 16:00

## 2023-05-19 RX ADMIN — MEROPENEM 500 MG: 500 INJECTION, POWDER, FOR SOLUTION INTRAVENOUS at 17:43

## 2023-05-19 RX ADMIN — LACTULOSE 20 G: 20 SOLUTION ORAL at 16:06

## 2023-05-19 RX ADMIN — SODIUM CHLORIDE 40 ML: 9 INJECTION, SOLUTION INTRAVENOUS at 08:11

## 2023-05-19 RX ADMIN — PANTOPRAZOLE SODIUM 40 MG: 40 INJECTION, POWDER, LYOPHILIZED, FOR SOLUTION INTRAVENOUS at 10:51

## 2023-05-19 RX ADMIN — PANTOPRAZOLE SODIUM 40 MG: 40 INJECTION, POWDER, LYOPHILIZED, FOR SOLUTION INTRAVENOUS at 21:41

## 2023-05-19 RX ADMIN — Medication 1 PATCH: at 16:06

## 2023-05-19 RX ADMIN — FOLIC ACID 1 MG: 5 INJECTION, SOLUTION INTRAMUSCULAR; INTRAVENOUS; SUBCUTANEOUS at 11:43

## 2023-05-19 RX ADMIN — RIFAXIMIN 550 MG: 550 TABLET ORAL at 21:41

## 2023-05-19 RX ADMIN — LACTULOSE 300 ML: 20 SOLUTION ORAL at 21:41

## 2023-05-19 RX ADMIN — MEROPENEM 500 MG: 500 INJECTION, POWDER, FOR SOLUTION INTRAVENOUS at 05:50

## 2023-05-19 RX ADMIN — OCTREOTIDE ACETATE 50 MCG/HR: 500 INJECTION, SOLUTION INTRAVENOUS; SUBCUTANEOUS at 18:37

## 2023-05-19 NOTE — POST-PROCEDURE NOTE
EGD    See provation    Hiatal hernia with ajneen erosions  Oozing varices s/p two bands  Mild portal hypertension  Nasojejunal tube placed with bridle    Plan  1.  Scheduled lactulose enema  2.  Scheduled lactulose per tube  3.  N acetylcysteine protocol  4.  Tube feeds may start  5,.  Thiamine and folic acid and MVI  6.  CIWA protocol  7.  Repeat EGD in 6 weeks to check bands  8.   PPI and octreotide; he already had abx  9.   Avoid nsaids  10.  Updated wife

## 2023-05-19 NOTE — PLAN OF CARE
Goal Outcome Evaluation:  Plan of Care Reviewed With: (P) patient        Progress: (P) improving  Outcome Evaluation: (P) Pt is alert only to self and is disoriented to time, place, and situation. Pt arouses to voice and answers questions when asked and follows commands. VSS with NSR on the monitor and is on RA. NG tube is still in place for medication admin. and pt has been asking about when he can eat/drink. Pt had two incontinent BM after given bowel medications and one continent void with the urinal. The second unit of PRBC was finished on this shift and 2 units of FFP was given with no reaction. There are no complaints at this time.

## 2023-05-19 NOTE — CASE MANAGEMENT/SOCIAL WORK
Discharge Planning Assessment  [facility]     Patient Name: Franck Toure  MRN: [id number]  Today's Date: 5/19/2023    Admit Date: 5/18/2023    Plan: Home   Discharge Needs Assessment     Row Name 05/19/23 1423       Living Environment    People in Home child(eduardo), dependent;spouse    Current Living Arrangements home    Primary Care Provided by self    Provides Primary Care For no one    Family Caregiver if Needed spouse    Quality of Family Relationships unable to assess    Able to Return to Prior Arrangements yes       Resource/Environmental Concerns    Resource/Environmental Concerns none       Transition Planning    Patient/Family Anticipates Transition to home with family    Patient/Family Anticipated Services at Transition     Transportation Anticipated family or friend will provide       Discharge Needs Assessment    Readmission Within the Last 30 Days no previous admission in last 30 days    Equipment Currently Used at Home none    Concerns to be Addressed discharge planning    Anticipated Changes Related to Illness none    Equipment Needed After Discharge none               Discharge Plan     Row Name 05/19/23 1424       Plan    Plan Home    Plan Comments Patient lives with his wife in [place].  He is independent with ADL's.  He has no DME at home and is not current with home health.  He does not have an advanced directive.  His PCP is Daniel Mckeon.  Mr. Toure has RX coverage and has his scripst filled at [facility].  His plan is home at discharge .  CM will continue to follow.    Final Discharge Disposition Code 01 - home or self-care              Continued Care and Services - Admitted Since 5/18/2023    Coordination has not been started for this encounter.       Expected Discharge Date and Time     Expected Discharge Date Expected Discharge Time    May 24, 2023          Demographic Summary     Row Name 05/19/23 1423       General Information    Admission Type inpatient     Arrived From hospital    Referral Source admission list    Reason for Consult discharge planning    Preferred Language English               Functional Status     Row Name 05/19/23 1423       Functional Status    Usual Activity Tolerance good    Current Activity Tolerance good       Functional Status, IADL    Medications independent    Meal Preparation independent    Housekeeping independent    Laundry independent    Shopping independent               Psychosocial    No documentation.                Abuse/Neglect    No documentation.                Legal    No documentation.                Substance Abuse    No documentation.                Patient Forms    No documentation.                   Kat Roach RN

## 2023-05-19 NOTE — ANESTHESIA POSTPROCEDURE EVALUATION
Patient: Franck Toure    Procedure Summary     Date: 05/19/23 Room / Location:  [facility] /  [facility]    Anesthesia Start: 0852 Anesthesia Stop: 0954    Procedure: ESOPHAGOGASTRODUODENOSCOPY Diagnosis:       Cirrhosis of liver without ascites, unspecified hepatic cirrhosis type      Anemia, unspecified type      (Cirrhosis of liver without ascites, unspecified hepatic cirrhosis type [K74.60])      (Anemia, unspecified type [D64.9])    Surgeons: Marguerite Blanco MD Provider: Ari Meyer MD    Anesthesia Type: MAC ASA Status: 4          Anesthesia Type: MAC    Vitals  Vitals Value Taken Time   /80 05/19/23 0955   Temp 98 °F (36.7 °C) 05/19/23 0951   Pulse 67 05/19/23 0956   Resp 18 05/19/23 0951   SpO2 97 % 05/19/23 0956   Vitals shown include unvalidated device data.        Post Anesthesia Care and Evaluation    Patient location during evaluation: PACU  Patient participation: complete - patient participated  Level of consciousness: awake and alert  Pain management: adequate    Airway patency: patent  Anesthetic complications: No anesthetic complications  PONV Status: none  Cardiovascular status: hemodynamically stable and acceptable  Respiratory status: nonlabored ventilation, acceptable and nasal cannula  Hydration status: acceptable

## 2023-05-19 NOTE — PROGRESS NOTES
" LOS: 1 day   Patient Care Team:  Provider, No Known as PCP - Pee Gillette MD as Consulting Physician (Nephrology)    Chief Complaint: EDMOND    Subjective       Subjective:  Symptoms:  Stable.  No shortness of breath, chest pain, chest pressure or anxiety.    Diet:  Adequate intake.        History taken from: patient    Objective     Vital Sign Min/Max for last 24 hours  Temp  Min: 96.7 °F (35.9 °C)  Max: 98.6 °F (37 °C)   BP  Min: 100/74  Max: 130/70   Pulse  Min: 62  Max: 76   Resp  Min: 16  Max: 20   SpO2  Min: 91 %  Max: 99 %   Flow (L/min)  Min: 2  Max: 4   No data recorded     Flowsheet Rows    Flowsheet Row First Filed Value   Admission Height 195.6 cm (77\") Documented at 05/18/2023 0938   Admission Weight 122 kg (268 lb 11.2 oz) Documented at 05/18/2023 0938          I/O this shift:  In: 800 [I.V.:800]  Out: -   I/O last 3 completed shifts:  In: 3181 [Blood:1891; Other:90; IV Piggyback:1200]  Out: 900 [Urine:900]    Objective:  General Appearance:  Comfortable.    Vital signs: (most recent): Blood pressure 117/79, pulse 64, temperature 98 °F (36.7 °C), temperature source Temporal, resp. rate 18, height 195.6 cm (77\"), weight 122 kg (268 lb 11.2 oz), SpO2 94 %.  Vital signs are normal.    Output: Producing urine.    HEENT: Normal HEENT exam.    Lungs:  Normal effort and normal respiratory rate.  Breath sounds clear to auscultation.  He is not in respiratory distress.  No decreased breath sounds or wheezes.    Heart: Normal rate.  Regular rhythm.  S1 normal and S2 normal.  No murmur or gallop.   Abdomen: Abdomen is soft.  Bowel sounds are normal.     Extremities: There is no dependent edema or local swelling.    Neurological: (Confusion).    Skin:  (Juandiced)            Results Review:     I reviewed the patient's new clinical results.    WBC WBC   Date Value Ref Range Status   05/19/2023 5.96 3.40 - 10.80 10*3/mm3 Final   05/18/2023 5.13 3.40 - 10.80 10*3/mm3 Final   05/17/2023 6.02 3.40 - 10.80 " 10*3/mm3 Final      HGB Hemoglobin   Date Value Ref Range Status   05/19/2023 8.9 (L) 13.0 - 17.7 g/dL Final   05/18/2023 6.9 (C) 13.0 - 17.7 g/dL Final   05/17/2023 7.3 (L) 13.0 - 17.7 g/dL Final      HCT Hematocrit   Date Value Ref Range Status   05/19/2023 25.4 (L) 37.5 - 51.0 % Final   05/18/2023 20.4 (C) 37.5 - 51.0 % Final   05/17/2023 21.9 (L) 37.5 - 51.0 % Final      Platlets No results found for: LABPLAT   MCV MCV   Date Value Ref Range Status   05/19/2023 101.2 (H) 79.0 - 97.0 fL Final   05/18/2023 106.8 (H) 79.0 - 97.0 fL Final   05/17/2023 110.1 (H) 79.0 - 97.0 fL Final          Sodium Sodium   Date Value Ref Range Status   05/19/2023 135 (L) 136 - 145 mmol/L Final   05/18/2023 134 (L) 136 - 145 mmol/L Final   05/17/2023 131 (L) 136 - 145 mmol/L Final      Potassium Potassium   Date Value Ref Range Status   05/19/2023 4.8 3.5 - 5.2 mmol/L Final   05/18/2023 3.9 3.5 - 5.2 mmol/L Final   05/17/2023 3.9 3.5 - 5.2 mmol/L Final      Chloride Chloride   Date Value Ref Range Status   05/19/2023 107 98 - 107 mmol/L Final   05/18/2023 108 (H) 98 - 107 mmol/L Final   05/17/2023 103 98 - 107 mmol/L Final      CO2 CO2   Date Value Ref Range Status   05/19/2023 19.0 (L) 22.0 - 29.0 mmol/L Final   05/18/2023 17.0 (L) 22.0 - 29.0 mmol/L Final   05/17/2023 18.5 (L) 22.0 - 29.0 mmol/L Final      BUN BUN   Date Value Ref Range Status   05/19/2023 20 6 - 20 mg/dL Final   05/18/2023 19 6 - 20 mg/dL Final   05/17/2023 20 6 - 20 mg/dL Final      Creatinine Creatinine   Date Value Ref Range Status   05/19/2023 1.20 0.76 - 1.27 mg/dL Final   05/18/2023 1.47 (H) 0.76 - 1.27 mg/dL Final   05/17/2023 2.59 (H) 0.76 - 1.27 mg/dL Final      Calcium Calcium   Date Value Ref Range Status   05/19/2023 8.0 (L) 8.6 - 10.5 mg/dL Final   05/18/2023 7.7 (L) 8.6 - 10.5 mg/dL Final   05/17/2023 8.3 (L) 8.6 - 10.5 mg/dL Final      PO4 No results found for: CAPO4   Albumin Albumin   Date Value Ref Range Status   05/19/2023 3.0 (L) 3.5 - 5.2 g/dL  Final   05/18/2023 2.7 (L) 3.5 - 5.2 g/dL Final   05/17/2023 2.0 (L) 3.5 - 5.2 g/dL Final      Magnesium Magnesium   Date Value Ref Range Status   05/17/2023 1.7 1.6 - 2.6 mg/dL Final      Uric Acid No results found for: URICACID     Medication Review: Yes    Assessment & Plan       Hepatorenal failure    Cirrhosis of liver without ascites    Anemia      Assessment & Plan     EDMOND: Baseline cr 0.9mg/dl. Cr 1.4-2.57 in last 2 days. Ua positive for nitrite, lue esterase and bilirubin. WBC+. EDMOND likely hemodynamic variation in the setting of liver dysfunction, possible GI bleed and low intra-vascular volume status     Met acidosis: Due to EDMOND     Hyponatremia: In the setting of EDMOND and liver failure     Alcoholic hepatitis: Hx of alcohol use disorder. Started on steroids    Anemia: Concern for GI bleed. Starting octreotide      Jaundice: Elevated Tbili.      Dependent edema: Stable.     AMS: Likley hepatic encephalopathy     Recs   Acute kidney injury likely hemodynamic variation in renal function.  Renal function improved with volume expansion. Discussed with Dr Joelle rojas to start diuretics.       Will sign off at this stage.     Pee Wen MD  05/19/23  13:10 EDT

## 2023-05-19 NOTE — CASE MANAGEMENT/SOCIAL WORK
Continued Stay Note  [facility]     Patient Name: Franck Toure  MRN: [id number]  Today's Date: 5/19/2023    Admit Date: 5/18/2023    Plan: Ongoing   Discharge Plan     Row Name 05/19/23 1800       Plan    Plan Ongoing    Plan Comments Consult received - thank you.  Chart reviewed.  UDS + Buprenorphine and THC.  BAL= 16    HX IVDU- on MOUD- Buprenorphine- ROMAN reviewed- he has been a complaint patient; has been receiving 28 day scripts of Buprenorphine since 3/7/23.  Dosage= 16/4.  ETOH- has been abusing ETOH for sometime- heavy use for the past 5 years.    No CIWA scores reported.  Labs- T. Bili= 16.0  AST/ALT= 94/20  Na= 135  NH 3=142 (on 5/17)  EGD today- oozing varices s/p banding      Our team will follow.  Re: Buprenorphine:  Monitor for withdrawal- should he begin to experience w/d symptoms, restarting his Buprenorphine at 8/2 would be the most benign way to manage his w/d given all of this current medical issues.                 Discharge Codes    No documentation.               Expected Discharge Date and Time     Expected Discharge Date Expected Discharge Time    May 24, 2023             Aminah King RN  MA,BSN-  Addiction Coordinator

## 2023-05-19 NOTE — PAYOR COMM NOTE
"Renato Toure (32 y.o. Male)     Mary Lou Aragon RN  Utilization Review  Dqxrk-[phone]  Lcg-[phone]      Date of Birth   1990    Social Security Number       Address   [address] [facility] [address]    Home Phone   [phone]    MRN   [id number]       Oriental orthodox   [facility]    Marital Status                               Admission Date   23    Admission Type   Urgent    Admitting Provider   Sigrid Grady MD    Attending Provider   Sigrid Grady MD    Department, Room/Bed   [facility] 6A, N618/1       Discharge Date       Discharge Disposition       Discharge Destination                               Attending Provider: Sigrid Grady MD    Allergies: No Known Allergies    Isolation: None   Infection: None   Code Status: CPR    Ht: 195.6 cm (77\")   Wt: 122 kg (268 lb 11.2 oz)    Admission Cmt: None   Principal Problem: Hepatorenal failure [K76.7]                 Active Insurance as of 2023     Primary Coverage     Payor Plan Insurance Group Employer/Plan Group    [facility] [id number]     Payor Plan Address Payor Plan Phone Number Payor Plan Fax Number Effective Dates    [address] [address]   2021 - None Entered    [facility] [address]       Subscriber Name Subscriber Birth Date Member ID       RENATO TOURE 1990 [id number]                 Emergency Contacts      (Rel.) Home Phone Work Phone Mobile Phone    SEGUNDO TOURE (Spouse) [phone] -- [phone]    SHAKA TOURE (Mother) [phone] -- [phone]               History & Physical      Sigrid Grady MD at [address] [place] [address] Medicine Services  HISTORY AND PHYSICAL    Patient Name: Renato Toure  : 1990  MRN: [id number]  Primary Care Physician: Provider, No Known  Date of admission: 2023      Subjective    Subjective "     Chief Complaint:  Direct admit from [facility] for acute encephalopathy and liver failure    HPI:  The patient is encephalopathic at the time of the encounter and cannot contribute to HPI.  Most of the history is obtained from his mother who is an RN as well as reviewing his old records    Franck Toure is a 32 y.o. male with past medical history of chronic hep C, alcohol use disorder, alcohol induced pancreatitis, fatty liver disease who presented to the hospital as a direct admit from [facility] for liver failure and higher level of care    The patient is known to be drinking alcohol, 10-15 double shots of hard liquor daily for at least the past 5 years with last drink a day prior to this hospitalization and known to have liver disease secondary to his alcohol use.  He also has history of IV drug use, last use was 10 days ago, currently on Suboxone who presented to the hospital from [facility] after he presented there with worsening lower extremity weakness and generalized fatigue    Per his wife, he has been getting worse over the last few months but particularly over the last few weeks with worsening weakness, fatigue and low energy.  He went to [facility] yesterday with worsening lower extremity swelling and found to have elevated bilirubin at 14, elevated lactic acid, and elevated creatinine at 2.5.  Because of lack of GI service at outside hospital, he was transferred to our facility    Review of Systems   Unable to obtain because the patient altered mental state    Personal History     Past Medical History:   Diagnosis Date   • Alcohol-induced acute pancreatitis 6/27/2019   • Alcoholism    • Drug use     Amphetamines and Suboxone   • Fatty liver    • Hepatitis C antibody test positive 2019   • Medical non-compliance    • Pancreatitis    • Smoker              Past Surgical History:   Procedure Laterality Date   • NO PAST SURGERIES         Family History:  family history includes Cancer in his maternal grandmother; No Known Problems in his father and mother.     Social History:  reports that he has been smoking cigarettes. He has been smoking an average of .5 packs per day. He has never used smokeless tobacco. He reports current alcohol use of about 8.0 standard drinks per week. He reports current drug use.  Social History     Social History Narrative   • Not on file       Medications:  Available home medication information reviewed.  Medications Prior to Admission   Medication Sig Dispense Refill Last Dose   • buprenorphine-naloxone (SUBOXONE) 8-2 MG per SL tablet Place 2 tablets under the tongue Daily. 56 tablet 0    • cloNIDine (Catapres) 0.1 MG tablet Take 1 tablet by mouth 2 (Two) Times a Day. Take as needed for anxiety.  Insomnia.  Chills or sweats 60 tablet 0    • hydrOXYzine pamoate (Vistaril) 50 MG capsule Take 1 capsule by mouth 4 (Four) Times a Day As Needed for Anxiety (and/or insomia). Take 1 to 2 tablets as needed for anxiety every 6 hours 90 capsule 0    • ibuprofen (ADVIL,MOTRIN) 600 MG tablet Take 1 tablet by mouth Every 6 (Six) Hours As Needed for Mild Pain. 30 tablet 0    • lisinopril (PRINIVIL,ZESTRIL) 20 MG tablet Take 1 tablet by mouth Daily. for blood pressure 30 tablet 2    • naloxone (NARCAN) 4 MG/0.1ML nasal spray 1 spray into the nostril(s) as directed by provider As Needed (Opiate oversedation). Spray in 1 nostril every 2 to 3 minutes call 911 2 each 2    • ondansetron (ZOFRAN) 8 MG tablet Take 1 tablet by mouth Every 8 (Eight) Hours As Needed for Nausea or Vomiting. 45 tablet 0    • QUEtiapine (SEROquel) 25 MG tablet Take 1 tablet by mouth Every Night. 30 tablet 0    • sertraline (Zoloft) 50 MG tablet Take 1 tablet by mouth Daily for 30 days. 30 tablet 0        No Known Allergies    Objective    Objective     Vital Signs:   Temp:  [97.5 °F (36.4 °C)-97.9 °F (36.6 °C)] 97.5 °F (36.4 °C)  Heart Rate:  [66-86] 72  Resp:  [16-18] 16  BP:  ()/(50-86) 103/84       Physical Exam   General: Encephalopathic, minimally conversant.  Head: Atraumatic and normocephalic  Eyes: Severe icterus.  Severe pallor  Ears:  Ears appear intact with no abnormalities noted  Throat: No oral lesions, no thrush  Neck: Supple, trachea midline  Lungs: Clear to auscultation bilaterally, equal air entry, no wheezing or crackles  Heart:  Normal S1 and S2, no murmur, no gallop, No JVD, no lower extremity swelling  Abdomen:  Soft, no tenderness, no organomegaly, normal bowel sounds, no organomegaly  Extremities: pulses equal bilaterally  Skin: No bleeding, bruising or rash, normal skin turgor and elasticity  Neurologic: Cranial nerves appear intact with no evidence of facial asymmetry, normal motor and sensory functions in all 4 extremities  Psych: Alert and oriented x 1    Result Review:  I have personally reviewed the results from the time of this admission to 5/18/2023 15:45 EDT and agree with these findings:  [x]  Laboratory list / accordion  []  Microbiology  [x]  Radiology  []  EKG/Telemetry   [x]  Cardiology/Vascular   []  Pathology  [x]  Old records        LAB RESULTS:      Lab 05/18/23  1215 05/18/23  1214 05/18/23  0126 05/17/23 2229 05/17/23 2011   WBC  --  5.13  --   --  6.02   HEMOGLOBIN  --  6.9*  --   --  7.3*   HEMATOCRIT  --  20.4*  --   --  21.9*   PLATELETS  --  106*  --   --  104*   NEUTROS ABS  --  3.18  --   --  3.67   EOS ABS  --  0.10  --   --  0.06   MCV  --  106.8*  --   --  110.1*   CRP  --   --   --   --  5.55*   PROCALCITONIN  --   --   --  0.64*  --    LACTATE 0.8  --  2.7* 2.9*  --    PROTIME 21.5*  --   --  19.2*  --    INR 1.85*  --   --  1.55*  --    APTT 49.3*  --   --  44.2*  --    D DIMER QUANT 2.74*  --   --   --   --          Lab 05/18/23  1215 05/17/23 2011   SODIUM 134* 131*   POTASSIUM 3.9 3.9   CHLORIDE 108* 103   CO2 17.0* 18.5*   ANION GAP 9.0 9.5   BUN 19 20   CREATININE 1.47* 2.59*   EGFR 64.6 32.7*   GLUCOSE 110* 111*    CALCIUM 7.7* 8.3*   MAGNESIUM  --  1.7         Lab 05/18/23  1215 05/17/23 2012 05/17/23 2011   TOTAL PROTEIN 6.1  --  6.3   ALBUMIN 2.7*  --  2.0*   GLOBULIN 3.4  --  4.3   ALT (SGPT) 18  --  24   AST (SGOT) 106*  --  120*   BILIRUBIN 12.2* 14.0* 14.9*   BILIRUBIN DIRECT  --  >10.0*  --    ALK PHOS 221*  --  277*   AMYLASE  --   --  18*   LIPASE  --   --  28         Lab 05/17/23 2012   PROBNP 337.6             Lab 05/18/23  1448 05/18/23  1215   ABO TYPING A A   RH TYPING Positive Positive   ANTIBODY SCREEN  --  Negative         UA        5/17/2023    20:57   Urinalysis   Squamous Epithelial Cells, UA 0-2     Specific Gravity, UA 1.023     Ketones, UA Negative     Blood, UA Trace     Leukocytes, UA Moderate (2+)     Nitrite, UA Positive     RBC, UA 6-12     WBC, UA Too Numerous to Count     Bacteria, UA 4+         Microbiology Results (last 10 days)     Procedure Component Value - Date/Time    COVID-19 and FLU A/B PCR - Swab, Nasopharynx [[id number]]  (Normal) Collected: 05/17/23 2231    Lab Status: Final result Specimen: Swab from Nasopharynx Updated: 05/17/23 2256     COVID19 Not Detected     Influenza A PCR Not Detected     Influenza B PCR Not Detected    Narrative:      Fact sheet for providers: https://www.fda.gov/media/168829/download    Fact sheet for patients: https://www.fda.gov/media/005995/download    Test performed by PCR.          CT Abdomen Pelvis Without Contrast    Result Date: 5/17/2023  CLINICAL HISTORY: Abdominal pain, jaundice.  COMPARISON: None available.  TECHNIQUE: Sequential trans-axial images were obtained with a multi-detector helical CT without administration of iodinated contrast. Coronal and sagittal reconstructions were obtained. No oral contrast given.  Limited exposure control, adjustment of the MA and/or KV according to patient's size or use of iterative reconstruction technique was utilized.  FINDINGS:  The visualized lung bases are unremarkable.  There is moderate hepatomegaly  measuring up to 16.9 cm in length with diffuse fatty infiltration.  There is heterogeneous parenchymal echotexture of the liver.  The noncontrast images of the kidneys, adrenals are normal.  There is marked splenomegaly measuring up to 18.8 cm in length.  Cholelithiasis.  Mild gallbladder wall thickening and pericholecystic fluid.  Tiny 2 mm sized right renal calculus.  No obstructive uropathy.  There is diffuse fatty atrophy of the pancreas.  Decompressed stomach. No dilation or obstruction. The appendix is unremarkable.  There is mild circumferential large bowel wall thickening.  There is no abdominal lymphadenopathy. There is no pneumoperitoneum. The retroperitoneal region appears unremarkable.The abdominal aorta shows normal unenhanced appearance.  Mild ascites.  There is no pelvic lymphadenopathy. The inguinal regions are unremarkable.  The bladder appears unremarkable.  Mild thoracolumbar degenerative changes.  There is no acute osseous abnormality.      Impression:  1.  Moderate hepatomegaly with diffuse fatty infiltration. Heterogeneous parenchymal echotexture of the liver. 2.  Marked splenomegaly.  Cholelithiasis.  Mild gallbladder wall thickening and pericholecystic fluid.  Differential possibilities to be considered are hepatocellular disease, congestive heart failure or a calculus cholecystitis. 3.  Mild ascites. 4.  Mild circumferential large bowel wall thickening.  This may be related to portal colopathy or infectious/inflammatory colitis. 5.  Right-sided nephrolithiasis.  No obstructive uropathy.  This report was finalized on 5/17/2023 10:01 PM by Janes Hunt MD.      US Abdomen Complete    Result Date: 5/17/2023  Complete abdominal ultrasound  HISTORY: Jaundice for 2 to 3 days, no pain.  COMPARISON: CT abdomen pelvis report only dated 6/17/2021  Technique: Grayscale, color and spectral Doppler imaging of the abdomen was performed by sonographer.  Radiologist was not present.  A total of 92 images  are sent for interpretation.  Findings: Pancreas: Obscured by overlying bowel gas.  Liver: Enlarged and diffusely echogenic, 25 cm length.  No discernible mass.  Probable focal fat sparing at the gallbladder fossa.  Normal hepatopetal flow in the main portal vein.  Left hepatic vein demonstrates normal direction of flow.  Bile ducts: No intrahepatic or extrahepatic biliary ductal dilatation. Common bile duct measures 2 mm diameter.  Gallbladder: Partially distended.  It contains a shadowing 2 cm stone. No wall thickening, hyperemia or pericholecystic fluid.  Negative sonographic Paiz's sign.  Spleen: Homogeneous, mildly enlarged at 15 cm length.  No focal mass.  Right kidney: Normal cortical thickness and parenchymal echogenicity. Right kidney length 11.3 cm.  No hydronephrosis, mass or stone.  Left kidney: Normal cortical thickness and parenchymal echogenicity. Right kidney length 12.1 cm.  No hydronephrosis, mass or stone.  Ascites: None.  Aorta and IVC: Both vessels obscured by overlying bowel gas along their course.      Impression: Impression: 1. Hepatosplenomegaly. 2. Echogenic liver parenchyma may represent diffuse fatty infiltration or other chronic process.  Liver enlargement may be acute or chronic, recommend clinical correlation. 3. Cholelithiasis without cholecystitis. 4. Pancreas obscured by overlying bowel gas.  No intrahepatic biliary tree dilatation.   To TALK to On Call [phone]  This report was finalized on 5/17/2023 7:35 PM by Dayne Nguyen MD.      XR Chest AP    Result Date: 5/17/2023  INDICATION: Cough.  TECHNIQUE: Frontal radiograph of the chest.  COMPARISON: None.  FINDINGS: The cardiomediastinal silhouette and pulmonary vasculature appear within normal limits. No infiltrate, pleural effusion or pneumothorax. No acute osseous abnormality evident.      Impression: No acute cardiopulmonary process.  This report was finalized on 5/17/2023 11:15 PM by Alex Pallas, DO.             Assessment & Plan   Assessment & Plan     Active Hospital Problems    Diagnosis  POA   • **Hepatorenal failure [K76.7]  Yes       Summary:  Franck Toure is a 32 y.o. male with past medical history of chronic hep C, alcohol use disorder, alcohol induced pancreatitis, fatty liver disease who presented to the hospital as a direct admit from [facility] for liver failure and higher level of care    Assessment and plan:  Acute liver failure  Acute metabolic/hepatic encephalopathy  Hepatocellular jaundice  Alcohol induced acute hepatitis  · Known to drink alcohol for years: 10-15 double shots of hard liquor  · Has advanced liver disease with huge splenomegaly and portal hypertension  · NG tube placement and start lactulose and rifaximin, given his markedly elevated ammonia level in the context of acute hepatic encephalopathy  · Neuro acetaminophen per patient's wife.  Acetaminophen level less than 5  · Maddrey discrimination score is 56 --> start IV steroids  · INR is elevated at 1.85 --> we will transfuse units of plasma and give him 10 milligram of IV vitamin K given his possible GI bleed  · Bilirubin is elevated at 14.9, trending down to 12.2 with no evidence of dilated common bile duct and CT scan abdomen from outside facility.  Likely hepatocellular jaundice.  Continue to monitor  · Aspiration precautions  · At this moment, patient is able to protect his airway and will wake up to verbal commands but will fall asleep quickly.  Low threshold to move him to ICU if his mental state continue to get worse and if he is unable to protect his airway    Possible sepsis secondary to UTI, POA  Cannot rule out SBP  · Has elevated procalcitonin, positive UA and elevated lactic acid  · Lactic acid improved with IV fluids  · Lactic acid could be also related to his liver failure  · Received 1 dose of IV Rocephin yesterday at [facility], will initiate broad-spectrum antibiotic therapy with IV  Merrem  · CT scan abdomen with trace ascites, bedside ultrasound with no adequate pocket of fluid to safely perform paracentesis  · IV albumin for possible SBP  · Follow blood and urine culture    Severe symptomatic anemia  · Hemoglobin 6 months ago was 16.  Today 6.9  · Suspect blood loss anemia secondary to chronic GI bleed versus acute GI bleed  · Start IV Protonix 40 mg twice daily  · Start IV octreotide drip  · We will transfuse units of blood  · Check anemia work-up including iron studies, folic acid and B12 and reticulocyte count  · Discussed with GI team.  Appreciate the help    EDMOND, likely secondary to volume depletion  Less likely hepatorenal syndrome  · Creatinine at outside hospital 2.5  · Improved after IV fluids and albumin to 1.4.  This will argue against hepatorenal syndrome  · Continue gentle IV fluids and IV albumin  · Nephrology team consulted.  Appreciate the help    Alcohol dependence  Drug use disorder  · Avoid benzodiazepine given his acute hepatic encephalopathy.  Will monitor for any withdrawal symptoms  · Addiction team consult        DVT prophylaxis:  SCD      CODE STATUS:    Code Status and Medical Interventions:   Ordered at: 05/18/23 1030     Level Of Support Discussed With:    Patient     Code Status (Patient has no pulse and is not breathing):    CPR (Attempt to Resuscitate)     Medical Interventions (Patient has pulse or is breathing):    Full Support       Expected Discharge   Expected Discharge Date: 5/24/2023; Expected Discharge Time:      Sigrid Grady MD  05/18/23       Critical Care Note  Authorized and Performed by: Dr Grady  Total critical care time: Approximately 56 minutes  Due to a high probability of clinically significant, life threatening deterioration, the patient required my highest level of preparedness to intervene emergently and I personally spent this critical care time directly and personally managing the patient. This critical care time included  obtaining a history; examining the patient; pulse oximetry; ordering and review of studies; arranging urgent treatment with development of a management plan; evaluation of patient's response to treatment; frequent reassessment; and, discussions with other providers.  This critical care time was performed to assess and manage the high probability of imminent, life-threatening deterioration that could result in multi-organ failure. It was exclusive of separately billable procedures and treating other patients and teaching time.    Electronically signed by Sigrid Grady MD at 05/18/23 1552       Emergency Department Notes    No notes of this type exist for this encounter.         Vital Signs (last day)     Date/Time Temp Temp src Pulse Resp BP Patient Position SpO2    05/19/23 0600 -- -- 63 -- -- -- 98    05/19/23 0500 -- -- 62 -- 107/74 -- 94    05/19/23 0457 96.8 (36) Axillary 63 18 100/74 Lying 95    05/19/23 0350 96.7 (35.9) Axillary 65 18 114/78 Lying 96    05/19/23 0335 97.2 (36.2) Axillary 63 20 110/72 Lying 93    05/19/23 0300 -- -- 63 -- 114/71 -- 94    05/19/23 0233 97.5 (36.4) Axillary 67 20 115/74 Lying 95    05/19/23 0200 -- -- 66 -- 120/74 -- 93    05/19/23 0100 -- -- 65 -- 115/74 -- 96    05/19/23 0049 97.3 (36.3) Axillary 65 18 113/76 Lying 94    05/19/23 0034 97.3 (36.3) Axillary 66 18 117/77 Lying 97    05/18/23 2300 -- -- 68 -- 114/79 -- 94    05/18/23 2256 97 (36.1) Axillary 69 20 117/81 Lying 96    05/18/23 2100 -- -- 70 -- 118/80 -- 95    05/18/23 1944 98.6 (37) Axillary 69 16 115/72 Lying 95    05/18/23 1900 97.8 (36.6) Axillary 71 16 117/79 -- 95    05/18/23 1820 97.7 (36.5) Axillary 76 16 118/76 -- 96    05/18/23 1800 -- -- 70 -- 117/70 -- 96    05/18/23 1700 -- -- 72 -- 119/67 -- 95    05/18/23 1630 -- -- 75 -- 117/78 -- 96    05/18/23 1600 -- -- 74 -- 130/70 -- 99    05/18/23 1557 97.5 (36.4) Oral 73 16 125/79 -- --    05/18/23 1542 97.5 (36.4) Axillary 72 16 103/84 -- 91    05/18/23  1503 97.7 (36.5) Oral 71 16 116/67 -- 96    05/18/23 1254 -- -- 66 16 109/73 Lying 97    05/18/23 0938 97.6 (36.4) Oral 71 16 113/86 -- 98          Oxygen Therapy (last day)     Date/Time SpO2 Device (Oxygen Therapy) Flow (L/min) Oxygen Concentration (%) ETCO2 (mmHg)    05/19/23 0600 98 room air -- -- --    05/19/23 0500 94 room air -- -- --    05/19/23 0457 95 room air -- -- --    05/19/23 0350 96 room air -- -- --    05/19/23 0335 93 room air -- -- --    05/19/23 0300 94 room air -- -- --    05/19/23 0233 95 room air -- -- --    05/19/23 0200 93 room air -- -- --    05/19/23 0100 96 room air -- -- --    05/19/23 0049 94 room air -- -- --    05/19/23 0034 97 room air -- -- --    05/18/23 2300 94 -- -- -- --    05/18/23 2256 96 room air -- -- --    05/18/23 2100 95 room air -- -- --    05/18/23 1944 95 -- -- -- --    05/18/23 1900 95 room air -- -- --    05/18/23 1820 96 room air -- -- --    05/18/23 1800 96 room air -- -- --    05/18/23 1700 95 room air -- -- --    05/18/23 1630 96 room air -- -- --    05/18/23 1600 99 room air -- -- --    05/18/23 1542 91 room air -- -- --    05/18/23 1503 96 room air -- -- --    05/18/23 1400 -- room air -- -- --    05/18/23 1254 97 -- -- -- --    05/18/23 1200 -- room air -- -- --    05/18/23 0938 98 room air -- -- --          Lines, Drains & Airways     Active LDAs     Name Placement date Placement time Site Days    PICC Triple Lumen 05/18/23 Right Basilic 05/18/23  1203  Basilic  less than 1    Peripheral IV 05/17/23 2000 Anterior;Left;Upper Arm 05/17/23 2000  Arm  1    NG/OG Tube Nasogastric 16 Fr Right nostril 05/18/23  1130  Right nostril  less than 1                  Current Facility-Administered Medications   Medication Dose Route Frequency Provider Last Rate Last Admin   • polyethylene glycol (MIRALAX) packet 17 g  17 g Oral Daily PRN Sigrid Grady MD        And   • bisacodyl (DULCOLAX) EC tablet 5 mg  5 mg Oral Daily PRN Sigrid Grady MD        And    • bisacodyl (DULCOLAX) suppository 10 mg  10 mg Rectal Daily PRN Sigrid Grady MD       • Calcium Replacement - Follow Nurse / BPA Driven Protocol   Does not apply PRN Sigrid Grady MD       • docusate sodium (COLACE) liquid 50 mg  50 mg Nasogastric BID Sigrid Grady MD   50 mg at 05/18/23 2214   • lactulose (CHRONULAC) 10 GM/15ML solution 20 g  20 g Oral TID Sigrid Grady MD   20 g at 05/18/23 2214   • Magnesium Standard Dose Replacement - Follow Nurse / BPA Driven Protocol   Does not apply PRN Sigrid Grady MD       • meropenem (MERREM) 500mg/100 mL 0.9% NS IVPB (mbp)  500 mg Intravenous Q6H Sigrid Grady MD   500 mg at 05/19/23 0550   • methylPREDNISolone sodium succinate (SOLU-Medrol) injection 60 mg  60 mg Intravenous Daily Sigrid Grady MD   60 mg at 05/18/23 1219   • octreotide (sandoSTATIN) 500 mcg in sodium chloride 0.9 % 100 mL (5 mcg/mL) infusion  50 mcg/hr Intravenous Continuous Sigrid Grady MD 10 mL/hr at 05/18/23 2255 50 mcg/hr at 05/18/23 2255   • ondansetron (ZOFRAN) injection 4 mg  4 mg Intravenous Q6H PRN Sigrid Grady MD       • pantoprazole (PROTONIX) injection 40 mg  40 mg Intravenous Q12H Sigrid Grady MD   40 mg at 05/18/23 2215   • Phosphorus Replacement - Follow Nurse / BPA Driven Protocol   Does not apply PRN Sigrid Grady MD       • Potassium Replacement - Follow Nurse / BPA Driven Protocol   Does not apply PRN Sigrid Grady MD       • riFAXIMin (XIFAXAN) tablet 550 mg  550 mg Oral Q12H Leesa Toure PA-C   550 mg at 05/18/23 2214   • sennosides (SENOKOT) 8.8 MG/5ML syrup 5 mL  5 mL Nasogastric BID Sigrid Grady MD   5 mL at 05/18/23 2213   • sodium chloride 0.9 % flush 10 mL  10 mL Intravenous Q12H Sigrid Grady MD   10 mL at 05/18/23 2214   • sodium chloride 0.9 % flush 10 mL  10 mL Intravenous PRN Sigrid Grady MD       • sodium chloride 0.9 %  infusion 40 mL  40 mL Intravenous Sigrid Browne MD           Lab Results (last 24 hours)     Procedure Component Value Units Date/Time    CBC & Differential [[id number]]  (Abnormal) Collected: 05/19/23 0537    Specimen: Blood Updated: 05/19/23 0725    Narrative:      The following orders were created for panel order CBC & Differential.  Procedure                               Abnormality         Status                     ---------                               -----------         ------                     CBC Auto Differential[[id number]]        Abnormal            Final result               Scan Slide[[id number]]                                                                    Please view results for these tests on the individual orders.    Manual Differential [[id number]]  (Abnormal) Collected: 05/19/23 0537    Specimen: Blood Updated: 05/19/23 0725     Neutrophil % 73.0 %      Lymphocyte % 6.0 %      Monocyte % 2.0 %      Eosinophil % 0.0 %      Basophil % 0.0 %      Bands %  14.0 %      Metamyelocyte % 5.0 %      Neutrophils Absolute 5.19 10*3/mm3      Lymphocytes Absolute 0.36 10*3/mm3      Monocytes Absolute 0.12 10*3/mm3      Eosinophils Absolute 0.00 10*3/mm3      Basophils Absolute 0.00 10*3/mm3      nRBC 1.0 /100 WBC      Anisocytosis Slight/1+     Crenated RBC's Slight/1+     Target Cells Mod/2+     WBC Morphology Normal     Platelet Morphology Normal    CBC Auto Differential [[id number]]  (Abnormal) Collected: 05/19/23 0537    Specimen: Blood Updated: 05/19/23 0725     WBC 5.96 10*3/mm3      RBC 2.51 10*6/mm3      Hemoglobin 8.9 g/dL      Hematocrit 25.4 %      .2 fL      MCH 35.5 pg      MCHC 35.0 g/dL      RDW 20.3 %      RDW-SD 73.5 fl      MPV 10.7 fL      Platelets 113 10*3/mm3     Narrative:      Verified by repeat analysis.    The previously reported component NRBC is no longer being reported. Previous result was 0.0 /100 WBC (Reference Range: 0.0-0.2 /100 WBC) on 5/19/2023 at  0658 EDT.    Comprehensive Metabolic Panel [[id number]]  (Abnormal) Collected: 05/19/23 0537    Specimen: Blood Updated: 05/19/23 0655     Glucose 184 mg/dL      BUN 20 mg/dL      Creatinine 1.20 mg/dL      Sodium 135 mmol/L      Potassium 4.8 mmol/L      Chloride 107 mmol/L      CO2 19.0 mmol/L      Calcium 8.0 mg/dL      Total Protein 6.2 g/dL      Albumin 3.0 g/dL      ALT (SGPT) 20 U/L      AST (SGOT) 94 U/L      Alkaline Phosphatase 209 U/L      Total Bilirubin 16.0 mg/dL      Globulin 3.2 gm/dL      Comment: Calculated Result        A/G Ratio 0.9 g/dL      BUN/Creatinine Ratio 16.7     Anion Gap 9.0 mmol/L      eGFR 82.4 mL/min/1.73     Narrative:      GFR Normal >60  Chronic Kidney Disease <60  Kidney Failure <15      Folate [[id number]] Collected: 05/19/23 0537    Specimen: Blood Updated: 05/19/23 0611    AFP Tumor Marker [[id number]]  (Normal) Collected: 05/18/23 1454    Specimen: Blood Updated: 05/19/23 0101     ALPHA-FETOPROTEIN 4.74 ng/mL     Narrative:      Alpha Fetoprotein Tumor Marker Reference Range:    0.0-8.3 ng/mL    Note: Normal values apply only to males and nonpregnant females. These results are not interpretable for pregnant females.    Results may be falsely decreased if patient taking Biotin.    Testing Method: [employer] Electrochemiluminescence Immunoassay(ECLIA)  Values obtained with different assay methods or kits cannot be used interchangeably.    Hepatitis B Surface Antibody [[id number]]  (Abnormal) Collected: 05/18/23 1454    Specimen: Blood Updated: 05/19/23 0101     Hep B S Ab Reactive    Narrative:      Non-Reactive - Individual is considered to be not immune to infection with HBV.    Equivocal - Unable to determine if anti-HBs is present at levels consistent with immunity. The individual should be further assessed by associated risk factors and the use of additional diagnositc information, or another sample may be collected and tested.    Reactive - Anti-HBs concentration  detected at >10 mIU/mL. Individual is considered to be immune to infection with HBV.      Results may be falsely decreased if patient taking Biotin.      Vitamin D,25-Hydroxy [[id number]]  (Abnormal) Collected: 05/18/23 1454    Specimen: Blood Updated: 05/19/23 0101     25 Hydroxy, Vitamin D 6.1 ng/ml     Narrative:      Reference Range for Total Vitamin D 25(OH)     Deficiency <20.0 ng/mL   Insufficiency 21-29 ng/mL   Sufficiency  ng/mL  Toxicity >100 ng/ml      Vitamin B12 [[id number]]  (Abnormal) Collected: 05/18/23 1454    Specimen: Blood Updated: 05/19/23 0101     Vitamin B-12 >2,000 pg/mL     Narrative:      Results may be falsely increased if patient taking Biotin.      Creatinine Urine Random (kidney function) GFR component - Urine, Clean Catch [[id number]] Collected: 05/18/23 1558    Specimen: Urine, Clean Catch Updated: 05/19/23 0026     Creatinine, Urine 134.3 mg/dL     Narrative:      Reference intervals for random urine have not been established.  Clinical usage is dependent upon physician's interpretation in combination with other laboratory tests.       Sodium, Urine, Random - Urine, Clean Catch [[id number]] Collected: 05/18/23 1558    Specimen: Urine, Clean Catch Updated: 05/18/23 1728     Sodium, Urine 48 mmol/L     Narrative:      Reference intervals for random urine have not been established.  Clinical usage is dependent upon physician's interpretation in combination with other laboratory tests.       Urinalysis With Culture If Indicated - Urine, Clean Catch [[id number]]  (Abnormal) Collected: 05/18/23 1558    Specimen: Urine, Clean Catch Updated: 05/18/23 1620     Color, UA Dark Yellow     Appearance, UA Cloudy     pH, UA 6.5     Specific Gravity, UA 1.018     Glucose, UA Negative     Ketones, UA Negative     Bilirubin, UA Large (3+)     Blood, UA Negative     Protein, UA 30 mg/dL (1+)     Leuk Esterase, UA Moderate (2+)     Nitrite, UA Negative     Urobilinogen, UA 1.0 E.U./dL    Narrative:       In absence of clinical symptoms, the presence of pyuria, bacteria, and/or nitrites on the urinalysis result does not correlate with infection.    Urinalysis, Microscopic Only - Urine, Clean Catch [[id number]]  (Abnormal) Collected: 05/18/23 1558    Specimen: Urine, Clean Catch Updated: 05/18/23 1620     RBC, UA 3-6 /HPF      WBC, UA 13-20 /HPF      Bacteria, UA None Seen /HPF      Squamous Epithelial Cells, UA 0-2 /HPF      Hyaline Casts, UA 0-6 /LPF      Methodology Automated Microscopy    Urine Culture - Urine, Urine, Clean Catch [[id number]] Collected: 05/18/23 1558    Specimen: Urine, Clean Catch Updated: 05/18/23 1620    Iron Profile [[id number]]  (Abnormal) Collected: 05/18/23 1215    Specimen: Blood Updated: 05/18/23 1545     Iron 59 mcg/dL      Iron Saturation 79 %      Transferrin 50 mg/dL      TIBC 75 mcg/dL     Blood Culture - Blood, Blood, PICC Line [[id number]] Collected: 05/18/23 1453    Specimen: Blood, PICC Line Updated: 05/18/23 1530    Hepatitis C RNA, Quantitative, PCR (graph) [[id number]] Collected: 05/18/23 1454    Specimen: Blood Updated: 05/18/23 1520    Hepatitis A Antibody, Total [[id number]] Collected: 05/18/23 1454    Specimen: Blood Updated: 05/18/23 1520    CBC & Differential [[id number]]  (Abnormal) Collected: 05/18/23 1214    Specimen: Blood Updated: 05/18/23 1356    Narrative:      The following orders were created for panel order CBC & Differential.  Procedure                               Abnormality         Status                     ---------                               -----------         ------                     CBC Auto Differential[[id number]]        Abnormal            Final result               Scan Slide[[id number]]                                                                    Please view results for these tests on the individual orders.    Manual Differential [[id number]]  (Abnormal) Collected: 05/18/23 1214    Specimen: Blood Updated: 05/18/23 1355     Neutrophil % 54.0  %      Lymphocyte % 26.0 %      Monocyte % 8.0 %      Eosinophil % 2.0 %      Basophil % 0.0 %      Bands %  8.0 %      Metamyelocyte % 1.0 %      Myelocyte % 1.0 %      Neutrophils Absolute 3.18 10*3/mm3      Lymphocytes Absolute 1.33 10*3/mm3      Monocytes Absolute 0.41 10*3/mm3      Eosinophils Absolute 0.10 10*3/mm3      Basophils Absolute 0.00 10*3/mm3      nRBC 0.0 /100 WBC      Target Cells Slight/1+     WBC Morphology Normal     Platelet Morphology Normal    CBC Auto Differential [[id number]]  (Abnormal) Collected: 05/18/23 1214    Specimen: Blood Updated: 05/18/23 1355     WBC 5.13 10*3/mm3      RBC 1.91 10*6/mm3      Hemoglobin 6.9 g/dL      Hematocrit 20.4 %      .8 fL      MCH 36.1 pg      MCHC 33.8 g/dL      RDW 18.8 %      RDW-SD 73.4 fl      MPV 10.0 fL      Platelets 106 10*3/mm3     Narrative:      The previously reported component NRBC is no longer being reported. Previous result was 0.0 /100 WBC (Reference Range: 0.0-0.2 /100 WBC) on 5/18/2023 at 1240 EDT.    Fibrin Split Products [[id number]]  (Normal) Collected: 05/18/23 1214    Specimen: Blood Updated: 05/18/23 1340     Fibrin Split Products Less than 10 mcg/mL     Comment: Corrected result. Previous result was Negative on 5/18/2023 at 1319 EDT.       Blood Culture - Blood, Blood, PICC Line [[id number]] Collected: 05/18/23 1213    Specimen: Blood, PICC Line Updated: 05/18/23 1320    Fibrinogen [[id number]]  (Abnormal) Collected: 05/18/23 1215    Specimen: Blood Updated: 05/18/23 1259     Fibrinogen 162 mg/dL     Comprehensive Metabolic Panel [[id number]]  (Abnormal) Collected: 05/18/23 1215    Specimen: Blood Updated: 05/18/23 1258     Glucose 110 mg/dL      BUN 19 mg/dL      Creatinine 1.47 mg/dL      Sodium 134 mmol/L      Potassium 3.9 mmol/L      Chloride 108 mmol/L      CO2 17.0 mmol/L      Calcium 7.7 mg/dL      Total Protein 6.1 g/dL      Albumin 2.7 g/dL      ALT (SGPT) 18 U/L      AST (SGOT) 106 U/L      Alkaline Phosphatase 221 U/L  "     Total Bilirubin 12.2 mg/dL      Globulin 3.4 gm/dL      Comment: Calculated Result        A/G Ratio 0.8 g/dL      BUN/Creatinine Ratio 12.9     Anion Gap 9.0 mmol/L      eGFR 64.6 mL/min/1.73     Narrative:      GFR Normal >60  Chronic Kidney Disease <60  Kidney Failure <15      Acetaminophen Level [[id number]]  (Normal) Collected: 05/18/23 1215    Specimen: Blood Updated: 05/18/23 1257     Acetaminophen <5.0 mcg/mL     CK [[id number]]  (Abnormal) Collected: 05/18/23 1215    Specimen: Blood Updated: 05/18/23 1257     Creatine Kinase 18 U/L     D-dimer, Quantitative [[id number]]  (Abnormal) Collected: 05/18/23 1215    Specimen: Blood Updated: 05/18/23 1253     D-Dimer, Quantitative 2.74 MCGFEU/mL     Narrative:      According to the assay 's published package insert, a normal (<0.50 MCGFEU/mL) D-dimer result in conjunction with a non-high clinical probability assessment, excludes deep vein thrombosis (DVT) and pulmonary embolism (PE) with high sensitivity.    D-dimer values increase with age and this can make VTE exclusion of an older population difficult. To address this, the [employer], based on best available evidence and recent guidelines, recommends that clinicians use age-adjusted D-dimer thresholds in patients greater than 50 years of age with: a) a low probability of PE who do not meet all Pulmonary Embolism Rule Out Criteria, or b) in those with intermediate probability of PE.   The formula for an age-adjusted D-dimer cut-off is \"age/100\".  For example, a 60 year old patient would have an age-adjusted cut-off of 0.60 MCGFEU/mL and an 80 year old 0.80 MCGFEU/mL.    Lactic Acid, Plasma [[id number]]  (Normal) Collected: 05/18/23 1215    Specimen: Blood Updated: 05/18/23 1253     Lactate 0.8 mmol/L      Comment: Falsely depressed results may occur on samples drawn from patients receiving N-Acetylcysteine (NAC) or Metamizole.       Protime-INR [[id number]]  (Abnormal) Collected: " 05/18/23 1215    Specimen: Blood Updated: 05/18/23 1253     Protime 21.5 Seconds      INR 1.85    aPTT [[id number]]  (Abnormal) Collected: 05/18/23 1215    Specimen: Blood Updated: 05/18/23 1253     PTT 49.3 seconds     Narrative:      PTT = The equivalent PTT values for the therapeutic range of heparin levels at 0.3 to 0.5 U/ml are 60 to 70 seconds.    Heparin Induced PLT Antibody With / Rfx [[id number]] Collected: 05/18/23 1214    Specimen: Blood Updated: 05/18/23 1230    Peripheral Blood Smear [[id number]] Collected: 05/18/23 1214    Specimen: Blood Updated: 05/18/23 1230    Reticulocytes [[id number]]  (Abnormal) Collected: 05/17/23 2011    Specimen: Blood Updated: 05/18/23 1220     Reticulocyte % 3.38 %      Reticulocyte Absolute 0.0686 10*6/mm3     Acetaminophen Level [[id number]]  (Normal) Collected: 05/17/23 2012    Specimen: Blood Updated: 05/18/23 1102     Acetaminophen <5.0 mcg/mL         Imaging Results (Last 24 Hours)     Procedure Component Value Units Date/Time    XR Abdomen KUB [[id number]] Collected: 05/18/23 2210     Updated: 05/19/23 0012    Narrative:      EXAMINATION: XR ABDOMEN KUB      DATE OF EXAM: 5/18/2023 11:04 PM    HISTORY: NG tube placement    COMPARISON: None.    FINDINGS:     Cone-down view of the lung bases and upper abdomen for evaluation of NG tube placement.    Enteric tube terminates within stomach.    No evidence of bowel obstruction.        Impression:        Enteric tube terminates within stomach.    Electronically signed by:  Kleber Mota D.O.    5/18/2023 10:11 PM Mountain Time        ECG/EMG Results (last 24 hours)     Procedure Component Value Units Date/Time    SCANNED - TELEMETRY   [[id number]] Resulted: 05/18/23     Updated: 05/19/23 0651          Physician Progress Notes (last 24 hours)  Notes from 05/18/23 0729 through 05/19/23 0729   No notes of this type exist for this encounter.            Consult Notes (last 24 hours)      Pee Wen MD at 05/18/23 1620             Patient Care Team:  Provider, No Known as PCP - Pee Gillette MD as Consulting Physician (Nephrology)    Chief complaint: Acute liver failure  Alcoholic hepatitis   Acute renal failure     History of Present Illness: Mr Toure is a 33 yo gentleman hx of alcohol use disorder he is transferred from [facility] for higher level of care. Patient is admitted in hospital w concern for alcoholic hepatitis, possible GI bleed, hepatic encephalopathy and acute renal failure. Labs on this admission remarkable for Cr 14<2.5 Na 134<131, K 3.9, Bicarb 17 total bili 12.2. Patient has dependent edema. Appears severely jaundiced. He also has mild confusion.     Review of Systems   Constitutional: Negative.    HENT: Negative.    Cardiovascular: Positive for leg swelling.   Gastrointestinal: Positive for abdominal distention.   Endocrine: Negative.    Genitourinary: Negative.    Musculoskeletal: Negative.    Skin: Positive for color change.   Neurological: Negative.    Psychiatric/Behavioral: Positive for confusion.        Past Medical History:   Diagnosis Date   • Alcohol-induced acute pancreatitis 6/27/2019   • Alcoholism    • Drug use     Amphetamines and Suboxone   • Fatty liver    • Hepatitis C antibody test positive 2019   • Medical non-compliance    • Pancreatitis    • Smoker    ,   Past Surgical History:   Procedure Laterality Date   • NO PAST SURGERIES     ,   Family History   Problem Relation Age of Onset   • Cancer Maternal Grandmother    • No Known Problems Mother    • No Known Problems Father    ,   Social History     Socioeconomic History   • Marital status:    Tobacco Use   • Smoking status: Every Day     Packs/day: 0.50     Types: Cigarettes   • Smokeless tobacco: Never   Vaping Use   • Vaping Use: Never used   Substance and Sexual Activity   • Alcohol use: Yes     Alcohol/week: 8.0 standard drinks     Types: 8 Shots of liquor per week     Comment: 5-6 double shots   • Drug use: Yes     Comment:  suboxone as prescribed   • Sexual activity: Defer     E-cigarette/Vaping   • E-cigarette/Vaping Use Never User      E-cigarette/Vaping Substances     E-cigarette/Vaping Devices       ,   Medications Prior to Admission   Medication Sig Dispense Refill Last Dose   • buprenorphine-naloxone (SUBOXONE) 8-2 MG per SL tablet Place 2 tablets under the tongue Daily. 56 tablet 0    • cloNIDine (Catapres) 0.1 MG tablet Take 1 tablet by mouth 2 (Two) Times a Day. Take as needed for anxiety.  Insomnia.  Chills or sweats 60 tablet 0    • hydrOXYzine pamoate (Vistaril) 50 MG capsule Take 1 capsule by mouth 4 (Four) Times a Day As Needed for Anxiety (and/or insomia). Take 1 to 2 tablets as needed for anxiety every 6 hours 90 capsule 0    • ibuprofen (ADVIL,MOTRIN) 600 MG tablet Take 1 tablet by mouth Every 6 (Six) Hours As Needed for Mild Pain. 30 tablet 0    • lisinopril (PRINIVIL,ZESTRIL) 20 MG tablet Take 1 tablet by mouth Daily. for blood pressure 30 tablet 2    • naloxone (NARCAN) 4 MG/0.1ML nasal spray 1 spray into the nostril(s) as directed by provider As Needed (Opiate oversedation). Spray in 1 nostril every 2 to 3 minutes call 911 2 each 2    • ondansetron (ZOFRAN) 8 MG tablet Take 1 tablet by mouth Every 8 (Eight) Hours As Needed for Nausea or Vomiting. 45 tablet 0    • QUEtiapine (SEROquel) 25 MG tablet Take 1 tablet by mouth Every Night. 30 tablet 0    • sertraline (Zoloft) 50 MG tablet Take 1 tablet by mouth Daily for 30 days. 30 tablet 0     and Scheduled Meds:  albumin human, 25 g, Intravenous, Q6H  docusate sodium, 50 mg, Nasogastric, BID  lactulose, 20 g, Oral, TID  meropenem, 500 mg, Intravenous, Q8H  methylPREDNISolone sodium succinate, 60 mg, Intravenous, Daily  pantoprazole, 40 mg, Intravenous, Q12H  phytonadione (VITAMIN K) IVPB, 10 mg, Intravenous, Once  sennosides, 5 mL, Nasogastric, BID  sodium chloride, 10 mL, Intravenous, Q12H  sodium chloride, 10 mL, Intravenous, Q12H        Objective     Vital  Signs  Temp:  [97.6 °F (36.4 °C)-97.9 °F (36.6 °C)] 97.6 °F (36.4 °C)  Heart Rate:  [66-86] 66  Resp:  [16-18] 16  BP: ()/(50-86) 109/73    I/O this shift:  In: 1000 [IV Piggyback:1000]  Out: -   No intake/output data recorded.    Physical Exam  Constitutional:       Appearance: He is ill-appearing.   HENT:      Head: Normocephalic and atraumatic.      Nose: Nose normal.   Eyes:      General: Scleral icterus present.      Pupils: Pupils are equal, round, and reactive to light.   Cardiovascular:      Rate and Rhythm: Normal rate.      Pulses: Normal pulses.   Pulmonary:      Effort: Pulmonary effort is normal. No respiratory distress.      Breath sounds: Normal breath sounds. No stridor.   Abdominal:      General: There is distension.   Musculoskeletal:      Right lower leg: Edema present.      Left lower leg: Edema present.   Skin:     General: Skin is warm.      Coloration: Skin is jaundiced.   Neurological:      General: No focal deficit present.      Comments: Confusion.         Results Review:    I reviewed the patient's new clinical results.    WBC WBC   Date Value Ref Range Status   05/18/2023 5.13 3.40 - 10.80 10*3/mm3 Preliminary   05/17/2023 6.02 3.40 - 10.80 10*3/mm3 Final      HGB Hemoglobin   Date Value Ref Range Status   05/18/2023 6.9 (C) 13.0 - 17.7 g/dL Preliminary   05/17/2023 7.3 (L) 13.0 - 17.7 g/dL Final      HCT Hematocrit   Date Value Ref Range Status   05/18/2023 20.4 (C) 37.5 - 51.0 % Preliminary   05/17/2023 21.9 (L) 37.5 - 51.0 % Final      Platlets No results found for: LABPLAT   MCV MCV   Date Value Ref Range Status   05/18/2023 106.8 (H) 79.0 - 97.0 fL Preliminary   05/17/2023 110.1 (H) 79.0 - 97.0 fL Final          Sodium Sodium   Date Value Ref Range Status   05/18/2023 134 (L) 136 - 145 mmol/L Final   05/17/2023 131 (L) 136 - 145 mmol/L Final      Potassium Potassium   Date Value Ref Range Status   05/18/2023 3.9 3.5 - 5.2 mmol/L Final   05/17/2023 3.9 3.5 - 5.2 mmol/L Final       Chloride Chloride   Date Value Ref Range Status   05/18/2023 108 (H) 98 - 107 mmol/L Final   05/17/2023 103 98 - 107 mmol/L Final      CO2 CO2   Date Value Ref Range Status   05/18/2023 17.0 (L) 22.0 - 29.0 mmol/L Final   05/17/2023 18.5 (L) 22.0 - 29.0 mmol/L Final      BUN BUN   Date Value Ref Range Status   05/18/2023 19 6 - 20 mg/dL Final   05/17/2023 20 6 - 20 mg/dL Final      Creatinine Creatinine   Date Value Ref Range Status   05/18/2023 1.47 (H) 0.76 - 1.27 mg/dL Final   05/17/2023 2.59 (H) 0.76 - 1.27 mg/dL Final      Calcium Calcium   Date Value Ref Range Status   05/18/2023 7.7 (L) 8.6 - 10.5 mg/dL Final   05/17/2023 8.3 (L) 8.6 - 10.5 mg/dL Final      PO4 No results found for: CAPO4   Albumin Albumin   Date Value Ref Range Status   05/18/2023 2.7 (L) 3.5 - 5.2 g/dL Final   05/17/2023 2.0 (L) 3.5 - 5.2 g/dL Final      Magnesium Magnesium   Date Value Ref Range Status   05/17/2023 1.7 1.6 - 2.6 mg/dL Final      Uric Acid No results found for: URICACID         Assessment & Plan       Hepatorenal failure        Assessment & Plan    EDMOND: Baseline cr 0.9mg/dl. Cr 1.4-2.57 in last 2 days. Ua positive for nitrite, lue esterase and bilirubin. WBC+. EDMOND likely hemodynamic variation in the setting of liver dysfunction, possible GI bleed and low intra-vascular volume status    Met acidosis: Due to EDMOND    Hyponatremia: In the setting of EDMOND and liver failure    Alcoholic hepatitis: Hx of alcohol use disorder. Started on steroids    Anemia: Concern for GI bleed. Starting octreotide     Jaundice: Elevated Tbili.     Dependent edema: Stable.     AMS: Likley hepatic encephalopathy    Recs   Acute kidney injury likely hemodynamic variation in renal function. Additional possibility ATN 2/2 bile cast nephropathy. Less likely to be HRS. As there is no ascites. Renal function already improving. Will recommend starting 25% albumin 1g/kg for 48hr. Agree with PRBC transfusion for low HH. Hold off on diuretics. Check urine Na,  "cr and chloride. Dose meds to eGFR. Need ram cath to rule out urinary retention. Strict I/O. No indication of dialysis      Will continue to follow the patient       I discussed the patients findings and my recommendations with patient    Pee Wen MD  05/18/23  13:26 EDT            Electronically signed by Pee Wen MD at 05/18/23 1339     Leesa Toure PA-C at 05/18/23 1033     Attestation signed by Marguerite Blanco MD at 05/18/23 2110    I have reviewed this documentation and agree.  Patient seen and evaluated in room.  His mother (an RN in [place]) and wife in room.  They report patient has had 5 bouts of pancreatitis in [place] and aware that alcohol has been affecting his liver.  Wife reports he has been told that he could die if continued to drink alcohol.      They report patient has been \"sick\" for several weeks.  HE has had more jaundice for 4 weeks that wife could recall.  She reports he had not been taking anything.  She reports he does take ibuprofen 800 mg for his knee but reports that he does not take tylenol products or additional medications that she is aware    She reports baseline patient has not been confused but more sleepy.     This afternoon, patient can identfy his mother and told his wife about his lactulose enema.      His exam by me shows that he is alert but very sleepy.  He has NG tube and does not show signs of withdrawal but rather takes time to answer questions.  He is jaundice but denies any pain    Patient resting  Agree with plan outlined as above  Discussed with family that he may require higher level of care if he does not protect his aware and does not improve fromhis encephalopathy.  Family aware and would like medical staff to reemphasize importance of seeking alcohol cessation.  Wife reports, \"He will not believe us\"    Patient was told he \"cleared\" his hepatitis C and has not been to see anyone that wife can recall for liver disease  She reports has never had " treatment for the hepatitis C    Overall, family aware that I am not a transplant hepatologist and that we do not offer transplant here.  They are please with current management and aware may need transfer if he continues to decompensate    They agree with plan and appreciated staff                [facility] Consult    Referring Provider: Ponce Cosby MD    PCP: Provider, No Known    Reason for Consultation: Decompensated cirrhosis     History of present illness:    Franck Toure is a 32 y.o. male, PMH includes EtOH abuse, h/o IVDU, chronic HCV, pancreatitis, is admitted via transfer from [facility] for evaluation of decompensated cirrhosis, hepatorenal syndrome. History is difficult, as pt is lethargic and no family is available at bedside.    Per pt, he has noticed jaundice and worsened LE edema for about two weeks. He drinks about 8-10oz Fireball whiskey per day. He reports h/o chronic HCV s/p treatment in 2013, data deficient at time of exam. He smokes 0.5ppd.     Labs at time of admission significant for EtOH 16, HCV Ab (+), Hb 7.3, Hct 21.9, plt 104, , MCH 36.7, BUN 20, Cr 2.59, Na 131, albumin 2.0, total bili 14.9, , ALT 24, alk phos 277, NH3 142, PT 19.2, INR 1.55, lactate 2.9. UDS (+) THC, TCA, buprenorphine.     CT A/P wo contrast 5/17: Moderate hepatomegaly with diffuse fatty infiltration. Heterogenous parenchymal echotexture of liver. Marked splenomegaly. Cholelithiasis. Mild ascites. Mild circumferential large bowel wall thickening, may be due to portal colopathy.     Allergies:  Patient has no known allergies.    Scheduled Meds:  albumin human, 25 g, Intravenous, Q6H  meropenem, 1 g, Intravenous, Once  meropenem, 500 mg, Intravenous, Q8H  octreotide, 50 mcg, Intravenous, Once  pantoprazole, 40 mg, Intravenous, Q12H  senna-docusate sodium, 2 tablet, Oral, BID  sodium chloride, 10 mL, Intravenous, Q12H         Infusions:  octreotide (SandoSTATIN) infusion, 50  mcg/hr        PRN Meds:  •  senna-docusate sodium **AND** polyethylene glycol **AND** bisacodyl **AND** bisacodyl  •  Calcium Replacement - Follow Nurse / BPA Driven Protocol  •  Magnesium Standard Dose Replacement - Follow Nurse / BPA Driven Protocol  •  ondansetron  •  Phosphorus Replacement - Follow Nurse / BPA Driven Protocol  •  Potassium Replacement - Follow Nurse / BPA Driven Protocol  •  sodium chloride  •  sodium chloride    Home Meds:  Medications Prior to Admission   Medication Sig Dispense Refill Last Dose   • buprenorphine-naloxone (SUBOXONE) 8-2 MG per SL tablet Place 2 tablets under the tongue Daily. 56 tablet 0    • cloNIDine (Catapres) 0.1 MG tablet Take 1 tablet by mouth 2 (Two) Times a Day. Take as needed for anxiety.  Insomnia.  Chills or sweats 60 tablet 0    • hydrOXYzine pamoate (Vistaril) 50 MG capsule Take 1 capsule by mouth 4 (Four) Times a Day As Needed for Anxiety (and/or insomia). Take 1 to 2 tablets as needed for anxiety every 6 hours 90 capsule 0    • ibuprofen (ADVIL,MOTRIN) 600 MG tablet Take 1 tablet by mouth Every 6 (Six) Hours As Needed for Mild Pain. 30 tablet 0    • lisinopril (PRINIVIL,ZESTRIL) 20 MG tablet Take 1 tablet by mouth Daily. for blood pressure 30 tablet 2    • naloxone (NARCAN) 4 MG/0.1ML nasal spray 1 spray into the nostril(s) as directed by provider As Needed (Opiate oversedation). Spray in 1 nostril every 2 to 3 minutes call 911 2 each 2    • ondansetron (ZOFRAN) 8 MG tablet Take 1 tablet by mouth Every 8 (Eight) Hours As Needed for Nausea or Vomiting. 45 tablet 0    • QUEtiapine (SEROquel) 25 MG tablet Take 1 tablet by mouth Every Night. 30 tablet 0    • sertraline (Zoloft) 50 MG tablet Take 1 tablet by mouth Daily for 30 days. 30 tablet 0        ROS: Review of Systems   Unable to perform ROS: Mental status change   Cardiovascular: Positive for leg swelling.   Gastrointestinal: Positive for abdominal distention.   Skin: Positive for color change.       PAST MED  "HX:  Past Medical History:   Diagnosis Date   • Alcohol-induced acute pancreatitis 6/27/2019   • Alcoholism    • Drug use     Amphetamines and Suboxone   • Fatty liver    • Hepatitis C antibody test positive 2019   • Medical non-compliance    • Pancreatitis    • Smoker        PAST SURG HX:  Past Surgical History:   Procedure Laterality Date   • NO PAST SURGERIES         FAM HX:  Family History   Problem Relation Age of Onset   • Cancer Maternal Grandmother    • No Known Problems Mother    • No Known Problems Father        SOC HX:  Social History     Socioeconomic History   • Marital status:    Tobacco Use   • Smoking status: Every Day     Packs/day: 0.50     Types: Cigarettes   • Smokeless tobacco: Never   Vaping Use   • Vaping Use: Never used   Substance and Sexual Activity   • Alcohol use: Yes     Alcohol/week: 8.0 standard drinks     Types: 8 Shots of liquor per week     Comment: 5-6 double shots   • Drug use: Yes     Comment: suboxone as prescribed   • Sexual activity: Defer       PHYSICAL EXAM  /86   Pulse 71   Temp 97.6 °F (36.4 °C) (Oral)   Resp 16   Ht 195.6 cm (77\")   Wt 122 kg (268 lb 11.2 oz)   SpO2 98%   BMI 31.86 kg/m²   Wt Readings from Last 3 Encounters:   05/18/23 122 kg (268 lb 11.2 oz)   05/17/23 111 kg (245 lb)   05/02/23 104 kg (229 lb)   ,body mass index is 31.86 kg/m².  Physical Exam  Vitals and nursing note reviewed.   Constitutional:       Appearance: He is ill-appearing. He is not diaphoretic.      Comments: Awakens briefly to voice / sternal rub, mumbles response to questions   HENT:      Head: Normocephalic and atraumatic.      Right Ear: External ear normal.      Left Ear: External ear normal.      Nose: Nose normal.      Mouth/Throat:      Mouth: Mucous membranes are moist.      Pharynx: Oropharynx is clear.   Eyes:      General: Scleral icterus present.         Right eye: No discharge.         Left eye: No discharge.      Conjunctiva/sclera: Conjunctivae normal.      " Pupils: Pupils are equal, round, and reactive to light.   Cardiovascular:      Rate and Rhythm: Normal rate and regular rhythm.      Pulses: Normal pulses.      Heart sounds: Normal heart sounds.   Pulmonary:      Effort: Pulmonary effort is normal.      Breath sounds: Normal breath sounds.   Abdominal:      General: Abdomen is flat. There is distension.      Palpations: There is hepatomegaly and splenomegaly.      Tenderness: There is no abdominal tenderness. There is no guarding or rebound.   Musculoskeletal:      Cervical back: Normal range of motion.      Right lower leg: Edema present.      Left lower leg: Edema present.   Skin:     General: Skin is warm and dry.      Capillary Refill: Capillary refill takes less than 2 seconds.      Coloration: Skin is jaundiced.      Findings: Bruising (lower abdomen) present.   Neurological:      Mental Status: He is lethargic.      Motor: Tremor (bilateral hands) present.         Results Review:   I reviewed the patient's new clinical results.  I reviewed the patient's new imaging results and agree with the interpretation.  I reviewed the patient's other test results and agree with the interpretation    Lab Results   Component Value Date    WBC 6.02 05/17/2023    HGB 7.3 (L) 05/17/2023    HGB 15.7 10/20/2022    HGB 13.1 03/16/2022    HCT 21.9 (L) 05/17/2023    .1 (H) 05/17/2023     (L) 05/17/2023       Lab Results   Component Value Date    INR 1.55 (H) 05/17/2023    INR 0.98 06/18/2021    INR 0.99 06/27/2019       Lab Results   Component Value Date    GLUCOSE 111 (H) 05/17/2023    BUN 20 05/17/2023    CREATININE 2.59 (H) 05/17/2023    EGFRIFNONA >150 01/01/2022    BCR 7.7 05/17/2023     (L) 05/17/2023    K 3.9 05/17/2023    CO2 18.5 (L) 05/17/2023    CALCIUM 8.3 (L) 05/17/2023    ALBUMIN 2.0 (L) 05/17/2023    ALKPHOS 277 (H) 05/17/2023    BILITOT 14.0 (H) 05/17/2023    BILIDIR >10.0 (H) 05/17/2023    ALT 24 05/17/2023     (H) 05/17/2023        ASSESSMENTS/PLANS    Acute EtOH hepatitis  Decompensated cirrhosis (likely EtOH / HCV) with jaundice, hepatosplenomegaly, HE, ascites  Macrocytoic anemia  Hepatorenal syndrome  Thrombocytopenia  Chronic HCV s/p treatment, unclear if SVR  H/o EtOH abuse  THC use   - continue trending H/H and transfuse per hospitalist protocol   - Heribertowilber's Discriminant Function 44.4, indicating poor prognosis; will initiate prednisolone 40mg daily   - MELD-Na 27, consistent with 19.6% 90 day mortality   - Child Chapa Score 13, Class C   - Last Para: N/A   - Ascites Management: will defer to nephrology given EDMOND vs HRS   - HCC Surveillance: CT A/P 5/18   - EV Surveillance: plan for EGD tomorrow with Dr. Blanco   - HE Management: lactulose 20g TID, will add xifaxan 550mg BID and lactulose enema x 1   - continue BID PPI, daily MV, thiamine, folic acid   - obtain HCV quant, Hep A/B serologies, AFP, vitamin D    Medically complex and at high risk of further decline including death. Will continue to closely monitor patient status.     I discussed the patient's findings and my recommendations with patient, nursing staff and consulting provider. Thank you very kindly for this consultation. Will continue to follow during this hospitalization.      Leesa Toure PA-C  05/18/23  10:33 EDT        Electronically signed by Marguerite Blanco MD at 05/18/23 2110        Mavis Barrios RN Extern   Registered Nurse  Plan of Care      Cosign Needed  Date of Service:  05/19/23 0631  Creation Time:  05/19/23 0631     Cosign Needed          Goal Outcome Evaluation:  Plan of Care Reviewed With: (P) patient  Progress: (P) improving  Outcome Evaluation: (P) Pt is alert only to self and is disoriented to time, place, and situation. Pt arouses to voice and answers questions when asked and follows commands. VSS with NSR on the monitor and is on RA. NG tube is still in place for medication admin. and pt has been asking about when he can eat/drink. Pt had two  incontinent BM after given bowel medications and one continent void with the urinal. The second unit of PRBC was finished on this shift and 2 units of FFP was given with no reaction. There are no complaints at this time.

## 2023-05-19 NOTE — ANESTHESIA PREPROCEDURE EVALUATION
Anesthesia Evaluation     Patient summary reviewed and Nursing notes reviewed   NPO Solid Status: > 8 hours  NPO Liquid Status: > 2 hours           Airway   Mallampati: II  TM distance: >3 FB  Neck ROM: full  No difficulty expected  Dental      Pulmonary    (+) a smoker Current,   Cardiovascular     ECG reviewed      ROS comment: ECG NSR     Neuro/Psych    ROS Comment: Hep encephalopathy   GI/Hepatic/Renal/Endo    (+)   liver disease (SEVERE LIVER DISEASE) history of elevated LFT cirrhosis, renal disease (1.4 ---> 1.2 ) ARF,     ROS Comment: Advanced etoh liver disease10-15 double shots of hard liquor  huge splenomegaly and portal hypertension  acute hepatic encephalopathy IV steroids  INR is elevated at 1.85 transfuse plasma and give him 10 milligram of IV vitamin K    Bilirubin is elevated at 14.9, trending down to 12.2 (no evidence of dilated common bile duct )Likely hepatocellular jaundice    Musculoskeletal     Abdominal    Substance History   (+) alcohol use, drug use (suboxone)     OB/GYN          Other   blood dyscrasia (HCT25 Plts 113k) anemia thrombocytopenia,     ROS/Med Hx Other: PT PTT elevated +++  Very somnolent making history impossible                Anesthesia Plan    ASA 4     MAC     (True MAC -hep encephgalopathy  itrate PFL)  intravenous induction     Anesthetic plan, risks, benefits, and alternatives have been provided, discussed and informed consent has been obtained with: patient.    Plan discussed with CRNA.        CODE STATUS:    Level Of Support Discussed With: Patient  Code Status (Patient has no pulse and is not breathing): CPR (Attempt to Resuscitate)  Medical Interventions (Patient has pulse or is breathing): Full Support

## 2023-05-20 ENCOUNTER — APPOINTMENT (OUTPATIENT)
Dept: CARDIOLOGY | Facility: HOSPITAL | Age: 33
End: 2023-05-20
Payer: COMMERCIAL

## 2023-05-20 LAB
ALBUMIN SERPL-MCNC: 2.8 G/DL (ref 3.5–5.2)
ALBUMIN/GLOB SERPL: 0.9 G/DL
ALP SERPL-CCNC: 206 U/L (ref 39–117)
ALT SERPL W P-5'-P-CCNC: 24 U/L (ref 1–41)
ANION GAP SERPL CALCULATED.3IONS-SCNC: 11 MMOL/L (ref 5–15)
ANISOCYTOSIS BLD QL: ABNORMAL
AST SERPL-CCNC: 101 U/L (ref 1–40)
BACTERIA SPEC AEROBE CULT: ABNORMAL
BASOPHILS # BLD MANUAL: 0 10*3/MM3 (ref 0–0.2)
BASOPHILS NFR BLD MANUAL: 0 % (ref 0–1.5)
BH BB BLOOD EXPIRATION DATE: NORMAL
BH BB BLOOD EXPIRATION DATE: NORMAL
BH BB BLOOD TYPE BARCODE: 600
BH BB BLOOD TYPE BARCODE: 6200
BH BB DISPENSE STATUS: NORMAL
BH BB DISPENSE STATUS: NORMAL
BH BB PRODUCT CODE: NORMAL
BH BB PRODUCT CODE: NORMAL
BH BB UNIT NUMBER: NORMAL
BH BB UNIT NUMBER: NORMAL
BH CV ECHO MEAS - AO MAX PG: 8.8 MMHG
BH CV ECHO MEAS - AO MEAN PG: 5 MMHG
BH CV ECHO MEAS - AO ROOT DIAM: 3.2 CM
BH CV ECHO MEAS - AO V2 MAX: 148 CM/SEC
BH CV ECHO MEAS - AO V2 VTI: 36.6 CM
BH CV ECHO MEAS - AVA(I,D): 2.44 CM2
BH CV ECHO MEAS - EDV(CUBED): 195.1 ML
BH CV ECHO MEAS - EDV(MOD-SP2): 98.7 ML
BH CV ECHO MEAS - EDV(MOD-SP4): 144 ML
BH CV ECHO MEAS - EF(MOD-BP): 57 %
BH CV ECHO MEAS - EF(MOD-SP2): 60.7 %
BH CV ECHO MEAS - EF(MOD-SP4): 56.6 %
BH CV ECHO MEAS - ESV(CUBED): 68.9 ML
BH CV ECHO MEAS - ESV(MOD-SP2): 38.8 ML
BH CV ECHO MEAS - ESV(MOD-SP4): 62.5 ML
BH CV ECHO MEAS - FS: 29.3 %
BH CV ECHO MEAS - IVS/LVPW: 0.88 CM
BH CV ECHO MEAS - IVSD: 0.7 CM
BH CV ECHO MEAS - LA DIMENSION: 3.3 CM
BH CV ECHO MEAS - LAT PEAK E' VEL: 16 CM/SEC
BH CV ECHO MEAS - LV MASS(C)D: 161.9 GRAMS
BH CV ECHO MEAS - LV MAX PG: 6.1 MMHG
BH CV ECHO MEAS - LV MEAN PG: 3 MMHG
BH CV ECHO MEAS - LV V1 MAX: 123 CM/SEC
BH CV ECHO MEAS - LV V1 VTI: 25.8 CM
BH CV ECHO MEAS - LVIDD: 5.8 CM
BH CV ECHO MEAS - LVIDS: 4.1 CM
BH CV ECHO MEAS - LVOT AREA: 3.5 CM2
BH CV ECHO MEAS - LVOT DIAM: 2.1 CM
BH CV ECHO MEAS - LVPWD: 0.8 CM
BH CV ECHO MEAS - MED PEAK E' VEL: 10.1 CM/SEC
BH CV ECHO MEAS - MV A MAX VEL: 70.7 CM/SEC
BH CV ECHO MEAS - MV DEC SLOPE: 313 CM/SEC2
BH CV ECHO MEAS - MV DEC TIME: 0.19 MSEC
BH CV ECHO MEAS - MV E MAX VEL: 101 CM/SEC
BH CV ECHO MEAS - MV E/A: 1.43
BH CV ECHO MEAS - MV MAX PG: 4 MMHG
BH CV ECHO MEAS - MV MEAN PG: 2 MMHG
BH CV ECHO MEAS - MV P1/2T: 103.9 MSEC
BH CV ECHO MEAS - MV V2 VTI: 31.2 CM
BH CV ECHO MEAS - MVA(P1/2T): 2.12 CM2
BH CV ECHO MEAS - MVA(VTI): 2.9 CM2
BH CV ECHO MEAS - PA ACC TIME: 0.22 SEC
BH CV ECHO MEAS - PA PR(ACCEL): -17.7 MMHG
BH CV ECHO MEAS - RAP SYSTOLE: 3 MMHG
BH CV ECHO MEAS - RVSP: 31 MMHG
BH CV ECHO MEAS - SV(LVOT): 89.4 ML
BH CV ECHO MEAS - SV(MOD-SP2): 59.9 ML
BH CV ECHO MEAS - SV(MOD-SP4): 81.5 ML
BH CV ECHO MEAS - TAPSE (>1.6): 1.99 CM
BH CV ECHO MEAS - TR MAX PG: 28.2 MMHG
BH CV ECHO MEAS - TR MAX VEL: 264.3 CM/SEC
BH CV ECHO MEASUREMENTS AVERAGE E/E' RATIO: 7.74
BH CV XLRA - RV BASE: 4.1 CM
BH CV XLRA - RV LENGTH: 7.6 CM
BH CV XLRA - RV MID: 3.5 CM
BH CV XLRA - TDI S': 14.5 CM/SEC
BILIRUB SERPL-MCNC: 14.5 MG/DL (ref 0–1.2)
BUN SERPL-MCNC: 28 MG/DL (ref 6–20)
BUN/CREAT SERPL: 21.4 (ref 7–25)
CALCIUM SPEC-SCNC: 8.2 MG/DL (ref 8.6–10.5)
CHLORIDE SERPL-SCNC: 105 MMOL/L (ref 98–107)
CO2 SERPL-SCNC: 18 MMOL/L (ref 22–29)
CREAT SERPL-MCNC: 1.31 MG/DL (ref 0.76–1.27)
DEPRECATED RDW RBC AUTO: 74.4 FL (ref 37–54)
EGFRCR SERPLBLD CKD-EPI 2021: 74.2 ML/MIN/1.73
EOSINOPHIL # BLD MANUAL: 0 10*3/MM3 (ref 0–0.4)
EOSINOPHIL NFR BLD MANUAL: 0 % (ref 0.3–6.2)
ERYTHROCYTE [DISTWIDTH] IN BLOOD BY AUTOMATED COUNT: 19.9 % (ref 12.3–15.4)
GLOBULIN UR ELPH-MCNC: 3.2 GM/DL
GLUCOSE BLDC GLUCOMTR-MCNC: 223 MG/DL (ref 70–130)
GLUCOSE BLDC GLUCOMTR-MCNC: 234 MG/DL (ref 70–130)
GLUCOSE BLDC GLUCOMTR-MCNC: 259 MG/DL (ref 70–130)
GLUCOSE BLDC GLUCOMTR-MCNC: 280 MG/DL (ref 70–130)
GLUCOSE SERPL-MCNC: 242 MG/DL (ref 65–99)
HCT VFR BLD AUTO: 25.6 % (ref 37.5–51)
HGB BLD-MCNC: 8.6 G/DL (ref 13–17.7)
INR PPP: 1.68 (ref 0.89–1.12)
LEFT ATRIUM VOLUME INDEX: 155.1 ML/M2
LYMPHOCYTES # BLD MANUAL: 0.65 10*3/MM3 (ref 0.7–3.1)
LYMPHOCYTES NFR BLD MANUAL: 4 % (ref 5–12)
MACROCYTES BLD QL SMEAR: ABNORMAL
MAGNESIUM SERPL-MCNC: 1.8 MG/DL (ref 1.6–2.6)
MAXIMAL PREDICTED HEART RATE: 188 BPM
MCH RBC QN AUTO: 34.7 PG (ref 26.6–33)
MCHC RBC AUTO-ENTMCNC: 33.6 G/DL (ref 31.5–35.7)
MCV RBC AUTO: 103.2 FL (ref 79–97)
METAMYELOCYTES NFR BLD MANUAL: 4 % (ref 0–0)
MONOCYTES # BLD: 0.37 10*3/MM3 (ref 0.1–0.9)
NEUTROPHILS # BLD AUTO: 7.9 10*3/MM3 (ref 1.7–7)
NEUTROPHILS NFR BLD MANUAL: 70 % (ref 42.7–76)
NEUTS BAND NFR BLD MANUAL: 15 % (ref 0–5)
NRBC SPEC MANUAL: 0 /100 WBC (ref 0–0.2)
PF4 HEPARIN CMPLX IGG SERPL IA: 0.18 OD (ref 0–0.4)
PHOSPHATE SERPL-MCNC: 3.1 MG/DL (ref 2.5–4.5)
PLATELET # BLD AUTO: 121 10*3/MM3 (ref 140–450)
PMV BLD AUTO: 10.9 FL (ref 6–12)
POTASSIUM SERPL-SCNC: 4.3 MMOL/L (ref 3.5–5.2)
PROT SERPL-MCNC: 6 G/DL (ref 6–8.5)
PROTHROMBIN TIME: 19.9 SECONDS (ref 12.2–14.5)
RBC # BLD AUTO: 2.48 10*6/MM3 (ref 4.14–5.8)
SMALL PLATELETS BLD QL SMEAR: ABNORMAL
SODIUM SERPL-SCNC: 134 MMOL/L (ref 136–145)
STRESS TARGET HR: 160 BPM
UNIT  ABO: NORMAL
UNIT  ABO: NORMAL
UNIT  RH: NORMAL
UNIT  RH: NORMAL
VARIANT LYMPHS NFR BLD MANUAL: 7 % (ref 19.6–45.3)
WBC MORPH BLD: NORMAL
WBC NRBC COR # BLD: 9.29 10*3/MM3 (ref 3.4–10.8)

## 2023-05-20 PROCEDURE — 99233 SBSQ HOSP IP/OBS HIGH 50: CPT | Performed by: INTERNAL MEDICINE

## 2023-05-20 PROCEDURE — 25010000002 METHYLPREDNISOLONE PER 40 MG: Performed by: INTERNAL MEDICINE

## 2023-05-20 PROCEDURE — 0 ACETYLCYSTEINE PER 100 MG: Performed by: INTERNAL MEDICINE

## 2023-05-20 PROCEDURE — 85025 COMPLETE CBC W/AUTO DIFF WBC: CPT | Performed by: INTERNAL MEDICINE

## 2023-05-20 PROCEDURE — 93306 TTE W/DOPPLER COMPLETE: CPT

## 2023-05-20 PROCEDURE — 25010000002 THIAMINE PER 100 MG: Performed by: NURSE PRACTITIONER

## 2023-05-20 PROCEDURE — 25010000002 MEROPENEM PER 100 MG: Performed by: INTERNAL MEDICINE

## 2023-05-20 PROCEDURE — 85007 BL SMEAR W/DIFF WBC COUNT: CPT | Performed by: INTERNAL MEDICINE

## 2023-05-20 PROCEDURE — 80053 COMPREHEN METABOLIC PANEL: CPT | Performed by: INTERNAL MEDICINE

## 2023-05-20 PROCEDURE — 82948 REAGENT STRIP/BLOOD GLUCOSE: CPT

## 2023-05-20 PROCEDURE — 63710000001 INSULIN LISPRO (HUMAN) PER 5 UNITS: Performed by: INTERNAL MEDICINE

## 2023-05-20 PROCEDURE — 25010000002 OCTREOTIDE PER 25 MCG: Performed by: INTERNAL MEDICINE

## 2023-05-20 PROCEDURE — 83735 ASSAY OF MAGNESIUM: CPT | Performed by: INTERNAL MEDICINE

## 2023-05-20 PROCEDURE — 84100 ASSAY OF PHOSPHORUS: CPT | Performed by: INTERNAL MEDICINE

## 2023-05-20 PROCEDURE — 85610 PROTHROMBIN TIME: CPT | Performed by: INTERNAL MEDICINE

## 2023-05-20 PROCEDURE — 99232 SBSQ HOSP IP/OBS MODERATE 35: CPT | Performed by: NURSE PRACTITIONER

## 2023-05-20 PROCEDURE — 25010000002 CEFTRIAXONE PER 250 MG: Performed by: INTERNAL MEDICINE

## 2023-05-20 RX ORDER — INSULIN LISPRO 100 [IU]/ML
2-7 INJECTION, SOLUTION INTRAVENOUS; SUBCUTANEOUS
Status: DISCONTINUED | OUTPATIENT
Start: 2023-05-20 | End: 2023-05-23 | Stop reason: HOSPADM

## 2023-05-20 RX ORDER — NICOTINE POLACRILEX 4 MG
15 LOZENGE BUCCAL
Status: DISCONTINUED | OUTPATIENT
Start: 2023-05-20 | End: 2023-05-23 | Stop reason: HOSPADM

## 2023-05-20 RX ORDER — DEXTROSE MONOHYDRATE 25 G/50ML
25 INJECTION, SOLUTION INTRAVENOUS
Status: DISCONTINUED | OUTPATIENT
Start: 2023-05-20 | End: 2023-05-23 | Stop reason: HOSPADM

## 2023-05-20 RX ORDER — TORSEMIDE 20 MG/1
40 TABLET ORAL ONCE
Status: COMPLETED | OUTPATIENT
Start: 2023-05-20 | End: 2023-05-20

## 2023-05-20 RX ORDER — IBUPROFEN 600 MG/1
1 TABLET ORAL
Status: DISCONTINUED | OUTPATIENT
Start: 2023-05-20 | End: 2023-05-23 | Stop reason: HOSPADM

## 2023-05-20 RX ORDER — LACTULOSE 10 G/15ML
30 SOLUTION ORAL 3 TIMES DAILY
Status: DISCONTINUED | OUTPATIENT
Start: 2023-05-20 | End: 2023-05-22

## 2023-05-20 RX ORDER — CHOLECALCIFEROL (VITAMIN D3) 125 MCG
5 CAPSULE ORAL NIGHTLY PRN
Status: DISCONTINUED | OUTPATIENT
Start: 2023-05-20 | End: 2023-05-23 | Stop reason: HOSPADM

## 2023-05-20 RX ADMIN — DOCUSATE SODIUM 50 MG: 50 LIQUID ORAL at 20:39

## 2023-05-20 RX ADMIN — LACTULOSE 30 G: 20 SOLUTION ORAL at 20:39

## 2023-05-20 RX ADMIN — SENNOSIDES 5 ML: 8.8 LIQUID ORAL at 08:39

## 2023-05-20 RX ADMIN — LACTULOSE 30 G: 20 SOLUTION ORAL at 08:37

## 2023-05-20 RX ADMIN — SODIUM CHLORIDE 2 G: 900 INJECTION INTRAVENOUS at 14:34

## 2023-05-20 RX ADMIN — OCTREOTIDE ACETATE 50 MCG/HR: 500 INJECTION, SOLUTION INTRAVENOUS; SUBCUTANEOUS at 23:07

## 2023-05-20 RX ADMIN — Medication 1000 UNITS: at 08:38

## 2023-05-20 RX ADMIN — Medication 10 ML: at 20:39

## 2023-05-20 RX ADMIN — DOCUSATE SODIUM 50 MG: 50 LIQUID ORAL at 08:37

## 2023-05-20 RX ADMIN — Medication 10 ML: at 08:39

## 2023-05-20 RX ADMIN — RIFAXIMIN 550 MG: 550 TABLET ORAL at 20:39

## 2023-05-20 RX ADMIN — SENNOSIDES 5 ML: 8.8 LIQUID ORAL at 20:39

## 2023-05-20 RX ADMIN — PANTOPRAZOLE SODIUM 40 MG: 40 INJECTION, POWDER, LYOPHILIZED, FOR SOLUTION INTRAVENOUS at 08:37

## 2023-05-20 RX ADMIN — INSULIN LISPRO 4 UNITS: 100 INJECTION, SOLUTION INTRAVENOUS; SUBCUTANEOUS at 18:17

## 2023-05-20 RX ADMIN — THIAMINE HYDROCHLORIDE 500 MG: 100 INJECTION, SOLUTION INTRAMUSCULAR; INTRAVENOUS at 11:20

## 2023-05-20 RX ADMIN — PANTOPRAZOLE SODIUM 40 MG: 40 INJECTION, POWDER, LYOPHILIZED, FOR SOLUTION INTRAVENOUS at 20:39

## 2023-05-20 RX ADMIN — TORSEMIDE 40 MG: 20 TABLET ORAL at 14:34

## 2023-05-20 RX ADMIN — ACETYLCYSTEINE 5.12 MG/KG/HR: 200 INJECTION INTRAVENOUS at 23:07

## 2023-05-20 RX ADMIN — RIFAXIMIN 550 MG: 550 TABLET ORAL at 08:38

## 2023-05-20 RX ADMIN — FOLIC ACID 1 MG: 5 INJECTION, SOLUTION INTRAMUSCULAR; INTRAVENOUS; SUBCUTANEOUS at 11:20

## 2023-05-20 RX ADMIN — OCTREOTIDE ACETATE 50 MCG/HR: 500 INJECTION, SOLUTION INTRAVENOUS; SUBCUTANEOUS at 03:34

## 2023-05-20 RX ADMIN — METHYLPREDNISOLONE SODIUM SUCCINATE 60 MG: 40 INJECTION INTRAMUSCULAR; INTRAVENOUS at 08:38

## 2023-05-20 RX ADMIN — Medication 5 MG: at 20:39

## 2023-05-20 RX ADMIN — Medication 1 PATCH: at 08:39

## 2023-05-20 RX ADMIN — ASCORBIC ACID, THIAMINE, RIBOFLAVIN, NIACINAMIDE, PYRIDOXINE, FOLIC ACID, COBALAMIN, BIOTIN, PANTOTHENIC ACID 15 ML: 100; 1.5; 1.7; 20; 10; 1; 6; 300; 1 TABLET, COATED ORAL at 08:38

## 2023-05-20 RX ADMIN — Medication 5 MG: at 00:53

## 2023-05-20 RX ADMIN — LACTULOSE 30 G: 20 SOLUTION ORAL at 18:17

## 2023-05-20 RX ADMIN — MEROPENEM 500 MG: 500 INJECTION, POWDER, FOR SOLUTION INTRAVENOUS at 06:08

## 2023-05-20 RX ADMIN — ACETYLCYSTEINE 5.12 MG/KG/HR: 200 INJECTION INTRAVENOUS at 03:34

## 2023-05-20 NOTE — PLAN OF CARE
Goal Outcome Evaluation:         Pt alert and oriented. Up x1 assist. Incontinent of bowel at times, multiple loose/liquid bowel movements throughout shift. Patient on room air, NSR on monitor.

## 2023-05-20 NOTE — CONSULTS
Clinical Nutrition     Nutrition Support Assessment  Reason for Visit: Physician consult, EN      Patient Name: Franck Toure  YOB: 1990  MRN: [id number]  Date of Encounter: 05/19/23 20:28 EDT  Admission date: 5/18/2023    Comments: Consult received for EN and order entered, then tube removed and pt began GI diet.  (EN order discontinued, see below if should need re-order)  Pt allows is beginning to eat.     Nutrition Assessment   Admission Diagnosis:  Hepatorenal failure [K76.7]  Hepatic encephalopathy on adm    Problem List:    Hepatorenal failure    Cirrhosis of liver without ascites    Anemia   Hep B+    ? GIB    ? Sepsis 2/2 UTI    +THC, tricyclic, buprenorphine    PMH:   He  has a past medical history of Alcohol-induced acute pancreatitis (6/27/2019), Alcoholism, Drug use, Fatty liver, Hepatitis C antibody test positive (2019), Medical non-compliance, Pancreatitis, and Smoker.    PSH:  He  has a past surgical history that includes No past surgeries.      Applicable Nutrition Concerns:   Skin:  Oral:  GI: per EGD Hiatal hernia w erosions, Oozing varices 2 bands       Applicable Interval History:   5/19 EGD w NJ placement - tube subsequently removed      Reported/Observed/Food/Nutrition Related History:     Pt allows wt of 245 lbs yesterday. Allows was eating ok at home.      Labs    Labs Reviewed: Yes     Elevated Ammonia on adm. Folate low. Amylase low, Lipase ok.    Results from last 7 days   Lab Units 05/19/23 0537 05/18/23 1215 05/17/23 2011   GLUCOSE mg/dL 184* 110* 111*   BUN mg/dL 20 19 20   CREATININE mg/dL 1.20 1.47* 2.59*   SODIUM mmol/L 135* 134* 131*   CHLORIDE mmol/L 107 108* 103   POTASSIUM mmol/L 4.8 3.9 3.9   MAGNESIUM mg/dL  --   --  1.7   ALT (SGPT) U/L 20 18 24       Results from last 7 days   Lab Units 05/19/23 0537 05/18/23  1215 05/17/23 2011   ALBUMIN g/dL 3.0* 2.7* 2.0*   CRP mg/dL  --   --  5.55*           Lab Results   Lab Value Date/Time     "HGBA1C 4.70 (L) 05/19/2023 0537    HGBA1C 5.50 06/17/2021 1502         Results from last 7 days   Lab Units 05/17/23  2012   PROBNP pg/mL 337.6         Medications    Medications Reviewed: Yes  Pertinent: vitamin D ,colace, folic acid, lactulose, abx, steroid, multi vit min, senokot, thiamin  Infusion:  PRN:       Intake/Ouptut 24 hrs (0701 - 0700)   I&O's Reviewed: Yes     Intake & Output (last day)       05/19 0701  05/20 0700    I.V. (mL/kg) 800 (6.6)    Blood     Other     IV Piggyback     Total Intake(mL/kg) 800 (6.6)    Urine (mL/kg/hr)     Stool     Total Output     Net +800             Stool x 4 on 5/18    Anthropometrics     Admission Height 195.6 cm (77\") Documented at 05/18/2023 0938   Admission Weight 122 kg (268 lb 11.2 oz) Documented at 05/18/2023 0938       Height: Height: 195.6 cm (77\")  Last Filed Weight: Weight: 122 kg (268 lb 11.2 oz) (05/18/23 0938)  Method: Weight Method: Bed scale  BMI: BMI (Calculated): 31.9  BMI classification: Obese Class I: 30-34.9kg/m2    UBW: 245 lbs per pt  Weight change:     Nutrition Focused Physical Exam     Date: 5/19    Unable to perform exam due to: Defer pending indication      Needs Assessment   Date:    Height used:Height: 195.6 cm (77\")  Weights used: 111 Kg (245 lbs rpted recent wt consistent w wt hx)       Estimated Calorie needs: ~  2700  Kcal/day  Method:  MSJ 2179 x1.3 = 2832  Method:  Kcals/KG 20-25 = 2220 - 2775    Estimated Protein needs: ~ 122 g PRO/day  Method: g/Kg 1.0 - 1.2  = 111-133    Estimated Fluid needs: ~ ml/day   Per clinical status    Current Nutrition Prescription     PO: Diet: Gastrointestinal Diets, High Protein Diet; Fiber-Restricted; Texture: Regular Texture (IDDSI 7); Fluid Consistency: Thin (IDDSI 0)  Oral Nutrition Supplement:   Intake: Insufficient data      Nutrition Diagnosis   Date: 5/19 Updated:   Problem Potential inadeq intake   Etiology Possible effect of dx on appetite   Signs/Symptoms Follow for intake progression "   Status:     Goal:   General: Nutrition to support treatment  PO: Establish PO  EN/PN: N/A at this time    Nutrition Intervention      Follow treatment progress, Care plan reviewed, Advise alternate selection, Menu provided, Encourage intake,  EN rec prepared per consult order     When ready to feed begin Peptamen1.5  20 ml/hr Water at 20 ml/hr for 8 hr, w tolerance advance to 40 ml/hr Water at 30 ml/hr for 8 hr, w tolerance advance to 60 ml/hr w Water at 40 ml/hr Finally after 8 hr w tolerance after advance to goal Peptamen 1.5 80 ml/hr, Water cont at 40 ml/hr.    At goal EN would supply 1760 ml feeding for   2640 Kcal 98% est need, 120 g PRO 98% est need, 0 Fiber, 1355 ml Water in EN 2235 total ml Water.        Monitoring/Evaluation:   Per protocol, I&O, PO intake, Pertinent labs, Weight, GI status, Symptoms      Prisca Last RD  Time Spent: 45 min

## 2023-05-20 NOTE — PROGRESS NOTES
"GI Daily Progress Note  Subjective:    Chief Complaint: Follow-up decompensated alcohol liver cirrhosis, acute alcohol hepatitis    Franck is resting in bed.  Mental status improved from prior exam in preop yesterday; mild asterixis noted but is alert and oriented x4.  Notes he is having bowel movements but is unable to tell me how many today.  Breakfast tray at the bedside with 0% consumed.    Objective:    BP (!) 121/101 (BP Location: Left arm, Patient Position: Lying)   Pulse 57   Temp 98.2 °F (36.8 °C) (Axillary)   Resp 16   Ht 195.6 cm (77\")   Wt 126 kg (277 lb)   SpO2 97%   BMI 32.85 kg/m²     Physical Exam  Vitals and nursing note reviewed.   Constitutional:       General: He is not in acute distress.     Appearance: He is ill-appearing. He is not toxic-appearing.   Eyes:      General: Scleral icterus present.      Extraocular Movements: Extraocular movements intact.      Pupils: Pupils are equal, round, and reactive to light.   Cardiovascular:      Rate and Rhythm: Normal rate and regular rhythm.      Pulses: Normal pulses.      Heart sounds: Normal heart sounds.   Pulmonary:      Effort: Pulmonary effort is normal. No respiratory distress.      Breath sounds: Normal breath sounds.   Abdominal:      General: Abdomen is flat. Bowel sounds are normal. There is no distension.      Palpations: Abdomen is soft. There is no mass.      Tenderness: There is no abdominal tenderness. There is no guarding or rebound.      Hernia: No hernia is present.   Skin:     Capillary Refill: Capillary refill takes less than 2 seconds.      Coloration: Skin is jaundiced.   Neurological:      General: No focal deficit present.      Mental Status: He is alert and oriented to person, place, and time.      Comments: Mild asterixis noted on exam   Psychiatric:         Mood and Affect: Mood normal.         Behavior: Behavior normal.         Thought Content: Thought content normal.         Judgment: Judgment normal.         Lab  I " have personally reviewed most recent cardiac tracings, lab results and radiology images and interpretations and agree with findings.    Lab Results   Component Value Date    WBC 9.29 05/20/2023    HGB 8.6 (L) 05/20/2023    HGB 8.9 (L) 05/19/2023    HGB 6.9 (C) 05/18/2023    .2 (H) 05/20/2023     (L) 05/20/2023    INR 1.68 (H) 05/20/2023    INR 1.85 (H) 05/18/2023    INR 1.55 (H) 05/17/2023    INR 0.98 06/18/2021    INR 0.99 06/27/2019       Lab Results   Component Value Date    GLUCOSE 242 (H) 05/20/2023    BUN 28 (H) 05/20/2023    CREATININE 1.31 (H) 05/20/2023    EGFRIFNONA >150 01/01/2022    BCR 21.4 05/20/2023     (L) 05/20/2023    K 4.3 05/20/2023    CO2 18.0 (L) 05/20/2023    CALCIUM 8.2 (L) 05/20/2023    ALBUMIN 2.8 (L) 05/20/2023    ALKPHOS 206 (H) 05/20/2023    BILITOT 14.5 (H) 05/20/2023    BILIDIR >10.0 (H) 05/17/2023    ALT 24 05/20/2023     (H) 05/20/2023     UPPER GI ENDOSCOPY (05/19/2023 07:59)    Assessment:  1.  Acute alcohol hepatitis   -MDF 44.4 indicating poor prognosis   -Steroids initiated on 5/18/2023   -Calculate Lille score 05/22/2023  2.  Decompensated liver cirrhosis, HCV/EtOH   -With varices, hepatic encephalopathy, ascites, and esophageal varices   -MELD-Na: 27 points, 32% 90 day mortality    -Child-Chapa: C  3. Gastrointestinal bleeding, symptomatic anemia, macrocytic anemia   -S/p EGD with bleeding esophageal varices, banded x 2  4. EDMOND on CKD, suspect ATN as urine Na not indicative of HRS  5. Chronic HCV, s/p treatment, unclear if SVR  6. Alcohol abuse and dependence, fireball.   7. THC Use  8. Hepatic Encephalopathy    -Grade 2/4 today, improved.     Plan:  Patient is status post EGD with banding of bleeding esophageal varices x2.  Hemoglobin remained stable.  >>> Continue Rocephin for full 7-day treatment course for SBP prophylaxis  >>> Continue octreotide for the next 2 days following EV banding  >>> Push nutrition   -high-protein / low sodium (less than  2g/day) diet   -Frequent high-protein snacks   -High-protein bedtime snack   -Initiate calorie count    -If eating less than 60% of meals, will need Keofeed.   -Add protein shakes to meals and at bedtime.  >>> IV PPI twice daily  >>> Lactulose enemas discontinued  >>> For hepatic encephalopathy management:   -Lactulose 30 g 3 times daily   -Rifaximin 550 mg twice daily    -Goal is for 3-4 soft BMs daily.  >>> Continue MVI, thiamine, folic acid  >>> will monitor creatinine; if worsening, will add albumin.     Discussion:   Had a long discussion with patient at the bedside regarding the status of his liver disease.  I discussed the importance of nutrition and of abstaining from any further use of alcohol.  Patient voiced understanding of this.  I further reiterated to patient his overall poor prognosis in setting of acute alcohol hepatitis and decompensated liver cirrhosis( Madrey score indicated an overall poor prognosis and MELD score is 27 point indicating a 32% 90-day mortality with a Child-Chapa class of C indicating an overall 1 to 3-year life expectancy if trajectory continues).  Patient absolutely must abstain from any further use of alcohol as it is deleterious to his health.  Patient voiced understanding of this.  We will continue to monitor closely.  Patient is medically complex and high risk of further decline including death.  Prognosis guarded.    Juarez Zeng, APRN  05/20/23  13:40 EDT

## 2023-05-20 NOTE — PROGRESS NOTES
[facility] Medicine Services  PROGRESS NOTE    Patient Name: Franck Toure  : 1990  MRN: [id number]    Date of Admission: 2023  Primary Care Physician: Provider, No Known    Subjective   Subjective     CC:  Follow-up for hepatic encephalopathy    HPI:  I have seen and evaluated the patient this morning. Feeling better. Still feeling weak. Multible BM last night and diuresed well but UOP is not measured      ROS:  General : no fevers, chills,++ weakness  CVS: No chest pain, palpitations  Respiratory: No cough, dyspnea  GI: No N/V/D, abd pain  10 point review of systems is negative except for what is mentioned in HPI    Objective   Objective     Vital Signs:   Temp:  [96.8 °F (36 °C)-98 °F (36.7 °C)] 97.8 °F (36.6 °C)  Heart Rate:  [57-72] 57  Resp:  [16-18] 16  BP: (103-145)/(73-99) 103/78  Flow (L/min):  [2-4] 2     Physical Exam:  General: Acutely ill looking.  Generalized edema anasarca  Head: Atraumatic and normocephalic  Eyes: Severe jaundice  Ears:  Ears appear intact with no abnormalities noted  Throat: No oral lesions, no thrush  Neck: Supple, trachea midline  Lungs: Diminished air entry bilaterally.  No crackles or wheezing.    Heart:  Normal S1 and S2, no murmur, no gallop, No JVD, 2+ lower extremity swelling  Abdomen:  Soft, no tenderness, no organomegaly, normal bowel sounds, no organomegaly  Extremities: pulses equal bilaterally  Skin: No bleeding, bruising or rash, normal skin turgor and elasticity  Neurologic: Cranial nerves appear intact with no evidence of facial asymmetry, normal motor and sensory functions in all 4 extremities  Psych: Alert and oriented x 3, normal mood    Results Reviewed:  LAB RESULTS:      Lab 23  0537 23  1215 23  1214 23  0126 23   WBC 5.96  --  5.13  --   --  6.02   HEMOGLOBIN 8.9*  --  6.9*  --   --  7.3*   HEMATOCRIT 25.4*  --  20.4*  --   --  21.9*   PLATELETS 113*  --  106*   --   --  104*   NEUTROS ABS 5.19  --  3.18  --   --  3.67   EOS ABS 0.00  --  0.10  --   --  0.06   .2*  --  106.8*  --   --  110.1*   CRP  --   --   --   --   --  5.55*   PROCALCITONIN  --   --   --   --  0.64*  --    LACTATE  --  0.8  --  2.7* 2.9*  --    PROTIME  --  21.5*  --   --  19.2*  --    APTT  --  49.3*  --   --  44.2*  --    D DIMER QUANT  --  2.74*  --   --   --   --          [facility] 05/19/23  [facility] 05/18/23 1215 05/17/23 2011   SODIUM 135* 134* 131*   POTASSIUM 4.8 3.9 3.9   CHLORIDE 107 108* 103   CO2 19.0* 17.0* 18.5*   ANION GAP 9.0 9.0 9.5   BUN 20 19 20   CREATININE 1.20 1.47* 2.59*   EGFR 82.4 64.6 32.7*   GLUCOSE 184* 110* 111*   CALCIUM 8.0* 7.7* 8.3*   MAGNESIUM  --   --  1.7   HEMOGLOBIN A1C 4.70*  --   --          [facility] 05/19/23 05 05/18/23 1215 05/17/23 2012 05/17/23 2011   TOTAL PROTEIN 6.2 6.1  --  6.3   ALBUMIN 3.0* 2.7*  --  2.0*   GLOBULIN 3.2 3.4  --  4.3   ALT (SGPT) 20 18  --  24   AST (SGOT) 94* 106*  --  120*   BILIRUBIN 16.0* 12.2* 14.0* 14.9*   BILIRUBIN DIRECT  --   --  >10.0*  --    ALK PHOS 209* 221*  --  277*   AMYLASE  --   --   --  18*   LIPASE  --   --   --  28         Lab 05/18/23 1215 05/17/23 2229 05/17/23 2012   PROBNP  --   --  337.6   PROTIME 21.5* 19.2*  --    INR 1.85* 1.55*  --              Lab 05/19/23 05 05/18/23  1454 05/18/23  1448 05/18/23  1215   IRON  --   --   --  59   IRON SATURATION  --   --   --  79*   TIBC  --   --   --  75*   TRANSFERRIN  --   --   --  50*   FOLATE 3.35*  --   --   --    VITAMIN B 12  --  >2,000*  --   --    ABO TYPING  --   --  A A   RH TYPING  --   --  Positive Positive   ANTIBODY SCREEN  --   --   --  Negative         Brief Urine Lab Results  (Last result in the past 365 days)      Color   Clarity   Blood   Leuk Est   Nitrite   Protein   CREAT   Urine HCG        05/18/23 1558             134.3         05/18/23 1558 Dark Yellow   Cloudy   Negative   Moderate (2+)   Negative   30 mg/dL (1+)                 Microbiology  Results Abnormal     Procedure Component Value - Date/Time    Urine Culture - Urine, Urine, Clean Catch [[id number]]  (Normal) Collected: 05/18/23 1558    Lab Status: Final result Specimen: Urine, Clean Catch Updated: 05/19/23 2338     Urine Culture No growth    Blood Culture - Blood, Blood, PICC Line [[id number]]  (Normal) Collected: 05/18/23 1453    Lab Status: Preliminary result Specimen: Blood, PICC Line Updated: 05/19/23 1546     Blood Culture No growth at 24 hours    Blood Culture - Blood, Blood, PICC Line [[id number]]  (Normal) Collected: 05/18/23 1213    Lab Status: Preliminary result Specimen: Blood, PICC Line Updated: 05/19/23 1330     Blood Culture No growth at 24 hours          XR Abdomen KUB    Result Date: 5/19/2023  XR ABDOMEN KUB Date of Exam: 5/19/2023 9:30 AM EDT Indication: nasal jejunal tube placement Comparison: None available. Findings: Enteric catheter projects over the expected location of the distal duodenum. The bowel gas pattern is unremarkable. No acute osseous lesion is seen.     Impression: Impression: Enteric catheter projects over the expected location of the distal duodenum. Electronically Signed: Horacio Sharma  5/19/2023 9:46 AM EDT  Workstation ID: [id number]    XR Abdomen KUB    Result Date: 5/19/2023  EXAMINATION: XR ABDOMEN KUB  DATE OF EXAM: 5/18/2023 11:04 PM HISTORY: NG tube placement COMPARISON: None. FINDINGS: Cone-down view of the lung bases and upper abdomen for evaluation of NG tube placement. Enteric tube terminates within stomach. No evidence of bowel obstruction.     Impression: Enteric tube terminates within stomach. Electronically signed by:  Kleber Mota D.O.  5/18/2023 10:11 PM Mountain Time          Current medications:  Scheduled Meds:cholecalciferol, 1,000 Units, Oral, Daily  docusate sodium, 50 mg, Nasogastric, BID  folic acid (FOLVITE) IVPB, 1 mg, Intravenous, Daily  lactulose, 30 g, Oral, TID  meropenem, 500 mg, Intravenous, Q6H  methylPREDNISolone  sodium succinate, 60 mg, Intravenous, Daily  multivitamin and minerals, 15 mL, Oral, Daily  nicotine, 1 patch, Transdermal, Q24H  pantoprazole, 40 mg, Intravenous, Q12H  rifAXIMin, 550 mg, Oral, Q12H  sennosides, 5 mL, Nasogastric, BID  sodium chloride, 10 mL, Intravenous, Q12H  thiamine (B-1) IV, 500 mg, Intravenous, Daily      Continuous Infusions:acetylcysteine (ACETADOTE) 6000mg in D5W infusion, 5.12 mg/kg/hr, Last Rate: 5.12 mg/kg/hr (05/20/23 0334)  octreotide (SandoSTATIN) infusion, 50 mcg/hr, Last Rate: 50 mcg/hr (05/20/23 0334)      PRN Meds:.•  [DISCONTINUED] senna-docusate sodium **AND** polyethylene glycol **AND** bisacodyl **AND** bisacodyl  •  Calcium Replacement - Follow Nurse / BPA Driven Protocol  •  Magnesium Standard Dose Replacement - Follow Nurse / BPA Driven Protocol  •  melatonin  •  ondansetron  •  Phosphorus Replacement - Follow Nurse / BPA Driven Protocol  •  Potassium Replacement - Follow Nurse / BPA Driven Protocol  •  sodium chloride  •  sodium chloride    Assessment & Plan   Assessment & Plan     Active Hospital Problems    Diagnosis  POA   • **Hepatorenal failure [K76.7]  Yes   • Cirrhosis of liver without ascites [K74.60]  Unknown   • Anemia [D64.9]  Unknown      Resolved Hospital Problems   No resolved problems to display.        Brief Hospital Course to date:  Franck Toure is a 32 y.o. male with past medical history of chronic hep C, alcohol use disorder, alcohol induced pancreatitis, fatty liver disease who presented to the hospital as a direct admit from [facility] for liver failure and higher level of care     Assessment and plan:  Acute liver failure, slowly improving  Acute metabolic/hepatic encephalopathy, improving  Hepatocellular jaundice  Alcohol induced acute hepatitis  • Known to drink alcohol for years: 10-15 double shots of hard liquor  • Has advanced liver disease with huge splenomegaly and portal hypertension  • Continue lactulose and rifaximin for hepatic  encephalopathy.  Consciousness is improving   • Maddrey discrimination score is 56 -->  continue steroids  • INR is elevated at 1.85 -->  status post 2 units of blood transfusion and vitamin K 10 mg x 1 dose.  Repeat INR is 1.6.  Continue to monitor INR  • Bilirubin is remained elevated.  Likely hepatocellular jaundice secondary to liver failure.  No evidence of biliary duct dilation on CT scan abdomen from outside facility  • GI following.  Appreciate the help  • Continue supportive care for now and close monitoring of liver functions     Acute blood loss anemia secondary to GI bleed, POA  Severe symptomatic anemia  • Hemoglobin 6 months ago was 16.  Today 6.9  • Suspect blood loss anemia secondary to chronic GI bleed versus acute GI bleed  • Seen by GI team.  EGD 5/19/2023 oozing varices bleed status post banding, portal hypertension gastropathy and hiatal hernia with Robert erosions  • Continue IV Protonix 40 mg twice daily  • Continue IV octreotide drip for total of 72 hours  • Status post transfusion of 2 units  of blood on 5/18/2023  • Anemia work-up --> iron overload.  B12 more than 2000 and low folic acid.  Continue folic acid replacement    Sepsis secondary to UTI, POA  Cannot rule out SBP  • Has elevated procalcitonin, positive UA and elevated lactic acid  • Lactic acid improved with IV fluids. Lactic acid could be also related to his liver failure  • CT scan abdomen with trace ascites, bedside ultrasound with no adequate pocket of fluid to safely perform paracentesis  • Initially started on IV Merrem.  Urine culture growing Klebsiella oxytocin sensitive to Rocephin, will switch to Rocephin with tentative plan to treat for 10 days as complicated UTI  • Follow blood and urine culture      EDMOND, likely secondary to volume depletion  Less likely hepatorenal syndrome  • Creatinine at outside hospital 2.5.  Improved with IV fluids and IV albumin and currently at 1.2   • Continue gentle diuresis today given his  volume overload  • Nephrology team following.  Appreciate help     Alcohol dependence  Drug use disorder   • Avoid benzodiazepine given his acute hepatic encephalopathy.  Will monitor for any withdrawal symptoms  • Addiction team consult    Hyperglycemia  · Likely secondary to N-acetylcysteine drip and steroid use  · A1c is normal  · ISS    Expected Discharge Location and Transportation: home  Expected Discharge   Expected Discharge Date: 5/24/2023; Expected Discharge Time:      DVT prophylaxis:  Mechanical DVT prophylaxis orders are present.          CODE STATUS:   Code Status and Medical Interventions:   Ordered at: 05/18/23 1030     Level Of Support Discussed With:    Patient     Code Status (Patient has no pulse and is not breathing):    CPR (Attempt to Resuscitate)     Medical Interventions (Patient has pulse or is breathing):    Full Support       Sigrid Grady MD  05/20/23

## 2023-05-21 LAB
ALBUMIN SERPL-MCNC: 2.8 G/DL (ref 3.5–5.2)
ALBUMIN/GLOB SERPL: 0.8 G/DL
ALP SERPL-CCNC: 189 U/L (ref 39–117)
ALT SERPL W P-5'-P-CCNC: 39 U/L (ref 1–41)
ANION GAP SERPL CALCULATED.3IONS-SCNC: 13 MMOL/L (ref 5–15)
AST SERPL-CCNC: 162 U/L (ref 1–40)
BASOPHILS # BLD AUTO: 0.02 10*3/MM3 (ref 0–0.2)
BASOPHILS NFR BLD AUTO: 0.2 % (ref 0–1.5)
BILIRUB SERPL-MCNC: 13.6 MG/DL (ref 0–1.2)
BUN SERPL-MCNC: 29 MG/DL (ref 6–20)
BUN/CREAT SERPL: 28.2 (ref 7–25)
BURR CELLS BLD QL SMEAR: NORMAL
CALCIUM SPEC-SCNC: 8.3 MG/DL (ref 8.6–10.5)
CHLORIDE SERPL-SCNC: 101 MMOL/L (ref 98–107)
CO2 SERPL-SCNC: 20 MMOL/L (ref 22–29)
CREAT SERPL-MCNC: 1.03 MG/DL (ref 0.76–1.27)
DEPRECATED RDW RBC AUTO: 85.8 FL (ref 37–54)
EGFRCR SERPLBLD CKD-EPI 2021: 99 ML/MIN/1.73
EOSINOPHIL # BLD AUTO: 0 10*3/MM3 (ref 0–0.4)
EOSINOPHIL NFR BLD AUTO: 0 % (ref 0.3–6.2)
ERYTHROCYTE [DISTWIDTH] IN BLOOD BY AUTOMATED COUNT: 20.3 % (ref 12.3–15.4)
GLOBULIN UR ELPH-MCNC: 3.5 GM/DL
GLUCOSE BLDC GLUCOMTR-MCNC: 215 MG/DL (ref 70–130)
GLUCOSE BLDC GLUCOMTR-MCNC: 226 MG/DL (ref 70–130)
GLUCOSE BLDC GLUCOMTR-MCNC: 237 MG/DL (ref 70–130)
GLUCOSE BLDC GLUCOMTR-MCNC: 248 MG/DL (ref 70–130)
GLUCOSE SERPL-MCNC: 217 MG/DL (ref 65–99)
HCT VFR BLD AUTO: 22.9 % (ref 37.5–51)
HGB BLD-MCNC: 7 G/DL (ref 13–17.7)
IMM GRANULOCYTES # BLD AUTO: 0.58 10*3/MM3 (ref 0–0.05)
IMM GRANULOCYTES NFR BLD AUTO: 6.9 % (ref 0–0.5)
INR PPP: 2.42 (ref 0.89–1.12)
LYMPHOCYTES # BLD AUTO: 0.55 10*3/MM3 (ref 0.7–3.1)
LYMPHOCYTES NFR BLD AUTO: 6.5 % (ref 19.6–45.3)
MACROCYTES BLD QL SMEAR: NORMAL
MAGNESIUM SERPL-MCNC: 1.8 MG/DL (ref 1.6–2.6)
MCH RBC QN AUTO: 35.9 PG (ref 26.6–33)
MCHC RBC AUTO-ENTMCNC: 30.6 G/DL (ref 31.5–35.7)
MCV RBC AUTO: 117.4 FL (ref 79–97)
MICROCYTES BLD QL: NORMAL
MONOCYTES # BLD AUTO: 0.72 10*3/MM3 (ref 0.1–0.9)
MONOCYTES NFR BLD AUTO: 8.6 % (ref 5–12)
NEUTROPHILS NFR BLD AUTO: 6.55 10*3/MM3 (ref 1.7–7)
NEUTROPHILS NFR BLD AUTO: 77.8 % (ref 42.7–76)
NRBC BLD AUTO-RTO: 0.4 /100 WBC (ref 0–0.2)
PHOSPHATE SERPL-MCNC: 2.2 MG/DL (ref 2.5–4.5)
PHOSPHATE SERPL-MCNC: 3.2 MG/DL (ref 2.5–4.5)
PLAT MORPH BLD: NORMAL
PLATELET # BLD AUTO: 101 10*3/MM3 (ref 140–450)
PMV BLD AUTO: 10.8 FL (ref 6–12)
POTASSIUM SERPL-SCNC: 3.4 MMOL/L (ref 3.5–5.2)
PROT SERPL-MCNC: 6.3 G/DL (ref 6–8.5)
PROTHROMBIN TIME: 26.5 SECONDS (ref 12.2–14.5)
RBC # BLD AUTO: 1.95 10*6/MM3 (ref 4.14–5.8)
SODIUM SERPL-SCNC: 134 MMOL/L (ref 136–145)
WBC MORPH BLD: NORMAL
WBC NRBC COR # BLD: 8.42 10*3/MM3 (ref 3.4–10.8)

## 2023-05-21 PROCEDURE — 25010000002 OCTREOTIDE PER 25 MCG: Performed by: INTERNAL MEDICINE

## 2023-05-21 PROCEDURE — P9016 RBC LEUKOCYTES REDUCED: HCPCS

## 2023-05-21 PROCEDURE — 85610 PROTHROMBIN TIME: CPT | Performed by: INTERNAL MEDICINE

## 2023-05-21 PROCEDURE — 0 ACETYLCYSTEINE PER 100 MG: Performed by: INTERNAL MEDICINE

## 2023-05-21 PROCEDURE — 99232 SBSQ HOSP IP/OBS MODERATE 35: CPT | Performed by: INTERNAL MEDICINE

## 2023-05-21 PROCEDURE — 82948 REAGENT STRIP/BLOOD GLUCOSE: CPT

## 2023-05-21 PROCEDURE — 63710000001 INSULIN LISPRO (HUMAN) PER 5 UNITS: Performed by: INTERNAL MEDICINE

## 2023-05-21 PROCEDURE — 86927 PLASMA FRESH FROZEN: CPT

## 2023-05-21 PROCEDURE — 85025 COMPLETE CBC W/AUTO DIFF WBC: CPT | Performed by: INTERNAL MEDICINE

## 2023-05-21 PROCEDURE — 36430 TRANSFUSION BLD/BLD COMPNT: CPT

## 2023-05-21 PROCEDURE — P9059 PLASMA, FRZ BETWEEN 8-24HOUR: HCPCS

## 2023-05-21 PROCEDURE — 25010000002 THIAMINE PER 100 MG: Performed by: NURSE PRACTITIONER

## 2023-05-21 PROCEDURE — 25010000002 METHYLPREDNISOLONE PER 40 MG: Performed by: INTERNAL MEDICINE

## 2023-05-21 PROCEDURE — 25010000002 CEFTRIAXONE PER 250 MG: Performed by: INTERNAL MEDICINE

## 2023-05-21 PROCEDURE — 85007 BL SMEAR W/DIFF WBC COUNT: CPT | Performed by: INTERNAL MEDICINE

## 2023-05-21 PROCEDURE — 83735 ASSAY OF MAGNESIUM: CPT | Performed by: INTERNAL MEDICINE

## 2023-05-21 PROCEDURE — 84100 ASSAY OF PHOSPHORUS: CPT | Performed by: INTERNAL MEDICINE

## 2023-05-21 PROCEDURE — 80053 COMPREHEN METABOLIC PANEL: CPT | Performed by: INTERNAL MEDICINE

## 2023-05-21 PROCEDURE — 86900 BLOOD TYPING SEROLOGIC ABO: CPT

## 2023-05-21 PROCEDURE — 99233 SBSQ HOSP IP/OBS HIGH 50: CPT | Performed by: INTERNAL MEDICINE

## 2023-05-21 RX ORDER — POTASSIUM CHLORIDE 750 MG/1
40 CAPSULE, EXTENDED RELEASE ORAL EVERY 4 HOURS
Status: COMPLETED | OUTPATIENT
Start: 2023-05-21 | End: 2023-05-22

## 2023-05-21 RX ORDER — WATER 1000 ML/1000ML
INJECTION, SOLUTION INTRAVENOUS
Status: COMPLETED
Start: 2023-05-21 | End: 2023-05-21

## 2023-05-21 RX ORDER — BUPRENORPHINE HYDROCHLORIDE AND NALOXONE HYDROCHLORIDE DIHYDRATE 8; 2 MG/1; MG/1
1 TABLET SUBLINGUAL DAILY
Status: DISCONTINUED | OUTPATIENT
Start: 2023-05-21 | End: 2023-05-23 | Stop reason: HOSPADM

## 2023-05-21 RX ADMIN — LACTULOSE 30 G: 20 SOLUTION ORAL at 17:37

## 2023-05-21 RX ADMIN — PANTOPRAZOLE SODIUM 40 MG: 40 INJECTION, POWDER, LYOPHILIZED, FOR SOLUTION INTRAVENOUS at 20:09

## 2023-05-21 RX ADMIN — BUPRENORPHINE AND NALOXONE 1 TABLET: 8; 2 TABLET SUBLINGUAL at 12:42

## 2023-05-21 RX ADMIN — Medication 10 ML: at 20:09

## 2023-05-21 RX ADMIN — INSULIN LISPRO 3 UNITS: 100 INJECTION, SOLUTION INTRAVENOUS; SUBCUTANEOUS at 17:38

## 2023-05-21 RX ADMIN — SODIUM CHLORIDE 2 G: 900 INJECTION INTRAVENOUS at 12:42

## 2023-05-21 RX ADMIN — Medication 1 PATCH: at 08:43

## 2023-05-21 RX ADMIN — INSULIN LISPRO 3 UNITS: 100 INJECTION, SOLUTION INTRAVENOUS; SUBCUTANEOUS at 20:16

## 2023-05-21 RX ADMIN — INSULIN LISPRO 3 UNITS: 100 INJECTION, SOLUTION INTRAVENOUS; SUBCUTANEOUS at 12:42

## 2023-05-21 RX ADMIN — OCTREOTIDE ACETATE 50 MCG/HR: 500 INJECTION, SOLUTION INTRAVENOUS; SUBCUTANEOUS at 08:40

## 2023-05-21 RX ADMIN — ASCORBIC ACID, THIAMINE, RIBOFLAVIN, NIACINAMIDE, PYRIDOXINE, FOLIC ACID, COBALAMIN, BIOTIN, PANTOTHENIC ACID 15 ML: 100; 1.5; 1.7; 20; 10; 1; 6; 300; 1 TABLET, COATED ORAL at 08:49

## 2023-05-21 RX ADMIN — DOCUSATE SODIUM 50 MG: 50 LIQUID ORAL at 20:09

## 2023-05-21 RX ADMIN — METHYLPREDNISOLONE SODIUM SUCCINATE 60 MG: 40 INJECTION INTRAMUSCULAR; INTRAVENOUS at 08:50

## 2023-05-21 RX ADMIN — POTASSIUM CHLORIDE 40 MEQ: 10 CAPSULE, COATED, EXTENDED RELEASE ORAL at 20:09

## 2023-05-21 RX ADMIN — RIFAXIMIN 550 MG: 550 TABLET ORAL at 20:09

## 2023-05-21 RX ADMIN — LACTULOSE 30 G: 20 SOLUTION ORAL at 20:09

## 2023-05-21 RX ADMIN — SENNOSIDES 5 ML: 8.8 LIQUID ORAL at 20:09

## 2023-05-21 RX ADMIN — ACETYLCYSTEINE 5.12 MG/KG/HR: 200 INJECTION INTRAVENOUS at 18:27

## 2023-05-21 RX ADMIN — RIFAXIMIN 550 MG: 550 TABLET ORAL at 08:45

## 2023-05-21 RX ADMIN — SERTRALINE 50 MG: 50 TABLET, FILM COATED ORAL at 12:42

## 2023-05-21 RX ADMIN — FOLIC ACID 1 MG: 5 INJECTION, SOLUTION INTRAMUSCULAR; INTRAVENOUS; SUBCUTANEOUS at 08:46

## 2023-05-21 RX ADMIN — ACETYLCYSTEINE 5.12 MG/KG/HR: 200 INJECTION INTRAVENOUS at 08:38

## 2023-05-21 RX ADMIN — PANTOPRAZOLE SODIUM 40 MG: 40 INJECTION, POWDER, LYOPHILIZED, FOR SOLUTION INTRAVENOUS at 09:07

## 2023-05-21 RX ADMIN — Medication 1000 UNITS: at 08:45

## 2023-05-21 RX ADMIN — WATER 10 ML: 1 INJECTION INTRAMUSCULAR; INTRAVENOUS; SUBCUTANEOUS at 09:07

## 2023-05-21 RX ADMIN — THIAMINE HYDROCHLORIDE 500 MG: 100 INJECTION, SOLUTION INTRAMUSCULAR; INTRAVENOUS at 09:08

## 2023-05-21 RX ADMIN — Medication 5 MG: at 20:09

## 2023-05-21 RX ADMIN — SODIUM PHOSPHATE, MONOBASIC, MONOHYDRATE AND SODIUM PHOSPHATE, DIBASIC, ANHYDROUS 15 MMOL: 276; 142 INJECTION, SOLUTION INTRAVENOUS at 09:08

## 2023-05-21 RX ADMIN — LACTULOSE 30 G: 20 SOLUTION ORAL at 08:43

## 2023-05-21 RX ADMIN — OCTREOTIDE ACETATE 50 MCG/HR: 500 INJECTION, SOLUTION INTRAVENOUS; SUBCUTANEOUS at 20:08

## 2023-05-21 RX ADMIN — INSULIN LISPRO 4 UNITS: 100 INJECTION, SOLUTION INTRAVENOUS; SUBCUTANEOUS at 08:41

## 2023-05-21 RX ADMIN — Medication 10 ML: at 09:07

## 2023-05-21 NOTE — PROGRESS NOTES
"GI Daily Progress Note  Subjective:    Chief Complaint: Follow-up decompensated alcoholic cirrhosis and hepatitis    Patient alert oriented sitting with mild asterixis he is eating all of his eggs and breakfast tray    Objective:    /88 (BP Location: Left arm, Patient Position: Lying)   Pulse 67   Temp 97.4 °F (36.3 °C) (Oral)   Resp 22   Ht 195.6 cm (77\")   Wt 126 kg (277 lb 8 oz)   SpO2 96%   BMI 32.91 kg/m²     Physical Exam  Constitutional:       Appearance: Normal appearance. He is ill-appearing.   HENT:      Head: Normocephalic and atraumatic.      Nose: Nose normal.   Eyes:      General: Scleral icterus present.   Cardiovascular:      Rate and Rhythm: Normal rate and regular rhythm.      Pulses: Normal pulses.   Pulmonary:      Effort: Pulmonary effort is normal.   Abdominal:      General: Abdomen is flat. Bowel sounds are normal.      Palpations: Abdomen is soft.   Musculoskeletal:         General: Normal range of motion.   Skin:     General: Skin is warm.   Neurological:      General: No focal deficit present.      Mental Status: He is alert.   Psychiatric:         Mood and Affect: Mood normal.         Lab  Lab Results   Component Value Date    WBC 8.42 05/21/2023    HGB 7.0 (L) 05/21/2023    HGB 8.6 (L) 05/20/2023    HGB 8.9 (L) 05/19/2023    .4 (H) 05/21/2023     (L) 05/21/2023    INR 2.42 (H) 05/21/2023    INR 1.68 (H) 05/20/2023    INR 1.85 (H) 05/18/2023    INR 1.55 (H) 05/17/2023    INR 0.98 06/18/2021       Lab Results   Component Value Date    GLUCOSE 242 (H) 05/20/2023    BUN 28 (H) 05/20/2023    CREATININE 1.31 (H) 05/20/2023    EGFRIFNONA >150 01/01/2022    BCR 21.4 05/20/2023     (L) 05/20/2023    K 4.3 05/20/2023    CO2 18.0 (L) 05/20/2023    CALCIUM 8.2 (L) 05/20/2023    ALBUMIN 2.8 (L) 05/20/2023    ALKPHOS 206 (H) 05/20/2023    BILITOT 14.5 (H) 05/20/2023    BILIDIR >10.0 (H) 05/17/2023    ALT 24 05/20/2023     (H) 05/20/2023       Assessment:      1.  " Acute alcohol hepatitis              -MDF 44.4 indicating poor prognosis              -Steroids initiated on 5/18/2023              -Calculate Lille score 05/22/2023  2.  Decompensated liver cirrhosis, HCV/EtOH              -With varices, hepatic encephalopathy, ascites, and esophageal varices              -MELD-Na: 27 points, 32% 90 day mortality               -Child-Chapa: C  3. Gastrointestinal bleeding, symptomatic anemia, macrocytic anemia              -S/p EGD with bleeding esophageal varices, banded x 2  4. EDMOND on CKD, suspect ATN as urine Na not indicative of HRS wife had mentioned that patient does take nsaids  5. Chronic HCV, s/p treatment, unclear if SVR  6. Alcohol abuse and dependence, fireball.   7. THC Use  8. Hepatic Encephalopathy               -Grade 2/4 today, improved.       status post EGD May 19, 2023 with banding of bleeding esophageal varices x2.         Plan:    Noted his hemoglobin has decreased and discussed this with Dr. Lai would recommend transfusion as needed but would not pursue repeat endoscopy at this time he has no melena  Continue Rocephin for full 7-day treatment for SBP prophylaxis  Complete octreotide for 1 more day  Nidhi importance of high-protein low-sodium diet with high-protein snacks bedtime snacks and a calorie count has been initiated  Continue proton pump inhibitor twice daily if he can reliably take this p.o. this can be changed over  At this point would not use lactulose enemas but will continue the lactulose  CIWA protocol as needed  Continue MVI thiamine and folic acid  Awaiting a CMP if his creatinine is worse would consider adding albumin at 1 g/kg for 3-day course and would consider midodrine if his blood pressures are low  Overall again discussed that his prognosis is guarded with his continued alcohol use and Child-Chapa class indicating a level of C and a MELD score of 27    Marguerite Blanco MD  05/21/23  10:38 EDT

## 2023-05-21 NOTE — PROGRESS NOTES
[facility] Medicine Services  PROGRESS NOTE    Patient Name: Franck Toure  : 1990  MRN: [id number]    Date of Admission: 2023  Primary Care Physician: Provider, No Known    Subjective   Subjective     CC:  Follow-up for hepatic encephalopathy    HPI:  I have seen and evaluated the patient this morning. Continues to feel well. No CP or SOB. No melena. Hemodynamics stable. Hgb dropped to 7.0 today    ROS:  General : no fevers, chills,++ weakness  CVS: No chest pain, palpitations  Respiratory: No cough, dyspnea  GI: No N/V/D, abd pain  10 point review of systems is negative except for what is mentioned in HPI    Objective   Objective     Vital Signs:   Temp:  [97 °F (36.1 °C)-98.2 °F (36.8 °C)] 97 °F (36.1 °C)  Heart Rate:  [59-72] 67  Resp:  [16-20] 20  BP: (103-132)/() 130/98     Physical Exam:  General: Acutely ill looking.  Generalized edema & anasarca  Head: Atraumatic and normocephalic  Eyes: Severe jaundice  Ears:  Ears appear intact with no abnormalities noted  Throat: No oral lesions, no thrush  Neck: Supple, trachea midline  Lungs: Diminished air entry bilaterally.  No crackles or wheezing.    Heart:  Normal S1 and S2, no murmur, no gallop, No JVD, 2+ lower extremity swelling  Abdomen:  Soft, no tenderness, no organomegaly, normal bowel sounds, no organomegaly  Extremities: pulses equal bilaterally  Skin: No bleeding, bruising or rash, normal skin turgor and elasticity  Neurologic: Cranial nerves appear intact with no evidence of facial asymmetry, normal motor and sensory functions in all 4 extremities  Psych: Alert and oriented x 3, normal mood    Results Reviewed:  LAB RESULTS:      Lab 23  0417 23  0913 23  0537 23  1215 23  1214 23  0126 239 23   WBC 8.42 9.29 5.96  --  5.13  --   --  6.02   HEMOGLOBIN 7.0* 8.6* 8.9*  --  6.9*  --   --  7.3*   HEMATOCRIT 22.9* 25.6* 25.4*  --  20.4*  --   --   21.9*   PLATELETS 101* 121* 113*  --  106*  --   --  104*   NEUTROS ABS 6.55 7.90* 5.19  --  3.18  --   --  3.67   IMMATURE GRANS (ABS) 0.58*  --   --   --   --   --   --   --    LYMPHS ABS 0.55*  --   --   --   --   --   --   --    MONOS ABS 0.72  --   --   --   --   --   --   --    EOS ABS 0.00 0.00 0.00  --  0.10  --   --  0.06   .4* 103.2* 101.2*  --  106.8*  --   --  110.1*   CRP  --   --   --   --   --   --   --  5.55*   PROCALCITONIN  --   --   --   --   --   --  0.64*  --    LACTATE  --   --   --  0.8  --  2.7* 2.9*  --    PROTIME 26.5* 19.9*  --  21.5*  --   --  19.2*  --    APTT  --   --   --  49.3*  --   --  44.2*  --    D DIMER QUANT  --   --   --  2.74*  --   --   --   --          Lab 05/21/23  0417 05/20/23  0913 05/19/23  0537 05/18/23  1215 05/17/23 2011   SODIUM  --  134* 135* 134* 131*   POTASSIUM  --  4.3 4.8 3.9 3.9   CHLORIDE  --  105 107 108* 103   CO2  --  18.0* 19.0* 17.0* 18.5*   ANION GAP  --  11.0 9.0 9.0 9.5   BUN  --  28* 20 19 20   CREATININE  --  1.31* 1.20 1.47* 2.59*   EGFR  --  74.2 82.4 64.6 32.7*   GLUCOSE  --  242* 184* 110* 111*   CALCIUM  --  8.2* 8.0* 7.7* 8.3*   MAGNESIUM 1.8 1.8  --   --  1.7   PHOSPHORUS 2.2* 3.1  --   --   --    HEMOGLOBIN A1C  --   --  4.70*  --   --          Lab 05/20/23  0913 05/19/23  0537 05/18/23  1215 05/17/23 2012 05/17/23 2011   TOTAL PROTEIN 6.0 6.2 6.1  --  6.3   ALBUMIN 2.8* 3.0* 2.7*  --  2.0*   GLOBULIN 3.2 3.2 3.4  --  4.3   ALT (SGPT) 24 20 18  --  24   AST (SGOT) 101* 94* 106*  --  120*   BILIRUBIN 14.5* 16.0* 12.2* 14.0* 14.9*   BILIRUBIN DIRECT  --   --   --  >10.0*  --    ALK PHOS 206* 209* 221*  --  277*   AMYLASE  --   --   --   --  18*   LIPASE  --   --   --   --  28         Lab 05/21/23  0417 05/20/23  0913 05/18/23  1215 05/17/23 2229 05/17/23 2012   PROBNP  --   --   --   --  337.6   PROTIME 26.5* 19.9* 21.5* 19.2*  --    INR 2.42* 1.68* 1.85* 1.55*  --              Lab 05/19/23  0537 05/18/23  1454 05/18/23  1448  05/18/23  1215   IRON  --   --   --  59   IRON SATURATION  --   --   --  79*   TIBC  --   --   --  75*   TRANSFERRIN  --   --   --  50*   FOLATE 3.35*  --   --   --    VITAMIN B 12  --  >2,000*  --   --    ABO TYPING  --   --  A A   RH TYPING  --   --  Positive Positive   ANTIBODY SCREEN  --   --   --  Negative         Brief Urine Lab Results  (Last result in the past 365 days)      Color   Clarity   Blood   Leuk Est   Nitrite   Protein   CREAT   Urine HCG        05/18/23 1558             134.3         05/18/23 1558 Dark Yellow   Cloudy   Negative   Moderate (2+)   Negative   30 mg/dL (1+)                 Microbiology Results Abnormal     Procedure Component Value - Date/Time    Blood Culture - Blood, Blood, PICC Line [[id number]]  (Normal) Collected: 05/18/23 1453    Lab Status: Preliminary result Specimen: Blood, PICC Line Updated: 05/20/23 1531     Blood Culture No growth at 2 days    Blood Culture - Blood, Blood, PICC Line [[id number]]  (Normal) Collected: 05/18/23 1213    Lab Status: Preliminary result Specimen: Blood, PICC Line Updated: 05/20/23 1331     Blood Culture No growth at 2 days    Urine Culture - Urine, Urine, Clean Catch [[id number]]  (Normal) Collected: 05/18/23 1558    Lab Status: Final result Specimen: Urine, Clean Catch Updated: 05/19/23 2338     Urine Culture No growth          Adult Transthoracic Echo Complete W/ Cont if Necessary Per Protocol    Result Date: 5/20/2023  •  Left ventricular systolic function is normal. Calculated left ventricular EF = 57% •  Estimated right ventricular systolic pressure from tricuspid regurgitation is normal (<35 mmHg).     XR Abdomen KUB    Result Date: 5/19/2023  XR ABDOMEN KUB Date of Exam: 5/19/2023 9:30 AM EDT Indication: nasal jejunal tube placement Comparison: None available. Findings: Enteric catheter projects over the expected location of the distal duodenum. The bowel gas pattern is unremarkable. No acute osseous lesion is seen.     Impression:  Impression: Enteric catheter projects over the expected location of the distal duodenum. Electronically Signed: Horacio Sharma  5/19/2023 9:46 AM EDT  Workstation ID: [id number]      Results for orders placed during the hospital encounter of 05/18/23    Adult Transthoracic Echo Complete W/ Cont if Necessary Per Protocol    Interpretation Summary  •  Left ventricular systolic function is normal. Calculated left ventricular EF = 57%  •  Estimated right ventricular systolic pressure from tricuspid regurgitation is normal (<35 mmHg).      Current medications:  Scheduled Meds:cefTRIAXone, 2 g, Intravenous, Q24H  cholecalciferol, 1,000 Units, Oral, Daily  docusate sodium, 50 mg, Nasogastric, BID  folic acid (FOLVITE) IVPB, 1 mg, Intravenous, Daily  insulin lispro, 2-7 Units, Subcutaneous, 4x Daily AC & at Bedtime  lactulose, 30 g, Oral, TID  methylPREDNISolone sodium succinate, 60 mg, Intravenous, Daily  multivitamin and minerals, 15 mL, Oral, Daily  nicotine, 1 patch, Transdermal, Q24H  pantoprazole, 40 mg, Intravenous, Q12H  rifAXIMin, 550 mg, Oral, Q12H  sennosides, 5 mL, Nasogastric, BID  sodium chloride, 10 mL, Intravenous, Q12H  thiamine (B-1) IV, 500 mg, Intravenous, Daily      Continuous Infusions:acetylcysteine (ACETADOTE) 6000mg in D5W infusion, 5.12 mg/kg/hr, Last Rate: 5.12 mg/kg/hr (05/20/23 2307)  octreotide (SandoSTATIN) infusion, 50 mcg/hr, Last Rate: 50 mcg/hr (05/20/23 2307)      PRN Meds:.•  [DISCONTINUED] senna-docusate sodium **AND** polyethylene glycol **AND** bisacodyl **AND** bisacodyl  •  Calcium Replacement - Follow Nurse / BPA Driven Protocol  •  dextrose  •  dextrose  •  glucagon (human recombinant)  •  Magnesium Standard Dose Replacement - Follow Nurse / BPA Driven Protocol  •  melatonin  •  ondansetron  •  Phosphorus Replacement - Follow Nurse / BPA Driven Protocol  •  Potassium Replacement - Follow Nurse / BPA Driven Protocol  •  sodium chloride  •  sodium chloride    Assessment & Plan    Assessment & Plan     Active Hospital Problems    Diagnosis  POA   • **Hepatorenal failure [K76.7]  Yes   • Cirrhosis of liver without ascites [K74.60]  Unknown   • Anemia [D64.9]  Unknown      Resolved Hospital Problems   No resolved problems to display.        Brief Hospital Course to date:  Franck Toure is a 32 y.o. male with past medical history of chronic hep C, alcohol use disorder, alcohol induced pancreatitis, fatty liver disease who presented to the hospital as a direct admit from [facility] for liver failure and higher level of care     Assessment and plan:  Acute liver failure, slowly improving  Acute metabolic/hepatic encephalopathy, improving  Hepatocellular jaundice  Alcohol induced acute hepatitis  • Known to drink alcohol for years: 10-15 double shots of hard liquor  • Has advanced liver disease with huge splenomegaly and portal hypertension  • Continue lactulose and rifaximin for hepatic encephalopathy.  Consciousness is improving   • Maddrey discrimination score is 56 -->  continue Solumedrol 60 mg daily  • INR was elevated at 1.85 on admission and received vitamin K 10 mg x 1 dose and 2 units of FF Plasma. INR improved to 1.68 then up again to 2.4. Will transfuse 2 units of plasma   • Bilirubin is remained elevated.  Likely hepatocellular jaundice secondary to liver failure.  No evidence of biliary duct dilation on CT scan abdomen from outside facility  • GI following.  Appreciate the help  • Continue supportive care for now and close monitoring of liver functions     Acute blood loss anemia secondary to GI bleed, POA  Severe symptomatic anemia  • Hemoglobin 6 months ago was 16.  Hemoglobin was 6.9 on admission status post 2 units of blood transfusion  • EGD 5/19/2023 showing 3 oozing varices bleed status post banding, portal hypertension gastropathy and hiatal hernia with Robert erosions  • Continue IV Protonix 40 mg twice daily  • Status post 72 hours of IV octreotide  drip  • Status post transfusion of 2 units  of blood on 5/18/2023  • Anemia work-up --> iron overload.  B12 more than 2000 and low folic acid.  Continue folic acid replacement  • Hemoglobin today 5/21/2023 is down to 7.0.  We will transfuse 2 notes of blood and 2 units of plasma given his elevated INR    Sepsis secondary to UTI, POA  Cannot rule out SBP  • On admission, he has elevated procalcitonin, positive UA and elevated lactic acid  • Lactic acid improved with IV fluids. Lactic acid could be also related to his liver failure  • CT scan abdomen with trace ascites, bedside ultrasound with no adequate pocket of fluid to safely perform paracentesis  • Initially started on IV Merrem.  Urine culture growing Klebsiella oxytocin sensitive to Rocephin, will switch to Rocephin with tentative plan to treat for 10 days as complicated UTI  • Follow blood and urine culture      EDMOND, likely secondary to volume depletion  Less likely hepatorenal syndrome  • Creatinine at outside hospital 2.5.  Improved with IV fluids and IV albumin and currently at 1.2   • Continue gentle diuresis today given his volume overload  • Nephrology team following.  Appreciate help     Alcohol dependence  Drug use disorder   • Avoid benzodiazepine given his acute hepatic encephalopathy.  Will monitor for any withdrawal symptoms  • Restart Suboxone 1 tablet daily today 5/21/2023  • Addiction team following.  Appreciate help  • Patient declining inpatient acute alcohol rehab despite counseling    Hyperglycemia  · Likely secondary to N-acetylcysteine drip and steroid use  · A1c is normal  · ISS    Expected Discharge Location and Transportation: home  Expected Discharge   Expected Discharge Date: 5/24/2023; Expected Discharge Time:      DVT prophylaxis:  Mechanical DVT prophylaxis orders are present.     AM-PAC 6 Clicks Score (PT): 21 (05/20/23 0800)    CODE STATUS:   Code Status and Medical Interventions:   Ordered at: 05/18/23 1030     Level Of Support  Discussed With:    Patient     Code Status (Patient has no pulse and is not breathing):    CPR (Attempt to Resuscitate)     Medical Interventions (Patient has pulse or is breathing):    Full Support       Sigrid Grady MD  05/21/23

## 2023-05-21 NOTE — PLAN OF CARE
Goal Outcome Evaluation:  Plan of Care Reviewed With: patient           Outcome Evaluation: Pt a/o, VSS, RA, Sat. 94%, no  c/o pain at this time. Continue monitoring.

## 2023-05-22 LAB
ALBUMIN SERPL-MCNC: 2.8 G/DL (ref 3.5–5.2)
ALBUMIN SERPL-MCNC: 3 G/DL (ref 3.5–5.2)
ALBUMIN/GLOB SERPL: 0.8 G/DL
ALBUMIN/GLOB SERPL: 1 G/DL
ALP SERPL-CCNC: 169 U/L (ref 39–117)
ALP SERPL-CCNC: 185 U/L (ref 39–117)
ALT SERPL W P-5'-P-CCNC: 47 U/L (ref 1–41)
ALT SERPL W P-5'-P-CCNC: 50 U/L (ref 1–41)
ANION GAP SERPL CALCULATED.3IONS-SCNC: 11 MMOL/L (ref 5–15)
ANION GAP SERPL CALCULATED.3IONS-SCNC: 14 MMOL/L (ref 5–15)
AST SERPL-CCNC: 152 U/L (ref 1–40)
AST SERPL-CCNC: 158 U/L (ref 1–40)
BACTERIA SPEC AEROBE CULT: NORMAL
BACTERIA SPEC AEROBE CULT: NORMAL
BASOPHILS # BLD AUTO: 0.02 10*3/MM3 (ref 0–0.2)
BASOPHILS NFR BLD AUTO: 0.2 % (ref 0–1.5)
BH BB BLOOD EXPIRATION DATE: NORMAL
BH BB BLOOD EXPIRATION DATE: NORMAL
BH BB BLOOD TYPE BARCODE: 6200
BH BB BLOOD TYPE BARCODE: 6200
BH BB DISPENSE STATUS: NORMAL
BH BB DISPENSE STATUS: NORMAL
BH BB PRODUCT CODE: NORMAL
BH BB PRODUCT CODE: NORMAL
BH BB UNIT NUMBER: NORMAL
BH BB UNIT NUMBER: NORMAL
BILIRUB SERPL-MCNC: 12.1 MG/DL (ref 0–1.2)
BILIRUB SERPL-MCNC: 12.4 MG/DL (ref 0–1.2)
BUN SERPL-MCNC: 29 MG/DL (ref 6–20)
BUN SERPL-MCNC: 29 MG/DL (ref 6–20)
BUN/CREAT SERPL: 28.2 (ref 7–25)
BUN/CREAT SERPL: 33.7 (ref 7–25)
CALCIUM SPEC-SCNC: 7.9 MG/DL (ref 8.6–10.5)
CALCIUM SPEC-SCNC: 8.3 MG/DL (ref 8.6–10.5)
CHLORIDE SERPL-SCNC: 103 MMOL/L (ref 98–107)
CHLORIDE SERPL-SCNC: 104 MMOL/L (ref 98–107)
CHOLEST SERPL-MCNC: 122 MG/DL (ref 0–200)
CO2 SERPL-SCNC: 18 MMOL/L (ref 22–29)
CO2 SERPL-SCNC: 21 MMOL/L (ref 22–29)
CREAT SERPL-MCNC: 0.86 MG/DL (ref 0.76–1.27)
CREAT SERPL-MCNC: 1.03 MG/DL (ref 0.76–1.27)
CROSSMATCH INTERPRETATION: NORMAL
CROSSMATCH INTERPRETATION: NORMAL
CRP SERPL-MCNC: 1 MG/DL (ref 0–0.5)
DEPRECATED RDW RBC AUTO: 68.3 FL (ref 37–54)
EGFRCR SERPLBLD CKD-EPI 2021: 118 ML/MIN/1.73
EGFRCR SERPLBLD CKD-EPI 2021: 99 ML/MIN/1.73
EOSINOPHIL # BLD AUTO: 0 10*3/MM3 (ref 0–0.4)
EOSINOPHIL NFR BLD AUTO: 0 % (ref 0.3–6.2)
ERYTHROCYTE [DISTWIDTH] IN BLOOD BY AUTOMATED COUNT: 19.3 % (ref 12.3–15.4)
GLOBULIN UR ELPH-MCNC: 3 GM/DL
GLOBULIN UR ELPH-MCNC: 3.4 GM/DL
GLUCOSE BLDC GLUCOMTR-MCNC: 159 MG/DL (ref 70–130)
GLUCOSE BLDC GLUCOMTR-MCNC: 224 MG/DL (ref 70–130)
GLUCOSE BLDC GLUCOMTR-MCNC: 231 MG/DL (ref 70–130)
GLUCOSE BLDC GLUCOMTR-MCNC: 247 MG/DL (ref 70–130)
GLUCOSE SERPL-MCNC: 150 MG/DL (ref 65–99)
GLUCOSE SERPL-MCNC: 150 MG/DL (ref 65–99)
HCT VFR BLD AUTO: 26.9 % (ref 37.5–51)
HCV RNA SERPL NAA+PROBE-ACNC: NORMAL IU/ML
HGB BLD-MCNC: 9.1 G/DL (ref 13–17.7)
IMM GRANULOCYTES # BLD AUTO: 0.58 10*3/MM3 (ref 0–0.05)
IMM GRANULOCYTES NFR BLD AUTO: 5.8 % (ref 0–0.5)
LYMPHOCYTES # BLD AUTO: 0.63 10*3/MM3 (ref 0.7–3.1)
LYMPHOCYTES NFR BLD AUTO: 6.3 % (ref 19.6–45.3)
MAGNESIUM SERPL-MCNC: 1.9 MG/DL (ref 1.6–2.6)
MCH RBC QN AUTO: 33.2 PG (ref 26.6–33)
MCHC RBC AUTO-ENTMCNC: 33.8 G/DL (ref 31.5–35.7)
MCV RBC AUTO: 98.2 FL (ref 79–97)
MONOCYTES # BLD AUTO: 0.88 10*3/MM3 (ref 0.1–0.9)
MONOCYTES NFR BLD AUTO: 8.8 % (ref 5–12)
NEUTROPHILS NFR BLD AUTO: 7.93 10*3/MM3 (ref 1.7–7)
NEUTROPHILS NFR BLD AUTO: 78.9 % (ref 42.7–76)
NRBC BLD AUTO-RTO: 0 /100 WBC (ref 0–0.2)
PHOSPHATE SERPL-MCNC: 3.1 MG/DL (ref 2.5–4.5)
PLATELET # BLD AUTO: 104 10*3/MM3 (ref 140–450)
PMV BLD AUTO: 10.9 FL (ref 6–12)
POTASSIUM SERPL-SCNC: 3.8 MMOL/L (ref 3.5–5.2)
POTASSIUM SERPL-SCNC: 3.8 MMOL/L (ref 3.5–5.2)
PREALB SERPL-MCNC: 10.4 MG/DL (ref 20–40)
PROT SERPL-MCNC: 6 G/DL (ref 6–8.5)
PROT SERPL-MCNC: 6.2 G/DL (ref 6–8.5)
RBC # BLD AUTO: 2.74 10*6/MM3 (ref 4.14–5.8)
SODIUM SERPL-SCNC: 135 MMOL/L (ref 136–145)
SODIUM SERPL-SCNC: 136 MMOL/L (ref 136–145)
TEST INFORMATION: NORMAL
TRIGL SERPL-MCNC: 156 MG/DL (ref 0–150)
UNIT  ABO: NORMAL
UNIT  ABO: NORMAL
UNIT  RH: NORMAL
UNIT  RH: NORMAL
WBC NRBC COR # BLD: 10.04 10*3/MM3 (ref 3.4–10.8)

## 2023-05-22 PROCEDURE — 80053 COMPREHEN METABOLIC PANEL: CPT

## 2023-05-22 PROCEDURE — 99232 SBSQ HOSP IP/OBS MODERATE 35: CPT | Performed by: PHYSICIAN ASSISTANT

## 2023-05-22 PROCEDURE — 97161 PT EVAL LOW COMPLEX 20 MIN: CPT

## 2023-05-22 PROCEDURE — 84100 ASSAY OF PHOSPHORUS: CPT

## 2023-05-22 PROCEDURE — 86927 PLASMA FRESH FROZEN: CPT

## 2023-05-22 PROCEDURE — 82948 REAGENT STRIP/BLOOD GLUCOSE: CPT

## 2023-05-22 PROCEDURE — 25010000002 METHYLPREDNISOLONE PER 40 MG: Performed by: INTERNAL MEDICINE

## 2023-05-22 PROCEDURE — 82465 ASSAY BLD/SERUM CHOLESTEROL: CPT

## 2023-05-22 PROCEDURE — 0 PHYTONADIONE 10 MG/ML SOLUTION 1 ML AMPULE: Performed by: INTERNAL MEDICINE

## 2023-05-22 PROCEDURE — 25010000002 OCTREOTIDE PER 25 MCG: Performed by: INTERNAL MEDICINE

## 2023-05-22 PROCEDURE — 99232 SBSQ HOSP IP/OBS MODERATE 35: CPT | Performed by: INTERNAL MEDICINE

## 2023-05-22 PROCEDURE — 0 ACETYLCYSTEINE PER 100 MG: Performed by: INTERNAL MEDICINE

## 2023-05-22 PROCEDURE — P9059 PLASMA, FRZ BETWEEN 8-24HOUR: HCPCS

## 2023-05-22 PROCEDURE — 36430 TRANSFUSION BLD/BLD COMPNT: CPT

## 2023-05-22 PROCEDURE — 80053 COMPREHEN METABOLIC PANEL: CPT | Performed by: PHYSICIAN ASSISTANT

## 2023-05-22 PROCEDURE — 84134 ASSAY OF PREALBUMIN: CPT

## 2023-05-22 PROCEDURE — 25010000002 CEFTRIAXONE PER 250 MG: Performed by: INTERNAL MEDICINE

## 2023-05-22 PROCEDURE — 84478 ASSAY OF TRIGLYCERIDES: CPT

## 2023-05-22 PROCEDURE — 83735 ASSAY OF MAGNESIUM: CPT

## 2023-05-22 PROCEDURE — 63710000001 INSULIN LISPRO (HUMAN) PER 5 UNITS: Performed by: INTERNAL MEDICINE

## 2023-05-22 PROCEDURE — 86140 C-REACTIVE PROTEIN: CPT

## 2023-05-22 PROCEDURE — 85025 COMPLETE CBC W/AUTO DIFF WBC: CPT | Performed by: INTERNAL MEDICINE

## 2023-05-22 RX ORDER — LACTULOSE 10 G/15ML
30 SOLUTION ORAL DAILY
Status: DISCONTINUED | OUTPATIENT
Start: 2023-05-23 | End: 2023-05-23 | Stop reason: HOSPADM

## 2023-05-22 RX ORDER — PREDNISONE 20 MG/1
40 TABLET ORAL
Status: DISCONTINUED | OUTPATIENT
Start: 2023-05-23 | End: 2023-05-23

## 2023-05-22 RX ORDER — TORSEMIDE 20 MG/1
60 TABLET ORAL ONCE
Status: COMPLETED | OUTPATIENT
Start: 2023-05-22 | End: 2023-05-22

## 2023-05-22 RX ADMIN — PHYTONADIONE 10 MG: 10 INJECTION, EMULSION INTRAMUSCULAR; INTRAVENOUS; SUBCUTANEOUS at 10:35

## 2023-05-22 RX ADMIN — PANTOPRAZOLE SODIUM 40 MG: 40 INJECTION, POWDER, LYOPHILIZED, FOR SOLUTION INTRAVENOUS at 21:25

## 2023-05-22 RX ADMIN — BUPRENORPHINE AND NALOXONE 1 TABLET: 8; 2 TABLET SUBLINGUAL at 08:35

## 2023-05-22 RX ADMIN — Medication 5 MG: at 21:25

## 2023-05-22 RX ADMIN — RIFAXIMIN 550 MG: 550 TABLET ORAL at 08:35

## 2023-05-22 RX ADMIN — LACTULOSE 30 G: 20 SOLUTION ORAL at 08:34

## 2023-05-22 RX ADMIN — INSULIN LISPRO 3 UNITS: 100 INJECTION, SOLUTION INTRAVENOUS; SUBCUTANEOUS at 17:19

## 2023-05-22 RX ADMIN — Medication 10 ML: at 08:37

## 2023-05-22 RX ADMIN — ASCORBIC ACID, THIAMINE, RIBOFLAVIN, NIACINAMIDE, PYRIDOXINE, FOLIC ACID, COBALAMIN, BIOTIN, PANTOTHENIC ACID 15 ML: 100; 1.5; 1.7; 20; 10; 1; 6; 300; 1 TABLET, COATED ORAL at 08:34

## 2023-05-22 RX ADMIN — OCTREOTIDE ACETATE 50 MCG/HR: 500 INJECTION, SOLUTION INTRAVENOUS; SUBCUTANEOUS at 06:23

## 2023-05-22 RX ADMIN — SERTRALINE 50 MG: 50 TABLET, FILM COATED ORAL at 08:35

## 2023-05-22 RX ADMIN — SODIUM CHLORIDE 2 G: 900 INJECTION INTRAVENOUS at 12:09

## 2023-05-22 RX ADMIN — SENNOSIDES 5 ML: 8.8 LIQUID ORAL at 08:34

## 2023-05-22 RX ADMIN — RIFAXIMIN 550 MG: 550 TABLET ORAL at 21:25

## 2023-05-22 RX ADMIN — TORSEMIDE 60 MG: 20 TABLET ORAL at 12:06

## 2023-05-22 RX ADMIN — Medication 10 ML: at 21:26

## 2023-05-22 RX ADMIN — ACETYLCYSTEINE 5.12 MG/KG/HR: 200 INJECTION INTRAVENOUS at 06:23

## 2023-05-22 RX ADMIN — OCTREOTIDE ACETATE 50 MCG/HR: 500 INJECTION, SOLUTION INTRAVENOUS; SUBCUTANEOUS at 17:19

## 2023-05-22 RX ADMIN — INSULIN LISPRO 2 UNITS: 100 INJECTION, SOLUTION INTRAVENOUS; SUBCUTANEOUS at 08:34

## 2023-05-22 RX ADMIN — FOLIC ACID 1 MG: 5 INJECTION, SOLUTION INTRAMUSCULAR; INTRAVENOUS; SUBCUTANEOUS at 10:36

## 2023-05-22 RX ADMIN — DOCUSATE SODIUM 50 MG: 50 LIQUID ORAL at 08:34

## 2023-05-22 RX ADMIN — INSULIN LISPRO 3 UNITS: 100 INJECTION, SOLUTION INTRAVENOUS; SUBCUTANEOUS at 12:06

## 2023-05-22 RX ADMIN — PANTOPRAZOLE SODIUM 40 MG: 40 INJECTION, POWDER, LYOPHILIZED, FOR SOLUTION INTRAVENOUS at 08:35

## 2023-05-22 RX ADMIN — Medication 1 PATCH: at 10:35

## 2023-05-22 RX ADMIN — POTASSIUM CHLORIDE 40 MEQ: 10 CAPSULE, COATED, EXTENDED RELEASE ORAL at 00:28

## 2023-05-22 RX ADMIN — METHYLPREDNISOLONE SODIUM SUCCINATE 60 MG: 40 INJECTION INTRAMUSCULAR; INTRAVENOUS at 08:36

## 2023-05-22 RX ADMIN — Medication 1000 UNITS: at 08:35

## 2023-05-22 RX ADMIN — INSULIN LISPRO 3 UNITS: 100 INJECTION, SOLUTION INTRAVENOUS; SUBCUTANEOUS at 21:25

## 2023-05-22 NOTE — PROGRESS NOTES
"GI Daily Progress Note  Subjective:    Pt sitting up in bed, states \"I feel great\". Wife and brother at bedside. Pt denies abdominal pain, nausea, vomiting, melena, hematochezia. Hb improved, 9.1 after 2u pRBC yesterday. Having multiple soft BM per day.     Objective:    BP (!) 140/109 (BP Location: Left arm, Patient Position: Lying)   Pulse 61   Temp 98 °F (36.7 °C) (Oral)   Resp 18   Ht 195.6 cm (77\")   Wt 120 kg (265 lb)   SpO2 92%   BMI 31.42 kg/m²     Physical Exam  Vitals and nursing note reviewed.   Constitutional:       General: He is not in acute distress.     Appearance: Normal appearance. He is ill-appearing. He is not diaphoretic.   HENT:      Head: Normocephalic and atraumatic.      Right Ear: External ear normal.      Left Ear: External ear normal.      Nose: Nose normal.      Mouth/Throat:      Mouth: Mucous membranes are moist.      Pharynx: Oropharynx is clear.   Eyes:      General: Scleral icterus present.      Conjunctiva/sclera: Conjunctivae normal.      Pupils: Pupils are equal, round, and reactive to light.   Cardiovascular:      Rate and Rhythm: Normal rate and regular rhythm.      Pulses: Normal pulses.   Pulmonary:      Effort: Pulmonary effort is normal.      Breath sounds: Normal breath sounds.   Abdominal:      General: Abdomen is flat. There is no distension.      Tenderness: There is no abdominal tenderness.   Musculoskeletal:         General: Normal range of motion.      Cervical back: Normal range of motion.      Right lower leg: Edema present.      Left lower leg: Edema present.   Skin:     General: Skin is warm and dry.      Coloration: Skin is jaundiced.      Findings: Bruising present.   Neurological:      Mental Status: He is alert and oriented to person, place, and time. Mental status is at baseline.      Comments: No asterixis   Psychiatric:         Mood and Affect: Mood normal.         Lab  Lab Results   Component Value Date    WBC 10.04 05/22/2023    HGB 9.1 (L) " 05/22/2023    HGB 7.0 (L) 05/21/2023    HGB 8.6 (L) 05/20/2023    MCV 98.2 (H) 05/22/2023     (L) 05/22/2023    INR 2.42 (H) 05/21/2023    INR 1.68 (H) 05/20/2023    INR 1.85 (H) 05/18/2023    INR 1.55 (H) 05/17/2023    INR 0.98 06/18/2021       Lab Results   Component Value Date    GLUCOSE 150 (H) 05/22/2023    GLUCOSE 150 (H) 05/22/2023    BUN 29 (H) 05/22/2023    BUN 29 (H) 05/22/2023    CREATININE 0.86 05/22/2023    CREATININE 1.03 05/22/2023    EGFRIFNONA >150 01/01/2022    BCR 33.7 (H) 05/22/2023    BCR 28.2 (H) 05/22/2023     05/22/2023     (L) 05/22/2023    K 3.8 05/22/2023    K 3.8 05/22/2023    CO2 21.0 (L) 05/22/2023    CO2 18.0 (L) 05/22/2023    CALCIUM 8.3 (L) 05/22/2023    CALCIUM 7.9 (L) 05/22/2023    ALBUMIN 2.8 (L) 05/22/2023    ALBUMIN 3.0 (L) 05/22/2023    ALKPHOS 169 (H) 05/22/2023    ALKPHOS 185 (H) 05/22/2023    BILITOT 12.4 (H) 05/22/2023    BILITOT 12.1 (H) 05/22/2023    BILIDIR >10.0 (H) 05/17/2023    ALT 47 (H) 05/22/2023    ALT 50 (H) 05/22/2023     (H) 05/22/2023     (H) 05/22/2023       Assessment and Plan:    Acute EtOH hepatitis  Decompensated cirrhosis (likely EtOH / HCV) with jaundice, hepatosplenomegaly, HE, ascites, EV  Symptomatic / macrocytic anemia 2/2 GIB  Hepatorenal syndrome  Thrombocytopenia  Chronic HCV s/p treatment with SVR  H/o EtOH abuse  THC use   - continue trending H/H and transfuse per hospitalist protocol   - Maddrey's Discriminant Function 44.4, indicating poor prognosis   - Lille Score pending this date, PT/INR pending    - MELD-Na 27, consistent with 19.6% 90 day mortality   - Child Chapa Score 13, Class C   - EGD 5/19 with esophageal varices, banded x 2; low threshold to repeat if persistent drop in H/H   - HE Management: lactulose 30g daily,xifaxan 550mg BID    - continue BID PPI, daily MV, thiamine, folic acid, cholecalciferol daily   - continue Rocephin for full 7 day treatment for SBP   - HCV quant undetectable this  admission    Leesa Toure PA-C  05/22/23  14:34 EDT

## 2023-05-22 NOTE — PLAN OF CARE
Goal Outcome Evaluation:  Plan of Care Reviewed With: patient, spouse, sibling        Progress: no change  Outcome Evaluation: PT eval completed. Patient presents w/ mild generalized weakness and decreased activity tolerance, but is able to ambulate 350ft w/ distant supervision to manage lines. No LOB or instability observed. No further acute PT services indicated at this time. Encouraged pt. to ambulate w/ assist. Pt. may benefit from OPPT at D/C.

## 2023-05-22 NOTE — THERAPY DISCHARGE NOTE
Patient Name: Franck Toure  : 1990    MRN: [id number]                              Today's Date: 2023       Admit Date: 2023    Visit Dx:     ICD-10-CM ICD-9-CM   1. Cirrhosis of liver without ascites, unspecified hepatic cirrhosis type  K74.60 571.5   2. Anemia, unspecified type  D64.9 285.9     Patient Active Problem List   Diagnosis   • Alcohol-induced acute pancreatitis   • Pancreatitis   • Chronic pain of right knee   • Hepatorenal failure   • Cirrhosis of liver without ascites   • Anemia     Past Medical History:   Diagnosis Date   • Alcohol-induced acute pancreatitis 2019   • Alcoholism    • Drug use     Amphetamines and Suboxone   • Fatty liver    • Hepatitis C antibody test positive    • Medical non-compliance    • Pancreatitis    • Smoker      Past Surgical History:   Procedure Laterality Date   • ENDOSCOPY N/A 2023    Procedure: ESOPHAGOGASTRODUODENOSCOPY WITH NJ TUBE PLACEMENT;  Surgeon: Marguerite Blanco MD;  Location: [facility] ENDOSCOPY;  Service: Gastroenterology;  Laterality: N/A;  2 bands placed in esophagus.   • NO PAST SURGERIES        General Information     Row Name 23 1459          Physical Therapy Time and Intention    Document Type evaluation  -MB     Mode of Treatment physical therapy  -MB     Row Name 23 1459          General Information    Patient Profile Reviewed yes  -MB     Prior Level of Function independent:;bed mobility;ADL's;transfer;gait;all household mobility;community mobility;driving;using stairs;work  -MB     Existing Precautions/Restrictions no known precautions/restrictions  -MB     Barriers to Rehab medically complex  -MB     Row Name 23 1459          Living Environment    People in Home spouse  -MB     Row Name 23 1459          Home Main Entrance    Number of Stairs, Main Entrance none  -MB     Row Name 23 1459          Stairs Within Home, Primary    Stairs, Within Home, Primary All needs met on main floor.  -MB      Number of Stairs, Within Home, Primary other (see comments)  basement; pt. does not have to access to meet daily needs  -MB     Row Name 05/22/23 1459          Cognition    Orientation Status (Cognition) oriented x 4  -MB     Row Name 05/22/23 1459          Safety Issues, Functional Mobility    Impairments Affecting Function (Mobility) endurance/activity tolerance  -MB           User Key  (r) = Recorded By, (t) = Taken By, (c) = Cosigned By    Initials Name Provider Type    Giovanna Epsteni, PT Physical Therapist               Mobility     Row Name 05/22/23 1550          Bed Mobility    Bed Mobility supine-sit  -MB     Supine-Sit [place] (Bed Mobility) independent  -MB     Comment, (Bed Mobility) Pt. completed bed mobility w/out difficulty or need of assist.  -MB     Row Name 05/22/23 1550          Transfers    Comment, (Transfers) STS from EOB/toilet/recliner w/ no instability.  -MB     Row Name 05/22/23 1550          Bed-Chair Transfer    Bed-Chair [place] (Transfers) independent  -MB     Row Name 05/22/23 1550          Sit-Stand Transfer    Sit-Stand [place] (Transfers) independent  -MB     Row Name 05/22/23 1550          Gait/Stairs (Locomotion)    [place] Level (Gait) supervision  -MB     Distance in Feet (Gait) 350  -MB     Deviations/Abnormal Patterns (Gait) marshall decreased  -MB     [place] Level (Stairs) unable to assess  -MB           User Key  (r) = Recorded By, (t) = Taken By, (c) = Cosigned By    Initials Name Provider Type    Giovanna Epstein, PT Physical Therapist               Obj/Interventions     Row Name 05/22/23 1552          Range of Motion Comprehensive    General Range of Motion no range of motion deficits identified  -MB     Row Name 05/22/23 1552          Strength Comprehensive (MMT)    General Manual Muscle Testing (MMT) Assessment no strength deficits identified  -MB     Comment, General Manual Muscle Testing (MMT) Assessment BLEs and BUEs grossly  4+/5  -MB     Row Name 05/22/23 1552          Balance    Balance Assessment sitting static balance;standing static balance;standing dynamic balance  -MB     Static Sitting Balance independent  -MB     Position, Sitting Balance unsupported;sitting edge of bed  -MB     Static Standing Balance independent  -MB     Dynamic Standing Balance independent  -MB     Position/Device Used, Standing Balance unsupported  -MB     Balance Interventions standing;sit to stand;weight shifting activity;manual resistance applied during activity;dynamic reaching  -MB     Row Name 05/22/23 1552          Sensory Assessment (Somatosensory)    Sensory Assessment (Somatosensory) LE sensation intact;UE sensation intact  -MB           User Key  (r) = Recorded By, (t) = Taken By, (c) = Cosigned By    Initials Name Provider Type    Giovanna Epstein, PT Physical Therapist               Goals/Plan    No documentation.                Clinical Impression     [facility] Name 05/22/23 1556          Pain    Pretreatment Pain Rating 0/10 - no pain  -MB     Posttreatment Pain Rating 0/10 - no pain  -[facility] Name 05/22/23 1553          Plan of Care Review    Plan of Care Reviewed With patient;spouse;sibling  -MB     Progress no change  -MB     Outcome Evaluation PT eval completed. Patient presents w/ mild generalized weakness and decreased activity tolerance, but is able to ambulate 350ft w/ distant supervision to manage lines. No LOB or instability observed. No further acute PT services indicated at this time. Encouraged pt. to ambulate w/ assist. Pt. may benefit from OPPT at D/C.  -MB     Row Name 05/22/23 1557          Therapy Assessment/Plan (PT)    Criteria for Skilled Interventions Met (PT) no;no problems identified which require skilled intervention  -MB     Therapy Frequency (PT) evaluation only  -MB     Row Name 05/22/23 1558          Vital Signs    Pre SpO2 (%) 97  -MB     O2 Delivery Pre Treatment room air  -MB     O2 Delivery Intra Treatment  room air  -MB     Post SpO2 (%) 97  -MB     O2 Delivery Post Treatment room air  -MB     Pre Patient Position Supine  -MB     Intra Patient Position Standing  -MB     Post Patient Position Sitting  -MB     Row Name 05/22/23 1553          Positioning and Restraints    Pre-Treatment Position in bed  -MB     Post Treatment Position chair  -MB     In Chair notified nsg;reclined;encouraged to call for assist;with family/caregiver;legs elevated  -MB           User Key  (r) = Recorded By, (t) = Taken By, (c) = Cosigned By    Initials Name Provider Type    Giovanna Epstein, PT Physical Therapist               Outcome Measures     Row Name 05/22/23 1558          How much help from another person do you currently need...    Turning from your back to your side while in flat bed without using bedrails? 4  -MB     Moving from lying on back to sitting on the side of a flat bed without bedrails? 4  -MB     Moving to and from a bed to a chair (including a wheelchair)? 4  -MB     Standing up from a chair using your arms (e.g., wheelchair, bedside chair)? 4  -MB     Climbing 3-5 steps with a railing? 3  -MB     To walk in hospital room? 3  -MB     AM-PAC 6 Clicks Score (PT) 22  -MB     Highest level of mobility 7 --> Walked 25 feet or more  -MB     Row Name 05/22/23 1558          Functional Assessment    Outcome Measure Options AM-PAC 6 Clicks Basic Mobility (PT)  -MB           User Key  (r) = Recorded By, (t) = Taken By, (c) = Cosigned By    Initials Name Provider Type    Giovanna Epstein, PT Physical Therapist              Physical Therapy Education     Title: PT OT SLP Therapies (Done)     Topic: Physical Therapy (Done)     Point: Mobility training (Done)     Learning Progress Summary           Patient Acceptance, E,D, VU,DU by MB at 5/22/2023 1558   Significant Other Acceptance, E,D, VU,DU by MB at 5/22/2023 1558                   Point: Home exercise program (Done)     Learning Progress Summary           Patient  Acceptance, E,D, VU,DU by MB at 5/22/2023 1558   Significant Other Acceptance, E,D, VU,DU by MB at 5/22/2023 1558                   Point: Body mechanics (Done)     Learning Progress Summary           Patient Acceptance, E,D, VU,DU by MB at 5/22/2023 1558   Significant Other Acceptance, E,D, VU,DU by MB at 5/22/2023 1558                   Point: Precautions (Done)     Learning Progress Summary           Patient Acceptance, E,D, VU,DU by MB at 5/22/2023 1558   Significant Other Acceptance, E,D, VU,DU by MB at 5/22/2023 1558                               User Key     Initials Effective Dates Name Provider Type Discipline    MB 06/16/21 -  Giovanna Newman, PT Physical Therapist PT              PT Recommendation and Plan     Plan of Care Reviewed With: patient, spouse, sibling  Progress: no change  Outcome Evaluation: PT eval completed. Patient presents w/ mild generalized weakness and decreased activity tolerance, but is able to ambulate 350ft w/ distant supervision to manage lines. No LOB or instability observed. No further acute PT services indicated at this time. Encouraged pt. to ambulate w/ assist. Pt. may benefit from OPPT at D/C.     Time Calculation:    PT Charges     Row Name 05/22/23 1559             Time Calculation    Start Time 1459  -MB      PT Received On 05/22/23  -MB         Untimed Charges    PT Eval/Re-eval Minutes 46  -MB         Total Minutes    Untimed Charges Total Minutes 46  -MB       Total Minutes 46  -MB            User Key  (r) = Recorded By, (t) = Taken By, (c) = Cosigned By    Initials Name Provider Type    Giovanna Epstein, PT Physical Therapist              Therapy Charges for Today     Code Description Service Date Service Provider Modifiers Qty    40346296493 HC PT EVAL LOW COMPLEXITY 4 5/22/2023 Giovanna Newman, PT GP 1          PT G-Codes  Outcome Measure Options: AM-PAC 6 Clicks Basic Mobility (PT)  AM-PAC 6 Clicks Score (PT): 22    PT Discharge Summary  Anticipated  Discharge Disposition (PT): home with assist, home with outpatient therapy services    Giovanna Newman, PT  5/22/2023

## 2023-05-22 NOTE — PROGRESS NOTES
[facility] Medicine Services  PROGRESS NOTE    Patient Name: Franck Toure  : 1990  MRN: [id number]    Date of Admission: 2023  Primary Care Physician: Provider, No Known    Subjective   Subjective     CC:  Follow-up for hepatic encephalopathy    HPI:  I have seen and evaluated the patient this morning.  Feeling better.  Denies any abdominal pain.  No melena.  Having multiple watery diarrhea.  Status post 2 units of blood transfusion yesterday and hemoglobin stable.  Still declining inpatient acute alcohol rehab    ROS:  General : no fevers, chills,++ weakness  CVS: No chest pain, palpitations  Respiratory: No cough, dyspnea  GI: No N/V/D, abd pain  10 point review of systems is negative except for what is mentioned in HPI    Objective   Objective     Vital Signs:   Temp:  [97.4 °F (36.3 °C)-98.2 °F (36.8 °C)] 98 °F (36.7 °C)  Heart Rate:  [50-66] 61  Resp:  [16-20] 18  BP: (112-143)/() 140/109     Physical Exam:  General: Acutely ill looking.  Generalized edema & anasarca  Head: Atraumatic and normocephalic  Eyes: Severe jaundice  Ears:  Ears appear intact with no abnormalities noted  Throat: No oral lesions, no thrush  Neck: Supple, trachea midline  Lungs: Diminished air entry bilaterally.  No crackles or wheezing.    Heart:  Normal S1 and S2, no murmur, no gallop, No JVD, 2+ lower extremity swelling  Abdomen:  Soft, no tenderness, no organomegaly, normal bowel sounds, no organomegaly  Extremities: pulses equal bilaterally  Skin: No bleeding, bruising or rash, normal skin turgor and elasticity  Neurologic: Cranial nerves appear intact with no evidence of facial asymmetry, normal motor and sensory functions in all 4 extremities  Psych: Alert and oriented x 3, normal mood    Results Reviewed:  LAB RESULTS:      Lab 23  0610 23  0417 23  0913 23  0537 23  1215 23  1214 23  0126 23  2229 23   WBC 10.04 8.42  9.29 5.96  --  5.13  --   --  6.02   HEMOGLOBIN 9.1* 7.0* 8.6* 8.9*  --  6.9*  --   --  7.3*   HEMATOCRIT 26.9* 22.9* 25.6* 25.4*  --  20.4*  --   --  21.9*   PLATELETS 104* 101* 121* 113*  --  106*  --   --  104*   NEUTROS ABS 7.93* 6.55 7.90* 5.19  --  3.18  --   --  3.67   IMMATURE GRANS (ABS) 0.58* 0.58*  --   --   --   --   --   --   --    LYMPHS ABS 0.63* 0.55*  --   --   --   --   --   --   --    MONOS ABS 0.88 0.72  --   --   --   --   --   --   --    EOS ABS 0.00 0.00 0.00 0.00  --  0.10  --   --  0.06   MCV 98.2* 117.4* 103.2* 101.2*  --  106.8*  --   --  110.1*   CRP 1.00*  --   --   --   --   --   --   --  5.55*   PROCALCITONIN  --   --   --   --   --   --   --  0.64*  --    LACTATE  --   --   --   --  0.8  --  2.7* 2.9*  --    PROTIME  --  26.5* 19.9*  --  21.5*  --   --  19.2*  --    APTT  --   --   --   --  49.3*  --   --  44.2*  --    D DIMER QUANT  --   --   --   --  2.74*  --   --   --   --          Lab 05/22/23  0610 05/21/23  1257 05/21/23  0417 05/20/23  0913 05/19/23  0537 05/18/23  1215 05/17/23 2011   SODIUM 136 134*  --  134* 135* 134* 131*   POTASSIUM 3.8 3.4*  --  4.3 4.8 3.9 3.9   CHLORIDE 104 101  --  105 107 108* 103   CO2 21.0* 20.0*  --  18.0* 19.0* 17.0* 18.5*   ANION GAP 11.0 13.0  --  11.0 9.0 9.0 9.5   BUN 29* 29*  --  28* 20 19 20   CREATININE 0.86 1.03  --  1.31* 1.20 1.47* 2.59*   EGFR 118.0 99.0  --  74.2 82.4 64.6 32.7*   GLUCOSE 150* 217*  --  242* 184* 110* 111*   CALCIUM 8.3* 8.3*  --  8.2* 8.0* 7.7* 8.3*   MAGNESIUM 1.9  --  1.8 1.8  --   --  1.7   PHOSPHORUS 3.1 3.2 2.2* 3.1  --   --   --    HEMOGLOBIN A1C  --   --   --   --  4.70*  --   --          Lab 05/22/23  0610 05/21/23  1257 05/20/23  0913 05/19/23  0537 05/18/23  1215 05/17/23 2012 05/17/23 2011   TOTAL PROTEIN 6.2 6.3 6.0 6.2 6.1  --  6.3   ALBUMIN 2.8* 2.8* 2.8* 3.0* 2.7*  --  2.0*   GLOBULIN 3.4 3.5 3.2 3.2 3.4  --  4.3   ALT (SGPT) 47* 39 24 20 18  --  24   AST (SGOT) 152* 162* 101* 94* 106*  --  120*    BILIRUBIN 12.4* 13.6* 14.5* 16.0* 12.2* 14.0* 14.9*   BILIRUBIN DIRECT  --   --   --   --   --  >10.0*  --    ALK PHOS 169* 189* 206* 209* 221*  --  277*   AMYLASE  --   --   --   --   --   --  18*   LIPASE  --   --   --   --   --   --  28         Lab 05/21/23  0417 05/20/23  0913 05/18/23  1215 05/17/23  2229 05/17/23  2012   PROBNP  --   --   --   --  337.6   PROTIME 26.5* 19.9* 21.5* 19.2*  --    INR 2.42* 1.68* 1.85* 1.55*  --          Lab 05/22/23  0610   CHOLESTEROL 122   TRIGLYCERIDES 156*         Lab 05/19/23  0537 05/18/23  1454 05/18/23  1448 05/18/23  1215   IRON  --   --   --  59   IRON SATURATION  --   --   --  79*   TIBC  --   --   --  75*   TRANSFERRIN  --   --   --  50*   FOLATE 3.35*  --   --   --    VITAMIN B 12  --  >2,000*  --   --    ABO TYPING  --   --  A A   RH TYPING  --   --  Positive Positive   ANTIBODY SCREEN  --   --   --  Negative         Brief Urine Lab Results  (Last result in the past 365 days)      Color   Clarity   Blood   Leuk Est   Nitrite   Protein   CREAT   Urine HCG        05/18/23 1558             134.3         05/18/23 1558 Dark Yellow   Cloudy   Negative   Moderate (2+)   Negative   30 mg/dL (1+)                 Microbiology Results Abnormal     Procedure Component Value - Date/Time    Blood Culture - Blood, Blood, PICC Line [[id number]]  (Normal) Collected: 05/18/23 1213    Lab Status: Preliminary result Specimen: Blood, PICC Line Updated: 05/22/23 1330     Blood Culture No growth at 4 days    Blood Culture - Blood, Blood, PICC Line [[id number]]  (Normal) Collected: 05/18/23 1453    Lab Status: Preliminary result Specimen: Blood, PICC Line Updated: 05/21/23 1532     Blood Culture No growth at 3 days    Urine Culture - Urine, Urine, Clean Catch [[id number]]  (Normal) Collected: 05/18/23 0108    Lab Status: Final result Specimen: Urine, Clean Catch Updated: 05/19/23 7752     Urine Culture No growth          No radiology results from the last 24 hrs    Results for orders placed  during the hospital encounter of 05/18/23    Adult Transthoracic Echo Complete W/ Cont if Necessary Per Protocol    Interpretation Summary  •  Left ventricular systolic function is normal. Calculated left ventricular EF = 57%  •  Estimated right ventricular systolic pressure from tricuspid regurgitation is normal (<35 mmHg).      Current medications:  Scheduled Meds:buprenorphine-naloxone, 1 tablet, Sublingual, Daily  cefTRIAXone, 2 g, Intravenous, Q24H  cholecalciferol, 1,000 Units, Oral, Daily  folic acid (FOLVITE) IVPB, 1 mg, Intravenous, Daily  insulin lispro, 2-7 Units, Subcutaneous, 4x Daily AC & at Bedtime  [START ON 5/23/2023] lactulose, 30 g, Oral, Daily  methylPREDNISolone sodium succinate, 60 mg, Intravenous, Daily  multivitamin and minerals, 15 mL, Oral, Daily  nicotine, 1 patch, Transdermal, Q24H  pantoprazole, 40 mg, Intravenous, Q12H  rifAXIMin, 550 mg, Oral, Q12H  sertraline, 50 mg, Oral, Daily  sodium chloride, 10 mL, Intravenous, Q12H      Continuous Infusions:octreotide (SandoSTATIN) infusion, 50 mcg/hr, Last Rate: 50 mcg/hr (05/22/23 0623)      PRN Meds:.•  [DISCONTINUED] senna-docusate sodium **AND** polyethylene glycol **AND** bisacodyl **AND** bisacodyl  •  Calcium Replacement - Follow Nurse / BPA Driven Protocol  •  dextrose  •  dextrose  •  glucagon (human recombinant)  •  Magnesium Standard Dose Replacement - Follow Nurse / BPA Driven Protocol  •  melatonin  •  ondansetron  •  Phosphorus Replacement - Follow Nurse / BPA Driven Protocol  •  Potassium Replacement - Follow Nurse / BPA Driven Protocol  •  sodium chloride  •  sodium chloride    Assessment & Plan   Assessment & Plan     Active Hospital Problems    Diagnosis  POA   • **Hepatorenal failure [K76.7]  Yes   • Cirrhosis of liver without ascites [K74.60]  Unknown   • Anemia [D64.9]  Unknown      Resolved Hospital Problems   No resolved problems to display.        Brief Hospital Course to date:  Franck Toure is a 32 y.o. male with past  medical history of chronic hep C, alcohol use disorder, alcohol induced pancreatitis, fatty liver disease who presented to the hospital as a direct admit from [facility] for liver failure and higher level of care     Assessment and plan:  Acute liver failure, slowly improving  Acute metabolic/hepatic encephalopathy, improving  Hepatocellular jaundice  Alcohol induced acute hepatitis  • Known to drink alcohol for years: 10-15 double shots of hard liquor  • Has advanced liver disease with huge splenomegaly and portal hypertension  • Continue lactulose and rifaximin for hepatic encephalopathy.  Consciousness is improving   • Maddrey discrimination score is 56 -->  continue Solumedrol 60 mg daily, will switch to p.o. prednisone  • INR remains elevated despite fresh frozen plasma.  Will give 1 dose of vitamin K today  • Bilirubin is remained elevated but trending down.  Likely hepatocellular jaundice secondary to liver failure.  No evidence of biliary duct dilation on CT scan abdomen from outside facility  • GI following.  Appreciate the help  • Continue supportive care for now and close monitoring of liver functions     Acute blood loss anemia secondary to GI bleed, POA  Severe symptomatic anemia  • Hemoglobin 6 months ago was 16.  Hemoglobin was 6.9 on admission status post 2 units of blood transfusion  • EGD 5/19/2023 showing 3 oozing varices bleed status post banding, portal hypertension gastropathy and hiatal hernia with Robert erosions  • Continue IV Protonix 40 mg twice daily  • Status post 72 hours of IV octreotide drip  • Status post transfusion of 2 units  of blood on 5/18/2023  • Anemia work-up --> iron overload.  B12 more than 2000 and low folic acid.  Continue folic acid replacement  • Hemoglobin on 5/21/2023 is down to 7.0.  Status post 2 units of blood transfusion.  Hemoglobin stable at 9  • Continue to monitor H&H  • GI following    Sepsis secondary to Klebsiella UTI, POA  Cannot rule out  SBP  • On admission, he has elevated procalcitonin, positive UA and elevated lactic acid  • Lactic acid improved with IV fluids. Lactic acid could be also related to his liver failure  • CT scan abdomen with trace ascites, bedside ultrasound with no adequate pocket of fluid to safely perform paracentesis  • Initially started on IV Merrem.  Later switched to IV Rocephin for Klebsiella UTI  • Follow blood and urine culture      EDMOND, likely secondary to volume depletion  Less likely hepatorenal syndrome  • Creatinine at outside hospital 2.5.  Improved with IV fluids and IV albumin and currently at 1.2   • Continue gentle diuresis today given his volume overload  • Nephrology team following.  Appreciate help     Alcohol dependence  Drug use disorder   • Avoid benzodiazepine given his acute hepatic encephalopathy.  Will monitor for any withdrawal symptoms  • Continue Suboxone 1 tablet daily   • Addiction team following.  Appreciate help  • Patient declining inpatient acute alcohol rehab despite counseling    Hyperglycemia  · Likely secondary to N-acetylcysteine drip and steroid use  · A1c is normal  · ISS    Expected Discharge Location and Transportation: home  Expected Discharge   Expected Discharge Date: 5/24/2023; Expected Discharge Time:      DVT prophylaxis:  Mechanical DVT prophylaxis orders are present.     AM-PAC 6 Clicks Score (PT): 21 (05/20/23 0800)    CODE STATUS:   Code Status and Medical Interventions:   Ordered at: 05/18/23 1030     Level Of Support Discussed With:    Patient     Code Status (Patient has no pulse and is not breathing):    CPR (Attempt to Resuscitate)     Medical Interventions (Patient has pulse or is breathing):    Full Support       Sigrid Grady MD  05/22/23

## 2023-05-22 NOTE — PAYOR COMM NOTE
"Renato Thomas (32 y.o. Male)     Mary Lou Aragon, MEDARDO  Utilization Review  [phone]  [phone]      Date of Birth   1990    Social Security Number       Address   [address] [facility] [address]    Home Phone   [phone]    MRN   [id number]       [facility]    Marital Status                               Admission Date   5/18/23    Admission Type   Urgent    Admitting Provider   Sigrid Grady MD    Attending Provider   Sigrid Grady MD    Department, Room/Bed   [facility] 6A, N618/1       Discharge Date       Discharge Disposition       Discharge Destination                               Attending Provider: Sigrid Grady MD    Allergies: No Known Allergies    Isolation: None   Infection: None   Code Status: CPR    Ht: 195.6 cm (77\")   Wt: 120 kg (265 lb)    Admission Cmt: None   Principal Problem: Hepatorenal failure [K76.7]                 Active Insurance as of 5/18/2023     Primary Coverage     Payor Plan Insurance Group Employer/Plan Group    [facility] BY QUINN [facility] BY QUINN [id number]     Payor Plan Address Payor Plan Phone Number Payor Plan Fax Number Effective Dates    [address] [address]   8/1/2021 - None Entered    [facility] [phone]       Subscriber Name Subscriber Birth Date Member ID       RENATO THOMAS 1990 [id number]                 Emergency Contacts      (Rel.) Home Phone Work Phone Mobile Phone    SEGUNDO THOMAS (Spouse) [phone] -- [phone]    SHAKA THOMAS (Mother) [phone] -- [phone]              Vital Signs (last 2 days)     Date/Time Temp Temp src Pulse Resp BP Patient Position SpO2    05/22/23 0710 -- -- 57 18 112/77 Lying 92    05/22/23 0510 98.2 (36.8) Oral 52 18 121/91 Lying 95    05/22/23 0239 97.4 (36.3) Oral 53 17 123/84 -- 94    05/22/23 0112 97.5 (36.4) Oral 56 16 131/100 -- --    05/22/23 0057 97.6 (36.4) -- 56 17 130/95 -- " 95    05/22/23 0024 97.7 (36.5) Oral 62 18 134/100 Lying 95    05/21/23 2245 97.4 (36.3) Oral 53 17 140/107 -- --    05/21/23 2229 97.5 (36.4) -- 66 18 120/105 -- 98    05/21/23 2204 97.5 (36.4) Oral 50 17 141/104 Lying 98    05/21/23 2018 97.4 (36.3) Oral 56 16 142/105 -- 99    05/21/23 2007 97.5 (36.4) Oral 63 18 142/107 Lying 95    05/21/23 1738 97.6 (36.4) Oral 57 18 143/104 Lying 97    05/21/23 1727 97.7 (36.5) Oral 59 20 139/104 Lying 95    05/21/23 1645 98.2 (36.8) Oral 57 18 132/101 Lying 95    05/21/23 1603 97.4 (36.3) Oral 60 20 131/96 Lying 96    05/21/23 1402 97.6 (36.4) Oral 59 18 134/99 Lying 95    05/21/23 1327 97.7 (36.5) Oral 67 20 119/87 Lying 94    05/21/23 1116 97.9 (36.6) Oral -- -- 134/98 Lying --    05/21/23 0740 97.4 (36.3) Oral -- 22 125/88 Lying --    05/21/23 0331 97 (36.1) Axillary 67 20 130/98 Lying 96    05/21/23 0018 97.6 (36.4) Oral 59 20 130/89 Lying 94    05/20/23 2015 97.6 (36.4) Oral 60 18 132/93 Lying 96    05/20/23 1607 97.5 (36.4) Oral 72 18 128/104 Lying --    05/20/23 1216 98.2 (36.8) Axillary -- 16 121/101 Lying --    05/20/23 0700 97.8 (36.6) Axillary -- 16 103/78 Lying --    05/20/23 0525 97.6 (36.4) Oral 57 16 132/98 Lying --    05/20/23 0025 98 (36.7) Oral 65 16 145/99 Sitting --        Oxygen Therapy (last 2 days)     Date/Time SpO2 Device (Oxygen Therapy) Flow (L/min) Oxygen Concentration (%) ETCO2 (mmHg)    05/22/23 0710 92 -- -- -- --    05/22/23 0631 -- room air -- -- --    05/22/23 0510 95 -- -- -- --    05/22/23 0416 -- room air -- -- --    05/22/23 0239 94 room air -- -- --    05/22/23 0216 -- room air -- -- --    05/22/23 0112 -- room air -- -- --    05/22/23 0057 95 -- -- -- --    05/22/23 0024 95 -- -- -- --    05/22/23 0005 -- room air -- -- --    05/21/23 2245 -- room air -- -- --    05/21/23 2229 98 -- -- -- --    05/21/23 2206 -- room air -- -- --    05/21/23 2204 98 room air -- -- --    05/21/23 2018 99 -- -- -- --    05/21/23 2007 95 -- -- -- --     05/21/23 2003 -- room air -- -- --    05/21/23 1800 -- room air -- -- --    05/21/23 1738 97 room air -- -- --    05/21/23 1727 95 room air -- -- --    05/21/23 1645 95 room air -- -- --    05/21/23 1603 96 -- -- -- --    05/21/23 1600 -- room air -- -- --    05/21/23 1402 95 room air -- -- --    05/21/23 1400 -- room air -- -- --    05/21/23 1327 94 room air -- -- --    05/21/23 1200 -- room air -- -- --    05/21/23 1000 -- room air -- -- --    05/21/23 0620 -- room air -- -- --    05/21/23 0413 -- room air -- -- --    05/21/23 0331 96 -- -- -- --    05/21/23 0211 -- room air -- -- --    05/21/23 0018 94 -- -- -- --    05/21/23 0009 -- room air -- -- --    05/20/23 2204 -- room air -- -- --    05/20/23 2035 -- room air -- -- --    05/20/23 2015 96 -- -- -- --    05/20/23 0618 -- room air -- -- --    05/20/23 0412 -- room air -- -- --    05/20/23 0212 -- room air -- -- --    05/20/23 0009 -- room air -- -- --        Lines, Drains & Airways     Active LDAs     Name Placement date Placement time Site Days    PICC Triple Lumen 05/18/23 Right Basilic 05/18/23  1203  Basilic  3                CIWA (last 2 days)     None        Current Facility-Administered Medications   Medication Dose Route Frequency Provider Last Rate Last Admin   • acetylcysteine (ACETADOTE) 6,000 mg in dextrose (D5W) 5 % 1,000 mL infusion  5.12 mg/kg/hr Intravenous Continuous Bella, Marguerite Woody .2 mL/hr at 05/22/23 0623 5.12 mg/kg/hr at 05/22/23 0623   • polyethylene glycol (MIRALAX) packet 17 g  17 g Oral Daily PRN Bella, Marguerite Woody MD        And   • bisacodyl (DULCOLAX) EC tablet 5 mg  5 mg Oral Daily PRN Bella, Marguerite Woody MD        And   • bisacodyl (DULCOLAX) suppository 10 mg  10 mg Rectal Daily PRN Bella, Marguerite Woody MD       • buprenorphine-naloxone (SUBOXONE) 8-2 MG per SL tablet 1 tablet  1 tablet Sublingual Daily Sigrid Grady MD   1 tablet at 05/21/23 1242   • Calcium Replacement - Follow Nurse / BPA Driven Protocol   Does not apply PRN Bella,  Marguerite Woody MD       • cefTRIAXone (ROCEPHIN) 2 g/100 mL 0.9% NS IVPB (MBP)  2 g Intravenous Q24H Sigrid Grady MD   2 g at 05/21/23 1242   • cholecalciferol (VITAMIN D3) tablet 1,000 Units  1,000 Units Oral Daily Bella, Marguerite Woody MD   1,000 Units at 05/21/23 0845   • dextrose (D50W) (25 g/50 mL) IV injection 25 g  25 g Intravenous Q15 Min PRN Sigrid Grady MD       • dextrose (GLUTOSE) oral gel 15 g  15 g Oral Q15 Min PRN Sigrid Grady MD       • docusate sodium (COLACE) liquid 50 mg  50 mg Nasogastric BID Bella, Marguerite Woody MD   50 mg at 05/21/23 2009   • folic acid 1 mg in sodium chloride 0.9 % 50 mL IVPB  1 mg Intravenous Daily Juarez Zeng, APRN 100 mL/hr at 05/21/23 0846 1 mg at 05/21/23 0846   • glucagon (GLUCAGEN) injection 1 mg  1 mg Intramuscular Q15 Min PRN Sigrid Grady MD       • Insulin Lispro (humaLOG) injection 2-7 Units  2-7 Units Subcutaneous 4x Daily AC & at Bedtime Sigrid Grady MD   3 Units at 05/21/23 2016   • lactulose (CHRONULAC) 10 GM/15ML solution 30 g  30 g Oral TID Juarez Zeng, APRN   30 g at 05/21/23 2009   • Magnesium Standard Dose Replacement - Follow Nurse / BPA Driven Protocol   Does not apply PRN Bella, Marguerite Woody MD       • melatonin tablet 5 mg  5 mg Oral Nightly PRN Deana Toure, APRN   5 mg at 05/21/23 2009   • methylPREDNISolone sodium succinate (SOLU-Medrol) injection 60 mg  60 mg Intravenous Daily Bella, Marguerite Woody MD   60 mg at 05/21/23 0850   • multivitamin and minerals liquid 15 mL  15 mL Oral Daily Bella, Marguerite Woody MD   15 mL at 05/21/23 0849   • nicotine (NICODERM CQ) 21 MG/24HR patch 1 patch  1 patch Transdermal Q24H Sigrid Grady MD   1 patch at 05/21/23 0843   • octreotide (sandoSTATIN) 500 mcg in sodium chloride 0.9 % 100 mL (5 mcg/mL) infusion  50 mcg/hr Intravenous Continuous Bella, Marguerite Woody MD 10 mL/hr at 05/22/23 0623 50 mcg/hr at 05/22/23 0623   • ondansetron (ZOFRAN) injection 4 mg  4 mg Intravenous Q6H PRN Bella,  Marguerite Woody MD       • pantoprazole (PROTONIX) injection 40 mg  40 mg Intravenous Q12H Bella, Marguerite Woody MD   40 mg at 05/21/23 2009   • Phosphorus Replacement - Follow Nurse / BPA Driven Protocol   Does not apply PRN Bella, Marguerite Woody MD       • phytonadione (AQUA-MEPHYTON, VITAMIN K) 10 mg in sodium chloride 0.9 % 50 mL IVPB  10 mg Intravenous Once Sigrid Grady MD       • Potassium Replacement - Follow Nurse / BPA Driven Protocol   Does not apply PRN Bella, Marguerite Woody MD       • riFAXIMin (XIFAXAN) tablet 550 mg  550 mg Oral Q12H Bella, Marguerite Woody MD   550 mg at 05/21/23 2009   • sennosides (SENOKOT) 8.8 MG/5ML syrup 5 mL  5 mL Nasogastric BID Bella, Marguerite Woody MD   5 mL at 05/21/23 2009   • sertraline (ZOLOFT) tablet 50 mg  50 mg Oral Daily Sigrid Grady MD   50 mg at 05/21/23 1242   • sodium chloride 0.9 % flush 10 mL  10 mL Intravenous Q12H Eblla, Marguerite Woody MD   10 mL at 05/21/23 2009   • sodium chloride 0.9 % flush 10 mL  10 mL Intravenous PRN Bella, Marguerite Woody MD       • sodium chloride 0.9 % infusion 40 mL  40 mL Intravenous PRN Bella, Marguerite Woody MD   40 mL at 05/19/23 0811     Lab Results (last 48 hours)     Procedure Component Value Units Date/Time    Nutrition Panel [[id number]]  (Abnormal) Collected: 05/22/23 0610    Specimen: Blood Updated: 05/22/23 0751     Glucose 150 mg/dL      BUN 29 mg/dL      Creatinine 0.86 mg/dL      Sodium 136 mmol/L      Potassium 3.8 mmol/L      Comment: Slight hemolysis detected by analyzer. Results may be affected.        Chloride 104 mmol/L      CO2 21.0 mmol/L      Calcium 8.3 mg/dL      Alkaline Phosphatase 169 U/L      ALT (SGPT) 47 U/L      AST (SGOT) 152 U/L      Total Cholesterol 122 mg/dL      Triglycerides 156 mg/dL      Total Protein 6.2 g/dL      Albumin 2.8 g/dL      Phosphorus 3.1 mg/dL      Total Bilirubin 12.4 mg/dL      Magnesium 1.9 mg/dL      Anion Gap 11.0 mmol/L      Globulin 3.4 gm/dL      A/G Ratio 0.8 g/dL      BUN/Creatinine Ratio 33.7     eGFR 118.0 mL/min/1.73      Narrative:      Cholesterol Reference Ranges:   Desirable       < 200 mg/dL   Borderline    200-239 mg/dL   High Risk       > 239 mg/dL    Triglyceride Reference Ranges:   Normal          < 150 mg/dL   Borderline    150-199 mg/dL   High          200-499 mg/dL   Very High       > 499 mg/dL    C-reactive Protein [[id number]]  (Abnormal) Collected: 05/22/23 0610    Specimen: Blood Updated: 05/22/23 0749     C-Reactive Protein 1.00 mg/dL     POC Glucose Once [[id number]]  (Abnormal) Collected: 05/22/23 0709    Specimen: Blood Updated: 05/22/23 0722     Glucose 159 mg/dL      Comment: Meter: [id number] : [id number] Bao Toth       CBC & Differential [[id number]]  (Abnormal) Collected: 05/22/23 0610    Specimen: Blood Updated: 05/22/23 0630    Narrative:      The following orders were created for panel order CBC & Differential.  Procedure                               Abnormality         Status                     ---------                               -----------         ------                     CBC Auto Differential[[id number]]        Abnormal            Final result               Scan Slide[[id number]]                                                                    Please view results for these tests on the individual orders.    CBC Auto Differential [[id number]]  (Abnormal) Collected: 05/22/23 0610    Specimen: Blood Updated: 05/22/23 0630     WBC 10.04 10*3/mm3      RBC 2.74 10*6/mm3      Hemoglobin 9.1 g/dL      Hematocrit 26.9 %      MCV 98.2 fL      MCH 33.2 pg      MCHC 33.8 g/dL      RDW 19.3 %      RDW-SD 68.3 fl      MPV 10.9 fL      Platelets 104 10*3/mm3      Neutrophil % 78.9 %      Lymphocyte % 6.3 %      Monocyte % 8.8 %      Eosinophil % 0.0 %      Basophil % 0.2 %      Immature Grans % 5.8 %      Neutrophils, Absolute 7.93 10*3/mm3      Lymphocytes, Absolute 0.63 10*3/mm3      Monocytes, Absolute 0.88 10*3/mm3      Eosinophils, Absolute 0.00 10*3/mm3      Basophils, Absolute 0.02 10*3/mm3       Immature Grans, Absolute 0.58 10*3/mm3      nRBC 0.0 /100 WBC     Prealbumin [[id number]] Collected: 05/22/23 0610    Specimen: Blood Updated: 05/22/23 0612    POC Glucose Once [[id number]]  (Abnormal) Collected: 05/21/23 2007    Specimen: Blood Updated: 05/21/23 2008     Glucose 237 mg/dL      Comment: Meter: [id number] : [id number] Charlie García       POC Glucose Once [[id number]]  (Abnormal) Collected: 05/21/23 1605    Specimen: Blood Updated: 05/21/23 1607     Glucose 248 mg/dL      Comment: Meter: [id number] : [id number] Esther Hoang       Blood Culture - Blood, Blood, PICC Line [[id number]]  (Normal) Collected: 05/18/23 1453    Specimen: Blood, PICC Line Updated: 05/21/23 1532     Blood Culture No growth at 3 days    Blood Culture - Blood, Blood, PICC Line [[id number]]  (Normal) Collected: 05/18/23 1213    Specimen: Blood, PICC Line Updated: 05/21/23 1331     Blood Culture No growth at 3 days    Phosphorus [[id number]]  (Normal) Collected: 05/21/23 1257    Specimen: Blood Updated: 05/21/23 1327     Phosphorus 3.2 mg/dL     Comprehensive Metabolic Panel [[id number]]  (Abnormal) Collected: 05/21/23 1257    Specimen: Blood Updated: 05/21/23 1327     Glucose 217 mg/dL      BUN 29 mg/dL      Creatinine 1.03 mg/dL      Sodium 134 mmol/L      Potassium 3.4 mmol/L      Chloride 101 mmol/L      CO2 20.0 mmol/L      Calcium 8.3 mg/dL      Total Protein 6.3 g/dL      Albumin 2.8 g/dL      ALT (SGPT) 39 U/L      AST (SGOT) 162 U/L      Alkaline Phosphatase 189 U/L      Total Bilirubin 13.6 mg/dL      Globulin 3.5 gm/dL      Comment: Calculated Result        A/G Ratio 0.8 g/dL      BUN/Creatinine Ratio 28.2     Anion Gap 13.0 mmol/L      eGFR 99.0 mL/min/1.73     Narrative:      GFR Normal >60  Chronic Kidney Disease <60  Kidney Failure <15      POC Glucose Once [[id number]]  (Abnormal) Collected: 05/21/23 1206    Specimen: Blood Updated: 05/21/23 1208     Glucose 215 mg/dL      Comment: Meter: [id number] :  [id number] [facility]       POC Glucose Once [[id number]]  (Abnormal) Collected: 05/21/23 0745    Specimen: Blood Updated: 05/21/23 0747     Glucose 226 mg/dL      Comment: Meter: [id number] : [id number] Alejandro MONDRAGON       Phosphorus [[id number]]  (Abnormal) Collected: 05/21/23 0417    Specimen: Blood Updated: 05/21/23 0613     Phosphorus 2.2 mg/dL     Magnesium [[id number]]  (Normal) Collected: 05/21/23 0417    Specimen: Blood Updated: 05/21/23 0613     Magnesium 1.8 mg/dL     Protime-INR [[id number]]  (Abnormal) Collected: 05/21/23 0417    Specimen: Blood Updated: 05/21/23 0559     Protime 26.5 Seconds      INR 2.42    CBC & Differential [[id number]]  (Abnormal) Collected: 05/21/23 0417    Specimen: Blood Updated: 05/21/23 0559    Narrative:      The following orders were created for panel order CBC & Differential.  Procedure                               Abnormality         Status                     ---------                               -----------         ------                     CBC Auto Differential[[id number]]        Abnormal            Final result               Scan Slide[[id number]]                                       Final result                 Please view results for these tests on the individual orders.    Scan Slide [[id number]] Collected: 05/21/23 0417    Specimen: Blood Updated: 05/21/23 0559     Crenated RBC's Slight/1+     Microcytes Slight/1+     Macrocytes Mod/2+     WBC Morphology Normal     Platelet Morphology Normal    CBC Auto Differential [[id number]]  (Abnormal) Collected: 05/21/23 0417    Specimen: Blood Updated: 05/21/23 0559     WBC 8.42 10*3/mm3      RBC 1.95 10*6/mm3      Hemoglobin 7.0 g/dL      Hematocrit 22.9 %      .4 fL      MCH 35.9 pg      MCHC 30.6 g/dL      RDW 20.3 %      RDW-SD 85.8 fl      MPV 10.8 fL      Platelets 101 10*3/mm3      Neutrophil % 77.8 %      Lymphocyte % 6.5 %      Monocyte % 8.6 %      Eosinophil % 0.0 %      Basophil % 0.2 %      Immature Grans  % 6.9 %      Neutrophils, Absolute 6.55 10*3/mm3      Lymphocytes, Absolute 0.55 10*3/mm3      Monocytes, Absolute 0.72 10*3/mm3      Eosinophils, Absolute 0.00 10*3/mm3      Basophils, Absolute 0.02 10*3/mm3      Immature Grans, Absolute 0.58 10*3/mm3      nRBC 0.4 /100 WBC     Narrative:      Appended report. These results have been appended to a previously verified report.    POC Glucose Once [[id number]]  (Abnormal) Collected: 05/20/23 2054    Specimen: Blood Updated: 05/20/23 2100     Glucose 234 mg/dL      Comment: Meter: [id number] : [id number] Carmelita Rousseau       POC Glucose Once [[id number]]  (Abnormal) Collected: 05/20/23 1806    Specimen: Blood Updated: 05/20/23 1808     Glucose 280 mg/dL      Comment: Meter: [id number] : [id number] Cary Shanelle       Heparin Induced PLT Antibody With / Rfx [[id number]] Collected: 05/18/23 1214    Specimen: Blood Updated: 05/20/23 1610     Heparin Induced Plt Ab 0.177 OD     Narrative:      Performed at:  [address] [place] [address]  [id number]  : Jong Rush MD, Phone:  [phone]    POC Glucose Once [[id number]]  (Abnormal) Collected: 05/20/23 1215    Specimen: Blood Updated: 05/20/23 1217     Glucose 259 mg/dL      Comment: Meter: [id number] : [id number] Sobia ANDERSON       Manual Differential [[id number]]  (Abnormal) Collected: 05/20/23 0913    Specimen: Blood Updated: 05/20/23 1029     Neutrophil % 70.0 %      Lymphocyte % 7.0 %      Monocyte % 4.0 %      Eosinophil % 0.0 %      Basophil % 0.0 %      Bands %  15.0 %      Metamyelocyte % 4.0 %      Neutrophils Absolute 7.90 10*3/mm3      Lymphocytes Absolute 0.65 10*3/mm3      Monocytes Absolute 0.37 10*3/mm3      Eosinophils Absolute 0.00 10*3/mm3      Basophils Absolute 0.00 10*3/mm3      nRBC 0.0 /100 WBC      Anisocytosis Mod/2+     Macrocytes Slight/1+     WBC Morphology Normal     Platelet Estimate Decreased    CBC & Differential [[id number]]  (Abnormal)  Collected: 05/20/23 0913    Specimen: Blood Updated: 05/20/23 1029    Narrative:      The following orders were created for panel order CBC & Differential.  Procedure                               Abnormality         Status                     ---------                               -----------         ------                     CBC Auto Differential[[id number]]        Abnormal            Final result               Scan Slide[[id number]]                                                                    Please view results for these tests on the individual orders.    CBC Auto Differential [[id number]]  (Abnormal) Collected: 05/20/23 0913    Specimen: Blood Updated: 05/20/23 1029     WBC 9.29 10*3/mm3      RBC 2.48 10*6/mm3      Hemoglobin 8.6 g/dL      Hematocrit 25.6 %      .2 fL      MCH 34.7 pg      MCHC 33.6 g/dL      RDW 19.9 %      RDW-SD 74.4 fl      MPV 10.9 fL      Platelets 121 10*3/mm3     Narrative:      The previously reported component NRBC is no longer being reported. Previous result was 0.0 /100 WBC (Reference Range: 0.0-0.2 /100 WBC) on 5/20/2023 at 0950 EDT.    Comprehensive Metabolic Panel [[id number]]  (Abnormal) Collected: 05/20/23 0913    Specimen: Blood Updated: 05/20/23 1004     Glucose 242 mg/dL      BUN 28 mg/dL      Creatinine 1.31 mg/dL      Sodium 134 mmol/L      Potassium 4.3 mmol/L      Chloride 105 mmol/L      CO2 18.0 mmol/L      Calcium 8.2 mg/dL      Total Protein 6.0 g/dL      Albumin 2.8 g/dL      ALT (SGPT) 24 U/L      AST (SGOT) 101 U/L      Alkaline Phosphatase 206 U/L      Total Bilirubin 14.5 mg/dL      Globulin 3.2 gm/dL      Comment: Calculated Result        A/G Ratio 0.9 g/dL      BUN/Creatinine Ratio 21.4     Anion Gap 11.0 mmol/L      eGFR 74.2 mL/min/1.73     Narrative:      GFR Normal >60  Chronic Kidney Disease <60  Kidney Failure <15      Magnesium [[id number]]  (Normal) Collected: 05/20/23 0913    Specimen: Blood Updated: 05/20/23 1004     Magnesium 1.8 mg/dL      Phosphorus [[id number]]  (Normal) Collected: 05/20/23 0913    Specimen: Blood Updated: 05/20/23 1000     Phosphorus 3.1 mg/dL     Protime-INR [[id number]]  (Abnormal) Collected: 05/20/23 0913    Specimen: Blood Updated: 05/20/23 0957     Protime 19.9 Seconds      INR 1.68          Imaging Results (Last 48 Hours)     ** No results found for the last 48 hours. **        ECG/EMG Results (last 48 hours)     Procedure Component Value Units Date/Time    Adult Transthoracic Echo Complete W/ Cont if Necessary Per Protocol [[id number]] Resulted: 05/20/23 1148     Updated: 05/20/23 1148     Target HR (85%) 160 bpm      Max. Pred. HR (100%) 188 bpm      EF(MOD-bp) 57.0 %      LVIDd 5.8 cm      LVIDs 4.1 cm      IVSd 0.70 cm      LVPWd 0.80 cm      FS 29.3 %      IVS/LVPW 0.88 cm      ESV(cubed) 68.9 ml      EDV(cubed) 195.1 ml      LVOT area 3.5 cm2      LV mass(C)d 161.9 grams      LVOT diam 2.10 cm      EDV(MOD-sp2) 98.7 ml      EDV(MOD-sp4) 144.0 ml      ESV(MOD-sp2) 38.8 ml      ESV(MOD-sp4) 62.5 ml      SV(MOD-sp2) 59.9 ml      SV(MOD-sp4) 81.5 ml      EF(MOD-sp2) 60.7 %      EF(MOD-sp4) 56.6 %      MV E max garo 101.0 cm/sec      MV A max garo 70.7 cm/sec      MV dec time 0.19 msec      MV E/A 1.43     LA ESV Index (BP) 155.1 ml/m2      Med Peak E' Garo 10.1 cm/sec      Lat Peak E' Garo 16.0 cm/sec      Avg E/e' ratio 7.74     SV(LVOT) 89.4 ml      RV Base 4.1 cm      RV Mid 3.5 cm      RV Length 7.6 cm      TAPSE (>1.6) 1.99 cm      RV S' 14.5 cm/sec      LA dimension (2D)  3.3 cm      LV V1 max 123.0 cm/sec      LV V1 max PG 6.1 mmHg      LV V1 mean PG 3.0 mmHg      LV V1 VTI 25.8 cm      Ao pk garo 148.0 cm/sec      Ao max PG 8.8 mmHg      Ao mean PG 5.0 mmHg      Ao V2 VTI 36.6 cm      HOME(I,D) 2.44 cm2      MV max PG 4.0 mmHg      MV mean PG 2.00 mmHg      MV V2 VTI 31.2 cm      MV P1/2t 103.9 msec      MVA(P1/2t) 2.12 cm2      MVA(VTI) 2.9 cm2      MV dec slope 313.0 cm/sec2      TR max garo 264.3 cm/sec      TR max PG 28.2  "mmHg      PA acc time 0.22 sec      PA pr(Accel) -17.7 mmHg      Ao root diam 3.2 cm      RVSP(TR) 31 mmHg      RAP systole 3 mmHg     Narrative:      •  Left ventricular systolic function is normal. Calculated left   ventricular EF = 57%  •  Estimated right ventricular systolic pressure from tricuspid   regurgitation is normal (<35 mmHg).      SCANNED - TELEMETRY   [[id number]] Resulted: 05/18/23     Updated: 05/20/23 1718    SCANNED - TELEMETRY   [[id number]] Resulted: 05/18/23     Updated: 05/20/23 1718    SCANNED - TELEMETRY   [[id number]] Resulted: 05/18/23     Updated: 05/22/23 0401        Operative/Procedure Notes (last 48 hours)  Notes from 05/20/23 0814 through 05/22/23 0814   No notes of this type exist for this encounter.          Physician Progress Notes (last 48 hours)      Marguerite Blanco MD at 05/21/23 1038          GI Daily Progress Note  Subjective:    Chief Complaint: Follow-up decompensated alcoholic cirrhosis and hepatitis    Patient alert oriented sitting with mild asterixis he is eating all of his eggs and breakfast tray    Objective:    /88 (BP Location: Left arm, Patient Position: Lying)   Pulse 67   Temp 97.4 °F (36.3 °C) (Oral)   Resp 22   Ht 195.6 cm (77\")   Wt 126 kg (277 lb 8 oz)   SpO2 96%   BMI 32.91 kg/m²     Physical Exam  Constitutional:       Appearance: Normal appearance. He is ill-appearing.   HENT:      Head: Normocephalic and atraumatic.      Nose: Nose normal.   Eyes:      General: Scleral icterus present.   Cardiovascular:      Rate and Rhythm: Normal rate and regular rhythm.      Pulses: Normal pulses.   Pulmonary:      Effort: Pulmonary effort is normal.   Abdominal:      General: Abdomen is flat. Bowel sounds are normal.      Palpations: Abdomen is soft.   Musculoskeletal:         General: Normal range of motion.   Skin:     General: Skin is warm.   Neurological:      General: No focal deficit present.      Mental Status: He is alert.   Psychiatric:         Mood " and Affect: Mood normal.         Lab  Lab Results   Component Value Date    WBC 8.42 05/21/2023    HGB 7.0 (L) 05/21/2023    HGB 8.6 (L) 05/20/2023    HGB 8.9 (L) 05/19/2023    .4 (H) 05/21/2023     (L) 05/21/2023    INR 2.42 (H) 05/21/2023    INR 1.68 (H) 05/20/2023    INR 1.85 (H) 05/18/2023    INR 1.55 (H) 05/17/2023    INR 0.98 06/18/2021       Lab Results   Component Value Date    GLUCOSE 242 (H) 05/20/2023    BUN 28 (H) 05/20/2023    CREATININE 1.31 (H) 05/20/2023    EGFRIFNONA >150 01/01/2022    BCR 21.4 05/20/2023     (L) 05/20/2023    K 4.3 05/20/2023    CO2 18.0 (L) 05/20/2023    CALCIUM 8.2 (L) 05/20/2023    ALBUMIN 2.8 (L) 05/20/2023    ALKPHOS 206 (H) 05/20/2023    BILITOT 14.5 (H) 05/20/2023    BILIDIR >10.0 (H) 05/17/2023    ALT 24 05/20/2023     (H) 05/20/2023       Assessment:      1.  Acute alcohol hepatitis              -MDF 44.4 indicating poor prognosis              -Steroids initiated on 5/18/2023              -Calculate Lille score 05/22/2023  2.  Decompensated liver cirrhosis, HCV/EtOH              -With varices, hepatic encephalopathy, ascites, and esophageal varices              -MELD-Na: 27 points, 32% 90 day mortality               -Child-Chapa: C  3. Gastrointestinal bleeding, symptomatic anemia, macrocytic anemia              -S/p EGD with bleeding esophageal varices, banded x 2  4. EDMOND on CKD, suspect ATN as urine Na not indicative of HRS wife had mentioned that patient does take nsaids  5. Chronic HCV, s/p treatment, unclear if SVR  6. Alcohol abuse and dependence, fireball.   7. THC Use  8. Hepatic Encephalopathy               -Grade 2/4 today, improved.       status post EGD May 19, 2023 with banding of bleeding esophageal varices x2.         Plan:    Noted his hemoglobin has decreased and discussed this with Dr. Lai would recommend transfusion as needed but would not pursue repeat endoscopy at this time he has no melena  Continue Rocephin for full 7-day  treatment for SBP prophylaxis  Complete octreotide for 1 more day  Nidhi importance of high-protein low-sodium diet with high-protein snacks bedtime snacks and a calorie count has been initiated  Continue proton pump inhibitor twice daily if he can reliably take this p.o. this can be changed over  At this point would not use lactulose enemas but will continue the lactulose  CIWA protocol as needed  Continue MVI thiamine and folic acid  Awaiting a CMP if his creatinine is worse would consider adding albumin at 1 g/kg for 3-day course and would consider midodrine if his blood pressures are low  Overall again discussed that his prognosis is guarded with his continued alcohol use and Child-Chapa class indicating a level of C and a MELD score of 27    Marguerite Blanco MD  23  10:38 EDT      Electronically signed by Marguerite Blanco MD at 23 1554     Sigrid Grady MD at 23 0619              [facility] Medicine Services  PROGRESS NOTE    Patient Name: Franck Toure  : 1990  MRN: [id number]    Date of Admission: 2023  Primary Care Physician: Provider, No Known    Subjective   Subjective     CC:  Follow-up for hepatic encephalopathy    HPI:  I have seen and evaluated the patient this morning. Continues to feel well. No CP or SOB. No melena. Hemodynamics stable. Hgb dropped to 7.0 today    ROS:  General : no fevers, chills,++ weakness  CVS: No chest pain, palpitations  Respiratory: No cough, dyspnea  GI: No N/V/D, abd pain  10 point review of systems is negative except for what is mentioned in HPI    Objective   Objective     Vital Signs:   Temp:  [97 °F (36.1 °C)-98.2 °F (36.8 °C)] 97 °F (36.1 °C)  Heart Rate:  [59-72] 67  Resp:  [16-20] 20  BP: (103-132)/() 130/98     Physical Exam:  General: Acutely ill looking.  Generalized edema & anasarca  Head: Atraumatic and normocephalic  Eyes: Severe jaundice  Ears:  Ears appear intact with no abnormalities  noted  Throat: No oral lesions, no thrush  Neck: Supple, trachea midline  Lungs: Diminished air entry bilaterally.  No crackles or wheezing.    Heart:  Normal S1 and S2, no murmur, no gallop, No JVD, 2+ lower extremity swelling  Abdomen:  Soft, no tenderness, no organomegaly, normal bowel sounds, no organomegaly  Extremities: pulses equal bilaterally  Skin: No bleeding, bruising or rash, normal skin turgor and elasticity  Neurologic: Cranial nerves appear intact with no evidence of facial asymmetry, normal motor and sensory functions in all 4 extremities  Psych: Alert and oriented x 3, normal mood    Results Reviewed:  LAB RESULTS:      Lab 05/21/23  0417 05/20/23  0913 05/19/23  0537 05/18/23  1215 05/18/23  1214 05/18/23  0126 05/17/23 2229 05/17/23 2011   WBC 8.42 9.29 5.96  --  5.13  --   --  6.02   HEMOGLOBIN 7.0* 8.6* 8.9*  --  6.9*  --   --  7.3*   HEMATOCRIT 22.9* 25.6* 25.4*  --  20.4*  --   --  21.9*   PLATELETS 101* 121* 113*  --  106*  --   --  104*   NEUTROS ABS 6.55 7.90* 5.19  --  3.18  --   --  3.67   IMMATURE GRANS (ABS) 0.58*  --   --   --   --   --   --   --    LYMPHS ABS 0.55*  --   --   --   --   --   --   --    MONOS ABS 0.72  --   --   --   --   --   --   --    EOS ABS 0.00 0.00 0.00  --  0.10  --   --  0.06   .4* 103.2* 101.2*  --  106.8*  --   --  110.1*   CRP  --   --   --   --   --   --   --  5.55*   PROCALCITONIN  --   --   --   --   --   --  0.64*  --    LACTATE  --   --   --  0.8  --  2.7* 2.9*  --    PROTIME 26.5* 19.9*  --  21.5*  --   --  19.2*  --    APTT  --   --   --  49.3*  --   --  44.2*  --    D DIMER QUANT  --   --   --  2.74*  --   --   --   --          Lab 05/21/23  0417 05/20/23  0913 05/19/23  0537 05/18/23  1215 05/17/23 2011   SODIUM  --  134* 135* 134* 131*   POTASSIUM  --  4.3 4.8 3.9 3.9   CHLORIDE  --  105 107 108* 103   CO2  --  18.0* 19.0* 17.0* 18.5*   ANION GAP  --  11.0 9.0 9.0 9.5   BUN  --  28* 20 19 20   CREATININE  --  1.31* 1.20 1.47* 2.59*   EGFR   --  74.2 82.4 64.6 32.7*   GLUCOSE  --  242* 184* 110* 111*   CALCIUM  --  8.2* 8.0* 7.7* 8.3*   MAGNESIUM 1.8 1.8  --   --  1.7   PHOSPHORUS 2.2* 3.1  --   --   --    HEMOGLOBIN A1C  --   --  4.70*  --   --          Lab 05/20/23  0913 05/19/23  0537 05/18/23 1215 05/17/23 2012 05/17/23 2011   TOTAL PROTEIN 6.0 6.2 6.1  --  6.3   ALBUMIN 2.8* 3.0* 2.7*  --  2.0*   GLOBULIN 3.2 3.2 3.4  --  4.3   ALT (SGPT) 24 20 18  --  24   AST (SGOT) 101* 94* 106*  --  120*   BILIRUBIN 14.5* 16.0* 12.2* 14.0* 14.9*   BILIRUBIN DIRECT  --   --   --  >10.0*  --    ALK PHOS 206* 209* 221*  --  277*   AMYLASE  --   --   --   --  18*   LIPASE  --   --   --   --  28         Lab 05/21/23  0417 05/20/23  0913 05/18/23 1215 05/17/23 2229 05/17/23 2012   PROBNP  --   --   --   --  337.6   PROTIME 26.5* 19.9* 21.5* 19.2*  --    INR 2.42* 1.68* 1.85* 1.55*  --              Lab 05/19/23  0537 05/18/23  1454 05/18/23 1448 05/18/23 1215   IRON  --   --   --  59   IRON SATURATION  --   --   --  79*   TIBC  --   --   --  75*   TRANSFERRIN  --   --   --  50*   FOLATE 3.35*  --   --   --    VITAMIN B 12  --  >2,000*  --   --    ABO TYPING  --   --  A A   RH TYPING  --   --  Positive Positive   ANTIBODY SCREEN  --   --   --  Negative         Brief Urine Lab Results  (Last result in the past 365 days)      Color   Clarity   Blood   Leuk Est   Nitrite   Protein   CREAT   Urine HCG        05/18/23 1558             134.3         05/18/23 1558 Dark Yellow   Cloudy   Negative   Moderate (2+)   Negative   30 mg/dL (1+)                 Microbiology Results Abnormal     Procedure Component Value - Date/Time    Blood Culture - Blood, Blood, PICC Line [[id number]]  (Normal) Collected: 05/18/23 1453    Lab Status: Preliminary result Specimen: Blood, PICC Line Updated: 05/20/23 1531     Blood Culture No growth at 2 days    Blood Culture - Blood, Blood, PICC Line [[id number]]  (Normal) Collected: 05/18/23 1213    Lab Status: Preliminary result Specimen:  Blood, PICC Line Updated: 05/20/23 1331     Blood Culture No growth at 2 days    Urine Culture - Urine, Urine, Clean Catch [[id number]]  (Normal) Collected: 05/18/23 1558    Lab Status: Final result Specimen: Urine, Clean Catch Updated: 05/19/23 2338     Urine Culture No growth          Adult Transthoracic Echo Complete W/ Cont if Necessary Per Protocol    Result Date: 5/20/2023  •  Left ventricular systolic function is normal. Calculated left ventricular EF = 57% •  Estimated right ventricular systolic pressure from tricuspid regurgitation is normal (<35 mmHg).     XR Abdomen KUB    Result Date: 5/19/2023  XR ABDOMEN KUB Date of Exam: 5/19/2023 9:30 AM EDT Indication: nasal jejunal tube placement Comparison: None available. Findings: Enteric catheter projects over the expected location of the distal duodenum. The bowel gas pattern is unremarkable. No acute osseous lesion is seen.     Impression: Impression: Enteric catheter projects over the expected location of the distal duodenum. Electronically Signed: Horacio Sharma  5/19/2023 9:46 AM EDT  Workstation ID: [id number]      Results for orders placed during the hospital encounter of 05/18/23    Adult Transthoracic Echo Complete W/ Cont if Necessary Per Protocol    Interpretation Summary  •  Left ventricular systolic function is normal. Calculated left ventricular EF = 57%  •  Estimated right ventricular systolic pressure from tricuspid regurgitation is normal (<35 mmHg).      Current medications:  Scheduled Meds:cefTRIAXone, 2 g, Intravenous, Q24H  cholecalciferol, 1,000 Units, Oral, Daily  docusate sodium, 50 mg, Nasogastric, BID  folic acid (FOLVITE) IVPB, 1 mg, Intravenous, Daily  insulin lispro, 2-7 Units, Subcutaneous, 4x Daily AC & at Bedtime  lactulose, 30 g, Oral, TID  methylPREDNISolone sodium succinate, 60 mg, Intravenous, Daily  multivitamin and minerals, 15 mL, Oral, Daily  nicotine, 1 patch, Transdermal, Q24H  pantoprazole, 40 mg, Intravenous,  Q12H  rifAXIMin, 550 mg, Oral, Q12H  sennosides, 5 mL, Nasogastric, BID  sodium chloride, 10 mL, Intravenous, Q12H  thiamine (B-1) IV, 500 mg, Intravenous, Daily      Continuous Infusions:acetylcysteine (ACETADOTE) 6000mg in D5W infusion, 5.12 mg/kg/hr, Last Rate: 5.12 mg/kg/hr (05/20/23 2307)  octreotide (SandoSTATIN) infusion, 50 mcg/hr, Last Rate: 50 mcg/hr (05/20/23 2307)      PRN Meds:.•  [DISCONTINUED] senna-docusate sodium **AND** polyethylene glycol **AND** bisacodyl **AND** bisacodyl  •  Calcium Replacement - Follow Nurse / BPA Driven Protocol  •  dextrose  •  dextrose  •  glucagon (human recombinant)  •  Magnesium Standard Dose Replacement - Follow Nurse / BPA Driven Protocol  •  melatonin  •  ondansetron  •  Phosphorus Replacement - Follow Nurse / BPA Driven Protocol  •  Potassium Replacement - Follow Nurse / BPA Driven Protocol  •  sodium chloride  •  sodium chloride    Assessment & Plan   Assessment & Plan     Active Hospital Problems    Diagnosis  POA   • **Hepatorenal failure [K76.7]  Yes   • Cirrhosis of liver without ascites [K74.60]  Unknown   • Anemia [D64.9]  Unknown      Resolved Hospital Problems   No resolved problems to display.        Brief Hospital Course to date:  Franck Toure is a 32 y.o. male with past medical history of chronic hep C, alcohol use disorder, alcohol induced pancreatitis, fatty liver disease who presented to the hospital as a direct admit from [facility] for liver failure and higher level of care     Assessment and plan:  Acute liver failure, slowly improving  Acute metabolic/hepatic encephalopathy, improving  Hepatocellular jaundice  Alcohol induced acute hepatitis  • Known to drink alcohol for years: 10-15 double shots of hard liquor  • Has advanced liver disease with huge splenomegaly and portal hypertension  • Continue lactulose and rifaximin for hepatic encephalopathy.  Consciousness is improving   • Maddrey discrimination score is 56 -->  continue  Solumedrol 60 mg daily  • INR was elevated at 1.85 on admission and received vitamin K 10 mg x 1 dose and 2 units of FF Plasma. INR improved to 1.68 then up again to 2.4. Will transfuse 2 units of plasma   • Bilirubin is remained elevated.  Likely hepatocellular jaundice secondary to liver failure.  No evidence of biliary duct dilation on CT scan abdomen from outside facility  • GI following.  Appreciate the help  • Continue supportive care for now and close monitoring of liver functions     Acute blood loss anemia secondary to GI bleed, POA  Severe symptomatic anemia  • Hemoglobin 6 months ago was 16.  Hemoglobin was 6.9 on admission status post 2 units of blood transfusion  • EGD 5/19/2023 showing 3 oozing varices bleed status post banding, portal hypertension gastropathy and hiatal hernia with Robert erosions  • Continue IV Protonix 40 mg twice daily  • Status post 72 hours of IV octreotide drip  • Status post transfusion of 2 units  of blood on 5/18/2023  • Anemia work-up --> iron overload.  B12 more than 2000 and low folic acid.  Continue folic acid replacement  • Hemoglobin today 5/21/2023 is down to 7.0.  We will transfuse 2 notes of blood and 2 units of plasma given his elevated INR    Sepsis secondary to UTI, POA  Cannot rule out SBP  • On admission, he has elevated procalcitonin, positive UA and elevated lactic acid  • Lactic acid improved with IV fluids. Lactic acid could be also related to his liver failure  • CT scan abdomen with trace ascites, bedside ultrasound with no adequate pocket of fluid to safely perform paracentesis  • Initially started on IV Merrem.  Urine culture growing Klebsiella oxytocin sensitive to Rocephin, will switch to Rocephin with tentative plan to treat for 10 days as complicated UTI  • Follow blood and urine culture      EDMOND, likely secondary to volume depletion  Less likely hepatorenal syndrome  • Creatinine at outside hospital 2.5.  Improved with IV fluids and IV albumin and  currently at 1.2   • Continue gentle diuresis today given his volume overload  • Nephrology team following.  Appreciate help     Alcohol dependence  Drug use disorder   • Avoid benzodiazepine given his acute hepatic encephalopathy.  Will monitor for any withdrawal symptoms  • Restart Suboxone 1 tablet daily today 5/21/2023  • Addiction team following.  Appreciate help  • Patient declining inpatient acute alcohol rehab despite counseling    Hyperglycemia  · Likely secondary to N-acetylcysteine drip and steroid use  · A1c is normal  · ISS    Expected Discharge Location and Transportation: home  Expected Discharge   Expected Discharge Date: 5/24/2023; Expected Discharge Time:      DVT prophylaxis:  Mechanical DVT prophylaxis orders are present.     AM-PAC 6 Clicks Score (PT): 21 (05/20/23 0800)    CODE STATUS:   Code Status and Medical Interventions:   Ordered at: 05/18/23 1030     Level Of Support Discussed With:    Patient     Code Status (Patient has no pulse and is not breathing):    CPR (Attempt to Resuscitate)     Medical Interventions (Patient has pulse or is breathing):    Full Support       Sigrid Grady MD  05/21/23        Electronically signed by Sigrid Grady MD at 05/21/23 1211     Juarez Zeng APRN at 05/20/23 1340     Attestation signed by Marguerite Blanco MD at 05/20/23 1623    I have reviewed this documentation and agree.                  GI Daily Progress Note  Subjective:    Chief Complaint: Follow-up decompensated alcohol liver cirrhosis, acute alcohol hepatitis    Franck is resting in bed.  Mental status improved from prior exam in preop yesterday; mild asterixis noted but is alert and oriented x4.  Notes he is having bowel movements but is unable to tell me how many today.  Breakfast tray at the bedside with 0% consumed.    Objective:    BP (!) 121/101 (BP Location: Left arm, Patient Position: Lying)   Pulse 57   Temp 98.2 °F (36.8 °C) (Axillary)   Resp 16   Ht 195.6 cm  "(77\")   Wt 126 kg (277 lb)   SpO2 97%   BMI 32.85 kg/m²     Physical Exam  Vitals and nursing note reviewed.   Constitutional:       General: He is not in acute distress.     Appearance: He is ill-appearing. He is not toxic-appearing.   Eyes:      General: Scleral icterus present.      Extraocular Movements: Extraocular movements intact.      Pupils: Pupils are equal, round, and reactive to light.   Cardiovascular:      Rate and Rhythm: Normal rate and regular rhythm.      Pulses: Normal pulses.      Heart sounds: Normal heart sounds.   Pulmonary:      Effort: Pulmonary effort is normal. No respiratory distress.      Breath sounds: Normal breath sounds.   Abdominal:      General: Abdomen is flat. Bowel sounds are normal. There is no distension.      Palpations: Abdomen is soft. There is no mass.      Tenderness: There is no abdominal tenderness. There is no guarding or rebound.      Hernia: No hernia is present.   Skin:     Capillary Refill: Capillary refill takes less than 2 seconds.      Coloration: Skin is jaundiced.   Neurological:      General: No focal deficit present.      Mental Status: He is alert and oriented to person, place, and time.      Comments: Mild asterixis noted on exam   Psychiatric:         Mood and Affect: Mood normal.         Behavior: Behavior normal.         Thought Content: Thought content normal.         Judgment: Judgment normal.         Lab  I have personally reviewed most recent cardiac tracings, lab results and radiology images and interpretations and agree with findings.    Lab Results   Component Value Date    WBC 9.29 05/20/2023    HGB 8.6 (L) 05/20/2023    HGB 8.9 (L) 05/19/2023    HGB 6.9 (C) 05/18/2023    .2 (H) 05/20/2023     (L) 05/20/2023    INR 1.68 (H) 05/20/2023    INR 1.85 (H) 05/18/2023    INR 1.55 (H) 05/17/2023    INR 0.98 06/18/2021    INR 0.99 06/27/2019       Lab Results   Component Value Date    GLUCOSE 242 (H) 05/20/2023    BUN 28 (H) 05/20/2023 "    CREATININE 1.31 (H) 05/20/2023    EGFRIFNONA >150 01/01/2022    BCR 21.4 05/20/2023     (L) 05/20/2023    K 4.3 05/20/2023    CO2 18.0 (L) 05/20/2023    CALCIUM 8.2 (L) 05/20/2023    ALBUMIN 2.8 (L) 05/20/2023    ALKPHOS 206 (H) 05/20/2023    BILITOT 14.5 (H) 05/20/2023    BILIDIR >10.0 (H) 05/17/2023    ALT 24 05/20/2023     (H) 05/20/2023     UPPER GI ENDOSCOPY (05/19/2023 07:59)    Assessment:  1.  Acute alcohol hepatitis   -MDF 44.4 indicating poor prognosis   -Steroids initiated on 5/18/2023   -Calculate Lille score 05/22/2023  2.  Decompensated liver cirrhosis, HCV/EtOH   -With varices, hepatic encephalopathy, ascites, and esophageal varices   -MELD-Na: 27 points, 32% 90 day mortality    -Child-Chapa: C  3. Gastrointestinal bleeding, symptomatic anemia, macrocytic anemia   -S/p EGD with bleeding esophageal varices, banded x 2  4. EDMOND on CKD, suspect ATN as urine Na not indicative of HRS  5. Chronic HCV, s/p treatment, unclear if SVR  6. Alcohol abuse and dependence, fireball.   7. THC Use  8. Hepatic Encephalopathy    -Grade 2/4 today, improved.     Plan:  Patient is status post EGD with banding of bleeding esophageal varices x2.  Hemoglobin remained stable.  >>> Continue Rocephin for full 7-day treatment course for SBP prophylaxis  >>> Continue octreotide for the next 2 days following EV banding  >>> Push nutrition   -high-protein / low sodium (less than 2g/day) diet   -Frequent high-protein snacks   -High-protein bedtime snack   -Initiate calorie count    -If eating less than 60% of meals, will need Keofeed.   -Add protein shakes to meals and at bedtime.  >>> IV PPI twice daily  >>> Lactulose enemas discontinued  >>> For hepatic encephalopathy management:   -Lactulose 30 g 3 times daily   -Rifaximin 550 mg twice daily    -Goal is for 3-4 soft BMs daily.  >>> Continue MVI, thiamine, folic acid  >>> will monitor creatinine; if worsening, will add albumin.     Discussion:   Had a long discussion  with patient at the bedside regarding the status of his liver disease.  I discussed the importance of nutrition and of abstaining from any further use of alcohol.  Patient voiced understanding of this.  I further reiterated to patient his overall poor prognosis in setting of acute alcohol hepatitis and decompensated liver cirrhosis( Madrey score indicated an overall poor prognosis and MELD score is 27 point indicating a 32% 90-day mortality with a Child-Chapa class of C indicating an overall 1 to 3-year life expectancy if trajectory continues).  Patient absolutely must abstain from any further use of alcohol as it is deleterious to his health.  Patient voiced understanding of this.  We will continue to monitor closely.  Patient is medically complex and high risk of further decline including death.  Prognosis guarded.    Juarez Zeng, RENAN  05/20/23  13:40 EDT      Electronically signed by Marguerite Blanco MD at 05/20/23 1623          Consult Notes (all)      Pee Wen MD at 05/18/23 1326            Patient Care Team:  Provider, No Known as PCP - General  Pee Wen MD as Consulting Physician (Nephrology)    Chief complaint: Acute liver failure  Alcoholic hepatitis   Acute renal failure     History of Present Illness: Mr Toure is a 33 yo gentleman hx of alcohol use disorder he is transferred from [facility] for higher level of care. Patient is admitted in hospital w concern for alcoholic hepatitis, possible GI bleed, hepatic encephalopathy and acute renal failure. Labs on this admission remarkable for Cr 14<2.5 Na 134<131, K 3.9, Bicarb 17 total bili 12.2. Patient has dependent edema. Appears severely jaundiced. He also has mild confusion.     Review of Systems   Constitutional: Negative.    HENT: Negative.    Cardiovascular: Positive for leg swelling.   Gastrointestinal: Positive for abdominal distention.   Endocrine: Negative.    Genitourinary: Negative.    Musculoskeletal: Negative.    Skin: Positive for color  change.   Neurological: Negative.    Psychiatric/Behavioral: Positive for confusion.        Past Medical History:   Diagnosis Date   • Alcohol-induced acute pancreatitis 6/27/2019   • Alcoholism    • Drug use     Amphetamines and Suboxone   • Fatty liver    • Hepatitis C antibody test positive 2019   • Medical non-compliance    • Pancreatitis    • Smoker    ,   Past Surgical History:   Procedure Laterality Date   • NO PAST SURGERIES     ,   Family History   Problem Relation Age of Onset   • Cancer Maternal Grandmother    • No Known Problems Mother    • No Known Problems Father    ,   Social History     Socioeconomic History   • Marital status:    Tobacco Use   • Smoking status: Every Day     Packs/day: 0.50     Types: Cigarettes   • Smokeless tobacco: Never   Vaping Use   • Vaping Use: Never used   Substance and Sexual Activity   • Alcohol use: Yes     Alcohol/week: 8.0 standard drinks     Types: 8 Shots of liquor per week     Comment: 5-6 double shots   • Drug use: Yes     Comment: suboxone as prescribed   • Sexual activity: Defer     E-cigarette/Vaping   • E-cigarette/Vaping Use Never User      E-cigarette/Vaping Substances     E-cigarette/Vaping Devices       ,   Medications Prior to Admission   Medication Sig Dispense Refill Last Dose   • buprenorphine-naloxone (SUBOXONE) 8-2 MG per SL tablet Place 2 tablets under the tongue Daily. 56 tablet 0    • cloNIDine (Catapres) 0.1 MG tablet Take 1 tablet by mouth 2 (Two) Times a Day. Take as needed for anxiety.  Insomnia.  Chills or sweats 60 tablet 0    • hydrOXYzine pamoate (Vistaril) 50 MG capsule Take 1 capsule by mouth 4 (Four) Times a Day As Needed for Anxiety (and/or insomia). Take 1 to 2 tablets as needed for anxiety every 6 hours 90 capsule 0    • ibuprofen (ADVIL,MOTRIN) 600 MG tablet Take 1 tablet by mouth Every 6 (Six) Hours As Needed for Mild Pain. 30 tablet 0    • lisinopril (PRINIVIL,ZESTRIL) 20 MG tablet Take 1 tablet by mouth Daily. for blood  pressure 30 tablet 2    • naloxone (NARCAN) 4 MG/0.1ML nasal spray 1 spray into the nostril(s) as directed by provider As Needed (Opiate oversedation). Spray in 1 nostril every 2 to 3 minutes call 911 2 each 2    • ondansetron (ZOFRAN) 8 MG tablet Take 1 tablet by mouth Every 8 (Eight) Hours As Needed for Nausea or Vomiting. 45 tablet 0    • QUEtiapine (SEROquel) 25 MG tablet Take 1 tablet by mouth Every Night. 30 tablet 0    • sertraline (Zoloft) 50 MG tablet Take 1 tablet by mouth Daily for 30 days. 30 tablet 0     and Scheduled Meds:  albumin human, 25 g, Intravenous, Q6H  docusate sodium, 50 mg, Nasogastric, BID  lactulose, 20 g, Oral, TID  meropenem, 500 mg, Intravenous, Q8H  methylPREDNISolone sodium succinate, 60 mg, Intravenous, Daily  pantoprazole, 40 mg, Intravenous, Q12H  phytonadione (VITAMIN K) IVPB, 10 mg, Intravenous, Once  sennosides, 5 mL, Nasogastric, BID  sodium chloride, 10 mL, Intravenous, Q12H  sodium chloride, 10 mL, Intravenous, Q12H        Objective     Vital Signs  Temp:  [97.6 °F (36.4 °C)-97.9 °F (36.6 °C)] 97.6 °F (36.4 °C)  Heart Rate:  [66-86] 66  Resp:  [16-18] 16  BP: ()/(50-86) 109/73    I/O this shift:  In: 1000 [IV Piggyback:1000]  Out: -   No intake/output data recorded.    Physical Exam  Constitutional:       Appearance: He is ill-appearing.   HENT:      Head: Normocephalic and atraumatic.      Nose: Nose normal.   Eyes:      General: Scleral icterus present.      Pupils: Pupils are equal, round, and reactive to light.   Cardiovascular:      Rate and Rhythm: Normal rate.      Pulses: Normal pulses.   Pulmonary:      Effort: Pulmonary effort is normal. No respiratory distress.      Breath sounds: Normal breath sounds. No stridor.   Abdominal:      General: There is distension.   Musculoskeletal:      Right lower leg: Edema present.      Left lower leg: Edema present.   Skin:     General: Skin is warm.      Coloration: Skin is jaundiced.   Neurological:      General: No  focal deficit present.      Comments: Confusion.         Results Review:    I reviewed the patient's new clinical results.    WBC WBC   Date Value Ref Range Status   05/18/2023 5.13 3.40 - 10.80 10*3/mm3 Preliminary   05/17/2023 6.02 3.40 - 10.80 10*3/mm3 Final      HGB Hemoglobin   Date Value Ref Range Status   05/18/2023 6.9 (C) 13.0 - 17.7 g/dL Preliminary   05/17/2023 7.3 (L) 13.0 - 17.7 g/dL Final      HCT Hematocrit   Date Value Ref Range Status   05/18/2023 20.4 (C) 37.5 - 51.0 % Preliminary   05/17/2023 21.9 (L) 37.5 - 51.0 % Final      Platlets No results found for: LABPLAT   MCV MCV   Date Value Ref Range Status   05/18/2023 106.8 (H) 79.0 - 97.0 fL Preliminary   05/17/2023 110.1 (H) 79.0 - 97.0 fL Final          Sodium Sodium   Date Value Ref Range Status   05/18/2023 134 (L) 136 - 145 mmol/L Final   05/17/2023 131 (L) 136 - 145 mmol/L Final      Potassium Potassium   Date Value Ref Range Status   05/18/2023 3.9 3.5 - 5.2 mmol/L Final   05/17/2023 3.9 3.5 - 5.2 mmol/L Final      Chloride Chloride   Date Value Ref Range Status   05/18/2023 108 (H) 98 - 107 mmol/L Final   05/17/2023 103 98 - 107 mmol/L Final      CO2 CO2   Date Value Ref Range Status   05/18/2023 17.0 (L) 22.0 - 29.0 mmol/L Final   05/17/2023 18.5 (L) 22.0 - 29.0 mmol/L Final      BUN BUN   Date Value Ref Range Status   05/18/2023 19 6 - 20 mg/dL Final   05/17/2023 20 6 - 20 mg/dL Final      Creatinine Creatinine   Date Value Ref Range Status   05/18/2023 1.47 (H) 0.76 - 1.27 mg/dL Final   05/17/2023 2.59 (H) 0.76 - 1.27 mg/dL Final      Calcium Calcium   Date Value Ref Range Status   05/18/2023 7.7 (L) 8.6 - 10.5 mg/dL Final   05/17/2023 8.3 (L) 8.6 - 10.5 mg/dL Final      PO4 No results found for: CAPO4   Albumin Albumin   Date Value Ref Range Status   05/18/2023 2.7 (L) 3.5 - 5.2 g/dL Final   05/17/2023 2.0 (L) 3.5 - 5.2 g/dL Final      Magnesium Magnesium   Date Value Ref Range Status   05/17/2023 1.7 1.6 - 2.6 mg/dL Final      Uric  "Acid No results found for: URICACID         Assessment & Plan       Hepatorenal failure        Assessment & Plan    EDMOND: Baseline cr 0.9mg/dl. Cr 1.4-2.57 in last 2 days. Ua positive for nitrite, lue esterase and bilirubin. WBC+. EDMOND likely hemodynamic variation in the setting of liver dysfunction, possible GI bleed and low intra-vascular volume status    Met acidosis: Due to EDMOND    Hyponatremia: In the setting of EDMOND and liver failure    Alcoholic hepatitis: Hx of alcohol use disorder. Started on steroids    Anemia: Concern for GI bleed. Starting octreotide     Jaundice: Elevated Tbili.     Dependent edema: Stable.     AMS: Likley hepatic encephalopathy    Recs   Acute kidney injury likely hemodynamic variation in renal function. Additional possibility ATN 2/2 bile cast nephropathy. Less likely to be HRS. As there is no ascites. Renal function already improving. Will recommend starting 25% albumin 1g/kg for 48hr. Agree with PRBC transfusion for low HH. Hold off on diuretics. Check urine Na, cr and chloride. Dose meds to eGFR. Need ram cath to rule out urinary retention. Strict I/O. No indication of dialysis      Will continue to follow the patient       I discussed the patients findings and my recommendations with patient    Pee Wen MD  05/18/23  13:26 EDT            Electronically signed by Pee Wen MD at 05/18/23 1339     Leesa Toure PA-C at 05/18/23 1033     Attestation signed by Marguerite Blanco MD at 05/18/23 2110    I have reviewed this documentation and agree.  Patient seen and evaluated in room.  His mother (an RN in [place]) and wife in room.  They report patient has had 5 bouts of pancreatitis in [place] and aware that alcohol has been affecting his liver.  Wife reports he has been told that he could die if continued to drink alcohol.      They report patient has been \"sick\" for several weeks.  HE has had more jaundice for 4 weeks that wife could recall.  She reports he had not been " "taking anything.  She reports he does take ibuprofen 800 mg for his knee but reports that he does not take tylenol products or additional medications that she is aware    She reports baseline patient has not been confused but more sleepy.     This afternoon, patient can identfy his mother and told his wife about his lactulose enema.      His exam by me shows that he is alert but very sleepy.  He has NG tube and does not show signs of withdrawal but rather takes time to answer questions.  He is jaundice but denies any pain    Patient resting  Agree with plan outlined as above  Discussed with family that he may require higher level of care if he does not protect his aware and does not improve fromhis encephalopathy.  Family aware and would like medical staff to reemphasize importance of seeking alcohol cessation.  Wife reports, \"He will not believe us\"    Patient was told he \"cleared\" his hepatitis C and has not been to see anyone that wife can recall for liver disease  She reports has never had treatment for the hepatitis C    Overall, family aware that I am not a transplant hepatologist and that we do not offer transplant here.  They are please with current management and aware may need transfer if he continues to decompensate    They agree with plan and appreciated staff                [facility]    Referring Provider: Ponce Cosby MD    PCP: Provider, No Known    Reason for Consultation: Decompensated cirrhosis     History of present illness:    Franck Toure is a 32 y.o. male, PMH includes EtOH abuse, h/o IVDU, chronic HCV, pancreatitis, is admitted [facility] transfer [facility] for evaluation of decompensated cirrhosis, hepatorenal syndrome. History is difficult, as pt is lethargic and no family is available at bedside.    Per pt, he has noticed jaundice and worsened LE edema for about two weeks. He drinks about 8-10oz Fireball whiskey per day. He reports h/o chronic HCV s/p " treatment in 2013, data deficient at time of exam. He smokes 0.5ppd.     Labs at time of admission significant for EtOH 16, HCV Ab (+), Hb 7.3, Hct 21.9, plt 104, , MCH 36.7, BUN 20, Cr 2.59, Na 131, albumin 2.0, total bili 14.9, , ALT 24, alk phos 277, NH3 142, PT 19.2, INR 1.55, lactate 2.9. UDS (+) THC, TCA, buprenorphine.     CT A/P wo contrast 5/17: Moderate hepatomegaly with diffuse fatty infiltration. Heterogenous parenchymal echotexture of liver. Marked splenomegaly. Cholelithiasis. Mild ascites. Mild circumferential large bowel wall thickening, may be due to portal colopathy.     Allergies:  Patient has no known allergies.    Scheduled Meds:  albumin human, 25 g, Intravenous, Q6H  meropenem, 1 g, Intravenous, Once  meropenem, 500 mg, Intravenous, Q8H  octreotide, 50 mcg, Intravenous, Once  pantoprazole, 40 mg, Intravenous, Q12H  senna-docusate sodium, 2 tablet, Oral, BID  sodium chloride, 10 mL, Intravenous, Q12H         Infusions:  octreotide (SandoSTATIN) infusion, 50 mcg/hr        PRN Meds:  •  senna-docusate sodium **AND** polyethylene glycol **AND** bisacodyl **AND** bisacodyl  •  Calcium Replacement - Follow Nurse / BPA Driven Protocol  •  Magnesium Standard Dose Replacement - Follow Nurse / BPA Driven Protocol  •  ondansetron  •  Phosphorus Replacement - Follow Nurse / BPA Driven Protocol  •  Potassium Replacement - Follow Nurse / BPA Driven Protocol  •  sodium chloride  •  sodium chloride    Home Meds:  Medications Prior to Admission   Medication Sig Dispense Refill Last Dose   • buprenorphine-naloxone (SUBOXONE) 8-2 MG per SL tablet Place 2 tablets under the tongue Daily. 56 tablet 0    • cloNIDine (Catapres) 0.1 MG tablet Take 1 tablet by mouth 2 (Two) Times a Day. Take as needed for anxiety.  Insomnia.  Chills or sweats 60 tablet 0    • hydrOXYzine pamoate (Vistaril) 50 MG capsule Take 1 capsule by mouth 4 (Four) Times a Day As Needed for Anxiety (and/or insomia). Take 1 to 2  tablets as needed for anxiety every 6 hours 90 capsule 0    • ibuprofen (ADVIL,MOTRIN) 600 MG tablet Take 1 tablet by mouth Every 6 (Six) Hours As Needed for Mild Pain. 30 tablet 0    • lisinopril (PRINIVIL,ZESTRIL) 20 MG tablet Take 1 tablet by mouth Daily. for blood pressure 30 tablet 2    • naloxone (NARCAN) 4 MG/0.1ML nasal spray 1 spray into the nostril(s) as directed by provider As Needed (Opiate oversedation). Spray in 1 nostril every 2 to 3 minutes call 911 2 each 2    • ondansetron (ZOFRAN) 8 MG tablet Take 1 tablet by mouth Every 8 (Eight) Hours As Needed for Nausea or Vomiting. 45 tablet 0    • QUEtiapine (SEROquel) 25 MG tablet Take 1 tablet by mouth Every Night. 30 tablet 0    • sertraline (Zoloft) 50 MG tablet Take 1 tablet by mouth Daily for 30 days. 30 tablet 0        ROS: Review of Systems   Unable to perform ROS: Mental status change   Cardiovascular: Positive for leg swelling.   Gastrointestinal: Positive for abdominal distention.   Skin: Positive for color change.       PAST MED HX:  Past Medical History:   Diagnosis Date   • Alcohol-induced acute pancreatitis 6/27/2019   • Alcoholism    • Drug use     Amphetamines and Suboxone   • Fatty liver    • Hepatitis C antibody test positive 2019   • Medical non-compliance    • Pancreatitis    • Smoker        PAST SURG HX:  Past Surgical History:   Procedure Laterality Date   • NO PAST SURGERIES         FAM HX:  Family History   Problem Relation Age of Onset   • Cancer Maternal Grandmother    • No Known Problems Mother    • No Known Problems Father        SOC HX:  Social History     Socioeconomic History   • Marital status:    Tobacco Use   • Smoking status: Every Day     Packs/day: 0.50     Types: Cigarettes   • Smokeless tobacco: Never   Vaping Use   • Vaping Use: Never used   Substance and Sexual Activity   • Alcohol use: Yes     Alcohol/week: 8.0 standard drinks     Types: 8 Shots of liquor per week     Comment: 5-6 double shots   • Drug use:  "Yes     Comment: suboxone as prescribed   • Sexual activity: Defer       PHYSICAL EXAM  /86   Pulse 71   Temp 97.6 °F (36.4 °C) (Oral)   Resp 16   Ht 195.6 cm (77\")   Wt 122 kg (268 lb 11.2 oz)   SpO2 98%   BMI 31.86 kg/m²   Wt Readings from Last 3 Encounters:   05/18/23 122 kg (268 lb 11.2 oz)   05/17/23 111 kg (245 lb)   05/02/23 104 kg (229 lb)   ,body mass index is 31.86 kg/m².  Physical Exam  Vitals and nursing note reviewed.   Constitutional:       Appearance: He is ill-appearing. He is not diaphoretic.      Comments: Awakens briefly to voice / sternal rub, mumbles response to questions   HENT:      Head: Normocephalic and atraumatic.      Right Ear: External ear normal.      Left Ear: External ear normal.      Nose: Nose normal.      Mouth/Throat:      Mouth: Mucous membranes are moist.      Pharynx: Oropharynx is clear.   Eyes:      General: Scleral icterus present.         Right eye: No discharge.         Left eye: No discharge.      Conjunctiva/sclera: Conjunctivae normal.      Pupils: Pupils are equal, round, and reactive to light.   Cardiovascular:      Rate and Rhythm: Normal rate and regular rhythm.      Pulses: Normal pulses.      Heart sounds: Normal heart sounds.   Pulmonary:      Effort: Pulmonary effort is normal.      Breath sounds: Normal breath sounds.   Abdominal:      General: Abdomen is flat. There is distension.      Palpations: There is hepatomegaly and splenomegaly.      Tenderness: There is no abdominal tenderness. There is no guarding or rebound.   Musculoskeletal:      Cervical back: Normal range of motion.      Right lower leg: Edema present.      Left lower leg: Edema present.   Skin:     General: Skin is warm and dry.      Capillary Refill: Capillary refill takes less than 2 seconds.      Coloration: Skin is jaundiced.      Findings: Bruising (lower abdomen) present.   Neurological:      Mental Status: He is lethargic.      Motor: Tremor (bilateral hands) present. "         Results Review:   I reviewed the patient's new clinical results.  I reviewed the patient's new imaging results and agree with the interpretation.  I reviewed the patient's other test results and agree with the interpretation    Lab Results   Component Value Date    WBC 6.02 05/17/2023    HGB 7.3 (L) 05/17/2023    HGB 15.7 10/20/2022    HGB 13.1 03/16/2022    HCT 21.9 (L) 05/17/2023    .1 (H) 05/17/2023     (L) 05/17/2023       Lab Results   Component Value Date    INR 1.55 (H) 05/17/2023    INR 0.98 06/18/2021    INR 0.99 06/27/2019       Lab Results   Component Value Date    GLUCOSE 111 (H) 05/17/2023    BUN 20 05/17/2023    CREATININE 2.59 (H) 05/17/2023    EGFRIFNONA >150 01/01/2022    BCR 7.7 05/17/2023     (L) 05/17/2023    K 3.9 05/17/2023    CO2 18.5 (L) 05/17/2023    CALCIUM 8.3 (L) 05/17/2023    ALBUMIN 2.0 (L) 05/17/2023    ALKPHOS 277 (H) 05/17/2023    BILITOT 14.0 (H) 05/17/2023    BILIDIR >10.0 (H) 05/17/2023    ALT 24 05/17/2023     (H) 05/17/2023       ASSESSMENTS/PLANS    Acute EtOH hepatitis  Decompensated cirrhosis (likely EtOH / HCV) with jaundice, hepatosplenomegaly, HE, ascites  Macrocytoic anemia  Hepatorenal syndrome  Thrombocytopenia  Chronic HCV s/p treatment, unclear if SVR  H/o EtOH abuse  THC use   - continue trending H/H and transfuse per hospitalist protocol   - Maddrey's Discriminant Function 44.4, indicating poor prognosis; will initiate prednisolone 40mg daily   - MELD-Na 27, consistent with 19.6% 90 day mortality   - Child Chapa Score 13, Class C   - Last Para: N/A   - Ascites Management: will defer to nephrology given EDMOND vs HRS   - HCC Surveillance: CT A/P 5/18   - EV Surveillance: plan for EGD tomorrow with Dr. Blanco   - BEVERLY Management: lactulose 20g TID, will add xifaxan 550mg BID and lactulose enema x 1   - continue BID PPI, daily MV, thiamine, folic acid   - obtain HCV quant, Hep A/B serologies, AFP, vitamin D    Medically complex and at high  risk of further decline including death. Will continue to closely monitor patient status.     I discussed the patient's findings and my recommendations with patient, nursing staff and consulting provider. Thank you very kindly for this consultation. Will continue to follow during this hospitalization.      Leesa Toure PA-C  05/18/23  10:33 EDT        Electronically signed by Marguerite Blanco MD at 05/18/23 [id number]

## 2023-05-22 NOTE — CASE MANAGEMENT/SOCIAL WORK
Continued Stay Note  [facility]     Patient Name: Franck Toure  MRN: [id number]  Today's Date: 5/22/2023    Admit Date: 5/18/2023    Plan: Home   Discharge Plan     Row Name 05/22/23 1152       Plan    Plan Home    Patient/Family in Agreement with Plan yes    Plan Comments Spoke with patient in room.  His plan is to return home at discharge.  CM will continue to follow.    Final Discharge Disposition Code 01 - home or self-care               Discharge Codes    No documentation.               Expected Discharge Date and Time     Expected Discharge Date Expected Discharge Time    May 24, 2023             Kat Roach RN

## 2023-05-23 ENCOUNTER — READMISSION MANAGEMENT (OUTPATIENT)
Dept: CALL CENTER | Facility: HOSPITAL | Age: 33
End: 2023-05-23
Payer: COMMERCIAL

## 2023-05-23 VITALS
DIASTOLIC BLOOD PRESSURE: 97 MMHG | OXYGEN SATURATION: 99 % | HEART RATE: 62 BPM | RESPIRATION RATE: 18 BRPM | TEMPERATURE: 98 F | SYSTOLIC BLOOD PRESSURE: 128 MMHG | BODY MASS INDEX: 31.29 KG/M2 | HEIGHT: 77 IN | WEIGHT: 265 LBS

## 2023-05-23 LAB
ALBUMIN SERPL-MCNC: 3.2 G/DL (ref 3.5–5.2)
ALBUMIN/GLOB SERPL: 0.9 G/DL
ALP SERPL-CCNC: 195 U/L (ref 39–117)
ALT SERPL W P-5'-P-CCNC: 70 U/L (ref 1–41)
ANION GAP SERPL CALCULATED.3IONS-SCNC: 11 MMOL/L (ref 5–15)
ANISOCYTOSIS BLD QL: NORMAL
AST SERPL-CCNC: 180 U/L (ref 1–40)
BACTERIA SPEC AEROBE CULT: NORMAL
BACTERIA SPEC AEROBE CULT: NORMAL
BASOPHILS # BLD AUTO: 0.05 10*3/MM3 (ref 0–0.2)
BASOPHILS NFR BLD AUTO: 0.4 % (ref 0–1.5)
BH BB BLOOD EXPIRATION DATE: NORMAL
BH BB BLOOD EXPIRATION DATE: NORMAL
BH BB BLOOD TYPE BARCODE: 6200
BH BB BLOOD TYPE BARCODE: 6200
BH BB DISPENSE STATUS: NORMAL
BH BB DISPENSE STATUS: NORMAL
BH BB PRODUCT CODE: NORMAL
BH BB PRODUCT CODE: NORMAL
BH BB UNIT NUMBER: NORMAL
BH BB UNIT NUMBER: NORMAL
BILIRUB SERPL-MCNC: 15.1 MG/DL (ref 0–1.2)
BUN SERPL-MCNC: 22 MG/DL (ref 6–20)
BUN/CREAT SERPL: 28.2 (ref 7–25)
CALCIUM SPEC-SCNC: 8.6 MG/DL (ref 8.6–10.5)
CHLORIDE SERPL-SCNC: 97 MMOL/L (ref 98–107)
CO2 SERPL-SCNC: 28 MMOL/L (ref 22–29)
CREAT SERPL-MCNC: 0.78 MG/DL (ref 0.76–1.27)
DEPRECATED RDW RBC AUTO: 65.7 FL (ref 37–54)
EGFRCR SERPLBLD CKD-EPI 2021: 121.5 ML/MIN/1.73
EOSINOPHIL # BLD AUTO: 0.01 10*3/MM3 (ref 0–0.4)
EOSINOPHIL NFR BLD AUTO: 0.1 % (ref 0.3–6.2)
ERYTHROCYTE [DISTWIDTH] IN BLOOD BY AUTOMATED COUNT: 18.8 % (ref 12.3–15.4)
GLOBULIN UR ELPH-MCNC: 3.5 GM/DL
GLUCOSE BLDC GLUCOMTR-MCNC: 164 MG/DL (ref 70–130)
GLUCOSE BLDC GLUCOMTR-MCNC: 179 MG/DL (ref 70–130)
GLUCOSE SERPL-MCNC: 136 MG/DL (ref 65–99)
HCT VFR BLD AUTO: 29.8 % (ref 37.5–51)
HGB BLD-MCNC: 10.7 G/DL (ref 13–17.7)
IMM GRANULOCYTES # BLD AUTO: 0.98 10*3/MM3 (ref 0–0.05)
IMM GRANULOCYTES NFR BLD AUTO: 7.6 % (ref 0–0.5)
INR PPP: 1.43 (ref 0.89–1.12)
LYMPHOCYTES # BLD AUTO: 1.09 10*3/MM3 (ref 0.7–3.1)
LYMPHOCYTES NFR BLD AUTO: 8.5 % (ref 19.6–45.3)
MAGNESIUM SERPL-MCNC: 1.9 MG/DL (ref 1.6–2.6)
MCH RBC QN AUTO: 34.5 PG (ref 26.6–33)
MCHC RBC AUTO-ENTMCNC: 35.9 G/DL (ref 31.5–35.7)
MCV RBC AUTO: 96.1 FL (ref 79–97)
MONOCYTES # BLD AUTO: 0.75 10*3/MM3 (ref 0.1–0.9)
MONOCYTES NFR BLD AUTO: 5.8 % (ref 5–12)
NEUTROPHILS NFR BLD AUTO: 10.01 10*3/MM3 (ref 1.7–7)
NEUTROPHILS NFR BLD AUTO: 77.6 % (ref 42.7–76)
NRBC BLD AUTO-RTO: 0 /100 WBC (ref 0–0.2)
PHOSPHATE SERPL-MCNC: 2.4 MG/DL (ref 2.5–4.5)
PLAT MORPH BLD: NORMAL
PLATELET # BLD AUTO: 111 10*3/MM3 (ref 140–450)
PMV BLD AUTO: 11.6 FL (ref 6–12)
POTASSIUM SERPL-SCNC: 3 MMOL/L (ref 3.5–5.2)
PROT SERPL-MCNC: 6.7 G/DL (ref 6–8.5)
PROTHROMBIN TIME: 17.6 SECONDS (ref 12.2–14.5)
RBC # BLD AUTO: 3.1 10*6/MM3 (ref 4.14–5.8)
SODIUM SERPL-SCNC: 136 MMOL/L (ref 136–145)
TARGETS BLD QL SMEAR: NORMAL
UNIT  ABO: NORMAL
UNIT  ABO: NORMAL
UNIT  RH: NORMAL
UNIT  RH: NORMAL
WBC MORPH BLD: NORMAL
WBC NRBC COR # BLD: 12.89 10*3/MM3 (ref 3.4–10.8)

## 2023-05-23 PROCEDURE — 99239 HOSP IP/OBS DSCHRG MGMT >30: CPT | Performed by: INTERNAL MEDICINE

## 2023-05-23 PROCEDURE — 25010000002 OCTREOTIDE PER 25 MCG: Performed by: INTERNAL MEDICINE

## 2023-05-23 PROCEDURE — 0 PHYTONADIONE 10 MG/ML SOLUTION 1 ML AMPULE: Performed by: INTERNAL MEDICINE

## 2023-05-23 PROCEDURE — 25010000002 CEFTRIAXONE PER 250 MG: Performed by: INTERNAL MEDICINE

## 2023-05-23 PROCEDURE — 85025 COMPLETE CBC W/AUTO DIFF WBC: CPT | Performed by: INTERNAL MEDICINE

## 2023-05-23 PROCEDURE — 63710000001 INSULIN LISPRO (HUMAN) PER 5 UNITS: Performed by: INTERNAL MEDICINE

## 2023-05-23 PROCEDURE — 84100 ASSAY OF PHOSPHORUS: CPT | Performed by: INTERNAL MEDICINE

## 2023-05-23 PROCEDURE — 85610 PROTHROMBIN TIME: CPT | Performed by: INTERNAL MEDICINE

## 2023-05-23 PROCEDURE — 82948 REAGENT STRIP/BLOOD GLUCOSE: CPT

## 2023-05-23 PROCEDURE — 80053 COMPREHEN METABOLIC PANEL: CPT | Performed by: INTERNAL MEDICINE

## 2023-05-23 PROCEDURE — 85007 BL SMEAR W/DIFF WBC COUNT: CPT | Performed by: INTERNAL MEDICINE

## 2023-05-23 PROCEDURE — 83735 ASSAY OF MAGNESIUM: CPT | Performed by: INTERNAL MEDICINE

## 2023-05-23 RX ORDER — FUROSEMIDE 40 MG/1
40 TABLET ORAL DAILY
Qty: 60 TABLET | Refills: 3 | Status: SHIPPED | OUTPATIENT
Start: 2023-05-23

## 2023-05-23 RX ORDER — PREDNISOLONE 15 MG/5ML
30 SOLUTION ORAL
Qty: 240 ML | Refills: 0 | Status: SHIPPED | OUTPATIENT
Start: 2023-05-23 | End: 2023-06-16

## 2023-05-23 RX ORDER — CEFDINIR 300 MG/1
300 CAPSULE ORAL 2 TIMES DAILY
Qty: 10 CAPSULE | Refills: 0 | Status: SHIPPED | OUTPATIENT
Start: 2023-05-23 | End: 2023-05-28

## 2023-05-23 RX ORDER — PANTOPRAZOLE SODIUM 40 MG/1
40 TABLET, DELAYED RELEASE ORAL
Qty: 60 TABLET | Refills: 0 | Status: SHIPPED | OUTPATIENT
Start: 2023-05-23 | End: 2023-06-22

## 2023-05-23 RX ORDER — PREDNISOLONE 15 MG/5ML
30 SOLUTION ORAL
Status: DISCONTINUED | OUTPATIENT
Start: 2023-05-23 | End: 2023-05-23 | Stop reason: HOSPADM

## 2023-05-23 RX ORDER — PANTOPRAZOLE SODIUM 40 MG/1
40 TABLET, DELAYED RELEASE ORAL
Status: DISCONTINUED | OUTPATIENT
Start: 2023-05-23 | End: 2023-05-23 | Stop reason: HOSPADM

## 2023-05-23 RX ORDER — LACTULOSE 10 G/15ML
30 SOLUTION ORAL NIGHTLY
Qty: 1350 ML | Refills: 2 | Status: SHIPPED | OUTPATIENT
Start: 2023-05-23 | End: 2023-06-22

## 2023-05-23 RX ORDER — FOLIC ACID 1 MG/1
1 TABLET ORAL DAILY
Status: DISCONTINUED | OUTPATIENT
Start: 2023-05-24 | End: 2023-05-23 | Stop reason: HOSPADM

## 2023-05-23 RX ORDER — MELATONIN
1000 DAILY
Qty: 30 TABLET | Refills: 0
Start: 2023-05-24 | End: 2023-06-23

## 2023-05-23 RX ORDER — POTASSIUM CHLORIDE 750 MG/1
40 CAPSULE, EXTENDED RELEASE ORAL ONCE
Status: COMPLETED | OUTPATIENT
Start: 2023-05-23 | End: 2023-05-23

## 2023-05-23 RX ORDER — POTASSIUM CHLORIDE 750 MG/1
40 CAPSULE, EXTENDED RELEASE ORAL EVERY 4 HOURS
Status: DISCONTINUED | OUTPATIENT
Start: 2023-05-23 | End: 2023-05-23 | Stop reason: HOSPADM

## 2023-05-23 RX ORDER — SPIRONOLACTONE 25 MG/1
25 TABLET ORAL DAILY
Qty: 30 TABLET | Refills: 2 | Status: SHIPPED | OUTPATIENT
Start: 2023-05-23 | End: 2024-05-22

## 2023-05-23 RX ORDER — FOLIC ACID 1 MG/1
1 TABLET ORAL DAILY
Qty: 30 TABLET | Refills: 0 | Status: SHIPPED | OUTPATIENT
Start: 2023-05-24 | End: 2023-06-23

## 2023-05-23 RX ADMIN — INSULIN LISPRO 2 UNITS: 100 INJECTION, SOLUTION INTRAVENOUS; SUBCUTANEOUS at 09:05

## 2023-05-23 RX ADMIN — Medication 10 ML: at 09:07

## 2023-05-23 RX ADMIN — Medication 1000 UNITS: at 09:06

## 2023-05-23 RX ADMIN — FOLIC ACID 1 MG: 5 INJECTION, SOLUTION INTRAMUSCULAR; INTRAVENOUS; SUBCUTANEOUS at 09:16

## 2023-05-23 RX ADMIN — SERTRALINE 50 MG: 50 TABLET, FILM COATED ORAL at 09:06

## 2023-05-23 RX ADMIN — Medication 1 PATCH: at 09:07

## 2023-05-23 RX ADMIN — OCTREOTIDE ACETATE 50 MCG/HR: 500 INJECTION, SOLUTION INTRAVENOUS; SUBCUTANEOUS at 01:26

## 2023-05-23 RX ADMIN — POTASSIUM CHLORIDE 40 MEQ: 10 CAPSULE, COATED, EXTENDED RELEASE ORAL at 10:59

## 2023-05-23 RX ADMIN — ASCORBIC ACID, THIAMINE, RIBOFLAVIN, NIACINAMIDE, PYRIDOXINE, FOLIC ACID, COBALAMIN, BIOTIN, PANTOTHENIC ACID 15 ML: 100; 1.5; 1.7; 20; 10; 1; 6; 300; 1 TABLET, COATED ORAL at 09:04

## 2023-05-23 RX ADMIN — SODIUM CHLORIDE 2 G: 900 INJECTION INTRAVENOUS at 12:07

## 2023-05-23 RX ADMIN — POTASSIUM CHLORIDE 40 MEQ: 10 CAPSULE, COATED, EXTENDED RELEASE ORAL at 12:06

## 2023-05-23 RX ADMIN — PANTOPRAZOLE SODIUM 40 MG: 40 INJECTION, POWDER, LYOPHILIZED, FOR SOLUTION INTRAVENOUS at 09:05

## 2023-05-23 RX ADMIN — LACTULOSE 30 G: 20 SOLUTION ORAL at 09:06

## 2023-05-23 RX ADMIN — BUPRENORPHINE AND NALOXONE 1 TABLET: 8; 2 TABLET SUBLINGUAL at 09:06

## 2023-05-23 RX ADMIN — RIFAXIMIN 550 MG: 550 TABLET ORAL at 09:06

## 2023-05-23 RX ADMIN — POTASSIUM CHLORIDE 40 MEQ: 10 CAPSULE, COATED, EXTENDED RELEASE ORAL at 16:04

## 2023-05-23 RX ADMIN — INSULIN LISPRO 2 UNITS: 100 INJECTION, SOLUTION INTRAVENOUS; SUBCUTANEOUS at 13:00

## 2023-05-23 RX ADMIN — PHYTONADIONE 10 MG: 10 INJECTION, EMULSION INTRAMUSCULAR; INTRAVENOUS; SUBCUTANEOUS at 10:59

## 2023-05-23 NOTE — OUTREACH NOTE
Prep Survey    Flowsheet Row Responses   [facility] [place] by [facility] patient discharged from? [place]   Is LACE score < 7 ? No   Eligibility [facility]   Date of Admission 05/18/23   Date of Discharge 05/23/23   Discharge Disposition Home or Self Care   Discharge diagnosis Hepatorenal failure   Does the patient have one of the following disease processes/diagnoses(primary or secondary)? Other   Does the patient have Home health ordered? No   Is there a DME ordered? No   Prep survey completed? Yes          Renuka BELTRAN - Registered Nurse

## 2023-05-23 NOTE — DISCHARGE SUMMARY
[facility]  DISCHARGE SUMMARY    Patient Name: Franck Toure  : 1990  MRN: [id number]    Date of Admission: 2023  9:31 AM  Date of Discharge: 2023  Primary Care Physician: Provider, No Known    Consults     Date and Time Order Name Status Description    2023 10:30 AM Inpatient Nephrology Consult      2023 10:24 AM Inpatient Gastroenterology Consult            Hospital Course     Presenting Problem:   Hepatorenal failure [K76.7]    Active Hospital Problems    Diagnosis  POA   • **Hepatorenal failure [K76.7]  Yes   • Cirrhosis of liver without ascites [K74.60]  Unknown   • Anemia [D64.9]  Unknown      Resolved Hospital Problems   No resolved problems to display.          Hospital Course:  Franck Toure is a 32 y.o. male with past medical history of chronic hep C, alcohol use disorder, alcohol induced pancreatitis, fatty liver disease who presented to the hospital as a direct admit from [facility] for liver failure and higher level of care.  He was found to be in acute liver failure with hepatic encephalopathy and with hepatocellular jaundice.  He improved with conservative measures with lactulose, rifaximin and IV steroids for acute alcohol induced hepatitis.  He was also found to have blood loss anemia and hemoglobin dropped to 6.9 requiring total of 4 units of blood transfusion during this hospitalization.  EGD 2023 showed 3 oh oozing varices status post band ligation as well as Robert erosion status post IV Protonix.  Discharged on twice daily Protonix.  Also found to have acute Klebsiella urinary tract infection and received 5 doses of IV Rocephin during this hospitalization and discharged on p.o. cefdinir.  Had mild EDMOND that improved with IV fluids, albumin and returned to baseline.  He declined acute alcohol rehab.  Eventually discharged home in a stable condition     Acute liver failure, slowly improving  Acute  metabolic/hepatic encephalopathy, improving  Hepatocellular jaundice  Alcohol induced acute hepatitis  • Known to drink alcohol for years: 10-15 double shots of hard liquor  • Has advanced liver disease with huge splenomegaly and portal hypertension  • Hepatic encephalopathy improved with lactulose.  Consciousness improved and returned to his baseline.  Discharged with prescription for both lactulose and rifaximin   • Heribertodrey discrimination score is 56 -->   initially started on IV Solumedrol 60 mg daily, and was transition to p.o. prednisolone 30 mg daily with tentative plan to treat for total of 4 weeks  • INR was elevated.  Required 4 units of fresh frozen plasma and vitamin K.  INR on day of discharge is 1.4   • Bilirubin is remained elevated but trending down.  Likely hepatocellular jaundice secondary to liver failure.  No evidence of biliary duct dilation on CT scan abdomen from outside facility.  Patient was instructed that it will be weeks before his bilirubin started trending down  • GI  followed the patient during this hospitalization and cleared him for discharge.  Patient was given referral to see GI as outpatient  • Extensive counseling provided to the patient and his family regarding alcohol use.  Patient declined acute alcohol rehab placement.  Wife will make arrangement to ensure that he will not have access to alcohol at home     Acute blood loss anemia secondary to GI bleed, POA  Severe symptomatic anemia  • Hemoglobin 6 months ago was 16.  Hemoglobin was 6.9 on admission status post 2 units of blood transfusion  • EGD 5/19/2023 showing 3 oozing varices bleed status post banding, portal hypertension gastropathy and hiatal hernia with Robert erosions  • Status post IV Protonix 40 mg twice daily.  Discharged on p.o. Protonix 40 mg twice daily  • Status post 72 hours of IV octreotide drip  • Status post transfusion of 2 units  of blood on 5/18/2023. Hemoglobin on 5/21/2023 trended down to 7.0.    And  patient received another 2 units of blood transfusion.  Hemoglobin remained stable at 9-10 with no evidence of further GI bleed or melena  • Anemia work-up --> iron overload.  B12 more than 2000 and low folic acid.  Continue folic acid replacement    Sepsis secondary to Klebsiella UTI, POA, sepsis resolved  Cannot rule out SBP  • On admission, he has elevated procalcitonin, positive UA and elevated lactic acid  • Lactic acid improved with IV fluids. Lactic acid could be also related to his liver failure  • CT scan abdomen with trace ascites, bedside ultrasound with no adequate pocket of fluid to safely perform paracentesis  • Urine culture positive for pansensitive Klebsiella  • Blood culture negative to date  • Initially started on IV Merrem.  Later switched to IV Rocephin for Klebsiella UTI.  Received total of 5-day course of parenteral antibiotic during this hospitalization and discharged on p.o. cefdinir x5 days    EDMOND, likely secondary to volume depletion  Less likely hepatorenal syndrome  • Creatinine at outside hospital 2.5.  Improved with IV fluids and IV albumin and currently at 1.2   · Nephrology followed the patient during this hospitalization    Alcohol dependence  Drug use disorder   • Avoid benzodiazepine given his acute hepatic encephalopathy.  Will monitor for any withdrawal symptoms  • On Suboxone at home through Suboxone clinic  • Addiction team following.    • Patient declining inpatient acute alcohol rehab despite counseling  • Wife will make arrangement so patient will not have access to alcohol     Hyperglycemia  • Likely secondary to N-acetylcysteine drip (was prepared on D5 W)and steroid use  • A1c is normal    Discharge Follow Up Recommendations for outpatient labs/diagnostics:  PCP in 1 week  Given GI referral upon discharge    Day of Discharge     HPI:   I have seen and evaluated the patient this morning.  Comfortable in bed.  Chua awake and alert.  Denied any abdomen pain or discomfort.   No melena.  Hemoglobin stable at 10.  Hemodynamics are stable as well wanting to go home    Review of Systems  General : no fevers, chills  CVS: No chest pain, palpitations  Respiratory: No cough, dyspnea  GI: No N/V/D, abd pain  10 point review of systems is negative except for what is mentioned in HPI    Vital Signs:   Temp:  [98 °F (36.7 °C)-98.6 °F (37 °C)] 98 °F (36.7 °C)  Heart Rate:  [53-72] 62  Resp:  [18] 18  BP: (114-132)/() 128/97      Physical Exam:  General: Comfortable, not in distress, conversant and cooperative  Head: Atraumatic and normocephalic  Eyes: Improving jaundice  Ears:  Ears appear intact with no abnormalities noted  Throat: No oral lesions, no thrush  Neck: Supple, trachea midline  Lungs: Clear to auscultation bilaterally, equal air entry, no wheezing or crackles  Heart:  Normal S1 and S2, no murmur, no gallop, No JVD, improving lower extremity swelling  Abdomen:  Soft, no tenderness, no organomegaly, normal bowel sounds, no organomegaly  Extremities: pulses equal bilaterally  Skin: No bleeding, bruising or rash, normal skin turgor and elasticity  Neurologic: Cranial nerves appear intact with no evidence of facial asymmetry, normal motor and sensory functions in all 4 extremities  Psych: Alert and oriented x 3, normal mood    Pertinent  and/or Most Recent Results     LAB RESULTS:      Lab 05/23/23  0737 05/22/23  0610 05/21/23  0417 05/20/23  0913 05/19/23  0537 05/18/23  1215 05/18/23  1214 05/18/23  0126 05/17/23  2229 05/17/23 2011   WBC 12.89* 10.04 8.42 9.29 5.96  --    < >  --   --  6.02   HEMOGLOBIN 10.7* 9.1* 7.0* 8.6* 8.9*  --    < >  --   --  7.3*   HEMATOCRIT 29.8* 26.9* 22.9* 25.6* 25.4*  --    < >  --   --  21.9*   PLATELETS 111* 104* 101* 121* 113*  --    < >  --   --  104*   NEUTROS ABS 10.01* 7.93* 6.55 7.90* 5.19  --    < >  --   --  3.67   IMMATURE GRANS (ABS) 0.98* 0.58* 0.58*  --   --   --   --   --   --   --    LYMPHS ABS 1.09 0.63* 0.55*  --   --   --   --   --    --   --    MONOS ABS 0.75 0.88 0.72  --   --   --   --   --   --   --    EOS ABS 0.01 0.00 0.00 0.00 0.00  --    < >  --   --  0.06   MCV 96.1 98.2* 117.4* 103.2* 101.2*  --    < >  --   --  110.1*   CRP  --  1.00*  --   --   --   --   --   --   --  5.55*   PROCALCITONIN  --   --   --   --   --   --   --   --  0.64*  --    LACTATE  --   --   --   --   --  0.8  --  2.7* 2.9*  --    PROTIME 17.6*  --  26.5* 19.9*  --  21.5*  --   --  19.2*  --    APTT  --   --   --   --   --  49.3*  --   --  44.2*  --    D DIMER QUANT  --   --   --   --   --  2.74*  --   --   --   --     < > = values in this interval not displayed.         Lab 05/23/23  0737 05/22/23  0610 05/21/23  1257 05/21/23  0417 05/20/23  0913 05/19/23  0537 05/18/23  1215 05/17/23 2011   SODIUM 136 135*  136 134*  --  134* 135*   < > 131*   POTASSIUM 3.0* 3.8  3.8 3.4*  --  4.3 4.8   < > 3.9   CHLORIDE 97* 103  104 101  --  105 107   < > 103   CO2 28.0 18.0*  21.0* 20.0*  --  18.0* 19.0*   < > 18.5*   ANION GAP 11.0 14.0  11.0 13.0  --  11.0 9.0   < > 9.5   BUN 22* 29*  29* 29*  --  28* 20   < > 20   CREATININE 0.78 1.03  0.86 1.03  --  1.31* 1.20   < > 2.59*   EGFR 121.5 99.0  118.0 99.0  --  74.2 82.4   < > 32.7*   GLUCOSE 136* 150*  150* 217*  --  242* 184*   < > 111*   CALCIUM 8.6 7.9*  8.3* 8.3*  --  8.2* 8.0*   < > 8.3*   MAGNESIUM 1.9 1.9  --  1.8 1.8  --   --  1.7   PHOSPHORUS 2.4* 3.1 3.2 2.2* 3.1  --   --   --    HEMOGLOBIN A1C  --   --   --   --   --  4.70*  --   --     < > = values in this interval not displayed.         Lab 05/23/23  0737 05/22/23  0610 05/21/23  1257 05/20/23  0913 05/19/23  0537 05/18/23  1215 05/17/23 2012 05/17/23 2011   TOTAL PROTEIN 6.7 6.0  6.2 6.3 6.0 6.2   < >  --  6.3   ALBUMIN 3.2* 3.0*  2.8* 2.8* 2.8* 3.0*   < >  --  2.0*   GLOBULIN 3.5 3.0  3.4 3.5 3.2 3.2   < >  --  4.3   ALT (SGPT) 70* 50*  47* 39 24 20   < >  --  24   AST (SGOT) 180* 158*  152* 162* 101* 94*   < >  --  120*   BILIRUBIN 15.1*  12.1*  12.4* 13.6* 14.5* 16.0*   < > 14.0* 14.9*   BILIRUBIN DIRECT  --   --   --   --   --   --  >10.0*  --    ALK PHOS 195* 185*  169* 189* 206* 209*   < >  --  277*   AMYLASE  --   --   --   --   --   --   --  18*   LIPASE  --   --   --   --   --   --   --  28    < > = values in this interval not displayed.         Lab 05/23/23  0737 05/21/23  0417 05/20/23  0913 05/18/23  1215 05/17/23  2229 05/17/23 2012   PROBNP  --   --   --   --   --  337.6   PROTIME 17.6* 26.5* 19.9* 21.5* 19.2*  --    INR 1.43* 2.42* 1.68* 1.85* 1.55*  --          Lab 05/22/23  0610   CHOLESTEROL 122   TRIGLYCERIDES 156*         Lab 05/19/23  0537 05/18/23  1454 05/18/23  1448 05/18/23  1215   IRON  --   --   --  59   IRON SATURATION  --   --   --  79*   TIBC  --   --   --  75*   TRANSFERRIN  --   --   --  50*   FOLATE 3.35*  --   --   --    VITAMIN B 12  --  >2,000*  --   --    ABO TYPING  --   --  A A   RH TYPING  --   --  Positive Positive   ANTIBODY SCREEN  --   --   --  Negative         Brief Urine Lab Results  (Last result in the past 365 days)      Color   Clarity   Blood   Leuk Est   Nitrite   Protein   CREAT   Urine HCG        05/18/23 1558             134.3         05/18/23 1558 Dark Yellow   Cloudy   Negative   Moderate (2+)   Negative   30 mg/dL (1+)               Microbiology Results (last 10 days)     Procedure Component Value - Date/Time    Urine Culture - Urine, Urine, Clean Catch [[id number]]  (Normal) Collected: 05/18/23 1558    Lab Status: Final result Specimen: Urine, Clean Catch Updated: 05/19/23 2338     Urine Culture No growth    Blood Culture - Blood, Blood, PICC Line [[id number]]  (Normal) Collected: 05/18/23 1453    Lab Status: Preliminary result Specimen: Blood, PICC Line Updated: 05/22/23 1546     Blood Culture No growth at 4 days    Blood Culture - Blood, Blood, PICC Line [[id number]]  (Normal) Collected: 05/18/23 1213    Lab Status: Preliminary result Specimen: Blood, PICC Line Updated: 05/22/23 1330     Blood  Culture No growth at 4 days    COVID-19 and FLU A/B PCR - Swab, Nasopharynx [[id number]]  (Normal) Collected: 05/17/23 2231    Lab Status: Final result Specimen: Swab from Nasopharynx Updated: 05/17/23 2256     COVID19 Not Detected     Influenza A PCR Not Detected     Influenza B PCR Not Detected    Narrative:      Fact sheet for providers: https://www.fda.gov/media/486279/download    Fact sheet for patients: https://www.fda.gov/media/558328/download    Test performed by PCR.    Urine Culture - Urine, Urine, Clean Catch [[id number]]  (Abnormal)  (Susceptibility) Collected: 05/17/23 2057    Lab Status: Final result Specimen: Urine, Clean Catch Updated: 05/20/23 1006     Urine Culture >100,000 CFU/mL Klebsiella oxytoca    Narrative:      Colonization of the urinary tract without infection is common. Treatment is discouraged unless the patient is symptomatic, pregnant, or undergoing an invasive urologic procedure.    Susceptibility      Klebsiella oxytoca      KAMAR      Ampicillin Resistant     Ampicillin + Sulbactam Resistant     Cefazolin Resistant     Cefepime Susceptible      Ceftazidime Susceptible      Ceftriaxone Susceptible      Gentamicin Susceptible      Levofloxacin Susceptible      Nitrofurantoin Susceptible      Piperacillin + Tazobactam Resistant     Trimethoprim + Sulfamethoxazole Susceptible                           Blood Culture - Blood, Arm, Left [[id number]]  (Normal) Collected: 05/17/23 2005    Lab Status: Final result Specimen: Blood from Arm, Left Updated: 05/22/23 2300     Blood Culture No growth at 5 days    Blood Culture - Blood, Arm, Right [[id number]]  (Normal) Collected: 05/17/23 2005    Lab Status: Final result Specimen: Blood from Arm, Right Updated: 05/22/23 2300     Blood Culture No growth at 5 days          Adult Transthoracic Echo Complete W/ Cont if Necessary Per Protocol    Result Date: 5/20/2023  •  Left ventricular systolic function is normal. Calculated left ventricular EF = 57% •   Estimated right ventricular systolic pressure from tricuspid regurgitation is normal (<35 mmHg).     CT Abdomen Pelvis Without Contrast    Result Date: 5/17/2023  CLINICAL HISTORY: Abdominal pain, jaundice.  COMPARISON: None available.  TECHNIQUE: Sequential trans-axial images were obtained with a multi-detector helical CT without administration of iodinated contrast. Coronal and sagittal reconstructions were obtained. No oral contrast given.  Limited exposure control, adjustment of the MA and/or KV according to patient's size or use of iterative reconstruction technique was utilized.  FINDINGS:  The visualized lung bases are unremarkable.  There is moderate hepatomegaly measuring up to 16.9 cm in length with diffuse fatty infiltration.  There is heterogeneous parenchymal echotexture of the liver.  The noncontrast images of the kidneys, adrenals are normal.  There is marked splenomegaly measuring up to 18.8 cm in length.  Cholelithiasis.  Mild gallbladder wall thickening and pericholecystic fluid.  Tiny 2 mm sized right renal calculus.  No obstructive uropathy.  There is diffuse fatty atrophy of the pancreas.  Decompressed stomach. No dilation or obstruction. The appendix is unremarkable.  There is mild circumferential large bowel wall thickening.  There is no abdominal lymphadenopathy. There is no pneumoperitoneum. The retroperitoneal region appears unremarkable.The abdominal aorta shows normal unenhanced appearance.  Mild ascites.  There is no pelvic lymphadenopathy. The inguinal regions are unremarkable.  The bladder appears unremarkable.  Mild thoracolumbar degenerative changes.  There is no acute osseous abnormality.       1.  Moderate hepatomegaly with diffuse fatty infiltration. Heterogeneous parenchymal echotexture of the liver. 2.  Marked splenomegaly.  Cholelithiasis.  Mild gallbladder wall thickening and pericholecystic fluid.  Differential possibilities to be considered are hepatocellular disease,  congestive heart failure or a calculus cholecystitis. 3.  Mild ascites. 4.  Mild circumferential large bowel wall thickening.  This may be related to portal colopathy or infectious/inflammatory colitis. 5.  Right-sided nephrolithiasis.  No obstructive uropathy.  This report was finalized on 5/17/2023 10:01 PM by Janes Hunt MD.      US Abdomen Complete    Result Date: 5/17/2023  Complete abdominal ultrasound  HISTORY: Jaundice for 2 to 3 days, no pain.  COMPARISON: CT abdomen pelvis report only dated 6/17/2021  Technique: Grayscale, color and spectral Doppler imaging of the abdomen was performed by sonographer.  Radiologist was not present.  A total of 92 images are sent for interpretation.  Findings: Pancreas: Obscured by overlying bowel gas.  Liver: Enlarged and diffusely echogenic, 25 cm length.  No discernible mass.  Probable focal fat sparing at the gallbladder fossa.  Normal hepatopetal flow in the main portal vein.  Left hepatic vein demonstrates normal direction of flow.  Bile ducts: No intrahepatic or extrahepatic biliary ductal dilatation. Common bile duct measures 2 mm diameter.  Gallbladder: Partially distended.  It contains a shadowing 2 cm stone. No wall thickening, hyperemia or pericholecystic fluid.  Negative sonographic Paiz's sign.  Spleen: Homogeneous, mildly enlarged at 15 cm length.  No focal mass.  Right kidney: Normal cortical thickness and parenchymal echogenicity. Right kidney length 11.3 cm.  No hydronephrosis, mass or stone.  Left kidney: Normal cortical thickness and parenchymal echogenicity. Right kidney length 12.1 cm.  No hydronephrosis, mass or stone.  Ascites: None.  Aorta and IVC: Both vessels obscured by overlying bowel gas along their course.      Impression: 1. Hepatosplenomegaly. 2. Echogenic liver parenchyma may represent diffuse fatty infiltration or other chronic process.  Liver enlargement may be acute or chronic, recommend clinical correlation. 3. Cholelithiasis  without cholecystitis. 4. Pancreas obscured by overlying bowel gas.  No intrahepatic biliary tree dilatation.   To TALK to On Call [phone]  This report was finalized on 5/17/2023 7:35 PM by Dayne Nguyen MD.      XR Abdomen KUB    Result Date: 5/19/2023  XR ABDOMEN KUB Date of Exam: 5/19/2023 9:30 AM EDT Indication: nasal jejunal tube placement Comparison: None available. Findings: Enteric catheter projects over the expected location of the distal duodenum. The bowel gas pattern is unremarkable. No acute osseous lesion is seen.     Impression: Enteric catheter projects over the expected location of the distal duodenum. Electronically Signed: Horacio Sharam  5/19/2023 9:46 AM EDT  Workstation ID: [id number]    XR Abdomen KUB    Result Date: 5/19/2023  EXAMINATION: XR ABDOMEN KUB  DATE OF EXAM: 5/18/2023 11:04 PM HISTORY: NG tube placement COMPARISON: None. FINDINGS: Cone-down view of the lung bases and upper abdomen for evaluation of NG tube placement. Enteric tube terminates within stomach. No evidence of bowel obstruction.     Enteric tube terminates within stomach. Electronically signed by:  Kleber Mota D.O.  5/18/2023 10:11 PM Mountain Time    XR Chest AP    Result Date: 5/17/2023  INDICATION: Cough.  TECHNIQUE: Frontal radiograph of the chest.  COMPARISON: None.  FINDINGS: The cardiomediastinal silhouette and pulmonary vasculature appear within normal limits. No infiltrate, pleural effusion or pneumothorax. No acute osseous abnormality evident.      No acute cardiopulmonary process.  This report was finalized on 5/17/2023 11:15 PM by Alex Pallas, DO.                Results for orders placed during the hospital encounter of 05/18/23    Adult Transthoracic Echo Complete W/ Cont if Necessary Per Protocol    Interpretation Summary  •  Left ventricular systolic function is normal. Calculated left ventricular EF = 57%  •  Estimated right ventricular systolic pressure from tricuspid  regurgitation is normal (<35 mmHg).      Plan for Follow-up of Pending Labs/Results:   Pending Labs     Order Current Status    Blood Culture - Blood, Blood, PICC Line Preliminary result    Blood Culture - Blood, Blood, PICC Line Preliminary result        Discharge Details        Discharge Medications      New Medications      Instructions Start Date   cefdinir 300 MG capsule  Commonly known as: OMNICEF   300 mg, Oral, 2 Times Daily      cholecalciferol 25 MCG (1000 UT) tablet  Commonly known as: VITAMIN D3   1,000 Units, Oral, Daily   Start Date: May 24, 2023     folic acid 1 MG tablet  Commonly known as: FOLVITE   1 mg, Oral, Daily   Start Date: May 24, 2023     furosemide 40 MG tablet  Commonly known as: Lasix   40 mg, Oral, Daily      lactulose 10 GM/15ML solution  Commonly known as: CHRONULAC   30 g, Oral, Nightly      pantoprazole 40 MG EC tablet  Commonly known as: PROTONIX   40 mg, Oral, 2 Times Daily Before Meals      prednisoLONE 15 MG/5ML solution oral solution  Commonly known as: PRELONE   30 mg, Oral, Daily With Breakfast      riFAXIMin 550 MG tablet  Commonly known as: XIFAXAN   550 mg, Oral, Every 12 Hours Scheduled      spironolactone 25 MG tablet  Commonly known as: Aldactone   25 mg, Oral, Daily         Continue These Medications      Instructions Start Date   buprenorphine-naloxone 8-2 MG per SL tablet  Commonly known as: SUBOXONE   2 tablets, Sublingual, Daily      hydrOXYzine pamoate 50 MG capsule  Commonly known as: Vistaril   50 mg, Oral, 4 Times Daily PRN, Take 1 to 2 tablets as needed for anxiety every 6 hours      naloxone 4 MG/0.1ML nasal spray  Commonly known as: NARCAN   1 spray, Nasal, As Needed, Spray in 1 nostril every 2 to 3 minutes call 911      ondansetron 8 MG tablet  Commonly known as: ZOFRAN   8 mg, Oral, Every 8 Hours PRN      sertraline 50 MG tablet  Commonly known as: Zoloft   50 mg, Oral, Daily         Stop These Medications    cloNIDine 0.1 MG tablet  Commonly known as:  Catapres     ibuprofen 600 MG tablet  Commonly known as: ADVIL,MOTRIN     lisinopril 20 MG tablet  Commonly known as: PRINIVIL,ZESTRIL     QUEtiapine 25 MG tablet  Commonly known as: SEROquel            No Known Allergies      Discharge Disposition:  Home or Self Care    Diet:  Hospital:  Diet Order   Procedures   • Diet: Gastrointestinal Diets, High Protein Diet; Fiber-Restricted; Texture: Regular Texture (IDDSI 7); Fluid Consistency: Thin (IDDSI 0)       Activity:  Activity Instructions     Activity as Tolerated            Restrictions or Other Recommendations:  None        CODE STATUS:    Code Status and Medical Interventions:   Ordered at: 05/18/23 1030     Level Of Support Discussed With:    Patient     Code Status (Patient has no pulse and is not breathing):    CPR (Attempt to Resuscitate)     Medical Interventions (Patient has pulse or is breathing):    Full Support       Future Appointments   Date Time Provider Department Center   5/30/2023 10:00 AM Daniel Olivas APRN [facility]   5/30/2023 11:30 AM Antonette Hadley DO [facility]                 Sigrid Grady MD  05/23/23      Time Spent on Discharge:  I spent  40 minutes on this discharge activity which included: face-to-face encounter with the patient, reviewing the data in the system, coordination of the care with the nursing staff as well as consultants, documentation, and entering orders.

## 2023-05-23 NOTE — CASE MANAGEMENT/SOCIAL WORK
Case Management Discharge Note      Final Note: Patient's plan is home at discharge.  No needs noted.         Selected Continued Care - Admitted Since 5/18/2023     Destination    No services have been selected for the patient.              Durable Medical Equipment    No services have been selected for the patient.              Dialysis/Infusion    No services have been selected for the patient.              Home Medical Care    No services have been selected for the patient.              Therapy    No services have been selected for the patient.              Community Resources    No services have been selected for the patient.              Community & DME    No services have been selected for the patient.                       Final Discharge Disposition Code: 01 - home or self-care

## 2023-05-23 NOTE — NURSING NOTE
Prior to central line removal, order for the removal of catheter was verified, patient was assessed, necessary materials were gathered and patient was educated regarding procedure .    Patient was positioned supine to ensure that the insertion site was at or below the level of the heart.    Hands were washed, clean gloves were applied and central line dressing was gently removed. Catheter exit site was not cultured.     A new pair of clean gloves were then applied. Insertion site was cleansed with 2% Chlorhexidine swab using a circular motion beginning at the insertion site and moving outward for 30 seconds and allowed to dry.     Clamp on line was not present.     Patient was instructed to perform Valsalva maneuver as catheter was withdrawn.     The central line was grasped at the insertion site and slowly pulled outward parallel to the skin. Resistance was not met.    After central line was completely removed, a sterile 4x4 gauze pad was used to apply light pressure until bleeding stopped. At that time, petroleum-based gauze and a sterile occlusive dressing was applied to exit site.     Patient was instructed to keep dressing in place for at least 24 hours and to remain in a flat or reclining position for 30 minutes post-removal.     Catheter was inspected after removal and intact. Tip of central line was not sent for culture. Patient tolerated procedure.

## 2023-05-23 NOTE — PLAN OF CARE
Goal Outcome Evaluation:      VSS. Nontele RA. No complaints of pain or SOB. PICC line in place, no further complaints at this time.

## 2023-05-24 ENCOUNTER — TRANSITIONAL CARE MANAGEMENT TELEPHONE ENCOUNTER (OUTPATIENT)
Dept: CALL CENTER | Facility: HOSPITAL | Age: 33
End: 2023-05-24
Payer: COMMERCIAL

## 2023-05-24 NOTE — OUTREACH NOTE
Call Center TCM Note    Flowsheet Row Responses   [facility] patient discharged from? [place]   Does the patient have one of the following disease processes/diagnoses(primary or secondary)? Other   TCM attempt successful? Yes   Call start time 0941   Call end time 0944   Discharge diagnosis Hepatorenal failure   Is patient permission given to speak with other caregiver? Yes   List who call center can speak with spouse- Karen   Person spoke with today (if not patient) and relationship spouse   Prescription comments spouse states she is going to pick medications up now   Comments New patient appt with Dr. Hadley for 5/30   Does the patient have an appointment with their PCP within 7 days of discharge? Yes   Has home health visited the patient within 72 hours of discharge? N/A   Psychosocial issues? No   Did the patient receive a copy of their discharge instructions? Yes   What is the patient's perception of their health status since discharge? Improving   Is the patient/caregiver able to teach back signs and symptoms related to disease process for when to call PCP? Yes   Is the patient/caregiver able to teach back signs and symptoms related to disease process for when to call 911? Yes   Is the patient/caregiver able to teach back the hierarchy of who to call/visit for symptoms/problems? PCP, Specialist, Home health nurse, Urgent Care, ED, 911 Yes   TCM call completed? Yes   Wrap up additional comments Per spouse, patient is doing well, no questions, confirmed new patient appt with Dr. Hadley for 5/30.   Call end time 0944   Would this patient benefit from a Referral to Amb Social Work? No   Is the patient interested in additional calls from an ambulatory ?  NOTE:  applies to high risk patients requiring additional follow-up. No          Svetlana Morales RN    5/24/2023, 09:44 EDT

## 2023-05-24 NOTE — PAYOR COMM NOTE
"Renato Thomas (32 y.o. Male)     [id number]    Mary Lou Aragon RN  Utilization Review  [phone]  [phone]      Date of Birth   1990    Social Security Number       Address   [address] [id number]    Home Phone   [phone]    MRN   [id number]       [facility]    Marital Status                               Admission Date   23    Admission Type   Urgent    Admitting Provider   Sigrid Grady MD    Attending Provider       Department, Room/Bed   [facility] 6A, N618/1       Discharge Date   2023    Discharge Disposition   Home or Self Care    Discharge Destination                               Attending Provider: (none)   Allergies: No Known Allergies    Isolation: None   Infection: None   Code Status: Prior    Ht: 195.6 cm (77\")   Wt: 120 kg (265 lb)    Admission Cmt: None   Principal Problem: Hepatorenal failure [K76.7]                 Active Insurance as of 2023     Primary Coverage     Payor Plan Insurance Group Employer/Plan Group    [facility] BY [facility] BY [place] [id number]     Payor Plan Address Payor Plan Phone Number Payor Plan Fax Number Effective Dates    [address] [address]   2021 - None Entered    [facility] [id number]       Subscriber Name Subscriber Birth Date Member ID       RENATO THOMAS 1990 [id number]                 Emergency Contacts      (Rel.) Home Phone Work Phone Mobile Phone    SEGUNDO THOMAS (Spouse) [phone] -- [phone]    SHAKA THOMAS (Mother) [phone] -- [phone]               Discharge Summary      Sigrid Grady MD at 23 1254              [facility] Medicine Services  DISCHARGE SUMMARY    Patient Name: Renato Thomas  : 1990  MRN: [id number]    Date of Admission: 2023  9:31 AM  Date of Discharge: 2023  Primary Care Physician: Provider, No Known    Consults "     Date and Time Order Name Status Description    5/18/2023 10:30 AM Inpatient Nephrology Consult      5/18/2023 10:24 AM Inpatient Gastroenterology Consult            Hospital Course     Presenting Problem:   Hepatorenal failure [K76.7]    Active Hospital Problems    Diagnosis  POA   • **Hepatorenal failure [K76.7]  Yes   • Cirrhosis of liver without ascites [K74.60]  Unknown   • Anemia [D64.9]  Unknown      Resolved Hospital Problems   No resolved problems to display.          Hospital Course:  Franck Toure is a 32 y.o. male with past medical history of chronic hep C, alcohol use disorder, alcohol induced pancreatitis, fatty liver disease who presented to the hospital as a direct admit from [facility] for liver failure and higher level of care.  He was found to be in acute liver failure with hepatic encephalopathy and with hepatocellular jaundice.  He improved with conservative measures with lactulose, rifaximin and IV steroids for acute alcohol induced hepatitis.  He was also found to have blood loss anemia and hemoglobin dropped to 6.9 requiring total of 4 units of blood transfusion during this hospitalization.  EGD 5/19/2023 showed 3 oh oozing varices status post band ligation as well as Robert erosion status post IV Protonix.  Discharged on twice daily Protonix.  Also found to have acute Klebsiella urinary tract infection and received 5 doses of IV Rocephin during this hospitalization and discharged on p.o. cefdinir.  Had mild EDMOND that improved with IV fluids, albumin and returned to baseline.  He declined acute alcohol rehab.  Eventually discharged home in a stable condition     Acute liver failure, slowly improving  Acute metabolic/hepatic encephalopathy, improving  Hepatocellular jaundice  Alcohol induced acute hepatitis  • Known to drink alcohol for years: 10-15 double shots of hard liquor  • Has advanced liver disease with huge splenomegaly and portal hypertension  • Hepatic encephalopathy  improved with lactulose.  Consciousness improved and returned to his baseline.  Discharged with prescription for both lactulose and rifaximin   • Maddrey discrimination score is 56 -->   initially started on IV Solumedrol 60 mg daily, and was transition to p.o. prednisolone 30 mg daily with tentative plan to treat for total of 4 weeks  • INR was elevated.  Required 4 units of fresh frozen plasma and vitamin K.  INR on day of discharge is 1.4   • Bilirubin is remained elevated but trending down.  Likely hepatocellular jaundice secondary to liver failure.  No evidence of biliary duct dilation on CT scan abdomen from outside facility.  Patient was instructed that it will be weeks before his bilirubin started trending down  • GI  followed the patient during this hospitalization and cleared him for discharge.  Patient was given referral to see GI as outpatient  • Extensive counseling provided to the patient and his family regarding alcohol use.  Patient declined acute alcohol rehab placement.  Wife will make arrangement to ensure that he will not have access to alcohol at home     Acute blood loss anemia secondary to GI bleed, POA  Severe symptomatic anemia  • Hemoglobin 6 months ago was 16.  Hemoglobin was 6.9 on admission status post 2 units of blood transfusion  • EGD 5/19/2023 showing 3 oozing varices bleed status post banding, portal hypertension gastropathy and hiatal hernia with Robert erosions  • Status post IV Protonix 40 mg twice daily.  Discharged on p.o. Protonix 40 mg twice daily  • Status post 72 hours of IV octreotide drip  • Status post transfusion of 2 units  of blood on 5/18/2023. Hemoglobin on 5/21/2023 trended down to 7.0.    And patient received another 2 units of blood transfusion.  Hemoglobin remained stable at 9-10 with no evidence of further GI bleed or melena  • Anemia work-up --> iron overload.  B12 more than 2000 and low folic acid.  Continue folic acid replacement    Sepsis secondary to  Klebsiella UTI, POA, sepsis resolved  Cannot rule out SBP  • On admission, he has elevated procalcitonin, positive UA and elevated lactic acid  • Lactic acid improved with IV fluids. Lactic acid could be also related to his liver failure  • CT scan abdomen with trace ascites, bedside ultrasound with no adequate pocket of fluid to safely perform paracentesis  • Urine culture positive for pansensitive Klebsiella  • Blood culture negative to date  • Initially started on IV Merrem.  Later switched to IV Rocephin for Klebsiella UTI.  Received total of 5-day course of parenteral antibiotic during this hospitalization and discharged on p.o. cefdinir x5 days    EDMOND, likely secondary to volume depletion  Less likely hepatorenal syndrome  • Creatinine at outside hospital 2.5.  Improved with IV fluids and IV albumin and currently at 1.2   · Nephrology followed the patient during this hospitalization    Alcohol dependence  Drug use disorder   • Avoid benzodiazepine given his acute hepatic encephalopathy.  Will monitor for any withdrawal symptoms  • On Suboxone at home through Suboxone clinic  • Addiction team following.    • Patient declining inpatient acute alcohol rehab despite counseling  • Wife will make arrangement so patient will not have access to alcohol     Hyperglycemia  • Likely secondary to N-acetylcysteine drip (was prepared on D5 W)and steroid use  • A1c is normal    Discharge Follow Up Recommendations for outpatient labs/diagnostics:  PCP in 1 week  Given GI referral upon discharge    Day of Discharge     HPI:   I have seen and evaluated the patient this morning.  Comfortable in bed.  Chua awake and alert.  Denied any abdomen pain or discomfort.  No melena.  Hemoglobin stable at 10.  Hemodynamics are stable as well wanting to go home    Review of Systems  General : no fevers, chills  CVS: No chest pain, palpitations  Respiratory: No cough, dyspnea  GI: No N/V/D, abd pain  10 point review of systems is negative  except for what is mentioned in HPI    Vital Signs:   Temp:  [98 °F (36.7 °C)-98.6 °F (37 °C)] 98 °F (36.7 °C)  Heart Rate:  [53-72] 62  Resp:  [18] 18  BP: (114-132)/() 128/97      Physical Exam:  General: Comfortable, not in distress, conversant and cooperative  Head: Atraumatic and normocephalic  Eyes: Improving jaundice  Ears:  Ears appear intact with no abnormalities noted  Throat: No oral lesions, no thrush  Neck: Supple, trachea midline  Lungs: Clear to auscultation bilaterally, equal air entry, no wheezing or crackles  Heart:  Normal S1 and S2, no murmur, no gallop, No JVD, improving lower extremity swelling  Abdomen:  Soft, no tenderness, no organomegaly, normal bowel sounds, no organomegaly  Extremities: pulses equal bilaterally  Skin: No bleeding, bruising or rash, normal skin turgor and elasticity  Neurologic: Cranial nerves appear intact with no evidence of facial asymmetry, normal motor and sensory functions in all 4 extremities  Psych: Alert and oriented x 3, normal mood    Pertinent  and/or Most Recent Results     LAB RESULTS:      Lab 05/23/23  0737 05/22/23  0610 05/21/23  0417 05/20/23  0913 05/19/23  0537 05/18/23  1215 05/18/23  1214 05/18/23  0126 05/17/23  2229 05/17/23 2011   WBC 12.89* 10.04 8.42 9.29 5.96  --    < >  --   --  6.02   HEMOGLOBIN 10.7* 9.1* 7.0* 8.6* 8.9*  --    < >  --   --  7.3*   HEMATOCRIT 29.8* 26.9* 22.9* 25.6* 25.4*  --    < >  --   --  21.9*   PLATELETS 111* 104* 101* 121* 113*  --    < >  --   --  104*   NEUTROS ABS 10.01* 7.93* 6.55 7.90* 5.19  --    < >  --   --  3.67   IMMATURE GRANS (ABS) 0.98* 0.58* 0.58*  --   --   --   --   --   --   --    LYMPHS ABS 1.09 0.63* 0.55*  --   --   --   --   --   --   --    MONOS ABS 0.75 0.88 0.72  --   --   --   --   --   --   --    EOS ABS 0.01 0.00 0.00 0.00 0.00  --    < >  --   --  0.06   MCV 96.1 98.2* 117.4* 103.2* 101.2*  --    < >  --   --  110.1*   CRP  --  1.00*  --   --   --   --   --   --   --  5.55*    PROCALCITONIN  --   --   --   --   --   --   --   --  0.64*  --    LACTATE  --   --   --   --   --  0.8  --  2.7* 2.9*  --    PROTIME 17.6*  --  26.5* 19.9*  --  21.5*  --   --  19.2*  --    APTT  --   --   --   --   --  49.3*  --   --  44.2*  --    D DIMER QUANT  --   --   --   --   --  2.74*  --   --   --   --     < > = values in this interval not displayed.         Lab 05/23/23  0737 05/22/23  0610 05/21/23  1257 05/21/23  0417 05/20/23  0913 05/19/23  0537 05/18/23  1215 05/17/23 2011   SODIUM 136 135*  136 134*  --  134* 135*   < > 131*   POTASSIUM 3.0* 3.8  3.8 3.4*  --  4.3 4.8   < > 3.9   CHLORIDE 97* 103  104 101  --  105 107   < > 103   CO2 28.0 18.0*  21.0* 20.0*  --  18.0* 19.0*   < > 18.5*   ANION GAP 11.0 14.0  11.0 13.0  --  11.0 9.0   < > 9.5   BUN 22* 29*  29* 29*  --  28* 20   < > 20   CREATININE 0.78 1.03  0.86 1.03  --  1.31* 1.20   < > 2.59*   EGFR 121.5 99.0  118.0 99.0  --  74.2 82.4   < > 32.7*   GLUCOSE 136* 150*  150* 217*  --  242* 184*   < > 111*   CALCIUM 8.6 7.9*  8.3* 8.3*  --  8.2* 8.0*   < > 8.3*   MAGNESIUM 1.9 1.9  --  1.8 1.8  --   --  1.7   PHOSPHORUS 2.4* 3.1 3.2 2.2* 3.1  --   --   --    HEMOGLOBIN A1C  --   --   --   --   --  4.70*  --   --     < > = values in this interval not displayed.         Lab 05/23/23  0737 05/22/23  0610 05/21/23  1257 05/20/23  0913 05/19/23  0537 05/18/23  1215 05/17/23 2012 05/17/23 2011   TOTAL PROTEIN 6.7 6.0  6.2 6.3 6.0 6.2   < >  --  6.3   ALBUMIN 3.2* 3.0*  2.8* 2.8* 2.8* 3.0*   < >  --  2.0*   GLOBULIN 3.5 3.0  3.4 3.5 3.2 3.2   < >  --  4.3   ALT (SGPT) 70* 50*  47* 39 24 20   < >  --  24   AST (SGOT) 180* 158*  152* 162* 101* 94*   < >  --  120*   BILIRUBIN 15.1* 12.1*  12.4* 13.6* 14.5* 16.0*   < > 14.0* 14.9*   BILIRUBIN DIRECT  --   --   --   --   --   --  >10.0*  --    ALK PHOS 195* 185*  169* 189* 206* 209*   < >  --  277*   AMYLASE  --   --   --   --   --   --   --  18*   LIPASE  --   --   --   --   --   --   --   28    < > = values in this interval not displayed.         Lab 05/23/23  0737 05/21/23  0417 05/20/23  0913 05/18/23  1215 05/17/23  2229 05/17/23  2012   PROBNP  --   --   --   --   --  337.6   PROTIME 17.6* 26.5* 19.9* 21.5* 19.2*  --    INR 1.43* 2.42* 1.68* 1.85* 1.55*  --          Lab 05/22/23  0610   CHOLESTEROL 122   TRIGLYCERIDES 156*         Lab 05/19/23  0537 05/18/23  1454 05/18/23  1448 05/18/23  1215   IRON  --   --   --  59   IRON SATURATION  --   --   --  79*   TIBC  --   --   --  75*   TRANSFERRIN  --   --   --  50*   FOLATE 3.35*  --   --   --    VITAMIN B 12  --  >2,000*  --   --    ABO TYPING  --   --  A A   RH TYPING  --   --  Positive Positive   ANTIBODY SCREEN  --   --   --  Negative         Brief Urine Lab Results  (Last result in the past 365 days)      Color   Clarity   Blood   Leuk Est   Nitrite   Protein   CREAT   Urine HCG        05/18/23 1558             134.3         05/18/23 1558 Dark Yellow   Cloudy   Negative   Moderate (2+)   Negative   30 mg/dL (1+)               Microbiology Results (last 10 days)     Procedure Component Value - Date/Time    Urine Culture - Urine, Urine, Clean Catch [[id number]]  (Normal) Collected: 05/18/23 1558    Lab Status: Final result Specimen: Urine, Clean Catch Updated: 05/19/23 2338     Urine Culture No growth    Blood Culture - Blood, Blood, PICC Line [[id number]]  (Normal) Collected: 05/18/23 1453    Lab Status: Preliminary result Specimen: Blood, PICC Line Updated: 05/22/23 1546     Blood Culture No growth at 4 days    Blood Culture - Blood, Blood, PICC Line [[id number]]  (Normal) Collected: 05/18/23 1213    Lab Status: Preliminary result Specimen: Blood, PICC Line Updated: 05/22/23 1330     Blood Culture No growth at 4 days    COVID-19 and FLU A/B PCR - Swab, Nasopharynx [[id number]]  (Normal) Collected: 05/17/23 2231    Lab Status: Final result Specimen: Swab from Nasopharynx Updated: 05/17/23 2256     COVID19 Not Detected     Influenza A PCR Not  Detected     Influenza B PCR Not Detected    Narrative:      Fact sheet for providers: https://www.fda.gov/media/123653/download    Fact sheet for patients: https://www.fda.gov/media/421676/download    Test performed by PCR.    Urine Culture - Urine, Urine, Clean Catch [[id number]]  (Abnormal)  (Susceptibility) Collected: 05/17/23 2057    Lab Status: Final result Specimen: Urine, Clean Catch Updated: 05/20/23 1006     Urine Culture >100,000 CFU/mL Klebsiella oxytoca    Narrative:      Colonization of the urinary tract without infection is common. Treatment is discouraged unless the patient is symptomatic, pregnant, or undergoing an invasive urologic procedure.    Susceptibility      Klebsiella oxytoca      KAMAR      Ampicillin Resistant     Ampicillin + Sulbactam Resistant     Cefazolin Resistant     Cefepime Susceptible      Ceftazidime Susceptible      Ceftriaxone Susceptible      Gentamicin Susceptible      Levofloxacin Susceptible      Nitrofurantoin Susceptible      Piperacillin + Tazobactam Resistant     Trimethoprim + Sulfamethoxazole Susceptible                           Blood Culture - Blood, Arm, Left [[id number]]  (Normal) Collected: 05/17/23 2005    Lab Status: Final result Specimen: Blood from Arm, Left Updated: 05/22/23 2300     Blood Culture No growth at 5 days    Blood Culture - Blood, Arm, Right [[id number]]  (Normal) Collected: 05/17/23 2005    Lab Status: Final result Specimen: Blood from Arm, Right Updated: 05/22/23 2300     Blood Culture No growth at 5 days          Adult Transthoracic Echo Complete W/ Cont if Necessary Per Protocol    Result Date: 5/20/2023  •  Left ventricular systolic function is normal. Calculated left ventricular EF = 57% •  Estimated right ventricular systolic pressure from tricuspid regurgitation is normal (<35 mmHg).     CT Abdomen Pelvis Without Contrast    Result Date: 5/17/2023  CLINICAL HISTORY: Abdominal pain, jaundice.  COMPARISON: None available.  TECHNIQUE:  Sequential trans-axial images were obtained with a multi-detector helical CT without administration of iodinated contrast. Coronal and sagittal reconstructions were obtained. No oral contrast given.  Limited exposure control, adjustment of the MA and/or KV according to patient's size or use of iterative reconstruction technique was utilized.  FINDINGS:  The visualized lung bases are unremarkable.  There is moderate hepatomegaly measuring up to 16.9 cm in length with diffuse fatty infiltration.  There is heterogeneous parenchymal echotexture of the liver.  The noncontrast images of the kidneys, adrenals are normal.  There is marked splenomegaly measuring up to 18.8 cm in length.  Cholelithiasis.  Mild gallbladder wall thickening and pericholecystic fluid.  Tiny 2 mm sized right renal calculus.  No obstructive uropathy.  There is diffuse fatty atrophy of the pancreas.  Decompressed stomach. No dilation or obstruction. The appendix is unremarkable.  There is mild circumferential large bowel wall thickening.  There is no abdominal lymphadenopathy. There is no pneumoperitoneum. The retroperitoneal region appears unremarkable.The abdominal aorta shows normal unenhanced appearance.  Mild ascites.  There is no pelvic lymphadenopathy. The inguinal regions are unremarkable.  The bladder appears unremarkable.  Mild thoracolumbar degenerative changes.  There is no acute osseous abnormality.       1.  Moderate hepatomegaly with diffuse fatty infiltration. Heterogeneous parenchymal echotexture of the liver. 2.  Marked splenomegaly.  Cholelithiasis.  Mild gallbladder wall thickening and pericholecystic fluid.  Differential possibilities to be considered are hepatocellular disease, congestive heart failure or a calculus cholecystitis. 3.  Mild ascites. 4.  Mild circumferential large bowel wall thickening.  This may be related to portal colopathy or infectious/inflammatory colitis. 5.  Right-sided nephrolithiasis.  No obstructive  uropathy.  This report was finalized on 5/17/2023 10:01 PM by Janes Hunt MD.      US Abdomen Complete    Result Date: 5/17/2023  Complete abdominal ultrasound  HISTORY: Jaundice for 2 to 3 days, no pain.  COMPARISON: CT abdomen pelvis report only dated 6/17/2021  Technique: Grayscale, color and spectral Doppler imaging of the abdomen was performed by sonographer.  Radiologist was not present.  A total of 92 images are sent for interpretation.  Findings: Pancreas: Obscured by overlying bowel gas.  Liver: Enlarged and diffusely echogenic, 25 cm length.  No discernible mass.  Probable focal fat sparing at the gallbladder fossa.  Normal hepatopetal flow in the main portal vein.  Left hepatic vein demonstrates normal direction of flow.  Bile ducts: No intrahepatic or extrahepatic biliary ductal dilatation. Common bile duct measures 2 mm diameter.  Gallbladder: Partially distended.  It contains a shadowing 2 cm stone. No wall thickening, hyperemia or pericholecystic fluid.  Negative sonographic Paiz's sign.  Spleen: Homogeneous, mildly enlarged at 15 cm length.  No focal mass.  Right kidney: Normal cortical thickness and parenchymal echogenicity. Right kidney length 11.3 cm.  No hydronephrosis, mass or stone.  Left kidney: Normal cortical thickness and parenchymal echogenicity. Right kidney length 12.1 cm.  No hydronephrosis, mass or stone.  Ascites: None.  Aorta and IVC: Both vessels obscured by overlying bowel gas along their course.      Impression: 1. Hepatosplenomegaly. 2. Echogenic liver parenchyma may represent diffuse fatty infiltration or other chronic process.  Liver enlargement may be acute or chronic, recommend clinical correlation. 3. Cholelithiasis without cholecystitis. 4. Pancreas obscured by overlying bowel gas.  No intrahepatic biliary tree dilatation.   To TALK to On Call [phone]  This report was finalized on 5/17/2023 7:35 PM by Dayne Nguyen MD.      XR Abdomen  KUB    Result Date: 5/19/2023  XR ABDOMEN KUB Date of Exam: 5/19/2023 9:30 AM EDT Indication: nasal jejunal tube placement Comparison: None available. Findings: Enteric catheter projects over the expected location of the distal duodenum. The bowel gas pattern is unremarkable. No acute osseous lesion is seen.     Impression: Enteric catheter projects over the expected location of the distal duodenum. Electronically Signed: Horacio Sharma  5/19/2023 9:46 AM EDT  Workstation ID: [id number]    XR Abdomen KUB    Result Date: 5/19/2023  EXAMINATION: XR ABDOMEN KUB  DATE OF EXAM: 5/18/2023 11:04 PM HISTORY: NG tube placement COMPARISON: None. FINDINGS: Cone-down view of the lung bases and upper abdomen for evaluation of NG tube placement. Enteric tube terminates within stomach. No evidence of bowel obstruction.     Enteric tube terminates within stomach. Electronically signed by:  Kleber Mota D.O.  5/18/2023 10:11 PM Mountain Time    XR Chest AP    Result Date: 5/17/2023  INDICATION: Cough.  TECHNIQUE: Frontal radiograph of the chest.  COMPARISON: None.  FINDINGS: The cardiomediastinal silhouette and pulmonary vasculature appear within normal limits. No infiltrate, pleural effusion or pneumothorax. No acute osseous abnormality evident.      No acute cardiopulmonary process.  This report was finalized on 5/17/2023 11:15 PM by Alex Pallas, DO.                Results for orders placed during the hospital encounter of 05/18/23    Adult Transthoracic Echo Complete W/ Cont if Necessary Per Protocol    Interpretation Summary  •  Left ventricular systolic function is normal. Calculated left ventricular EF = 57%  •  Estimated right ventricular systolic pressure from tricuspid regurgitation is normal (<35 mmHg).      Plan for Follow-up of Pending Labs/Results:   Pending Labs     Order Current Status    Blood Culture - Blood, Blood, PICC Line Preliminary result    Blood Culture - Blood, Blood, PICC Line Preliminary result         Discharge Details        Discharge Medications      New Medications      Instructions Start Date   cefdinir 300 MG capsule  Commonly known as: OMNICEF   300 mg, Oral, 2 Times Daily      cholecalciferol 25 MCG (1000 UT) tablet  Commonly known as: VITAMIN D3   1,000 Units, Oral, Daily   Start Date: May 24, 2023     folic acid 1 MG tablet  Commonly known as: FOLVITE   1 mg, Oral, Daily   Start Date: May 24, 2023     furosemide 40 MG tablet  Commonly known as: Lasix   40 mg, Oral, Daily      lactulose 10 GM/15ML solution  Commonly known as: CHRONULAC   30 g, Oral, Nightly      pantoprazole 40 MG EC tablet  Commonly known as: PROTONIX   40 mg, Oral, 2 Times Daily Before Meals      prednisoLONE 15 MG/5ML solution oral solution  Commonly known as: PRELONE   30 mg, Oral, Daily With Breakfast      riFAXIMin 550 MG tablet  Commonly known as: XIFAXAN   550 mg, Oral, Every 12 Hours Scheduled      spironolactone 25 MG tablet  Commonly known as: Aldactone   25 mg, Oral, Daily         Continue These Medications      Instructions Start Date   buprenorphine-naloxone 8-2 MG per SL tablet  Commonly known as: SUBOXONE   2 tablets, Sublingual, Daily      hydrOXYzine pamoate 50 MG capsule  Commonly known as: Vistaril   50 mg, Oral, 4 Times Daily PRN, Take 1 to 2 tablets as needed for anxiety every 6 hours      naloxone 4 MG/0.1ML nasal spray  Commonly known as: NARCAN   1 spray, Nasal, As Needed, Spray in 1 nostril every 2 to 3 minutes call 911      ondansetron 8 MG tablet  Commonly known as: ZOFRAN   8 mg, Oral, Every 8 Hours PRN      sertraline 50 MG tablet  Commonly known as: Zoloft   50 mg, Oral, Daily         Stop These Medications    cloNIDine 0.1 MG tablet  Commonly known as: Catapres     ibuprofen 600 MG tablet  Commonly known as: ADVIL,MOTRIN     lisinopril 20 MG tablet  Commonly known as: PRINIVIL,ZESTRIL     QUEtiapine 25 MG tablet  Commonly known as: SEROquel            No Known Allergies      Discharge  Disposition:  Home or Self Care    Diet:  Hospital:  Diet Order   Procedures   • Diet: Gastrointestinal Diets, High Protein Diet; Fiber-Restricted; Texture: Regular Texture (IDDSI 7); Fluid Consistency: Thin (IDDSI 0)       Activity:  Activity Instructions     Activity as Tolerated            Restrictions or Other Recommendations:  None        CODE STATUS:    Code Status and Medical Interventions:   Ordered at: 05/18/23 1030     Level Of Support Discussed With:    Patient     Code Status (Patient has no pulse and is not breathing):    CPR (Attempt to Resuscitate)     Medical Interventions (Patient has pulse or is breathing):    Full Support       Future Appointments   Date Time Provider Department Center   5/30/2023 10:00 AM Daniel Olivas APRN [facility]   5/30/2023 11:30 AM Antonette Hadley DO [facility]                 Sigrid Grady MD  05/23/23      Time Spent on Discharge:  I spent  40 minutes on this discharge activity which included: face-to-face encounter with the patient, reviewing the data in the system, coordination of the care with the nursing staff as well as consultants, documentation, and entering orders.            Electronically signed by Sigrid Grady MD at 05/23/23 [id number]

## 2023-06-01 ENCOUNTER — READMISSION MANAGEMENT (OUTPATIENT)
Dept: CALL CENTER | Facility: HOSPITAL | Age: 33
End: 2023-06-01

## 2023-06-01 NOTE — OUTREACH NOTE
Medical Week 2 Survey    Flowsheet Row Responses   [facility] patient discharged from? [facility]   Does the patient have one of the following disease processes/diagnoses(primary or secondary)? Other   Week 2 attempt successful? No   Unsuccessful attempts Attempt 1          Renuka BELTRAN - Registered Nurse

## 2023-06-02 DIAGNOSIS — Z79.899 MEDICATION MANAGEMENT: Primary | ICD-10-CM

## 2023-06-02 DIAGNOSIS — F11.20 OPIOID TYPE DEPENDENCE, CONTINUOUS: ICD-10-CM

## 2023-06-02 DIAGNOSIS — G47.00 INSOMNIA, UNSPECIFIED TYPE: ICD-10-CM

## 2023-06-02 DIAGNOSIS — F32.A DEPRESSION, UNSPECIFIED DEPRESSION TYPE: ICD-10-CM

## 2023-06-02 DIAGNOSIS — F41.1 GENERALIZED ANXIETY DISORDER: ICD-10-CM

## 2023-06-02 DIAGNOSIS — R11.14 BILIOUS VOMITING WITH NAUSEA: ICD-10-CM

## 2023-06-02 RX ORDER — BUPRENORPHINE HYDROCHLORIDE AND NALOXONE HYDROCHLORIDE DIHYDRATE 8; 2 MG/1; MG/1
2 TABLET SUBLINGUAL DAILY
Qty: 56 TABLET | Refills: 0 | Status: SHIPPED | OUTPATIENT
Start: 2023-06-02

## 2023-06-02 RX ORDER — ONDANSETRON HYDROCHLORIDE 8 MG/1
8 TABLET, FILM COATED ORAL EVERY 8 HOURS PRN
Qty: 45 TABLET | Refills: 0 | Status: SHIPPED | OUTPATIENT
Start: 2023-06-02

## 2023-06-02 RX ORDER — HYDROXYZINE PAMOATE 50 MG/1
50 CAPSULE ORAL 4 TIMES DAILY PRN
Qty: 90 CAPSULE | Refills: 0 | Status: SHIPPED | OUTPATIENT
Start: 2023-06-02

## 2023-06-02 NOTE — TELEPHONE ENCOUNTER
Patient was in the hospital and was not able to keep appt and is coming in Monday and wants a refill till Monday at least

## 2023-08-23 ENCOUNTER — TELEMEDICINE (OUTPATIENT)
Dept: PSYCHIATRY | Facility: CLINIC | Age: 33
End: 2023-08-23
Payer: COMMERCIAL

## 2023-08-23 VITALS
HEIGHT: 77 IN | SYSTOLIC BLOOD PRESSURE: 142 MMHG | BODY MASS INDEX: 26.38 KG/M2 | HEART RATE: 66 BPM | DIASTOLIC BLOOD PRESSURE: 96 MMHG | WEIGHT: 223.4 LBS

## 2023-08-23 DIAGNOSIS — Z79.899 MEDICATION MANAGEMENT: ICD-10-CM

## 2023-08-23 DIAGNOSIS — F51.01 PRIMARY INSOMNIA: ICD-10-CM

## 2023-08-23 DIAGNOSIS — F11.20 OPIOID TYPE DEPENDENCE, CONTINUOUS: Primary | ICD-10-CM

## 2023-08-23 RX ORDER — LACTULOSE 10 G/15ML
SOLUTION ORAL
COMMUNITY
Start: 2023-07-25

## 2023-08-23 RX ORDER — BUPRENORPHINE HYDROCHLORIDE AND NALOXONE HYDROCHLORIDE DIHYDRATE 8; 2 MG/1; MG/1
2 TABLET SUBLINGUAL DAILY
Qty: 56 TABLET | Refills: 0 | Status: SHIPPED | OUTPATIENT
Start: 2023-08-23

## 2023-08-23 RX ORDER — RIFAXIMIN 550 MG/1
TABLET ORAL
COMMUNITY
Start: 2023-08-22

## 2023-08-23 RX ORDER — FOLIC ACID 1 MG/1
1000 TABLET ORAL DAILY
COMMUNITY
Start: 2023-08-22

## 2023-08-23 RX ORDER — QUETIAPINE FUMARATE 25 MG/1
25 TABLET, FILM COATED ORAL
COMMUNITY
Start: 2023-06-26 | End: 2023-08-23 | Stop reason: SDUPTHER

## 2023-08-23 RX ORDER — QUETIAPINE FUMARATE 25 MG/1
25 TABLET, FILM COATED ORAL NIGHTLY
Qty: 30 TABLET | Refills: 0 | Status: SHIPPED | OUTPATIENT
Start: 2023-08-23

## 2023-08-23 NOTE — PROGRESS NOTES
This provider is located at [facility]. The Patient is seen remotely located at the [facility] ([facility]) using Video. Patient is being seen via telehealth and confirm that they are in a secure environment for this session. The patient's condition being diagnosed/treated is appropriate for telemedicine. Provider identified as Daniel Olivas as well as credentials APRN MSN FNP-C ANTONIO-REYNOLD.   The client/patient gave consent to be seen remotely, and when consent is given they understand that the consent allows for patient identifiable information to be sent to a third party as needed.   They may refuse to be seen remotely at any time. The electronic data is encrypted and password protected, and the patient has been advised of the potential risks to privacy not withstanding such measures.    5/2/2023-last evaluation [facility] for medication assisted treatment    Chief Complaint/History of Present Illness: Follow Up buprenorphine/naloxone Medicated Assisted Treatment for Opiate Use Disorder     Patient/Client Concerns/Updates: Continue Seroquel for insomnia  -Patient reports he is doing well since last evaluation and reports no significant life changes  -Patient reports he is continuing care with a gastroenterologist for management of hepatitis C  -Month lapse in care in the [facility] however patient reports he has acquired buprenorphine from nonprescribed sources and continue dosing consistently with such  -Reports mood is stable and denies concerns with anxiety or depression and sleeping well    Triggers (Persons/Places/Things/Events/Thought/Emotions): Insomnia    Cravings: Denies cravings    Relapse Prevention: Counseling    Urine Drug Screen (today's visit) discussed: Positive buprenorphine and positive THC    UDS Confirmation (Most recent/Resulted): Positive buprenorphine/nor-buprenorphine, no concerns for urine tampering otherwise positive gabapentin    Most recent pertinent laboratory  studies reviewed: 5/23/2023-hepatic function most notably transaminases above normal limits chronic in nature, we will monitor as buprenorphine therapy progresses, Onesimo OWENS (PDMP) Reviewed for Current/Active Medications: buprenorphine/naloxone as reviewed today    Past Surgical History:  Past Surgical History:   Procedure Laterality Date    ENDOSCOPY N/A 5/19/2023    Procedure: ESOPHAGOGASTRODUODENOSCOPY WITH NJ TUBE PLACEMENT;  Surgeon: Marguerite Blanco MD;  [facility] ENDOSCOPY;  Service: Gastroenterology;  Laterality: N/A;  2 bands placed in esophagus.    NO PAST SURGERIES         Problem List:  Patient Active Problem List   Diagnosis    Alcohol-induced acute pancreatitis    Pancreatitis    Chronic pain of right knee    Hepatorenal failure    Cirrhosis of liver without ascites    Anemia       Allergy:   No Known Allergies     Current Medications:   Current Outpatient Medications   Medication Sig Dispense Refill    folic acid (FOLVITE) 1 MG tablet Take 1 tablet by mouth Daily. as directed      furosemide (Lasix) 40 MG tablet Take 1 tablet by mouth Daily. 60 tablet 3    hydrOXYzine pamoate (Vistaril) 50 MG capsule Take 1 capsule by mouth 4 (Four) Times a Day As Needed for Anxiety (and/or insomia). Take 1 to 2 tablets as needed for anxiety every 6 hours 90 capsule 0    lactulose (CHRONULAC) 10 GM/15ML solution TAKE 45MLS BY MOUTH EVERY NIGHT AT BEDTIME      ondansetron (ZOFRAN) 8 MG tablet Take 1 tablet by mouth Every 8 (Eight) Hours As Needed for Nausea or Vomiting. 45 tablet 0    spironolactone (Aldactone) 25 MG tablet Take 1 tablet by mouth Daily. 30 tablet 2    Xifaxan 550 MG tablet TAKE ONE TABLET BY MOUTH TWO TIMES PER DAY AS DIRECTED      buprenorphine-naloxone (SUBOXONE) 8-2 MG per SL tablet Place 2 tablets under the tongue Daily. 56 tablet 0    naloxone (NARCAN) 4 MG/0.1ML nasal spray 1 spray into the nostril(s) as directed by provider As Needed (Opiate oversedation). Spray in 1  nostril every 2 to 3 minutes call 911 (Patient not taking: Reported on 8/23/2023) 2 each 2    QUEtiapine (SEROquel) 25 MG tablet Take 1 tablet by mouth Every Night. 30 tablet 0    sertraline (Zoloft) 50 MG tablet Take 1 tablet by mouth Daily for 30 days. 30 tablet 0     No current facility-administered medications for this visit.       Past Medical History:  Past Medical History:   Diagnosis Date    Alcohol-induced acute pancreatitis 06/27/2019    Alcoholism     Cirrhosis     Drug use     Amphetamines and Suboxone    Fatty liver     Hepatitis C antibody test positive 2019    Medical non-compliance     Pancreatitis     Smoker          Social History     Socioeconomic History    Marital status:    Tobacco Use    Smoking status: Every Day     Packs/day: 0.50     Types: Cigarettes    Smokeless tobacco: Never   Vaping Use    Vaping Use: Never used   Substance and Sexual Activity    Alcohol use: Yes     Alcohol/week: 8.0 standard drinks     Types: 8 Shots of liquor per week     Comment: 5-6 double shots    Drug use: Yes     Comment: suboxone as prescribed    Sexual activity: Defer         Family History   Problem Relation Age of Onset    Cancer Maternal Grandmother     No Known Problems Mother     No Known Problems Father          Mental Status Exam:   Hygiene:   good  Cooperation:  Cooperative  Eye Contact:  Good  Psychomotor Behavior:  Appropriate  Affect:  Appropriate  Mood: normal  Speech:  Normal  Thought Process:  Goal directed  Thought Content:  Normal  Suicidal:  None  Homicidal:  None  Hallucinations:  None  Delusion:  None  Memory:  Intact  Orientation:  Grossly intact  Reliability:  good  Insight:  Good  Judgement:  Good  Impulse Control:  Good         Review of Systems:  Review of Systems   Constitutional:  Negative for activity change, chills, diaphoresis and fatigue.   Respiratory:  Negative for apnea, cough and shortness of breath.    Cardiovascular:  Negative for chest pain,  "palpitations and leg swelling.   Gastrointestinal:  Negative for abdominal pain, constipation, diarrhea, nausea and vomiting.   Genitourinary:  Negative for difficulty urinating.   Musculoskeletal:  Negative for arthralgias.   Skin:  Negative for rash.   Neurological:  Negative for dizziness, weakness and headaches.   Psychiatric/Behavioral:  Positive for sleep disturbance. Negative for agitation, self-injury and suicidal ideas. The patient is not nervous/anxious.        Physical Exam:  Physical Exam  Vitals reviewed.   Constitutional:       General: He is not in acute distress.     Appearance: Normal appearance. He is not ill-appearing or toxic-appearing.   Pulmonary:      Effort: Pulmonary effort is normal.   Musculoskeletal:         General: Normal range of motion.   Neurological:      General: No focal deficit present.      Mental Status: He is alert and oriented to person, place, and time.   Psychiatric:         Attention and Perception: Attention and perception normal.         Mood and Affect: Mood normal. Mood is not anxious or depressed.         Speech: Speech normal.         Behavior: Behavior normal. Behavior is cooperative.         Thought Content: Thought content normal.         Cognition and Memory: Cognition and memory normal.         Judgment: Judgment normal.     Vital Signs:   /96   Pulse 66   Ht 195.6 cm (77.01\")   Wt 101 kg (223 lb 6.4 oz)   BMI 26.49 kg/mý      Lab Results:   Telemedicine on 08/23/2023   Component Date Value Ref Range Status    External Amphetamine Screen Urine 08/23/2023 Negative   Final    External Benzodiazepine Screen Uri* 08/23/2023 Negative   Final    External Cocaine Screen Urine 08/23/2023 Negative   Final    External THC Screen Urine 08/23/2023 Positive (A)   Final    External Methadone Screen Urine 08/23/2023 Negative   Final    External Methamphetamine Screen Ur* 08/23/2023 Negative   Final    External Oxycodone Screen Urine 08/23/2023 Negative   Final "    External Buprenorphine Screen Urine 08/23/2023 Positive (A)   Final    External MDMA 08/23/2023 Negative   Final    External Opiates Screen Urine 08/23/2023 Negative   Final   Orders Only on 06/02/2023   Component Date Value Ref Range Status    External Amphetamine Screen Urine 06/02/2023 Negative   Final    External Benzodiazepine Screen Uri* 06/02/2023 Negative   Final    External Cocaine Screen Urine 06/02/2023 Negative   Final    External THC Screen Urine 06/02/2023 Positive (A)   Final    External Methadone Screen Urine 06/02/2023 Negative   Final    External Methamphetamine Screen Ur* 06/02/2023 Negative   Final    External Oxycodone Screen Urine 06/02/2023 Negative   Final    External Buprenorphine Screen Urine 06/02/2023 Positive (A)   Final    External MDMA 06/02/2023 Negative   Final    External Opiates Screen Urine 06/02/2023 Negative   Final   No results displayed because visit has over 200 results.      Admission on 05/17/2023, Discharged on 05/18/2023   Component Date Value Ref Range Status    Glucose 05/17/2023 111 (H)  65 - 99 mg/dL Final    BUN 05/17/2023 20  6 - 20 mg/dL Final    Creatinine 05/17/2023 2.59 (H)  0.76 - 1.27 mg/dL Final    Sodium 05/17/2023 131 (L)  136 - 145 mmol/L Final    Potassium 05/17/2023 3.9  3.5 - 5.2 mmol/L Final    Chloride 05/17/2023 103  98 - 107 mmol/L Final    CO2 05/17/2023 18.5 (L)  22.0 - 29.0 mmol/L Final    Calcium 05/17/2023 8.3 (L)  8.6 - 10.5 mg/dL Final    Total Protein 05/17/2023 6.3  6.0 - 8.5 g/dL Final    Albumin 05/17/2023 2.0 (L)  3.5 - 5.2 g/dL Final    ALT (SGPT) 05/17/2023 24  1 - 41 U/L Final    AST (SGOT) 05/17/2023 120 (H)  1 - 40 U/L Final    Alkaline Phosphatase 05/17/2023 277 (H)  39 - 117 U/L Final    Total Bilirubin 05/17/2023 14.9 (H)  0.0 - 1.2 mg/dL Final    Globulin 05/17/2023 4.3  gm/dL Final    A/G Ratio 05/17/2023 0.5  g/dL Final    BUN/Creatinine Ratio 05/17/2023 7.7  7.0 - 25.0 Final    Anion Gap  05/17/2023 9.5  5.0 - 15.0 mmol/L Final    eGFR 05/17/2023 32.7 (L)  >60.0 mL/min/1.73 Final    Lipase 05/17/2023 28  13 - 60 U/L Final    Color, UA 05/17/2023 Dark Yellow (A)  Yellow, Straw Final    Appearance, UA 05/17/2023 Turbid (A)  Clear Final    pH, UA 05/17/2023 5.5  5.0 - 8.0 Final    Specific Gravity, UA 05/17/2023 1.023  1.005 - 1.030 Final    Glucose, UA 05/17/2023 Negative  Negative Final    Ketones, UA 05/17/2023 Negative  Negative Final    Bilirubin, UA 05/17/2023 Large (3+) (A)  Negative Final    Blood, UA 05/17/2023 Trace (A)  Negative Final    Protein, UA 05/17/2023 100 mg/dL (2+) (A)  Negative Final    Leuk Esterase, UA 05/17/2023 Moderate (2+) (A)  Negative Final    Nitrite, UA 05/17/2023 Positive (A)  Negative Final    Urobilinogen, UA 05/17/2023 1.0 E.U./dL  0.2 - 1.0 E.U./dL Final    THC, Screen, Urine 05/17/2023 Positive (A)  Negative Final    Phencyclidine (PCP), Urine 05/17/2023 Negative  Negative Final    Cocaine Screen, Urine 05/17/2023 Negative  Negative Final    Methamphetamine, Ur 05/17/2023 Negative  Negative Final    Opiate Screen 05/17/2023 Negative  Negative Final    Amphetamine Screen, Urine 05/17/2023 Negative  Negative Final    Benzodiazepine Screen, Urine 05/17/2023 Negative  Negative Final    Tricyclic Antidepressants Screen 05/17/2023 Positive (A)  Negative Final    Methadone Screen, Urine 05/17/2023 Negative  Negative Final    Barbiturates Screen, Urine 05/17/2023 Negative  Negative Final    Oxycodone Screen, Urine 05/17/2023 Negative  Negative Final    Propoxyphene Screen 05/17/2023 Negative  Negative Final    Buprenorphine, Screen, Urine 05/17/2023 Positive (A)  Negative Final    WBC 05/17/2023 6.02  3.40 - 10.80 10*3/mm3 Final    RBC 05/17/2023 1.99 (L)  4.14 - 5.80 10*6/mm3 Final    Hemoglobin 05/17/2023 7.3 (L)  13.0 - 17.7 g/dL Final    Hematocrit 05/17/2023 21.9 (L)  37.5 - 51.0 % Final    MCV 05/17/2023 110.1 (H)  79.0 - 97.0 fL Final     MCH 05/17/2023 36.7 (H)  26.6 - 33.0 pg Final    MCHC 05/17/2023 33.3  31.5 - 35.7 g/dL Final    RDW 05/17/2023 19.1 (H)  12.3 - 15.4 % Final    RDW-SD 05/17/2023 75.8 (H)  37.0 - 54.0 fl Final    MPV 05/17/2023 10.5  6.0 - 12.0 fL Final    Platelets 05/17/2023 104 (L)  140 - 450 10*3/mm3 Final    Hepatitis B Surface Ag 05/17/2023 Non-Reactive  Non-Reactive Final    Hep A IgM 05/17/2023 Non-Reactive  Non-Reactive Final    Hep B C IgM 05/17/2023 Non-Reactive  Non-Reactive Final    Hepatitis C Ab 05/17/2023 Reactive (A)  Non-Reactive Final    Ethanol 05/17/2023 16 (H)  0 - 10 mg/dL Final    Ethanol % 05/17/2023 0.016  % Final    Magnesium 05/17/2023 1.7  1.6 - 2.6 mg/dL Final    Total Bilirubin 05/17/2023 14.0 (H)  0.0 - 1.2 mg/dL Final    Bilirubin, Direct 05/17/2023 >10.0 (H)  0.0 - 0.3 mg/dL Final    Bilirubin, Indirect 05/17/2023    Final    Unable to calculate    Neutrophil % 05/17/2023 51.0  42.7 - 76.0 % Final    Lymphocyte % 05/17/2023 28.0  19.6 - 45.3 % Final    Monocyte % 05/17/2023 6.0  5.0 - 12.0 % Final    Eosinophil % 05/17/2023 1.0  0.3 - 6.2 % Final    Bands %  05/17/2023 10.0 (H)  0.0 - 5.0 % Final    Metamyelocyte % 05/17/2023 1.0 (H)  0.0 - 0.0 % Final    Myelocyte % 05/17/2023 3.0 (H)  0.0 - 0.0 % Final    Neutrophils Absolute 05/17/2023 3.67  1.70 - 7.00 10*3/mm3 Final    Lymphocytes Absolute 05/17/2023 1.69  0.70 - 3.10 10*3/mm3 Final    Monocytes Absolute 05/17/2023 0.36  0.10 - 0.90 10*3/mm3 Final    Eosinophils Absolute 05/17/2023 0.06  0.00 - 0.40 10*3/mm3 Final    Anisocytosis 05/17/2023 Large/3+  None Seen Final    Macrocytes 05/17/2023 Slight/1+  None Seen Final    Target Cells 05/17/2023 Slight/1+  None Seen Final    Platelet Morphology 05/17/2023 Normal  Normal Final    COVID19 05/17/2023 Not Detected  Not Detected - Ref. Range Final    Influenza A PCR 05/17/2023 Not Detected  Not Detected Final    Influenza B PCR 05/17/2023 Not Detected  Not  Detected Final    Ammonia 05/17/2023 142 (H)  16 - 60 umol/L Final    Protime 05/17/2023 19.2 (H)  12.1 - 14.7 Seconds Final    INR 05/17/2023 1.55 (H)  0.90 - 1.10 Final    PTT 05/17/2023 44.2 (H)  26.5 - 34.5 seconds Final    Amylase 05/17/2023 18 (L)  28 - 100 U/L Final    C-Reactive Protein 05/17/2023 5.55 (H)  0.00 - 0.50 mg/dL Final    RBC, UA 05/17/2023 6-12 (A)  None Seen, 0-2 /HPF Final    WBC, UA 05/17/2023 Too Numerous to Count (A)  None Seen, 0-2 /HPF Final    Bacteria, UA 05/17/2023 4+ (A)  None Seen /HPF Final    Squamous Epithelial Cells, UA 05/17/2023 0-2  None Seen, 0-2 /HPF Final    Hyaline Casts, UA 05/17/2023 3-6  None Seen /LPF Final    Granular Casts, UA 05/17/2023 3-6  None Seen /LPF Final    Methodology 05/17/2023 Manual Light Microscopy   Final    Blood Culture 05/17/2023 No growth at 5 days   Final    Blood Culture 05/17/2023 No growth at 5 days   Final    Procalcitonin 05/17/2023 0.64 (H)  0.00 - 0.25 ng/mL Final    Lactate 05/17/2023 2.9 (C)  0.5 - 2.0 mmol/L Final    proBNP 05/17/2023 337.6  0.0 - 450.0 pg/mL Final    Urine Culture 05/17/2023 >100,000 CFU/mL Klebsiella oxytoca (A)   Final    Lactate 05/18/2023 2.7 (C)  0.5 - 2.0 mmol/L Final   Telemedicine on 05/02/2023   Component Date Value Ref Range Status    External Amphetamine Screen Urine 05/02/2023 Negative   Final    External Benzodiazepine Screen Uri* 05/02/2023 Negative   Final    External Cocaine Screen Urine 05/02/2023 Negative   Final    External THC Screen Urine 05/02/2023 Positive (A)   Final    External Methadone Screen Urine 05/02/2023 Negative   Final    External Methamphetamine Screen Ur* 05/02/2023 Negative   Final    External Oxycodone Screen Urine 05/02/2023 Negative   Final    External Buprenorphine Screen Urine 05/02/2023 Positive (A)   Final    External MDMA 05/02/2023 Negative   Final    External Opiates Screen Urine 05/02/2023 Negative   Final   Telemedicine on 04/04/2023    Component Date Value Ref Range Status    External Amphetamine Screen Urine 04/04/2023 Negative   Final    External Benzodiazepine Screen Uri* 04/04/2023 Negative   Final    External Cocaine Screen Urine 04/04/2023 Negative   Final    External THC Screen Urine 04/04/2023 Positive (A)   Final    External Methadone Screen Urine 04/04/2023 Negative   Final    External Methamphetamine Screen Ur* 04/04/2023 Negative   Final    External Oxycodone Screen Urine 04/04/2023 Negative   Final    External Buprenorphine Screen Urine 04/04/2023 Positive (A)   Final    External MDMA 04/04/2023 Negative   Final    External Opiates Screen Urine 04/04/2023 Negative   Final   Telemedicine on 03/07/2023   Component Date Value Ref Range Status    External Amphetamine Screen Urine 03/07/2023 Negative   Final    External Benzodiazepine Screen Uri* 03/07/2023 Negative   Final    External Cocaine Screen Urine 03/07/2023 Negative   Final    External THC Screen Urine 03/07/2023 Positive (A)   Final    External Methadone Screen Urine 03/07/2023 Negative   Final    External Methamphetamine Screen Ur* 03/07/2023 Negative   Final    External Oxycodone Screen Urine 03/07/2023 Negative   Final    External Buprenorphine Screen Urine 03/07/2023 Positive (A)   Final    External MDMA 03/07/2023 Negative   Final    External Opiates Screen Urine 03/07/2023 Negative   Final         Assessment & Plan   Diagnoses and all orders for this visit:    1. Opioid type dependence, continuous (Primary)  -     buprenorphine-naloxone (SUBOXONE) 8-2 MG per SL tablet; Place 2 tablets under the tongue Daily.  Dispense: 56 tablet; Refill: 0    2. Medication management  -     [facility] Screen    3. Primary insomnia    Other orders  -     QUEtiapine (SEROquel) 25 MG tablet; Take 1 tablet by mouth Every Night.  Dispense: 30 tablet; Refill: 0        Visit Diagnoses:    ICD-10-CM ICD-9-CM   1. Opioid type dependence, continuous  F11.20 304.01   2.  Medication management  Z79.899 V58.69   3. Primary insomnia  F51.01 307.42       PLAN:  Safety: No acute safety concerns  Risk Assessment: Risk of self-harm acutely is low. Risk of self-harm chronically is also low, but could be further elevated in the event of treatment noncompliance and/or AODA.    TREATMENT PLAN/GOALS: Continue supportive psychotherapy efforts and medications as indicated. Treatment and medication options discussed during today's visit. Patient acknowledged and verbally consented to continue with current treatment plan and was educated on the importance of compliance with treatment and follow-up appointments.    MEDICATION ISSUES:  ROMAN reviewed as expected.  Discussed medication options and treatment plan of prescribed medication as well as the risks, benefits, and side effects including potential falls, possible impaired driving and metabolic adversities among others. Patient is agreeable to call the office with any worsening of symptoms or onset of side effects. Patient is agreeable to call 911 or go to the nearest ER should he/she begin having SI/HI. No medication side effects or related complaints today.     MEDS ORDERED DURING VISIT:  New Medications Ordered This Visit   Medications    buprenorphine-naloxone (SUBOXONE) 8-2 MG per SL tablet     Sig: Place 2 tablets under the tongue Daily.     Dispense:  56 tablet     Refill:  0     NADEAN:[id number]    QUEtiapine (SEROquel) 25 MG tablet     Sig: Take 1 tablet by mouth Every Night.     Dispense:  30 tablet     Refill:  0       No follow-ups on file.           This document has been electronically signed by RENAN De Dios  August 23, 2023 12:00 EDT      Part of this note may be an electronic transcription/translation of spoken language to printed text using the Dragon Dictation System.

## 2023-09-26 ENCOUNTER — TELEMEDICINE (OUTPATIENT)
Dept: PSYCHIATRY | Facility: CLINIC | Age: 33
End: 2023-09-26
Payer: COMMERCIAL

## 2023-09-26 VITALS
SYSTOLIC BLOOD PRESSURE: 126 MMHG | BODY MASS INDEX: 26.47 KG/M2 | HEART RATE: 70 BPM | WEIGHT: 224.2 LBS | HEIGHT: 77 IN | DIASTOLIC BLOOD PRESSURE: 87 MMHG

## 2023-09-26 DIAGNOSIS — Z79.899 MEDICATION MANAGEMENT: ICD-10-CM

## 2023-09-26 DIAGNOSIS — F51.01 PRIMARY INSOMNIA: ICD-10-CM

## 2023-09-26 DIAGNOSIS — F11.20 OPIOID TYPE DEPENDENCE, CONTINUOUS: Primary | ICD-10-CM

## 2023-09-26 RX ORDER — BUPRENORPHINE HYDROCHLORIDE AND NALOXONE HYDROCHLORIDE DIHYDRATE 8; 2 MG/1; MG/1
2 TABLET SUBLINGUAL DAILY
Qty: 56 TABLET | Refills: 0 | Status: SHIPPED | OUTPATIENT
Start: 2023-09-26

## 2023-09-26 RX ORDER — QUETIAPINE FUMARATE 25 MG/1
25 TABLET, FILM COATED ORAL NIGHTLY
Qty: 30 TABLET | Refills: 0 | Status: SHIPPED | OUTPATIENT
Start: 2023-09-26

## 2023-09-26 NOTE — PROGRESS NOTES
This provider is located at [facility]. The Patient is seen remotely located at the [facility] ([facility]) using Video. Patient is being seen via telehealth and confirm that they are in a secure environment for this session. The patient's condition being diagnosed/treated is appropriate for telemedicine. Provider identified as Daniel Olivas as well as credentials APRN MSN FNP-C ANTONIO-REYNOLD.   The client/patient gave consent to be seen remotely, and when consent is given they understand that the consent allows for patient identifiable information to be sent to a third party as needed.   They may refuse to be seen remotely at any time. The electronic data is encrypted and password protected, and the patient has been advised of the potential risks to privacy not withstanding such measures.    Chief Complaint/History of Present Illness: Follow Up buprenorphine/naloxone Medicated Assisted Treatment for Opiate Use Disorder     Patient/Client Concerns/Updates: Continue Seroquel for insomnia   -Patient reports he is doing well and has no acute concerns today; reports no life changes since last evaluation, reports overall having a good month  -Mood is stable with no depressive or anxious episodes, no suicidal or homicidal thoughts and sleeping well    Triggers (Persons/Places/Things/Events/Thought/Emotions): Insomnia    Cravings/Substance Use: Denies cravings continues of THC    Relapse Prevention: Counseling    Urine Drug Screen (today's visit) discussed: Positive buprenorphine and positive THC    UDS Confirmation (Most recent/Resulted): Positive buprenorphine/nor-buprenorphine, no concerns for urine tampering otherwise positive gabapentin    Most recent pertinent laboratory studies reviewed:  5/23/2023-hepatic function most notably transaminases above normal limits chronic in nature, we will monitor as buprenorphine therapy progresses, Onesimo OWENS (PDMP) Reviewed for Current/Active Medications:  buprenorphine/naloxone as reviewed today    Past Surgical History:  Past Surgical History:   Procedure Laterality Date   • ENDOSCOPY N/A 5/19/2023    Procedure: ESOPHAGOGASTRODUODENOSCOPY WITH NJ TUBE PLACEMENT;  Surgeon: Marguerite Blanco MD;  Location: formerly [facility] ENDOSCOPY;  Service: Gastroenterology;  Laterality: N/A;  2 bands placed in esophagus.   • NO PAST SURGERIES         Problem List:  Patient Active Problem List   Diagnosis   • Alcohol-induced acute pancreatitis   • Pancreatitis   • Chronic pain of right knee   • Hepatorenal failure   • Cirrhosis of liver without ascites   • Anemia       Allergy:   No Known Allergies     Current Medications:   Current Outpatient Medications   Medication Sig Dispense Refill   • buprenorphine-naloxone (SUBOXONE) 8-2 MG per SL tablet Place 2 tablets under the tongue Daily. 56 tablet 0   • folic acid (FOLVITE) 1 MG tablet Take 1 tablet by mouth Daily. as directed     • furosemide (Lasix) 40 MG tablet Take 1 tablet by mouth Daily. 60 tablet 3   • hydrOXYzine pamoate (Vistaril) 50 MG capsule Take 1 capsule by mouth 4 (Four) Times a Day As Needed for Anxiety (and/or insomia). Take 1 to 2 tablets as needed for anxiety every 6 hours 90 capsule 0   • lactulose (CHRONULAC) 10 GM/15ML solution TAKE 45MLS BY MOUTH EVERY NIGHT AT BEDTIME     • ondansetron (ZOFRAN) 8 MG tablet Take 1 tablet by mouth Every 8 (Eight) Hours As Needed for Nausea or Vomiting. 45 tablet 0   • QUEtiapine (SEROquel) 25 MG tablet Take 1 tablet by mouth Every Night. 30 tablet 0   • spironolactone (Aldactone) 25 MG tablet Take 1 tablet by mouth Daily. 30 tablet 2   • Xifaxan 550 MG tablet TAKE ONE TABLET BY MOUTH TWO TIMES PER DAY AS DIRECTED     • naloxone (NARCAN) 4 MG/0.1ML nasal spray 1 spray into the nostril(s) as directed by provider As Needed (Opiate oversedation). Spray in 1 nostril every 2 to 3 minutes call 911 (Patient not taking: Reported on 9/26/2023) 2 each 2     No current facility-administered medications  for this visit.       Past Medical History:  Past Medical History:   Diagnosis Date   • Alcohol-induced acute pancreatitis 06/27/2019   • Alcoholism    • Cirrhosis    • Drug use     Amphetamines and Suboxone   • Fatty liver    • Hepatitis C antibody test positive 2019   • Medical non-compliance    • Pancreatitis    • Smoker          Social History     Socioeconomic History   • Marital status:    Tobacco Use   • Smoking status: Every Day     Packs/day: 0.50     Types: Cigarettes   • Smokeless tobacco: Never   Vaping Use   • Vaping Use: Never used   Substance and Sexual Activity   • Alcohol use: Yes     Alcohol/week: 8.0 standard drinks     Types: 8 Shots of liquor per week     Comment: 5-6 double shots   • Drug use: Yes     Comment: suboxone as prescribed   • Sexual activity: Defer         Family History   Problem Relation Age of Onset   • Cancer Maternal Grandmother    • No Known Problems Mother    • No Known Problems Father          Mental Status Exam:   Hygiene:   good  Cooperation:  Cooperative  Eye Contact:  Good  Psychomotor Behavior:  Appropriate  Affect:  Appropriate  Mood: normal  Speech:  Normal  Thought Process:  Goal directed  Thought Content:  Normal  Suicidal:  None  Homicidal:  None  Hallucinations:  None  Delusion:  None  Memory:  Intact  Orientation:  Grossly intact  Reliability:  good  Insight:  Good  Judgement:  Good  Impulse Control:  Good         Review of Systems:  Review of Systems   Constitutional:  Negative for activity change, chills, diaphoresis and fatigue.   Respiratory:  Negative for apnea, cough and shortness of breath.    Cardiovascular:  Negative for chest pain, palpitations and leg swelling.   Gastrointestinal:  Negative for abdominal pain, constipation, diarrhea, nausea and vomiting.   Genitourinary:  Negative for difficulty urinating.   Musculoskeletal:  Negative for arthralgias.   Skin:  Negative for rash.   Neurological:  Negative for dizziness, weakness and headaches.  "  Psychiatric/Behavioral:  Positive for sleep disturbance. Negative for agitation, self-injury and suicidal ideas. The patient is not nervous/anxious.        Physical Exam:  Physical Exam  Vitals reviewed.   Constitutional:       General: He is not in acute distress.     Appearance: Normal appearance. He is not ill-appearing or toxic-appearing.   Pulmonary:      Effort: Pulmonary effort is normal.   Musculoskeletal:         General: Normal range of motion.   Neurological:      General: No focal deficit present.      Mental Status: He is alert and oriented to person, place, and time.   Psychiatric:         Attention and Perception: Attention and perception normal.         Mood and Affect: Mood normal. Mood is not anxious or depressed.         Speech: Speech normal.         Behavior: Behavior normal. Behavior is cooperative.         Thought Content: Thought content normal.         Cognition and Memory: Cognition and memory normal.         Judgment: Judgment normal.     Vital Signs:   /87   Pulse 70   Ht 195.6 cm (77.01\")   Wt 102 kg (224 lb 3.2 oz)   BMI 26.58 kg/m²      Lab Results:   Telemedicine on 09/26/2023   Component Date Value Ref Range Status   • External Amphetamine Screen Urine 09/26/2023 Negative   Final   • External Benzodiazepine Screen Uri* 09/26/2023 Negative   Final   • External Cocaine Screen Urine 09/26/2023 Negative   Final   • External THC Screen Urine 09/26/2023 Positive (A)   Final   • External Methadone Screen Urine 09/26/2023 Negative   Final   • External Methamphetamine Screen Ur* 09/26/2023 Negative   Final   • External Oxycodone Screen Urine 09/26/2023 Negative   Final   • External Buprenorphine Screen Urine 09/26/2023 Positive (A)   Final   • External MDMA 09/26/2023 Negative   Final   • External Opiates Screen Urine 09/26/2023 Negative   Final   Telemedicine on 08/23/2023   Component Date Value Ref Range Status   • External Amphetamine Screen Urine 08/23/2023 Negative   Final "   • External Benzodiazepine Screen Uri* 08/23/2023 Negative   Final   • External Cocaine Screen Urine 08/23/2023 Negative   Final   • External THC Screen Urine 08/23/2023 Positive (A)   Final   • External Methadone Screen Urine 08/23/2023 Negative   Final   • External Methamphetamine Screen Ur* 08/23/2023 Negative   Final   • External Oxycodone Screen Urine 08/23/2023 Negative   Final   • External Buprenorphine Screen Urine 08/23/2023 Positive (A)   Final   • External MDMA 08/23/2023 Negative   Final   • External Opiates Screen Urine 08/23/2023 Negative   Final   Orders Only on 06/02/2023   Component Date Value Ref Range Status   • External Amphetamine Screen Urine 06/02/2023 Negative   Final   • External Benzodiazepine Screen Uri* 06/02/2023 Negative   Final   • External Cocaine Screen Urine 06/02/2023 Negative   Final   • External THC Screen Urine 06/02/2023 Positive (A)   Final   • External Methadone Screen Urine 06/02/2023 Negative   Final   • External Methamphetamine Screen Ur* 06/02/2023 Negative   Final   • External Oxycodone Screen Urine 06/02/2023 Negative   Final   • External Buprenorphine Screen Urine 06/02/2023 Positive (A)   Final   • External MDMA 06/02/2023 Negative   Final   • External Opiates Screen Urine 06/02/2023 Negative   Final   No results displayed because visit has over 200 results.      Admission on 05/17/2023, Discharged on 05/18/2023   Component Date Value Ref Range Status   • Glucose 05/17/2023 111 (H)  65 - 99 mg/dL Final   • BUN 05/17/2023 20  6 - 20 mg/dL Final   • Creatinine 05/17/2023 2.59 (H)  0.76 - 1.27 mg/dL Final   • Sodium 05/17/2023 131 (L)  136 - 145 mmol/L Final   • Potassium 05/17/2023 3.9  3.5 - 5.2 mmol/L Final   • Chloride 05/17/2023 103  98 - 107 mmol/L Final   • CO2 05/17/2023 18.5 (L)  22.0 - 29.0 mmol/L Final   • Calcium 05/17/2023 8.3 (L)  8.6 - 10.5 mg/dL Final   • Total Protein 05/17/2023 6.3  6.0 - 8.5 g/dL Final   • Albumin 05/17/2023 2.0 (L)  3.5 - 5.2 g/dL  Final   • ALT (SGPT) 05/17/2023 24  1 - 41 U/L Final   • AST (SGOT) 05/17/2023 120 (H)  1 - 40 U/L Final   • Alkaline Phosphatase 05/17/2023 277 (H)  39 - 117 U/L Final   • Total Bilirubin 05/17/2023 14.9 (H)  0.0 - 1.2 mg/dL Final   • Globulin 05/17/2023 4.3  gm/dL Final   • A/G Ratio 05/17/2023 0.5  g/dL Final   • BUN/Creatinine Ratio 05/17/2023 7.7  7.0 - 25.0 Final   • Anion Gap 05/17/2023 9.5  5.0 - 15.0 mmol/L Final   • eGFR 05/17/2023 32.7 (L)  >60.0 mL/min/1.73 Final   • Lipase 05/17/2023 28  13 - 60 U/L Final   • Color, UA 05/17/2023 Dark Yellow (A)  Yellow, Straw Final   • Appearance, UA 05/17/2023 Turbid (A)  Clear Final   • pH, UA 05/17/2023 5.5  5.0 - 8.0 Final   • Specific Gravity, UA 05/17/2023 1.023  1.005 - 1.030 Final   • Glucose, UA 05/17/2023 Negative  Negative Final   • Ketones, UA 05/17/2023 Negative  Negative Final   • Bilirubin, UA 05/17/2023 Large (3+) (A)  Negative Final   • Blood, UA 05/17/2023 Trace (A)  Negative Final   • Protein, UA 05/17/2023 100 mg/dL (2+) (A)  Negative Final   • Leuk Esterase, UA 05/17/2023 Moderate (2+) (A)  Negative Final   • Nitrite, UA 05/17/2023 Positive (A)  Negative Final   • Urobilinogen, UA 05/17/2023 1.0 E.U./dL  0.2 - 1.0 E.U./dL Final   • THC, Screen, Urine 05/17/2023 Positive (A)  Negative Final   • Phencyclidine (PCP), Urine 05/17/2023 Negative  Negative Final   • Cocaine Screen, Urine 05/17/2023 Negative  Negative Final   • Methamphetamine, Ur 05/17/2023 Negative  Negative Final   • Opiate Screen 05/17/2023 Negative  Negative Final   • Amphetamine Screen, Urine 05/17/2023 Negative  Negative Final   • Benzodiazepine Screen, Urine 05/17/2023 Negative  Negative Final   • Tricyclic Antidepressants Screen 05/17/2023 Positive (A)  Negative Final   • Methadone Screen, Urine 05/17/2023 Negative  Negative Final   • Barbiturates Screen, Urine 05/17/2023 Negative  Negative Final   • Oxycodone Screen, Urine 05/17/2023 Negative  Negative Final   • Propoxyphene  Screen 05/17/2023 Negative  Negative Final   • Buprenorphine, Screen, Urine 05/17/2023 Positive (A)  Negative Final   • WBC 05/17/2023 6.02  3.40 - 10.80 10*3/mm3 Final   • RBC 05/17/2023 1.99 (L)  4.14 - 5.80 10*6/mm3 Final   • Hemoglobin 05/17/2023 7.3 (L)  13.0 - 17.7 g/dL Final   • Hematocrit 05/17/2023 21.9 (L)  37.5 - 51.0 % Final   • MCV 05/17/2023 110.1 (H)  79.0 - 97.0 fL Final   • MCH 05/17/2023 36.7 (H)  26.6 - 33.0 pg Final   • MCHC 05/17/2023 33.3  31.5 - 35.7 g/dL Final   • RDW 05/17/2023 19.1 (H)  12.3 - 15.4 % Final   • RDW-SD 05/17/2023 75.8 (H)  37.0 - 54.0 fl Final   • MPV 05/17/2023 10.5  6.0 - 12.0 fL Final   • Platelets 05/17/2023 104 (L)  140 - 450 10*3/mm3 Final   • Hepatitis B Surface Ag 05/17/2023 Non-Reactive  Non-Reactive Final   • Hep A IgM 05/17/2023 Non-Reactive  Non-Reactive Final   • Hep B C IgM 05/17/2023 Non-Reactive  Non-Reactive Final   • Hepatitis C Ab 05/17/2023 Reactive (A)  Non-Reactive Final   • Ethanol 05/17/2023 16 (H)  0 - 10 mg/dL Final   • Ethanol % 05/17/2023 0.016  % Final   • Magnesium 05/17/2023 1.7  1.6 - 2.6 mg/dL Final   • Total Bilirubin 05/17/2023 14.0 (H)  0.0 - 1.2 mg/dL Final   • Bilirubin, Direct 05/17/2023 >10.0 (H)  0.0 - 0.3 mg/dL Final   • Bilirubin, Indirect 05/17/2023    Final    Unable to calculate   • Neutrophil % 05/17/2023 51.0  42.7 - 76.0 % Final   • Lymphocyte % 05/17/2023 28.0  19.6 - 45.3 % Final   • Monocyte % 05/17/2023 6.0  5.0 - 12.0 % Final   • Eosinophil % 05/17/2023 1.0  0.3 - 6.2 % Final   • Bands %  05/17/2023 10.0 (H)  0.0 - 5.0 % Final   • Metamyelocyte % 05/17/2023 1.0 (H)  0.0 - 0.0 % Final   • Myelocyte % 05/17/2023 3.0 (H)  0.0 - 0.0 % Final   • Neutrophils Absolute 05/17/2023 3.67  1.70 - 7.00 10*3/mm3 Final   • Lymphocytes Absolute 05/17/2023 1.69  0.70 - 3.10 10*3/mm3 Final   • Monocytes Absolute 05/17/2023 0.36  0.10 - 0.90 10*3/mm3 Final   • Eosinophils Absolute 05/17/2023 0.06  0.00 - 0.40 10*3/mm3 Final   • Anisocytosis  05/17/2023 Large/3+  None Seen Final   • Macrocytes 05/17/2023 Slight/1+  None Seen Final   • Target Cells 05/17/2023 Slight/1+  None Seen Final   • Platelet Morphology 05/17/2023 Normal  Normal Final   • COVID19 05/17/2023 Not Detected  Not Detected - Ref. Range Final   • Influenza A PCR 05/17/2023 Not Detected  Not Detected Final   • Influenza B PCR 05/17/2023 Not Detected  Not Detected Final   • Ammonia 05/17/2023 142 (H)  16 - 60 umol/L Final   • Protime 05/17/2023 19.2 (H)  12.1 - 14.7 Seconds Final   • INR 05/17/2023 1.55 (H)  0.90 - 1.10 Final   • PTT 05/17/2023 44.2 (H)  26.5 - 34.5 seconds Final   • Amylase 05/17/2023 18 (L)  28 - 100 U/L Final   • C-Reactive Protein 05/17/2023 5.55 (H)  0.00 - 0.50 mg/dL Final   • RBC, UA 05/17/2023 6-12 (A)  None Seen, 0-2 /HPF Final   • WBC, UA 05/17/2023 Too Numerous to Count (A)  None Seen, 0-2 /HPF Final   • Bacteria, UA 05/17/2023 4+ (A)  None Seen /HPF Final   • Squamous Epithelial Cells, UA 05/17/2023 0-2  None Seen, 0-2 /HPF Final   • Hyaline Casts, UA 05/17/2023 3-6  None Seen /LPF Final   • Granular Casts, UA 05/17/2023 3-6  None Seen /LPF Final   • Methodology 05/17/2023 Manual Light Microscopy   Final   • Blood Culture 05/17/2023 No growth at 5 days   Final   • Blood Culture 05/17/2023 No growth at 5 days   Final   • Procalcitonin 05/17/2023 0.64 (H)  0.00 - 0.25 ng/mL Final   • Lactate 05/17/2023 2.9 (C)  0.5 - 2.0 mmol/L Final   • proBNP 05/17/2023 337.6  0.0 - 450.0 pg/mL Final   • Urine Culture 05/17/2023 >100,000 CFU/mL Klebsiella oxytoca (A)   Final   • Lactate 05/18/2023 2.7 (C)  0.5 - 2.0 mmol/L Final   Telemedicine on 05/02/2023   Component Date Value Ref Range Status   • External Amphetamine Screen Urine 05/02/2023 Negative   Final   • External Benzodiazepine Screen Uri* 05/02/2023 Negative   Final   • External Cocaine Screen Urine 05/02/2023 Negative   Final   • External THC Screen Urine 05/02/2023 Positive (A)   Final   • External Methadone Screen  Urine 05/02/2023 Negative   Final   • External Methamphetamine Screen Ur* 05/02/2023 Negative   Final   • External Oxycodone Screen Urine 05/02/2023 Negative   Final   • External Buprenorphine Screen Urine 05/02/2023 Positive (A)   Final   • External MDMA 05/02/2023 Negative   Final   • External Opiates Screen Urine 05/02/2023 Negative   Final   Telemedicine on 04/04/2023   Component Date Value Ref Range Status   • External Amphetamine Screen Urine 04/04/2023 Negative   Final   • External Benzodiazepine Screen Uri* 04/04/2023 Negative   Final   • External Cocaine Screen Urine 04/04/2023 Negative   Final   • External THC Screen Urine 04/04/2023 Positive (A)   Final   • External Methadone Screen Urine 04/04/2023 Negative   Final   • External Methamphetamine Screen Ur* 04/04/2023 Negative   Final   • External Oxycodone Screen Urine 04/04/2023 Negative   Final   • External Buprenorphine Screen Urine 04/04/2023 Positive (A)   Final   • External MDMA 04/04/2023 Negative   Final   • External Opiates Screen Urine 04/04/2023 Negative   Final         Assessment & Plan   Diagnoses and all orders for this visit:    1. Opioid type dependence, continuous (Primary)  -     buprenorphine-naloxone (SUBOXONE) 8-2 MG per SL tablet; Place 2 tablets under the tongue Daily.  Dispense: 56 tablet; Refill: 0    2. Medication management  -     [facility] Screen    3. Primary insomnia    Other orders  -     QUEtiapine (SEROquel) 25 MG tablet; Take 1 tablet by mouth Every Night.  Dispense: 30 tablet; Refill: 0        Visit Diagnoses:    ICD-10-CM ICD-9-CM   1. Opioid type dependence, continuous  F11.20 304.01   2. Medication management  Z79.899 V58.69   3. Primary insomnia  F51.01 307.42       PLAN:  Safety: No acute safety concerns  Risk Assessment: Risk of self-harm acutely is low. Risk of self-harm chronically is also low, but could be further elevated in the event of treatment noncompliance and/or AODA.    TREATMENT PLAN/GOALS: Continue  supportive psychotherapy efforts and medications as indicated. Treatment and medication options discussed during today's visit. Patient acknowledged and verbally consented to continue with current treatment plan and was educated on the importance of compliance with treatment and follow-up appointments.    MEDICATION ISSUES:  ROMAN reviewed as expected.  Discussed medication options and treatment plan of prescribed medication as well as the risks, benefits, and side effects including potential falls, possible impaired driving and metabolic adversities among others. Patient is agreeable to call the office with any worsening of symptoms or onset of side effects. Patient is agreeable to call 911 or go to the nearest ER should he/she begin having SI/HI. No medication side effects or related complaints today.     MEDS ORDERED DURING VISIT:  New Medications Ordered This Visit   Medications   • buprenorphine-naloxone (SUBOXONE) 8-2 MG per SL tablet     Sig: Place 2 tablets under the tongue Daily.     Dispense:  56 tablet     Refill:  0     NADEAN:[id number]   • QUEtiapine (SEROquel) 25 MG tablet     Sig: Take 1 tablet by mouth Every Night.     Dispense:  30 tablet     Refill:  0       No follow-ups on file.           This document has been electronically signed by RENAN De Dios  September 26, 2023 11:02 EDT      Part of this note may be an electronic transcription/translation of spoken language to printed text using the Dragon Dictation System.

## 2023-10-19 ENCOUNTER — TELEMEDICINE (OUTPATIENT)
Dept: PSYCHIATRY | Facility: CLINIC | Age: 33
End: 2023-10-19
Payer: COMMERCIAL

## 2023-10-19 VITALS
HEIGHT: 77 IN | BODY MASS INDEX: 26.09 KG/M2 | WEIGHT: 221 LBS | HEART RATE: 87 BPM | SYSTOLIC BLOOD PRESSURE: 133 MMHG | DIASTOLIC BLOOD PRESSURE: 92 MMHG

## 2023-10-19 DIAGNOSIS — F11.20 OPIOID TYPE DEPENDENCE, CONTINUOUS: Primary | ICD-10-CM

## 2023-10-19 DIAGNOSIS — Z79.899 MEDICATION MANAGEMENT: ICD-10-CM

## 2023-10-19 DIAGNOSIS — F51.01 PRIMARY INSOMNIA: ICD-10-CM

## 2023-10-19 RX ORDER — BUPRENORPHINE HYDROCHLORIDE AND NALOXONE HYDROCHLORIDE DIHYDRATE 8; 2 MG/1; MG/1
2 TABLET SUBLINGUAL DAILY
Qty: 56 TABLET | Refills: 0 | Status: SHIPPED | OUTPATIENT
Start: 2023-10-19

## 2023-10-19 RX ORDER — QUETIAPINE FUMARATE 25 MG/1
25 TABLET, FILM COATED ORAL NIGHTLY
Qty: 30 TABLET | Refills: 0 | Status: SHIPPED | OUTPATIENT
Start: 2023-10-19

## 2023-10-19 NOTE — PROGRESS NOTES
This provider is located at [facility]. The Patient is seen remotely located at the [facility] ([facility]) using Video. Patient is being seen via telehealth and confirm that they are in a secure environment for this session. The patient's condition being diagnosed/treated is appropriate for telemedicine. Provider identified as Daniel Olivas as well as credentials APRN MSN FNP-JUSTIN ALVAREZ-REYNOLD.   The client/patient gave consent to be seen remotely, and when consent is given they understand that the consent allows for patient identifiable information to be sent to a third party as needed.   They may refuse to be seen remotely at any time. The electronic data is encrypted and password protected, and the patient has been advised of the potential risks to privacy not withstanding such measures.    Chief Complaint/History of Present Illness: Follow Up buprenorphine/naloxone Medicated Assisted Treatment for Opiate Use Disorder     Patient/Client Concerns/Updates: Continue Seroquel for insomnia    -Patient reports separation from his wife since last evaluation; patient reports this has been emotionally difficult however patient reports he is tolerating and coping well with his life change  -Expresses satisfaction optimism with therapeutic benefit of Seroquel; patient reports he is sleeping well and reports his mood is overall stable  -Denies concerning depressive exacerbations, no suicidal or homicidal thoughts, no symptoms of psychosis and sleeping well    Triggers (Persons/Places/Things/Events/Thought/Emotions): Recent separation from wife    Cravings/Substance Use: Denies cravings    Relapse Prevention: Counseling    Urine Drug Screen (today's visit) discussed: Positive buprenorphine and positive THC    UDS Confirmation (Most recent/Resulted): Positive buprenorphine/nor-buprenorphine, no concerns for urine tampering otherwise positive THC and gabapentin    Most recent pertinent laboratory studies reviewed:  5/23/2023-hepatic function most notably transaminases above normal limits chronic in nature, we payton wasl monitor as buprenorphine therapy progresses, Onesimo OWENS (PDMP) Reviewed for Current/Active Medications: buprenorphine/naloxone as reviewed today    Past Surgical History:  Past Surgical History:   Procedure Laterality Date   • ENDOSCOPY N/A 5/19/2023    Procedure: ESOPHAGOGASTRODUODENOSCOPY WITH NJ TUBE PLACEMENT;  Surgeon: Marguerite Blanco MD;  Location: [facility] ENDOSCOPY;  Service: Gastroenterology;  Laterality: N/A;  2 bands placed in esophagus.   • NO PAST SURGERIES         Problem List:  Patient Active Problem List   Diagnosis   • Alcohol-induced acute pancreatitis   • Pancreatitis   • Chronic pain of right knee   • Hepatorenal failure   • Cirrhosis of liver without ascites   • Anemia       Allergy:   No Known Allergies     Current Medications:   Current Outpatient Medications   Medication Sig Dispense Refill   • buprenorphine-naloxone (SUBOXONE) 8-2 MG per SL tablet Place 2 tablets under the tongue Daily. 56 tablet 0   • folic acid (FOLVITE) 1 MG tablet Take 1 tablet by mouth Daily. as directed     • furosemide (Lasix) 40 MG tablet Take 1 tablet by mouth Daily. 60 tablet 3   • hydrOXYzine pamoate (Vistaril) 50 MG capsule Take 1 capsule by mouth 4 (Four) Times a Day As Needed for Anxiety (and/or insomia). Take 1 to 2 tablets as needed for anxiety every 6 hours 90 capsule 0   • lactulose (CHRONULAC) 10 GM/15ML solution TAKE 45MLS BY MOUTH EVERY NIGHT AT BEDTIME     • ondansetron (ZOFRAN) 8 MG tablet Take 1 tablet by mouth Every 8 (Eight) Hours As Needed for Nausea or Vomiting. 45 tablet 0   • QUEtiapine (SEROquel) 25 MG tablet Take 1 tablet by mouth Every Night. 30 tablet 0   • spironolactone (Aldactone) 25 MG tablet Take 1 tablet by mouth Daily. 30 tablet 2   • Xifaxan 550 MG tablet TAKE ONE TABLET BY MOUTH TWO TIMES PER DAY AS DIRECTED     • naloxone (NARCAN) 4 MG/0.1ML nasal spray 1 spray into the  nostril(s) as directed by provider As Needed (Opiate oversedation). Spray in 1 nostril every 2 to 3 minutes call 911 (Patient not taking: Reported on 10/19/2023) 2 each 2     No current facility-administered medications for this visit.       Past Medical History:  Past Medical History:   Diagnosis Date   • Alcohol-induced acute pancreatitis 06/27/2019   • Alcoholism    • Cirrhosis    • Drug use     Amphetamines and Suboxone   • Fatty liver    • Hepatitis C antibody test positive 2019   • Medical non-compliance    • Pancreatitis    • Smoker          Social History     Socioeconomic History   • Marital status:    Tobacco Use   • Smoking status: Every Day     Packs/day: .5     Types: Cigarettes   • Smokeless tobacco: Never   Vaping Use   • Vaping Use: Never used   Substance and Sexual Activity   • Alcohol use: Yes     Alcohol/week: 8.0 standard drinks of alcohol     Types: 8 Shots of liquor per week     Comment: 5-6 double shots   • Drug use: Yes     Comment: suboxone as prescribed   • Sexual activity: Defer         Family History   Problem Relation Age of Onset   • Cancer Maternal Grandmother    • No Known Problems Mother    • No Known Problems Father          Mental Status Exam:   Hygiene:   good  Cooperation:  Cooperative  Eye Contact:  Good  Psychomotor Behavior:  Appropriate  Affect:  Appropriate  Mood: normal  Speech:  Normal  Thought Process:  Goal directed  Thought Content:  Normal  Suicidal:  None  Homicidal:  None  Hallucinations:  None  Delusion:  None  Memory:  Intact  Orientation:  Grossly intact  Reliability:  good  Insight:  Good  Judgement:  Good  Impulse Control:  Good         Review of Systems:  Review of Systems   Constitutional:  Negative for activity change, chills, diaphoresis and fatigue.   Respiratory:  Negative for apnea, cough and shortness of breath.    Cardiovascular:  Negative for chest pain, palpitations and leg swelling.   Gastrointestinal:  Negative for abdominal pain,  "constipation, diarrhea, nausea and vomiting.   Genitourinary:  Negative for difficulty urinating.   Musculoskeletal:  Negative for arthralgias.   Skin:  Negative for rash.   Neurological:  Negative for dizziness, weakness and headaches.   Psychiatric/Behavioral:  Positive for sleep disturbance. Negative for agitation, self-injury and suicidal ideas. The patient is not nervous/anxious.        Physical Exam:  Physical Exam  Vitals reviewed.   Constitutional:       General: He is not in acute distress.     Appearance: Normal appearance. He is not ill-appearing or toxic-appearing.   Pulmonary:      Effort: Pulmonary effort is normal.   Musculoskeletal:         General: Normal range of motion.   Neurological:      General: No focal deficit present.      Mental Status: He is alert and oriented to person, place, and time.   Psychiatric:         Attention and Perception: Attention and perception normal.         Mood and Affect: Mood normal. Mood is not anxious or depressed.         Speech: Speech normal.         Behavior: Behavior normal. Behavior is cooperative.         Thought Content: Thought content normal.         Cognition and Memory: Cognition and memory normal.         Judgment: Judgment normal.     Vital Signs:   /92   Pulse 87   Ht 195.6 cm (77.01\")   Wt 100 kg (221 lb)   BMI 26.20 kg/m²      Lab Results:   Telemedicine on 10/19/2023   Component Date Value Ref Range Status   • External Amphetamine Screen Urine 10/19/2023 Negative   Final   • External Benzodiazepine Screen Uri* 10/19/2023 Negative   Final   • External Cocaine Screen Urine 10/19/2023 Negative   Final   • External THC Screen Urine 10/19/2023 Positive (A)   Final   • External Methadone Screen Urine 10/19/2023 Negative   Final   • External Methamphetamine Screen Ur* 10/19/2023 Negative   Final   • External Oxycodone Screen Urine 10/19/2023 Negative   Final   • External Buprenorphine Screen Urine 10/19/2023 Positive (A)   Final   • External " MDMA 10/19/2023 Negative   Final   • External Opiates Screen Urine 10/19/2023 Negative   Final   Telemedicine on 09/26/2023   Component Date Value Ref Range Status   • External Amphetamine Screen Urine 09/26/2023 Negative   Final   • External Benzodiazepine Screen Uri* 09/26/2023 Negative   Final   • External Cocaine Screen Urine 09/26/2023 Negative   Final   • External THC Screen Urine 09/26/2023 Positive (A)   Final   • External Methadone Screen Urine 09/26/2023 Negative   Final   • External Methamphetamine Screen Ur* 09/26/2023 Negative   Final   • External Oxycodone Screen Urine 09/26/2023 Negative   Final   • External Buprenorphine Screen Urine 09/26/2023 Positive (A)   Final   • External MDMA 09/26/2023 Negative   Final   • External Opiates Screen Urine 09/26/2023 Negative   Final   Telemedicine on 08/23/2023   Component Date Value Ref Range Status   • External Amphetamine Screen Urine 08/23/2023 Negative   Final   • External Benzodiazepine Screen Uri* 08/23/2023 Negative   Final   • External Cocaine Screen Urine 08/23/2023 Negative   Final   • External THC Screen Urine 08/23/2023 Positive (A)   Final   • External Methadone Screen Urine 08/23/2023 Negative   Final   • External Methamphetamine Screen Ur* 08/23/2023 Negative   Final   • External Oxycodone Screen Urine 08/23/2023 Negative   Final   • External Buprenorphine Screen Urine 08/23/2023 Positive (A)   Final   • External MDMA 08/23/2023 Negative   Final   • External Opiates Screen Urine 08/23/2023 Negative   Final   Orders Only on 06/02/2023   Component Date Value Ref Range Status   • External Amphetamine Screen Urine 06/02/2023 Negative   Final   • External Benzodiazepine Screen Uri* 06/02/2023 Negative   Final   • External Cocaine Screen Urine 06/02/2023 Negative   Final   • External THC Screen Urine 06/02/2023 Positive (A)   Final   • External Methadone Screen Urine 06/02/2023 Negative   Final   • External Methamphetamine Screen Ur* 06/02/2023  Negative   Final   • External Oxycodone Screen Urine 06/02/2023 Negative   Final   • External Buprenorphine Screen Urine 06/02/2023 Positive (A)   Final   • External MDMA 06/02/2023 Negative   Final   • External Opiates Screen Urine 06/02/2023 Negative   Final   No results displayed because visit has over 200 results.      Admission on 05/17/2023, Discharged on 05/18/2023   Component Date Value Ref Range Status   • Glucose 05/17/2023 111 (H)  65 - 99 mg/dL Final   • BUN 05/17/2023 20  6 - 20 mg/dL Final   • Creatinine 05/17/2023 2.59 (H)  0.76 - 1.27 mg/dL Final   • Sodium 05/17/2023 131 (L)  136 - 145 mmol/L Final   • Potassium 05/17/2023 3.9  3.5 - 5.2 mmol/L Final   • Chloride 05/17/2023 103  98 - 107 mmol/L Final   • CO2 05/17/2023 18.5 (L)  22.0 - 29.0 mmol/L Final   • Calcium 05/17/2023 8.3 (L)  8.6 - 10.5 mg/dL Final   • Total Protein 05/17/2023 6.3  6.0 - 8.5 g/dL Final   • Albumin 05/17/2023 2.0 (L)  3.5 - 5.2 g/dL Final   • ALT (SGPT) 05/17/2023 24  1 - 41 U/L Final   • AST (SGOT) 05/17/2023 120 (H)  1 - 40 U/L Final   • Alkaline Phosphatase 05/17/2023 277 (H)  39 - 117 U/L Final   • Total Bilirubin 05/17/2023 14.9 (H)  0.0 - 1.2 mg/dL Final   • Globulin 05/17/2023 4.3  gm/dL Final   • A/G Ratio 05/17/2023 0.5  g/dL Final   • BUN/Creatinine Ratio 05/17/2023 7.7  7.0 - 25.0 Final   • Anion Gap 05/17/2023 9.5  5.0 - 15.0 mmol/L Final   • eGFR 05/17/2023 32.7 (L)  >60.0 mL/min/1.73 Final   • Lipase 05/17/2023 28  13 - 60 U/L Final   • Color, UA 05/17/2023 Dark Yellow (A)  Yellow, Straw Final   • Appearance, UA 05/17/2023 Turbid (A)  Clear Final   • pH, UA 05/17/2023 5.5  5.0 - 8.0 Final   • Specific Gravity, UA 05/17/2023 1.023  1.005 - 1.030 Final   • Glucose, UA 05/17/2023 Negative  Negative Final   • Ketones, UA 05/17/2023 Negative  Negative Final   • Bilirubin, UA 05/17/2023 Large (3+) (A)  Negative Final   • Blood, UA 05/17/2023 Trace (A)  Negative Final   • Protein, UA 05/17/2023 100 mg/dL (2+) (A)   Negative Final   • Leuk Esterase, UA 05/17/2023 Moderate (2+) (A)  Negative Final   • Nitrite, UA 05/17/2023 Positive (A)  Negative Final   • Urobilinogen, UA 05/17/2023 1.0 E.U./dL  0.2 - 1.0 E.U./dL Final   • THC, Screen, Urine 05/17/2023 Positive (A)  Negative Final   • Phencyclidine (PCP), Urine 05/17/2023 Negative  Negative Final   • Cocaine Screen, Urine 05/17/2023 Negative  Negative Final   • Methamphetamine, Ur 05/17/2023 Negative  Negative Final   • Opiate Screen 05/17/2023 Negative  Negative Final   • Amphetamine Screen, Urine 05/17/2023 Negative  Negative Final   • Benzodiazepine Screen, Urine 05/17/2023 Negative  Negative Final   • Tricyclic Antidepressants Screen 05/17/2023 Positive (A)  Negative Final   • Methadone Screen, Urine 05/17/2023 Negative  Negative Final   • Barbiturates Screen, Urine 05/17/2023 Negative  Negative Final   • Oxycodone Screen, Urine 05/17/2023 Negative  Negative Final   • Propoxyphene Screen 05/17/2023 Negative  Negative Final   • Buprenorphine, Screen, Urine 05/17/2023 Positive (A)  Negative Final   • WBC 05/17/2023 6.02  3.40 - 10.80 10*3/mm3 Final   • RBC 05/17/2023 1.99 (L)  4.14 - 5.80 10*6/mm3 Final   • Hemoglobin 05/17/2023 7.3 (L)  13.0 - 17.7 g/dL Final   • Hematocrit 05/17/2023 21.9 (L)  37.5 - 51.0 % Final   • MCV 05/17/2023 110.1 (H)  79.0 - 97.0 fL Final   • MCH 05/17/2023 36.7 (H)  26.6 - 33.0 pg Final   • MCHC 05/17/2023 33.3  31.5 - 35.7 g/dL Final   • RDW 05/17/2023 19.1 (H)  12.3 - 15.4 % Final   • RDW-SD 05/17/2023 75.8 (H)  37.0 - 54.0 fl Final   • MPV 05/17/2023 10.5  6.0 - 12.0 fL Final   • Platelets 05/17/2023 104 (L)  140 - 450 10*3/mm3 Final   • Hepatitis B Surface Ag 05/17/2023 Non-Reactive  Non-Reactive Final   • Hep A IgM 05/17/2023 Non-Reactive  Non-Reactive Final   • Hep B C IgM 05/17/2023 Non-Reactive  Non-Reactive Final   • Hepatitis C Ab 05/17/2023 Reactive (A)  Non-Reactive Final   • Ethanol 05/17/2023 16 (H)  0 - 10 mg/dL Final   • Ethanol %  05/17/2023 0.016  % Final   • Magnesium 05/17/2023 1.7  1.6 - 2.6 mg/dL Final   • Total Bilirubin 05/17/2023 14.0 (H)  0.0 - 1.2 mg/dL Final   • Bilirubin, Direct 05/17/2023 >10.0 (H)  0.0 - 0.3 mg/dL Final   • Bilirubin, Indirect 05/17/2023    Final    Unable to calculate   • Neutrophil % 05/17/2023 51.0  42.7 - 76.0 % Final   • Lymphocyte % 05/17/2023 28.0  19.6 - 45.3 % Final   • Monocyte % 05/17/2023 6.0  5.0 - 12.0 % Final   • Eosinophil % 05/17/2023 1.0  0.3 - 6.2 % Final   • Bands %  05/17/2023 10.0 (H)  0.0 - 5.0 % Final   • Metamyelocyte % 05/17/2023 1.0 (H)  0.0 - 0.0 % Final   • Myelocyte % 05/17/2023 3.0 (H)  0.0 - 0.0 % Final   • Neutrophils Absolute 05/17/2023 3.67  1.70 - 7.00 10*3/mm3 Final   • Lymphocytes Absolute 05/17/2023 1.69  0.70 - 3.10 10*3/mm3 Final   • Monocytes Absolute 05/17/2023 0.36  0.10 - 0.90 10*3/mm3 Final   • Eosinophils Absolute 05/17/2023 0.06  0.00 - 0.40 10*3/mm3 Final   • Anisocytosis 05/17/2023 Large/3+  None Seen Final   • Macrocytes 05/17/2023 Slight/1+  None Seen Final   • Target Cells 05/17/2023 Slight/1+  None Seen Final   • Platelet Morphology 05/17/2023 Normal  Normal Final   • COVID19 05/17/2023 Not Detected  Not Detected - Ref. Range Final   • Influenza A PCR 05/17/2023 Not Detected  Not Detected Final   • Influenza B PCR 05/17/2023 Not Detected  Not Detected Final   • Ammonia 05/17/2023 142 (H)  16 - 60 umol/L Final   • Protime 05/17/2023 19.2 (H)  12.1 - 14.7 Seconds Final   • INR 05/17/2023 1.55 (H)  0.90 - 1.10 Final   • PTT 05/17/2023 44.2 (H)  26.5 - 34.5 seconds Final   • Amylase 05/17/2023 18 (L)  28 - 100 U/L Final   • C-Reactive Protein 05/17/2023 5.55 (H)  0.00 - 0.50 mg/dL Final   • RBC, UA 05/17/2023 6-12 (A)  None Seen, 0-2 /HPF Final   • WBC, UA 05/17/2023 Too Numerous to Count (A)  None Seen, 0-2 /HPF Final   • Bacteria, UA 05/17/2023 4+ (A)  None Seen /HPF Final   • Squamous Epithelial Cells, UA 05/17/2023 0-2  None Seen, 0-2 /HPF Final   • Hyaline  Casts, UA 05/17/2023 3-6  None Seen /LPF Final   • Granular Casts, UA 05/17/2023 3-6  None Seen /LPF Final   • Methodology 05/17/2023 Manual Light Microscopy   Final   • Blood Culture 05/17/2023 No growth at 5 days   Final   • Blood Culture 05/17/2023 No growth at 5 days   Final   • Procalcitonin 05/17/2023 0.64 (H)  0.00 - 0.25 ng/mL Final   • Lactate 05/17/2023 2.9 (C)  0.5 - 2.0 mmol/L Final   • proBNP 05/17/2023 337.6  0.0 - 450.0 pg/mL Final   • Urine Culture 05/17/2023 >100,000 CFU/mL Klebsiella oxytoca (A)   Final   • Lactate 05/18/2023 2.7 (C)  0.5 - 2.0 mmol/L Final   Telemedicine on 05/02/2023   Component Date Value Ref Range Status   • External Amphetamine Screen Urine 05/02/2023 Negative   Final   • External Benzodiazepine Screen Uri* 05/02/2023 Negative   Final   • External Cocaine Screen Urine 05/02/2023 Negative   Final   • External THC Screen Urine 05/02/2023 Positive (A)   Final   • External Methadone Screen Urine 05/02/2023 Negative   Final   • External Methamphetamine Screen Ur* 05/02/2023 Negative   Final   • External Oxycodone Screen Urine 05/02/2023 Negative   Final   • External Buprenorphine Screen Urine 05/02/2023 Positive (A)   Final   • External MDMA 05/02/2023 Negative   Final   • External Opiates Screen Urine 05/02/2023 Negative   Final         Assessment & Plan   Diagnoses and all orders for this visit:    1. Opioid type dependence, continuous (Primary)  -     buprenorphine-naloxone (SUBOXONE) 8-2 MG per SL tablet; Place 2 tablets under the tongue Daily.  Dispense: 56 tablet; Refill: 0    2. Medication management  -     [facility] Screen    3. Primary insomnia  -     QUEtiapine (SEROquel) 25 MG tablet; Take 1 tablet by mouth Every Night.  Dispense: 30 tablet; Refill: 0        Visit Diagnoses:    ICD-10-CM ICD-9-CM   1. Opioid type dependence, continuous  F11.20 304.01   2. Medication management  Z79.899 V58.69   3. Primary insomnia  F51.01 307.42       PLAN:  Safety: No acute safety  concerns  Risk Assessment: Risk of self-harm acutely is low. Risk of self-harm chronically is also low, but could be further elevated in the event of treatment noncompliance and/or AODA.    TREATMENT PLAN/GOALS: Continue supportive psychotherapy efforts and medications as indicated. Treatment and medication options discussed during today's visit. Patient acknowledged and verbally consented to continue with current treatment plan and was educated on the importance of compliance with treatment and follow-up appointments.    MEDICATION ISSUES:  ROMAN reviewed as expected.  Discussed medication options and treatment plan of prescribed medication as well as the risks, benefits, and side effects including potential falls, possible impaired driving and metabolic adversities among others. Patient is agreeable to call the office with any worsening of symptoms or onset of side effects. Patient is agreeable to call 911 or go to the nearest ER should he/she begin having SI/HI. No medication side effects or related complaints today.     MEDS ORDERED DURING VISIT:  New Medications Ordered This Visit   Medications   • buprenorphine-naloxone (SUBOXONE) 8-2 MG per SL tablet     Sig: Place 2 tablets under the tongue Daily.     Dispense:  56 tablet     Refill:  0     NADEAN:[id number]   • QUEtiapine (SEROquel) 25 MG tablet     Sig: Take 1 tablet by mouth Every Night.     Dispense:  30 tablet     Refill:  0       No follow-ups on file.           This document has been electronically signed by RENAN De Dios  October 19, 2023 10:25 EDT      Part of this note may be an electronic transcription/translation of spoken language to printed text using the Dragon Dictation System.

## 2023-11-16 ENCOUNTER — TELEMEDICINE (OUTPATIENT)
Dept: PSYCHIATRY | Facility: CLINIC | Age: 33
End: 2023-11-16
Payer: COMMERCIAL

## 2023-11-16 VITALS
SYSTOLIC BLOOD PRESSURE: 147 MMHG | HEIGHT: 77 IN | WEIGHT: 224 LBS | BODY MASS INDEX: 26.45 KG/M2 | DIASTOLIC BLOOD PRESSURE: 92 MMHG | HEART RATE: 82 BPM

## 2023-11-16 DIAGNOSIS — Z79.899 MEDICATION MANAGEMENT: Primary | ICD-10-CM

## 2023-11-16 DIAGNOSIS — F11.20 OPIOID TYPE DEPENDENCE, CONTINUOUS: ICD-10-CM

## 2023-11-16 DIAGNOSIS — F51.01 PRIMARY INSOMNIA: ICD-10-CM

## 2023-11-16 RX ORDER — BUPRENORPHINE HYDROCHLORIDE AND NALOXONE HYDROCHLORIDE DIHYDRATE 8; 2 MG/1; MG/1
2 TABLET SUBLINGUAL DAILY
Qty: 56 TABLET | Refills: 0 | Status: SHIPPED | OUTPATIENT
Start: 2023-11-16

## 2023-11-16 RX ORDER — QUETIAPINE FUMARATE 25 MG/1
25 TABLET, FILM COATED ORAL NIGHTLY
Qty: 30 TABLET | Refills: 0 | Status: SHIPPED | OUTPATIENT
Start: 2023-11-16

## 2023-11-16 NOTE — TELEPHONE ENCOUNTER
Franck came in early for his appointment but could not wait or come back after 3. He said he needed to get back to work. Franck wants to know if there is anyway you send in a prescription for Suboxone, Seroquel and Ibuprofen? His [facility] results from today are in Epic. Thank you.

## 2024-01-09 ENCOUNTER — TELEMEDICINE (OUTPATIENT)
Dept: PSYCHIATRY | Facility: CLINIC | Age: 34
End: 2024-01-09
Payer: COMMERCIAL

## 2024-01-09 VITALS
HEART RATE: 65 BPM | HEIGHT: 77 IN | WEIGHT: 238.8 LBS | DIASTOLIC BLOOD PRESSURE: 81 MMHG | SYSTOLIC BLOOD PRESSURE: 125 MMHG | BODY MASS INDEX: 28.2 KG/M2

## 2024-01-09 DIAGNOSIS — F11.20 OPIOID TYPE DEPENDENCE, CONTINUOUS: Primary | ICD-10-CM

## 2024-01-09 DIAGNOSIS — Z79.899 MEDICATION MANAGEMENT: ICD-10-CM

## 2024-01-09 DIAGNOSIS — F51.01 PRIMARY INSOMNIA: ICD-10-CM

## 2024-01-09 PROCEDURE — 99214 OFFICE O/P EST MOD 30 MIN: CPT | Performed by: NURSE PRACTITIONER

## 2024-01-09 PROCEDURE — 1159F MED LIST DOCD IN RCRD: CPT | Performed by: NURSE PRACTITIONER

## 2024-01-09 PROCEDURE — 1160F RVW MEDS BY RX/DR IN RCRD: CPT | Performed by: NURSE PRACTITIONER

## 2024-01-09 RX ORDER — QUETIAPINE FUMARATE 25 MG/1
25 TABLET, FILM COATED ORAL NIGHTLY
Qty: 30 TABLET | Refills: 0 | Status: SHIPPED | OUTPATIENT
Start: 2024-01-09

## 2024-01-09 RX ORDER — FOLIC ACID 1 MG/1
1000 TABLET ORAL DAILY
Qty: 30 TABLET | Refills: 2 | Status: SHIPPED | OUTPATIENT
Start: 2024-01-09

## 2024-01-09 RX ORDER — BUPRENORPHINE HYDROCHLORIDE AND NALOXONE HYDROCHLORIDE DIHYDRATE 8; 2 MG/1; MG/1
2 TABLET SUBLINGUAL DAILY
Qty: 56 TABLET | Refills: 0 | Status: SHIPPED | OUTPATIENT
Start: 2024-01-09

## 2024-01-09 NOTE — PROGRESS NOTES
This provider is located at [facility]. The Patient is seen remotely located at the [facility] ([facility]) using Video. Patient is being seen via telehealth and confirm that they are in a secure environment for this session. The patient's condition being diagnosed/treated is appropriate for telemedicine. Provider identified as Daniel Olivas as well as credentials APRN MSN FNP-C ANTONIO-REYNOLD.   The client/patient gave consent to be seen remotely, and when consent is given they understand that the consent allows for patient identifiable information to be sent to a third party as needed.   They may refuse to be seen remotely at any time. The electronic data is encrypted and password protected, and the patient has been advised of the potential risks to privacy not withstanding such measures.    Chief Complaint/History of Present Illness: Follow Up buprenorphine/naloxone Medicated Assisted Treatment for Opiate Use Disorder     Patient/Client Concerns/Updates: Continue Seroquel for insomnia   -Patient reports he is doing well and has no acute concerns today  -Reports mood is stable with no depressive or anxious concerns, no suicidal or homicidal thoughts  -Discussed positive alcohol screen most recent definitive urinalysis; patient reports this was a one-time use related to a sporting event he attended with his father; patient's present understanding alcohol is dangerous taking concurrently with buprenorphine and patient reports he has not consumed alcohol after that 1 incident    Triggers (Persons/Places/Things/Events/Thought/Emotions): Insomnia    Cravings/Substance Use: Denies cravings or use of illicit substances    Relapse Prevention: Counseling    Urine Drug Screen (today's visit) discussed: Positive buprenorphine, otherwise negative for substances tested    UDS Confirmation (Most recent/Resulted): Positive buprenorphine/nor-buprenorphine, no concerns for urine tampering otherwise positive THC and  positive alcohol screen    Most recent pertinent laboratory studies reviewed: 5/23/2023-hepatic function most notably transaminases above normal limits chronic in nature, we payton wasl monitor as buprenorphine therapy progresses, Onesimo OWENS (PDMP) Reviewed for Current/Active Medications: buprenorphine/naloxone as reviewed today    Past Surgical History:  Past Surgical History:   Procedure Laterality Date   • ENDOSCOPY N/A 5/19/2023    Procedure: ESOPHAGOGASTRODUODENOSCOPY WITH NJ TUBE PLACEMENT;  Surgeon: Marguerite Blanco MD;  Location: [facility] ENDOSCOPY;  Service: Gastroenterology;  Laterality: N/A;  2 bands placed in esophagus.   • NO PAST SURGERIES         Problem List:  Patient Active Problem List   Diagnosis   • Alcohol-induced acute pancreatitis   • Pancreatitis   • Chronic pain of right knee   • Hepatorenal failure   • Cirrhosis of liver without ascites   • Anemia       Allergy:   No Known Allergies     Current Medications:   Current Outpatient Medications   Medication Sig Dispense Refill   • buprenorphine-naloxone (SUBOXONE) 8-2 MG per SL tablet Place 2 tablets under the tongue Daily. 56 tablet 0   • folic acid (FOLVITE) 1 MG tablet Take 1 tablet by mouth Daily. as directed     • furosemide (Lasix) 40 MG tablet Take 1 tablet by mouth Daily. 60 tablet 3   • hydrOXYzine pamoate (Vistaril) 50 MG capsule Take 1 capsule by mouth 4 (Four) Times a Day As Needed for Anxiety (and/or insomia). Take 1 to 2 tablets as needed for anxiety every 6 hours 90 capsule 0   • lactulose (CHRONULAC) 10 GM/15ML solution TAKE 45MLS BY MOUTH EVERY NIGHT AT BEDTIME     • ondansetron (ZOFRAN) 8 MG tablet Take 1 tablet by mouth Every 8 (Eight) Hours As Needed for Nausea or Vomiting. 45 tablet 0   • QUEtiapine (SEROquel) 25 MG tablet Take 1 tablet by mouth Every Night. 30 tablet 0   • spironolactone (Aldactone) 25 MG tablet Take 1 tablet by mouth Daily. 30 tablet 2   • Xifaxan 550 MG tablet TAKE ONE TABLET BY MOUTH TWO TIMES PER DAY  AS DIRECTED     • naloxone (NARCAN) 4 MG/0.1ML nasal spray 1 spray into the nostril(s) as directed by provider As Needed (Opiate oversedation). Spray in 1 nostril every 2 to 3 minutes call 911 (Patient not taking: Reported on 1/9/2024) 2 each 2     No current facility-administered medications for this visit.       Past Medical History:  Past Medical History:   Diagnosis Date   • Alcohol-induced acute pancreatitis 06/27/2019   • Alcoholism    • Cirrhosis    • Drug use     Amphetamines and Suboxone   • Fatty liver    • Hepatitis C antibody test positive 2019   • Medical non-compliance    • Pancreatitis    • Smoker          Social History     Socioeconomic History   • Marital status:    Tobacco Use   • Smoking status: Every Day     Packs/day: .5     Types: Cigarettes   • Smokeless tobacco: Never   Vaping Use   • Vaping Use: Never used   Substance and Sexual Activity   • Alcohol use: Yes     Alcohol/week: 8.0 standard drinks of alcohol     Types: 8 Shots of liquor per week     Comment: 5-6 double shots   • Drug use: Yes     Comment: suboxone as prescribed   • Sexual activity: Defer         Family History   Problem Relation Age of Onset   • Cancer Maternal Grandmother    • No Known Problems Mother    • No Known Problems Father          Mental Status Exam:   Hygiene:   good  Cooperation:  Cooperative  Eye Contact:  Good  Psychomotor Behavior:  Appropriate  Affect:  Appropriate  Mood: normal  Speech:  Normal  Thought Process:  Goal directed  Thought Content:  Normal  Suicidal:  None  Homicidal:  None  Hallucinations:  None  Delusion:  None  Memory:  Intact  Orientation:  Grossly intact  Reliability:  good  Insight:  Good  Judgement:  Good  Impulse Control:  Good         Review of Systems:  Review of Systems   Constitutional:  Negative for activity change, chills, diaphoresis and fatigue.   Respiratory:  Negative for apnea, cough and shortness of breath.    Cardiovascular:  Negative for chest pain, palpitations and  "leg swelling.   Gastrointestinal:  Negative for abdominal pain, constipation, diarrhea, nausea and vomiting.   Genitourinary:  Negative for difficulty urinating.   Musculoskeletal:  Negative for arthralgias.   Skin:  Negative for rash.   Neurological:  Negative for dizziness, weakness and headaches.   Psychiatric/Behavioral:  Positive for sleep disturbance. Negative for agitation, self-injury and suicidal ideas. The patient is not nervous/anxious.        Physical Exam:  Physical Exam  Vitals reviewed.   Constitutional:       General: He is not in acute distress.     Appearance: Normal appearance. He is not ill-appearing or toxic-appearing.   Pulmonary:      Effort: Pulmonary effort is normal.   Musculoskeletal:         General: Normal range of motion.   Neurological:      General: No focal deficit present.      Mental Status: He is alert and oriented to person, place, and time.   Psychiatric:         Attention and Perception: Attention and perception normal.         Mood and Affect: Mood normal. Mood is not anxious or depressed.         Speech: Speech normal.         Behavior: Behavior normal. Behavior is cooperative.         Thought Content: Thought content normal.         Cognition and Memory: Cognition and memory normal.         Judgment: Judgment normal.     Vital Signs:   /81   Pulse 65   Ht 195.6 cm (77.01\")   Wt 108 kg (238 lb 12.8 oz)   BMI 28.31 kg/m²      Lab Results:   Telemedicine on 01/09/2024   Component Date Value Ref Range Status   • External Amphetamine Screen Urine 01/09/2024 Negative   Final   • External Benzodiazepine Screen Uri* 01/09/2024 Negative   Final   • External Cocaine Screen Urine 01/09/2024 Negative   Final   • External THC Screen Urine 01/09/2024 Negative   Final   • External Methadone Screen Urine 01/09/2024 Negative   Final   • External Methamphetamine Screen Ur* 01/09/2024 Negative   Final   • External Oxycodone Screen Urine 01/09/2024 Negative   Final   • External " Buprenorphine Screen Urine 01/09/2024 Positive (A)   Final   • External MDMA 01/09/2024 Negative   Final   • External Opiates Screen Urine 01/09/2024 Negative   Final   Telemedicine on 10/19/2023   Component Date Value Ref Range Status   • External Amphetamine Screen Urine 10/19/2023 Negative   Final   • External Benzodiazepine Screen Uri* 10/19/2023 Negative   Final   • External Cocaine Screen Urine 10/19/2023 Negative   Final   • External THC Screen Urine 10/19/2023 Positive (A)   Final   • External Methadone Screen Urine 10/19/2023 Negative   Final   • External Methamphetamine Screen Ur* 10/19/2023 Negative   Final   • External Oxycodone Screen Urine 10/19/2023 Negative   Final   • External Buprenorphine Screen Urine 10/19/2023 Positive (A)   Final   • External MDMA 10/19/2023 Negative   Final   • External Opiates Screen Urine 10/19/2023 Negative   Final   Telemedicine on 09/26/2023   Component Date Value Ref Range Status   • External Amphetamine Screen Urine 09/26/2023 Negative   Final   • External Benzodiazepine Screen Uri* 09/26/2023 Negative   Final   • External Cocaine Screen Urine 09/26/2023 Negative   Final   • External THC Screen Urine 09/26/2023 Positive (A)   Final   • External Methadone Screen Urine 09/26/2023 Negative   Final   • External Methamphetamine Screen Ur* 09/26/2023 Negative   Final   • External Oxycodone Screen Urine 09/26/2023 Negative   Final   • External Buprenorphine Screen Urine 09/26/2023 Positive (A)   Final   • External MDMA 09/26/2023 Negative   Final   • External Opiates Screen Urine 09/26/2023 Negative   Final   Telemedicine on 08/23/2023   Component Date Value Ref Range Status   • External Amphetamine Screen Urine 08/23/2023 Negative   Final   • External Benzodiazepine Screen Uri* 08/23/2023 Negative   Final   • External Cocaine Screen Urine 08/23/2023 Negative   Final   • External THC Screen Urine 08/23/2023 Positive (A)   Final   • External Methadone Screen Urine 08/23/2023  Negative   Final   • External Methamphetamine Screen Ur* 08/23/2023 Negative   Final   • External Oxycodone Screen Urine 08/23/2023 Negative   Final   • External Buprenorphine Screen Urine 08/23/2023 Positive (A)   Final   • External MDMA 08/23/2023 Negative   Final   • External Opiates Screen Urine 08/23/2023 Negative   Final         Assessment & Plan   Diagnoses and all orders for this visit:    1. Opioid type dependence, continuous (Primary)  -     buprenorphine-naloxone (SUBOXONE) 8-2 MG per SL tablet; Place 2 tablets under the tongue Daily.  Dispense: 56 tablet; Refill: 0    2. Medication management  -     [facility] Drug Screen  -     folic acid (FOLVITE) 1 MG tablet; Take 1 tablet by mouth Daily. as directed  Dispense: 30 tablet; Refill: 2    3. Primary insomnia  -     QUEtiapine (SEROquel) 25 MG tablet; Take 1 tablet by mouth Every Night.  Dispense: 30 tablet; Refill: 0        Visit Diagnoses:    ICD-10-CM ICD-9-CM   1. Medication management  Z79.899 V58.69       PLAN:  Safety: No acute safety concerns  Risk Assessment: Risk of self-harm acutely is low. Risk of self-harm chronically is also low, but could be further elevated in the event of treatment noncompliance and/or AODA.    TREATMENT PLAN/GOALS: Continue supportive psychotherapy efforts and medications as indicated. Treatment and medication options discussed during today's visit. Patient acknowledged and verbally consented to continue with current treatment plan and was educated on the importance of compliance with treatment and follow-up appointments.    MEDICATION ISSUES:  ROMAN reviewed as expected.  Discussed medication options and treatment plan of prescribed medication as well as the risks, benefits, and side effects including potential falls, possible impaired driving and metabolic adversities among others. Patient is agreeable to call the office with any worsening of symptoms or onset of side effects. Patient is agreeable to call 911 or go to the  nearest ER should he/she begin having SI/HI. No medication side effects or related complaints today.     MEDS ORDERED DURING VISIT:  No orders of the defined types were placed in this encounter.      No follow-ups on file.           This document has been electronically signed by RENAN De Dios  January 9, 2024 09:42 EST      Part of this note may be an electronic transcription/translation of spoken language to printed text using the Dragon Dictation System.

## 2024-02-14 ENCOUNTER — TELEMEDICINE (OUTPATIENT)
Dept: PSYCHIATRY | Facility: CLINIC | Age: 34
End: 2024-02-14
Payer: COMMERCIAL

## 2024-02-14 VITALS
DIASTOLIC BLOOD PRESSURE: 99 MMHG | HEART RATE: 102 BPM | WEIGHT: 230.4 LBS | HEIGHT: 77 IN | SYSTOLIC BLOOD PRESSURE: 150 MMHG | BODY MASS INDEX: 27.2 KG/M2

## 2024-02-14 DIAGNOSIS — F11.20 OPIOID TYPE DEPENDENCE, CONTINUOUS: Primary | ICD-10-CM

## 2024-02-14 DIAGNOSIS — Z79.899 MEDICATION MANAGEMENT: ICD-10-CM

## 2024-02-14 DIAGNOSIS — F51.01 PRIMARY INSOMNIA: ICD-10-CM

## 2024-02-14 RX ORDER — BUPRENORPHINE HYDROCHLORIDE AND NALOXONE HYDROCHLORIDE DIHYDRATE 8; 2 MG/1; MG/1
2 TABLET SUBLINGUAL DAILY
Qty: 56 TABLET | Refills: 0 | Status: SHIPPED | OUTPATIENT
Start: 2024-02-14

## 2024-02-14 RX ORDER — QUETIAPINE FUMARATE 25 MG/1
25 TABLET, FILM COATED ORAL NIGHTLY
Qty: 30 TABLET | Refills: 0 | Status: SHIPPED | OUTPATIENT
Start: 2024-02-14

## 2024-02-14 RX ORDER — PANTOPRAZOLE SODIUM 40 MG/1
40 TABLET, DELAYED RELEASE ORAL DAILY
COMMUNITY
Start: 2024-01-17

## (undated) DEVICE — KT ORCA ORCAPOD DISP STRL

## (undated) DEVICE — THE BITE BLOCK MAXI, LATEX FREE STRAP IS USED TO PROTECT THE ENDOSCOPE INSERTION TUBE FROM BEING BITTEN BY THE PATIENT.

## (undated) DEVICE — INTRO ACCSR BLNT TP

## (undated) DEVICE — CONTN GRAD MEAS TRIANG 32OZ BLK

## (undated) DEVICE — ADAPT CLN LUM OLYMP AIR/H20

## (undated) DEVICE — TUBING,OXYGEN,CRUSH RES,7',CLEAR,UC: Brand: MEDLINE INDUSTRIES, INC.

## (undated) DEVICE — SYR LUERLOK 50ML

## (undated) DEVICE — FIRST STEP BEDSIDE ADD WATER KIT - RESEALABLE STAND-UP POUCH, ENDOSCOPIC CLEANING PAD - 1 POUCH: Brand: FIRST STEP BEDSIDE ADD WATER KIT - RESEALABLE STAND-UP POUCH, ENDOSCOPIC CLEANIN

## (undated) DEVICE — HYBRID CO2 TUBING/CAP SET FOR OLYMPUS® SCOPES & CO2 SOURCE: Brand: ERBE

## (undated) DEVICE — NASO-JEJUNAL FEEDING TUBE: Brand: KANGAROO

## (undated) DEVICE — ENDOGATOR HYBRID TUBING KIT FOR USE WITH ENDOGATOR IRRIGATION PUMP, OLYMPUS PUMP, GI4000 ESU, AND TORRENT IRRIGATION PUMP.: Brand: ENDOGATOR KIT

## (undated) DEVICE — MULTIPLE BAND LIGATOR: Brand: SPEEDBAND SUPERVIEW SUPER 7

## (undated) DEVICE — SAFELINER SUCTION CANISTER 1000CC: Brand: DEROYAL

## (undated) DEVICE — TUBING, SUCTION, 1/4" X 10', STRAIGHT: Brand: MEDLINE

## (undated) DEVICE — LUBE JELLY FOIL PACK 1.4 OZ: Brand: MEDLINE INDUSTRIES, INC.

## (undated) DEVICE — SOL IRR H2O BTL 1000ML STRL

## (undated) DEVICE — SOLIDIFIER LIQ PREMISORB 1500CC